# Patient Record
Sex: MALE | Race: WHITE | NOT HISPANIC OR LATINO | Employment: UNEMPLOYED | URBAN - METROPOLITAN AREA
[De-identification: names, ages, dates, MRNs, and addresses within clinical notes are randomized per-mention and may not be internally consistent; named-entity substitution may affect disease eponyms.]

---

## 2022-06-29 ENCOUNTER — TELEPHONE (OUTPATIENT)
Dept: HEMATOLOGY/ONCOLOGY | Facility: CLINIC | Age: 57
End: 2022-06-29
Payer: MEDICARE

## 2022-06-29 NOTE — TELEPHONE ENCOUNTER
Pt verbalized request for mid-morning visit due to commute from home to [facility]. Pt verbalized agreement  for BMT eval with Dr Pino on 7/14, 1030am

## 2022-06-29 NOTE — LETTER
Fax Transmission   Date:   22      To:     Danielle Gen; pathology                             From:  Eduardo Cevallos, BMT Lay Navigator   Fax:  [phone]                   Fax:      [phone]   Phone:      [phone] Phone:  [phone] (Lay Navigator)               [phone] (Pathology lab)       TIME SENSITIVE - Appointment Scheduled for _____  IF THERE ARE ANY PROBLEMS WITH THIS TRANSMISSION, PLEASE CALL IMMEDIATELY. THANK YOU.    Patient:  ___De Lakhani__     __[birth year]___Diagnosis: _MM_    Specimen ID: ___[id number]____  Date Collected: _22_  Type of Specimen: _________________        Please send the following:   Entire case, including  o All glass slides   o 10 unstained slides of tumor   Pathology reports (to include results of cytogenetic, molecular studies, next gen sequencing, flow cytometry, FISH and other tests, if any)    PLEASE FAX PATH REPORT -[phone] AT THE TIME OF SHIPMENT AS WELL AS SENDING COPY OF REPORT WITH SPECIMEN.    See attached FedEx label to use for shipping, tracking number ___________    Shipping to:  [facility]  [facility] 4th floor  [address] [place] [address]    CONFIDENTIALITY NOTICE: The accompanying facsimile is intended solely for the use of the recipient designated above. Document(s) transmitted herewith may contain information that is confidential and privileged. Delivery, distribution or dissemination of this communication other than to the intended recipient is strictly prohibited. If you have received this facsimile in error, please notify [facility]'s Corporate Integrity Department immediately by telephone at [phone].

## 2022-06-29 NOTE — TELEPHONE ENCOUNTER
----- Message from Deepak Townsend sent at 6/28/2022 11:44 AM CDT -----  Regarding: RE: SCT Benefits check  Good afternoon Malgorzata,    I have verified medical and transplant benefits for patient at [facility].    Thank you  Deepak    ----- Message -----  From: Malgorzata Schwartz RN  Sent: 6/28/2022  10:54 AM CDT  To: Armaan Lares, Deepak Townsend  Subject: SCT Benefits check                               Hello,    Please complete benefit check for patient. Medical benefit check and transplant benefit check for [facility] only.    Auto    Thank you,  Malgorzata

## 2022-06-30 DIAGNOSIS — C90.00 MULTIPLE MYELOMA, REMISSION STATUS UNSPECIFIED: Primary | ICD-10-CM

## 2022-07-06 ENCOUNTER — LAB VISIT (OUTPATIENT)
Dept: LAB | Facility: HOSPITAL | Age: 57
End: 2022-07-06
Payer: MEDICARE

## 2022-07-06 DIAGNOSIS — C90.00 MULTIPLE MYELOMA, REMISSION STATUS UNSPECIFIED: ICD-10-CM

## 2022-07-06 LAB
COMMENT: NORMAL
FINAL PATHOLOGIC DIAGNOSIS: NORMAL
GROSS: NORMAL
Lab: NORMAL
MICROSCOPIC EXAM: NORMAL

## 2022-07-06 PROCEDURE — 88321 CONSLTJ&REPRT SLD PREP ELSWR: CPT | Performed by: PATHOLOGY

## 2022-07-06 PROCEDURE — 88325 PR  COMPREHENSIVE REVIEW OF DATA: ICD-10-PCS | Mod: ,,, | Performed by: PATHOLOGY

## 2022-07-06 PROCEDURE — 88325 CONSLTJ COMPRE RVW REC REPRT: CPT | Performed by: PATHOLOGY

## 2022-07-06 PROCEDURE — 88325 CONSLTJ COMPRE RVW REC REPRT: CPT | Mod: ,,, | Performed by: PATHOLOGY

## 2022-07-14 ENCOUNTER — TELEPHONE (OUTPATIENT)
Dept: HEMATOLOGY/ONCOLOGY | Facility: CLINIC | Age: 57
End: 2022-07-14
Payer: MEDICARE

## 2022-07-14 NOTE — TELEPHONE ENCOUNTER
Spoke with pt regarding his appointments scheduled for 7/14/22. He stated that he admitted at [facility] since 7/7/22 due to hisk idneys. He states that he spoke with April Schwartz regarding his appointments. I sent a secure message to April Schwartz and Carol Pino MD regarding this conversation.

## 2022-07-26 NOTE — PROGRESS NOTES
HEMATOLOGY ONCOLOGY FELLOW CLINIC    HISTORY OF PRESENT ILLNESS:      Mr. De Lakhani is a 57 year old male with hypertension, history of prosthetic joint infection who was referred by Dr. Jonh Bunch for transplant evaluation for high-risk kappa light chain multiple myeloma with 1q gain.     Mr. Lakhani was diagnosed with multiple myeloma in April 2022 after presenting with ASHLEE. 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 He had bone marrow biopsy which showed 100% involvement by plasma cells. He was started on CyBorD on 5/18 and received two cycles of treatment. His bone marrow biopsy after the first cycle showed a decrease in his plasma cell involvement to 80-90%. Dr. Bunch then started DVRd on 7/5/22. However, he was admitted to the hospital on 7/7/22 with septic shock requiring ICU care. He improved with antibiotics but had an ASHLEE thought to be due to hypotension which resulted in renal failure. He is now dialysis dependent. He saw Dr. Bunch after he was discharged from the hospital and started back on DVRd with dose reduced lenalidomide on 7/25/22.     Oncology history:  4/20/22: renal biopsy: light chain cast nephropathy, kappa light chain  4/26/22: right subclavian vein thrombus   4/27/22: M spike 0.2 with free monoclonal kappa band. B2mg 19.57, IgG 496, IgA 10, IgM <5. Kappa 40628, Lambda 7.9, ratio >1000  5/12/22: BMBx: plasma cell myeloma, marrow cellularity 100%, plasma cell percentage 100%. Plasma cell phenotype +, kappa +, CD20- -, CD30-, EMA-, SADAF-, Congo red negative. Peripheral blood with pancytopenia and no circulating blasts. Decreased storage iron. FISH canceled due to quantity not sufficient  5/18/22: CyBorD C1D1  5/23/22: rasburicase  5/24/22: Xgeva  6/6/22: Kappa 62495, lambda 4.0, ratio >1000, M spike 0.1 with free monoclonal kappa band present  6/6/22: CyBorD C2D1  6/9/22: BMBx Plasma cell neoplasm compatible with plasma cell myeloma. Extent of involvement  80-90% of bone marrow elements. Cytology: Plasmablastic. FISH cytogenetics positive for 1q21/CKS1B gain. Karyotype failed. Myelofibrosis diffuse (MF-3)  6/20/22: CyBorD C2D8 delayed due to covid  6/23/22: CyBOrD C2D11  7/5/22: C1D1 DVRd  7/7/22 - 7/18/22: hospital admission for septic shock resulting in renal failure  7/25/22: started again on DVRd    Patient had a left knee prosthetic joint infection in the summer of 2021. In March, still had bacteria in joint space, now on prophylactic doxycycline.   He had a colonoscopy 8 years ago which he thinks was clear.     Past Medical History:   Diagnosis Date    Chronic infection of prosthetic knee, subsequent encounter     Encounter for blood transfusion     Essential (primary) hypertension     Multiple myeloma        History reviewed. No pertinent family history.    Social History     Socioeconomic History    Marital status:    Tobacco Use    Smoking status: Former Smoker    Smokeless tobacco: Never Used    Tobacco comment: quit smoking in the 90s         MEDICATIONS:     Current Outpatient Medications on File Prior to Visit   Medication Sig Dispense Refill    acyclovir (ZOVIRAX) 400 MG tablet 1 tablet      calcium carbonate (TUMS) 200 mg calcium (500 mg) chewable tablet Take 1,500 mg by mouth once daily.      dapsone 100 MG Tab Take 100 mg by mouth once daily.      dexAMETHasone (DECADRON) 4 MG Tab TAKE 10 TABLETS BY MOUTH ON DAYS 1, 8, AND 15 OF A 21 DAY CYCLE TAKE WITH FOOD IN THE MORNING (AFTER BREAKFAST)      doxycycline (VIBRAMYCIN) 100 MG Cap 1 capsule      famotidine (PEPCID) 20 MG tablet Take 20 mg by mouth once daily.      INV CYPROHEPTADINE HCL 1MG/2MG/PLACEBO CAPSULE Take 2 capsules by mouth 2 (two) times daily. FOR INVESTIGATIONAL USE ONLY      mv-mn/iron/folic acid/herb 190 (VITAMIN D3 COMPLETE ORAL) Take by mouth.      ondansetron (ZOFRAN) 4 MG tablet TAKE ONE TABLET BY MOUTH EVERY EIGHT HOURS IF NEEDED FOR NAUSEA.       "oxyCODONE-acetaminophen (PERCOCET) 5-325 mg per tablet TAKE ONE TABLET BY MOUTH EVERY 6 HOURS IF NEEDED FOR SEVERE PAIN      promethazine (PHENERGAN) 12.5 MG Tab TAKE ONE TABLET BY MOUTH EVERY 6 HOURS IF NEEDED FOR NAUSEA 2ND CHOICE      REVLIMID 10 mg Cap       rosuvastatin (CRESTOR) 20 MG tablet Take 20 mg by mouth once daily.      scopolamine (TRANSDERM-SCOP) 1.3-1.5 mg (1 mg over 3 days) APPLY ONE PATCH TO SKIN AS DIRECTED AND REPLACE EVERY 3 DAYS.      sertraline (ZOLOFT) 50 MG tablet Take 50 mg by mouth once daily.      traMADoL (ULTRAM) 50 mg tablet TAKE ONE TABLET BY MOUTH EVERY 6 HOURS IF NEEDED FOR MODERATE PAIN       No current facility-administered medications on file prior to visit.       ALLERGIES: Review of patient's allergies indicates:  No Known Allergies     ROS:       Review of Systems   Constitutional: Negative for chills and fever.   HENT:   Negative for nosebleeds and sore throat.    Respiratory: Negative for cough and shortness of breath.    Cardiovascular: Negative for chest pain and palpitations.   Gastrointestinal: Negative for abdominal pain, diarrhea, nausea and vomiting.   Genitourinary: Negative for dysuria and hematuria.    Musculoskeletal: Negative for back pain and neck pain.   Skin: Negative for rash and wound.   Neurological: Negative for headaches and light-headedness.   Hematological: Negative for adenopathy. Does not bruise/bleed easily.   Psychiatric/Behavioral: Negative for depression. The patient is not nervous/anxious.        PHYSICAL EXAM:  Vitals:    07/28/22 0912   BP: (!) 148/75   Pulse: 70   Resp: 16   SpO2: 100%   Weight: 105.6 kg (232 lb 12.9 oz)   Height: 5' 11" (1.803 m)   PainSc:   8   PainLoc: Knee       Physical Exam  Constitutional:       General: He is not in acute distress.     Appearance: He is well-developed. He is not diaphoretic.   HENT:      Head: Normocephalic and atraumatic.      Mouth/Throat:      Comments: No macroglossia. No dental caries " seen  Eyes:      General: No scleral icterus.     Conjunctiva/sclera: Conjunctivae normal.   Cardiovascular:      Rate and Rhythm: Normal rate and regular rhythm.      Heart sounds: Normal heart sounds. No murmur heard.    No friction rub. No gallop.   Pulmonary:      Effort: Pulmonary effort is normal. No respiratory distress.      Breath sounds: Normal breath sounds. No wheezing or rales.   Abdominal:      General: Bowel sounds are normal. There is no distension.      Palpations: Abdomen is soft.      Tenderness: There is no abdominal tenderness. There is no guarding.   Musculoskeletal:         General: Normal range of motion.      Cervical back: Normal range of motion and neck supple.      Comments: Right chest wall with tunneled dialysis catheter. Dressing clean and intact   Lymphadenopathy:      Cervical: No cervical adenopathy.   Skin:     General: Skin is warm and dry.      Findings: No rash.   Neurological:      Mental Status: He is alert and oriented to person, place, and time.   Psychiatric:         Behavior: Behavior normal.         LAB:   Results for orders placed or performed in visit on 07/06/22   Specimen to Pathology Other   Result Value Ref Range    Final Pathologic Diagnosis       [id number] (collected on 05/12/2022 close  BONE MARROW ASPIRATE, BONE MARROW CLOT, AND BONE MARROW CORE BIOPSY WITH:  CELLULARITY= 100%.  CONSISTENT WITH PLASMA CELL NEOPLASM(>90%).  SEE COMMENT.  CONGO RED NEGATIVE.  STORAGE IRON NOT IDENTIFIED.      Gross       Received 27 outside slides and 2 paraffin blocks labeled as [id number].    Microscopic Exam       CBC DATA  05/12/2022:  RBC:  2.68 M/UL,  H/H :  8.1/24,  MCV :  89.9 FL, WBC:   2.6 K/UL, Platelet:  82 K/UL.  Peripheral blood smear shows pancytopenia.  BONE MARROW ASPIRATE:  No bone marrow spicules are seen.  Scattered plasma cells are noted.  BONE MARROW CLOT:  Inadequate.  No bone marrow spicules are seen.  Stainable iron is not  identified.  BONE MARROW CORE  BIOPSY:  Cellularity is 100 % with mostly plasma cells.  Stainable iron is not  identified.  Occasional megakaryocytes are seen.  Special stain for Congo red  is negative.      Comment       A comprehensive review of records that included laboratory results was  performed to assist in the diagnostic assessment of the case.  -Flow cytometry analysis of bone marrow was canceled due to low cell  viability.  -FISH analysis for plasma cell myeloma panel was canceled due to quantity not  sufficient.  -Immunohistochemical studies were performed by referring institution and  reviewed by [facility] with adequate positive and negative  controls.  More than 90% of plasma cells ( positive, CD3 negative, CD20  negative, PAX5 negative, CD30 negative, CD34 negative,  negative, EMA  negative).  In Situ hybridization and immunohistochemical studies for kappa  and lambda shows kappa light chain restricted plasma cell population.  In  Situ hybridization for EBV is negative.  Findings are consistent with plasma cell neoplasm.  Correlate clinically.      Disclaimer       Unless the case is a 'gross only' or additional testing only, the final  diagnosis for each specimen is based on a microscopic examination of  appropriate tissue sections.         PROBLEMS ASSESSED THIS VISIT:    1. Multiple myeloma not having achieved remission    2. Chronic infection of knee joint prosthesis, subsequent encounter        ASSESSMENT AND PLAN    Multiple myeloma, high-risk, kappa light chain  High risk due to 1q gain  R2-ISS Stage III    - will check CBC, CMP, SPEP, GRACE, FLC, beta 2 microglobulin, LDH, quantitative immunoglobulins, HIV, hepatitis studies, MPN panel, and bcr-abl today  - patient met with transplant coordinator today  - would proceed with DVRd in the meantime. Recommend weekly bortezomib rather than twice weekly bortezomib  - will follow up with patient virtually in 6 weeks with repeat myeloma labs drawn  locally    Discussed with Dr. Christine Pino, DO  PGY-IV  Hematology/Oncology Fellow    Route Chart for Scheduling    BMT Chart Routing      Follow up with physician . F/u with me in 6 weeks virtually   Follow up with QIAN    Infusion scheduling note    Injection scheduling note    Labs CBC, CMP, immunofixation, immunoglobulins and free light chains   Lab interval:  Labs today: CBC, CMP, SPEP, GRACE, FLC, quant immunoglobulins, beta 2 microglobulin, LDH, MPN with reflex, BCR-abl, hepatitis studies, HIV.   Labs in 6 weeks, at least 3 days prior to follow up visit: CBC, CMP, SPEP, GRACE, FLC   Imaging    Pharmacy appointment    Other referrals

## 2022-07-28 ENCOUNTER — LAB VISIT (OUTPATIENT)
Dept: LAB | Facility: HOSPITAL | Age: 57
End: 2022-07-28
Payer: MEDICARE

## 2022-07-28 ENCOUNTER — OFFICE VISIT (OUTPATIENT)
Dept: HEMATOLOGY/ONCOLOGY | Facility: CLINIC | Age: 57
End: 2022-07-28
Payer: MEDICARE

## 2022-07-28 VITALS
DIASTOLIC BLOOD PRESSURE: 75 MMHG | RESPIRATION RATE: 16 BRPM | BODY MASS INDEX: 32.59 KG/M2 | WEIGHT: 232.81 LBS | HEIGHT: 71 IN | SYSTOLIC BLOOD PRESSURE: 148 MMHG | OXYGEN SATURATION: 100 % | HEART RATE: 70 BPM

## 2022-07-28 DIAGNOSIS — C90.00 MULTIPLE MYELOMA NOT HAVING ACHIEVED REMISSION: Primary | ICD-10-CM

## 2022-07-28 DIAGNOSIS — C90.00 MULTIPLE MYELOMA, REMISSION STATUS UNSPECIFIED: ICD-10-CM

## 2022-07-28 DIAGNOSIS — Z12.5 SCREENING FOR PROSTATE CANCER: ICD-10-CM

## 2022-07-28 DIAGNOSIS — Z76.82 STEM CELL TRANSPLANT CANDIDATE: Primary | ICD-10-CM

## 2022-07-28 DIAGNOSIS — Z96.659 CHRONIC INFECTION OF KNEE JOINT PROSTHESIS, SUBSEQUENT ENCOUNTER: ICD-10-CM

## 2022-07-28 DIAGNOSIS — T84.59XD CHRONIC INFECTION OF KNEE JOINT PROSTHESIS, SUBSEQUENT ENCOUNTER: ICD-10-CM

## 2022-07-28 DIAGNOSIS — Z76.82 STEM CELL TRANSPLANT CANDIDATE: ICD-10-CM

## 2022-07-28 PROBLEM — T84.59XA CHRONIC INFECTION OF PROSTHETIC KNEE: Status: ACTIVE | Noted: 2022-07-28

## 2022-07-28 LAB
ALBUMIN SERPL BCP-MCNC: 3.8 G/DL (ref 3.5–5.2)
ALP SERPL-CCNC: 105 U/L (ref 55–135)
ALT SERPL W/O P-5'-P-CCNC: 18 U/L (ref 10–44)
ANION GAP SERPL CALC-SCNC: 11 MMOL/L (ref 8–16)
AST SERPL-CCNC: 15 U/L (ref 10–40)
B2 MICROGLOB SERPL-MCNC: 17.5 UG/ML (ref 0–2.5)
BASOPHILS # BLD AUTO: 0.02 K/UL (ref 0–0.2)
BASOPHILS NFR BLD: 0.8 % (ref 0–1.9)
BILIRUB SERPL-MCNC: 0.9 MG/DL (ref 0.1–1)
BUN SERPL-MCNC: 21 MG/DL (ref 6–20)
CALCIUM SERPL-MCNC: 7.8 MG/DL (ref 8.7–10.5)
CHLORIDE SERPL-SCNC: 106 MMOL/L (ref 95–110)
CO2 SERPL-SCNC: 23 MMOL/L (ref 23–29)
COMPLEXED PSA SERPL-MCNC: 0.35 NG/ML (ref 0–4)
CREAT SERPL-MCNC: 4.6 MG/DL (ref 0.5–1.4)
DIFFERENTIAL METHOD: ABNORMAL
EOSINOPHIL # BLD AUTO: 0 K/UL (ref 0–0.5)
EOSINOPHIL NFR BLD: 0.4 % (ref 0–8)
ERYTHROCYTE [DISTWIDTH] IN BLOOD BY AUTOMATED COUNT: 18.7 % (ref 11.5–14.5)
EST. GFR  (AFRICAN AMERICAN): 15.1 ML/MIN/1.73 M^2
EST. GFR  (NON AFRICAN AMERICAN): 13.1 ML/MIN/1.73 M^2
GLUCOSE SERPL-MCNC: 87 MG/DL (ref 70–110)
HBV CORE AB SERPL QL IA: NEGATIVE
HBV SURFACE AB SER-ACNC: POSITIVE M[IU]/ML
HBV SURFACE AG SERPL QL IA: NEGATIVE
HCT VFR BLD AUTO: 24 % (ref 40–54)
HCV AB SERPL QL IA: NEGATIVE
HGB BLD-MCNC: 7.6 G/DL (ref 14–18)
HIV 1+2 AB+HIV1 P24 AG SERPL QL IA: NEGATIVE
IGA SERPL-MCNC: 10 MG/DL (ref 40–350)
IGG SERPL-MCNC: 381 MG/DL (ref 650–1600)
IGM SERPL-MCNC: 9 MG/DL (ref 50–300)
IMM GRANULOCYTES # BLD AUTO: 0.01 K/UL (ref 0–0.04)
IMM GRANULOCYTES NFR BLD AUTO: 0.4 % (ref 0–0.5)
LYMPHOCYTES # BLD AUTO: 0.1 K/UL (ref 1–4.8)
LYMPHOCYTES NFR BLD: 3.6 % (ref 18–48)
MCH RBC QN AUTO: 30.4 PG (ref 27–31)
MCHC RBC AUTO-ENTMCNC: 31.7 G/DL (ref 32–36)
MCV RBC AUTO: 96 FL (ref 82–98)
MONOCYTES # BLD AUTO: 0.3 K/UL (ref 0.3–1)
MONOCYTES NFR BLD: 11.1 % (ref 4–15)
NEUTROPHILS # BLD AUTO: 2.1 K/UL (ref 1.8–7.7)
NEUTROPHILS NFR BLD: 83.7 % (ref 38–73)
NRBC BLD-RTO: 0 /100 WBC
PLATELET # BLD AUTO: 102 K/UL (ref 150–450)
PMV BLD AUTO: 11.7 FL (ref 9.2–12.9)
POTASSIUM SERPL-SCNC: 3.6 MMOL/L (ref 3.5–5.1)
PROT SERPL-MCNC: 6 G/DL (ref 6–8.4)
RBC # BLD AUTO: 2.5 M/UL (ref 4.6–6.2)
SODIUM SERPL-SCNC: 140 MMOL/L (ref 136–145)
WBC # BLD AUTO: 2.53 K/UL (ref 3.9–12.7)

## 2022-07-28 PROCEDURE — 99999 PR PBB SHADOW E&M-EST. PATIENT-LVL IV: CPT | Mod: PBBFAC,GC,, | Performed by: STUDENT IN AN ORGANIZED HEALTH CARE EDUCATION/TRAINING PROGRAM

## 2022-07-28 PROCEDURE — 99999 PR PBB SHADOW E&M-EST. PATIENT-LVL IV: ICD-10-PCS | Mod: PBBFAC,GC,, | Performed by: STUDENT IN AN ORGANIZED HEALTH CARE EDUCATION/TRAINING PROGRAM

## 2022-07-28 PROCEDURE — 82784 ASSAY IGA/IGD/IGG/IGM EACH: CPT | Performed by: STUDENT IN AN ORGANIZED HEALTH CARE EDUCATION/TRAINING PROGRAM

## 2022-07-28 PROCEDURE — 3008F PR BODY MASS INDEX (BMI) DOCUMENTED: ICD-10-PCS | Mod: CPTII,GC,S$GLB, | Performed by: STUDENT IN AN ORGANIZED HEALTH CARE EDUCATION/TRAINING PROGRAM

## 2022-07-28 PROCEDURE — 80053 COMPREHEN METABOLIC PANEL: CPT | Performed by: STUDENT IN AN ORGANIZED HEALTH CARE EDUCATION/TRAINING PROGRAM

## 2022-07-28 PROCEDURE — 3077F SYST BP >= 140 MM HG: CPT | Mod: CPTII,GC,S$GLB, | Performed by: STUDENT IN AN ORGANIZED HEALTH CARE EDUCATION/TRAINING PROGRAM

## 2022-07-28 PROCEDURE — 99205 OFFICE O/P NEW HI 60 MIN: CPT | Mod: GC,S$GLB,, | Performed by: STUDENT IN AN ORGANIZED HEALTH CARE EDUCATION/TRAINING PROGRAM

## 2022-07-28 PROCEDURE — 81207 BCR/ABL1 GENE MINOR BP: CPT | Performed by: STUDENT IN AN ORGANIZED HEALTH CARE EDUCATION/TRAINING PROGRAM

## 2022-07-28 PROCEDURE — 86704 HEP B CORE ANTIBODY TOTAL: CPT | Performed by: STUDENT IN AN ORGANIZED HEALTH CARE EDUCATION/TRAINING PROGRAM

## 2022-07-28 PROCEDURE — 86334 PATHOLOGIST INTERPRETATION IFE: ICD-10-PCS | Mod: 26,,, | Performed by: PATHOLOGY

## 2022-07-28 PROCEDURE — 86334 IMMUNOFIX E-PHORESIS SERUM: CPT | Performed by: STUDENT IN AN ORGANIZED HEALTH CARE EDUCATION/TRAINING PROGRAM

## 2022-07-28 PROCEDURE — 83520 IMMUNOASSAY QUANT NOS NONAB: CPT | Mod: 59 | Performed by: STUDENT IN AN ORGANIZED HEALTH CARE EDUCATION/TRAINING PROGRAM

## 2022-07-28 PROCEDURE — 1160F RVW MEDS BY RX/DR IN RCRD: CPT | Mod: CPTII,GC,S$GLB, | Performed by: STUDENT IN AN ORGANIZED HEALTH CARE EDUCATION/TRAINING PROGRAM

## 2022-07-28 PROCEDURE — 87340 HEPATITIS B SURFACE AG IA: CPT | Performed by: STUDENT IN AN ORGANIZED HEALTH CARE EDUCATION/TRAINING PROGRAM

## 2022-07-28 PROCEDURE — 81339 MPL GENE SEQ ALYS EXON 10: CPT | Performed by: STUDENT IN AN ORGANIZED HEALTH CARE EDUCATION/TRAINING PROGRAM

## 2022-07-28 PROCEDURE — 85025 COMPLETE CBC W/AUTO DIFF WBC: CPT | Performed by: STUDENT IN AN ORGANIZED HEALTH CARE EDUCATION/TRAINING PROGRAM

## 2022-07-28 PROCEDURE — 1159F MED LIST DOCD IN RCRD: CPT | Mod: CPTII,GC,S$GLB, | Performed by: STUDENT IN AN ORGANIZED HEALTH CARE EDUCATION/TRAINING PROGRAM

## 2022-07-28 PROCEDURE — 84153 ASSAY OF PSA TOTAL: CPT | Performed by: STUDENT IN AN ORGANIZED HEALTH CARE EDUCATION/TRAINING PROGRAM

## 2022-07-28 PROCEDURE — 81206 BCR/ABL1 GENE MAJOR BP: CPT | Performed by: STUDENT IN AN ORGANIZED HEALTH CARE EDUCATION/TRAINING PROGRAM

## 2022-07-28 PROCEDURE — 3077F PR MOST RECENT SYSTOLIC BLOOD PRESSURE >= 140 MM HG: ICD-10-PCS | Mod: CPTII,GC,S$GLB, | Performed by: STUDENT IN AN ORGANIZED HEALTH CARE EDUCATION/TRAINING PROGRAM

## 2022-07-28 PROCEDURE — 84165 PATHOLOGIST INTERPRETATION SPE: ICD-10-PCS | Mod: 26,,, | Performed by: PATHOLOGY

## 2022-07-28 PROCEDURE — 86803 HEPATITIS C AB TEST: CPT | Performed by: STUDENT IN AN ORGANIZED HEALTH CARE EDUCATION/TRAINING PROGRAM

## 2022-07-28 PROCEDURE — 87389 HIV-1 AG W/HIV-1&-2 AB AG IA: CPT | Performed by: STUDENT IN AN ORGANIZED HEALTH CARE EDUCATION/TRAINING PROGRAM

## 2022-07-28 PROCEDURE — 84165 PROTEIN E-PHORESIS SERUM: CPT | Mod: 26,,, | Performed by: PATHOLOGY

## 2022-07-28 PROCEDURE — 3008F BODY MASS INDEX DOCD: CPT | Mod: CPTII,GC,S$GLB, | Performed by: STUDENT IN AN ORGANIZED HEALTH CARE EDUCATION/TRAINING PROGRAM

## 2022-07-28 PROCEDURE — 81270 JAK2 GENE: CPT | Performed by: STUDENT IN AN ORGANIZED HEALTH CARE EDUCATION/TRAINING PROGRAM

## 2022-07-28 PROCEDURE — 82232 ASSAY OF BETA-2 PROTEIN: CPT | Performed by: STUDENT IN AN ORGANIZED HEALTH CARE EDUCATION/TRAINING PROGRAM

## 2022-07-28 PROCEDURE — 36415 COLL VENOUS BLD VENIPUNCTURE: CPT | Performed by: STUDENT IN AN ORGANIZED HEALTH CARE EDUCATION/TRAINING PROGRAM

## 2022-07-28 PROCEDURE — 4010F ACE/ARB THERAPY RXD/TAKEN: CPT | Mod: CPTII,GC,S$GLB, | Performed by: STUDENT IN AN ORGANIZED HEALTH CARE EDUCATION/TRAINING PROGRAM

## 2022-07-28 PROCEDURE — 81219 CALR GENE COM VARIANTS: CPT | Performed by: STUDENT IN AN ORGANIZED HEALTH CARE EDUCATION/TRAINING PROGRAM

## 2022-07-28 PROCEDURE — 1160F PR REVIEW ALL MEDS BY PRESCRIBER/CLIN PHARMACIST DOCUMENTED: ICD-10-PCS | Mod: CPTII,GC,S$GLB, | Performed by: STUDENT IN AN ORGANIZED HEALTH CARE EDUCATION/TRAINING PROGRAM

## 2022-07-28 PROCEDURE — 86706 HEP B SURFACE ANTIBODY: CPT | Performed by: STUDENT IN AN ORGANIZED HEALTH CARE EDUCATION/TRAINING PROGRAM

## 2022-07-28 PROCEDURE — 3078F PR MOST RECENT DIASTOLIC BLOOD PRESSURE < 80 MM HG: ICD-10-PCS | Mod: CPTII,GC,S$GLB, | Performed by: STUDENT IN AN ORGANIZED HEALTH CARE EDUCATION/TRAINING PROGRAM

## 2022-07-28 PROCEDURE — 1159F PR MEDICATION LIST DOCUMENTED IN MEDICAL RECORD: ICD-10-PCS | Mod: CPTII,GC,S$GLB, | Performed by: STUDENT IN AN ORGANIZED HEALTH CARE EDUCATION/TRAINING PROGRAM

## 2022-07-28 PROCEDURE — 3078F DIAST BP <80 MM HG: CPT | Mod: CPTII,GC,S$GLB, | Performed by: STUDENT IN AN ORGANIZED HEALTH CARE EDUCATION/TRAINING PROGRAM

## 2022-07-28 PROCEDURE — 4010F PR ACE/ARB THEARPY RXD/TAKEN: ICD-10-PCS | Mod: CPTII,GC,S$GLB, | Performed by: STUDENT IN AN ORGANIZED HEALTH CARE EDUCATION/TRAINING PROGRAM

## 2022-07-28 PROCEDURE — 86334 IMMUNOFIX E-PHORESIS SERUM: CPT | Mod: 26,,, | Performed by: PATHOLOGY

## 2022-07-28 PROCEDURE — 84165 PROTEIN E-PHORESIS SERUM: CPT | Performed by: STUDENT IN AN ORGANIZED HEALTH CARE EDUCATION/TRAINING PROGRAM

## 2022-07-28 PROCEDURE — 99205 PR OFFICE/OUTPT VISIT, NEW, LEVL V, 60-74 MIN: ICD-10-PCS | Mod: GC,S$GLB,, | Performed by: STUDENT IN AN ORGANIZED HEALTH CARE EDUCATION/TRAINING PROGRAM

## 2022-07-28 RX ORDER — LENALIDOMIDE 10 MG/1
CAPSULE ORAL
COMMUNITY
Start: 2022-06-15 | End: 2022-11-14

## 2022-07-28 RX ORDER — FAMOTIDINE 20 MG/1
20 TABLET, FILM COATED ORAL DAILY
COMMUNITY
Start: 2022-05-18

## 2022-07-28 RX ORDER — DAPSONE 100 MG/1
100 TABLET ORAL DAILY
Status: ON HOLD | COMMUNITY
Start: 2022-05-18 | End: 2023-01-13 | Stop reason: HOSPADM

## 2022-07-28 RX ORDER — TRAMADOL HYDROCHLORIDE 50 MG/1
TABLET ORAL
COMMUNITY
Start: 2022-03-11 | End: 2022-12-12

## 2022-07-28 RX ORDER — ACYCLOVIR 400 MG/1
400 TABLET ORAL 2 TIMES DAILY
Status: ON HOLD | COMMUNITY
End: 2023-01-13 | Stop reason: HOSPADM

## 2022-07-28 RX ORDER — PROMETHAZINE HYDROCHLORIDE 12.5 MG/1
TABLET ORAL
Status: ON HOLD | COMMUNITY
Start: 2022-05-18 | End: 2023-01-13 | Stop reason: HOSPADM

## 2022-07-28 RX ORDER — SERTRALINE HYDROCHLORIDE 50 MG/1
50 TABLET, FILM COATED ORAL DAILY
COMMUNITY
Start: 2022-06-20

## 2022-07-28 RX ORDER — CALCIUM CARBONATE 200(500)MG
1000 TABLET,CHEWABLE ORAL DAILY
Status: ON HOLD | COMMUNITY
End: 2023-01-13 | Stop reason: HOSPADM

## 2022-07-28 RX ORDER — DEXAMETHASONE 4 MG/1
TABLET ORAL
COMMUNITY
Start: 2022-06-30 | End: 2022-12-28 | Stop reason: ALTCHOICE

## 2022-07-28 RX ORDER — SCOLOPAMINE TRANSDERMAL SYSTEM 1 MG/1
1 PATCH, EXTENDED RELEASE TRANSDERMAL
COMMUNITY
Start: 2022-06-23

## 2022-07-28 RX ORDER — ROSUVASTATIN CALCIUM 20 MG/1
20 TABLET, COATED ORAL DAILY
COMMUNITY
Start: 2022-06-22

## 2022-07-28 RX ORDER — ONDANSETRON 4 MG/1
TABLET, FILM COATED ORAL
COMMUNITY
Start: 2022-04-30

## 2022-07-28 RX ORDER — DOXYCYCLINE 100 MG/1
100 CAPSULE ORAL EVERY 12 HOURS
COMMUNITY

## 2022-07-28 RX ORDER — OXYCODONE AND ACETAMINOPHEN 5; 325 MG/1; MG/1
TABLET ORAL
Status: ON HOLD | COMMUNITY
Start: 2022-03-11 | End: 2022-12-28

## 2022-07-28 NOTE — Clinical Note
Dr. Bunch,   I saw this patient of yours with Dr. King today in clinic. One of our transplant coordinators met with him today and we will follow up with Mr. Lakhani in about 6 weeks. We recommend continuing with DVRd for now with weekly bortezomib.  Thank you!  Carol Pino,  PGY-V Hematology/Oncology Fellow

## 2022-07-29 LAB
ALBUMIN SERPL ELPH-MCNC: 3.92 G/DL (ref 3.35–5.55)
ALPHA1 GLOB SERPL ELPH-MCNC: 0.35 G/DL (ref 0.17–0.41)
ALPHA2 GLOB SERPL ELPH-MCNC: 0.71 G/DL (ref 0.43–0.99)
B-GLOBULIN SERPL ELPH-MCNC: 0.47 G/DL (ref 0.5–1.1)
GAMMA GLOB SERPL ELPH-MCNC: 0.35 G/DL (ref 0.67–1.58)
INTERPRETATION SERPL IFE-IMP: NORMAL
KAPPA LC SER QL IA: 214.88 MG/DL (ref 0.33–1.94)
KAPPA LC/LAMBDA SER IA: 298.4 (ref 0.26–1.65)
LAMBDA LC SER QL IA: 0.72 MG/DL (ref 0.57–2.63)
PROT SERPL-MCNC: 5.8 G/DL (ref 6–8.4)

## 2022-08-01 LAB
PATHOLOGIST INTERPRETATION IFE: NORMAL
PATHOLOGIST INTERPRETATION SPE: NORMAL

## 2022-08-02 LAB
DIAGNOSTIC BCR/ABL 1 RESULT: NORMAL
NARRATIVE DIAGNOSTIC REPORT-IMP: NORMAL
SPECIMEN TYPE, BCR/ABL: NORMAL

## 2022-08-03 DIAGNOSIS — C90.00 MULTIPLE MYELOMA, REMISSION STATUS UNSPECIFIED: Primary | ICD-10-CM

## 2022-08-04 LAB
MPNR  FINAL DIAGNOSIS: NORMAL
MPNR  SPECIMEN TYPE: NORMAL
MPNR RESULT: NORMAL

## 2022-08-23 ENCOUNTER — TELEPHONE (OUTPATIENT)
Dept: HEMATOLOGY/ONCOLOGY | Facility: CLINIC | Age: 57
End: 2022-08-23
Payer: MEDICARE

## 2022-08-23 NOTE — TELEPHONE ENCOUNTER
Spoke with patient wife and confirmed that patient is having bmbx on Thursday morning. She stated that Dr. Bunch advised her to let us know, so we are in the loop. Notified that Dr. Pino is out of the clinic and that I will update the team.

## 2022-08-23 NOTE — TELEPHONE ENCOUNTER
----- Message from Yesenia Rea RN sent at 8/23/2022  8:52 AM CDT -----  Contact: Patient wife    ----- Message -----  From: Elizabeth Hernandez  Sent: 8/23/2022   8:45 AM CDT  To: , #    ATTN:Dr.Chung Medina    Patient wife call in to inform office of      Patient wife stated patient having another bone marrow biopsy Thursday morning by      Please assist    Patient wife can be reach at [phone]

## 2022-08-25 ENCOUNTER — TELEPHONE (OUTPATIENT)
Dept: HEMATOLOGY/ONCOLOGY | Facility: CLINIC | Age: 57
End: 2022-08-25
Payer: MEDICARE

## 2022-08-25 NOTE — TELEPHONE ENCOUNTER
"----- Message from Suresh Eden sent at 8/25/2022  1:22 PM CDT -----  Consult/Advisory:          Name Of Caller: JAZMYNE JUAN (Spouse)       Contact Preference?:  [phone] (Mobile)      Provider Name: Pino      Does patient feel the need to be seen today? No      What is the nature of the call?: Calling to speak w/ nurse about pt candidacy          Additional Notes:  "Thank you for all that you do for our patients"      "

## 2022-08-25 NOTE — TELEPHONE ENCOUNTER
Wife wanted to discuss whether it would be better to switch to peritoneal dialysis vs remaining on hemodialysis. Discussed that it would be best to make this decision with their local nephrologist however if they did pursue peritoneal it may offer another potential site of infection during the transplant process which would not be ideal. Also wanted to discuss how HD would happen during the chandrakant-transplant time period. I was unable to answer but assured her that we would be better able to answer once the time comes closer.

## 2022-09-15 ENCOUNTER — OFFICE VISIT (OUTPATIENT)
Dept: HEMATOLOGY/ONCOLOGY | Facility: CLINIC | Age: 57
End: 2022-09-15
Payer: MEDICARE

## 2022-09-15 DIAGNOSIS — C90.00 MULTIPLE MYELOMA NOT HAVING ACHIEVED REMISSION: Primary | ICD-10-CM

## 2022-09-15 PROCEDURE — 1160F PR REVIEW ALL MEDS BY PRESCRIBER/CLIN PHARMACIST DOCUMENTED: ICD-10-PCS | Mod: CPTII,95,GC, | Performed by: STUDENT IN AN ORGANIZED HEALTH CARE EDUCATION/TRAINING PROGRAM

## 2022-09-15 PROCEDURE — 1160F RVW MEDS BY RX/DR IN RCRD: CPT | Mod: CPTII,95,GC, | Performed by: STUDENT IN AN ORGANIZED HEALTH CARE EDUCATION/TRAINING PROGRAM

## 2022-09-15 PROCEDURE — 4010F ACE/ARB THERAPY RXD/TAKEN: CPT | Mod: CPTII,95,GC, | Performed by: STUDENT IN AN ORGANIZED HEALTH CARE EDUCATION/TRAINING PROGRAM

## 2022-09-15 PROCEDURE — 1159F MED LIST DOCD IN RCRD: CPT | Mod: CPTII,95,GC, | Performed by: STUDENT IN AN ORGANIZED HEALTH CARE EDUCATION/TRAINING PROGRAM

## 2022-09-15 PROCEDURE — 4010F PR ACE/ARB THEARPY RXD/TAKEN: ICD-10-PCS | Mod: CPTII,95,GC, | Performed by: STUDENT IN AN ORGANIZED HEALTH CARE EDUCATION/TRAINING PROGRAM

## 2022-09-15 PROCEDURE — 1159F PR MEDICATION LIST DOCUMENTED IN MEDICAL RECORD: ICD-10-PCS | Mod: CPTII,95,GC, | Performed by: STUDENT IN AN ORGANIZED HEALTH CARE EDUCATION/TRAINING PROGRAM

## 2022-09-15 PROCEDURE — 99214 OFFICE O/P EST MOD 30 MIN: CPT | Mod: 95,GC,, | Performed by: STUDENT IN AN ORGANIZED HEALTH CARE EDUCATION/TRAINING PROGRAM

## 2022-09-15 PROCEDURE — 99214 PR OFFICE/OUTPT VISIT, EST, LEVL IV, 30-39 MIN: ICD-10-PCS | Mod: 95,GC,, | Performed by: STUDENT IN AN ORGANIZED HEALTH CARE EDUCATION/TRAINING PROGRAM

## 2022-09-15 NOTE — PROGRESS NOTES
The patient location is: home  The chief complaint leading to consultation is: multiple myeloma    Visit type: audiovisual    Face to Face time with patient: 20 min  30 minutes of total time spent on the encounter, which includes face to face time and non-face to face time preparing to see the patient (eg, review of tests), Obtaining and/or reviewing separately obtained history, Documenting clinical information in the electronic or other health record, Independently interpreting results (not separately reported) and communicating results to the patient/family/caregiver, or Care coordination (not separately reported).         Each patient to whom he or she provides medical services by telemedicine is:  (1) informed of the relationship between the physician and patient and the respective role of any other health care provider with respect to management of the patient; and (2) notified that he or she may decline to receive medical services by telemedicine and may withdraw from such care at any time.    Notes:      [facility]    Hematology History  Initial consult  Mr. De Lakhani is a 57 year old male with hypertension, history of prosthetic joint infection who was referred by Dr. Jonh Bunch for transplant evaluation for high-risk kappa light chain multiple myeloma with 1q gain.     Mr. Lakhani was diagnosed with multiple myeloma in April 2022 after presenting with ASHLEE. 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 He had bone marrow biopsy which showed 100% involvement by plasma cells. He was started on CyBorD on 5/18 and received two cycles of treatment. His bone marrow biopsy after the first cycle showed a decrease in his plasma cell involvement to 80-90%. Dr. Bunch then started DVRd on 7/5/22. However, he was admitted to the hospital on 7/7/22 with septic shock requiring ICU care. He improved with antibiotics but had an ASHLEE thought to be due to hypotension which resulted in  renal failure. He is now dialysis dependent. He saw Dr. Bunch after he was discharged from the hospital and started back on DVRd with dose reduced lenalidomide on 7/25/22.     Oncology history:  4/20/22: renal biopsy: light chain cast nephropathy, kappa light chain  4/26/22: right subclavian vein thrombus   4/27/22: M spike 0.2 with free monoclonal kappa band. B2mg 19.57, IgG 496, IgA 10, IgM <5. Kappa 42328, Lambda 7.9, ratio >1000  5/12/22: BMBx: plasma cell myeloma, marrow cellularity 100%, plasma cell percentage 100%. Plasma cell phenotype +, kappa +, CD20- -, CD30-, EMA-, SADAF-, Congo red negative. Peripheral blood with pancytopenia and no circulating blasts. Decreased storage iron. FISH canceled due to quantity not sufficient  5/18/22: CyBorD C1D1  5/23/22: rasburicase  5/24/22: Xgeva  6/6/22: Kappa 57981, lambda 4.0, ratio >1000, M spike 0.1 with free monoclonal kappa band present  6/6/22: CyBorD C2D1  6/9/22: BMBx Plasma cell neoplasm compatible with plasma cell myeloma. Extent of involvement 80-90% of bone marrow elements. Cytology: Plasmablastic. FISH cytogenetics positive for 1q21/CKS1B gain. Karyotype failed. Myelofibrosis diffuse (MF-3)  6/20/22: CyBorD C2D8 delayed due to covid  6/23/22: CyBOrD C2D11  7/5/22: C1D1 DVRd  7/7/22 - 7/18/22: hospital admission for septic shock resulting in renal failure  7/25/22: started again on DVRd    Patient had a left knee prosthetic joint infection in the summer of 2021. In March, still had bacteria in joint space, now on prophylactic doxycycline.   He had a colonoscopy 8 years ago which he thinks was clear.     Interval History:  Mr. Lakhani presents today for follow up of multiple myeloma. Since last visit, he has been on DVRd and Cycle 4 will start next Monday. He is doing well with no significant side effects from treatment. No neuropathy or diarrhea. He is following with orthopedic surgery for history of left knee prosthetic joint infection. They are  planning to aspirate joint in October for monitoring. He is also following with nephrology for renal failure.     Past Medical History:   Diagnosis Date    Chronic infection of prosthetic knee, subsequent encounter     Encounter for blood transfusion     Essential (primary) hypertension     Multiple myeloma        History reviewed. No pertinent family history.    Social History     Socioeconomic History    Marital status:    Tobacco Use    Smoking status: Former    Smokeless tobacco: Never    Tobacco comments:     quit smoking in the 90s         MEDICATIONS:     Current Outpatient Medications on File Prior to Visit   Medication Sig Dispense Refill    acyclovir (ZOVIRAX) 400 MG tablet 1 tablet      calcium carbonate (TUMS) 200 mg calcium (500 mg) chewable tablet Take 1,500 mg by mouth once daily.      dapsone 100 MG Tab Take 100 mg by mouth once daily.      dexAMETHasone (DECADRON) 4 MG Tab TAKE 10 TABLETS BY MOUTH ON DAYS 1, 8, AND 15 OF A 21 DAY CYCLE TAKE WITH FOOD IN THE MORNING (AFTER BREAKFAST)      doxycycline (VIBRAMYCIN) 100 MG Cap 1 capsule      famotidine (PEPCID) 20 MG tablet Take 20 mg by mouth once daily.      INV CYPROHEPTADINE HCL 1MG/2MG/PLACEBO CAPSULE Take 2 capsules by mouth 2 (two) times daily. FOR INVESTIGATIONAL USE ONLY      mv-mn/iron/folic acid/herb 190 (VITAMIN D3 COMPLETE ORAL) Take by mouth.      ondansetron (ZOFRAN) 4 MG tablet TAKE ONE TABLET BY MOUTH EVERY EIGHT HOURS IF NEEDED FOR NAUSEA.      oxyCODONE-acetaminophen (PERCOCET) 5-325 mg per tablet TAKE ONE TABLET BY MOUTH EVERY 6 HOURS IF NEEDED FOR SEVERE PAIN      promethazine (PHENERGAN) 12.5 MG Tab TAKE ONE TABLET BY MOUTH EVERY 6 HOURS IF NEEDED FOR NAUSEA 2ND CHOICE      REVLIMID 10 mg Cap       rosuvastatin (CRESTOR) 20 MG tablet Take 20 mg by mouth once daily.      scopolamine (TRANSDERM-SCOP) 1.3-1.5 mg (1 mg over 3 days) APPLY ONE PATCH TO SKIN AS DIRECTED AND REPLACE EVERY 3 DAYS.      sertraline (ZOLOFT) 50 MG  tablet Take 50 mg by mouth once daily.      traMADoL (ULTRAM) 50 mg tablet TAKE ONE TABLET BY MOUTH EVERY 6 HOURS IF NEEDED FOR MODERATE PAIN       No current facility-administered medications on file prior to visit.       ALLERGIES: Review of patient's allergies indicates:  No Known Allergies     ROS:       Review of Systems   Constitutional:  Negative for chills and fever.   HENT:   Negative for nosebleeds and sore throat.    Respiratory:  Negative for cough and shortness of breath.    Cardiovascular:  Negative for chest pain and palpitations.   Gastrointestinal:  Negative for abdominal pain, diarrhea, nausea and vomiting.   Genitourinary:  Negative for dysuria and hematuria.    Musculoskeletal:  Negative for back pain and neck pain.   Skin:  Negative for rash and wound.   Neurological:  Negative for headaches and light-headedness.   Hematological:  Negative for adenopathy. Does not bruise/bleed easily.   Psychiatric/Behavioral:  Negative for depression. The patient is not nervous/anxious.      PHYSICAL EXAM:  Virtual visit    LAB:   Results for orders placed or performed in visit on 07/28/22   Comprehensive Metabolic Panel   Result Value Ref Range    Sodium 140 136 - 145 mmol/L    Potassium 3.6 3.5 - 5.1 mmol/L    Chloride 106 95 - 110 mmol/L    CO2 23 23 - 29 mmol/L    Glucose 87 70 - 110 mg/dL    BUN 21 (H) 6 - 20 mg/dL    Creatinine 4.6 (H) 0.5 - 1.4 mg/dL    Calcium 7.8 (L) 8.7 - 10.5 mg/dL    Total Protein 6.0 6.0 - 8.4 g/dL    Albumin 3.8 3.5 - 5.2 g/dL    Total Bilirubin 0.9 0.1 - 1.0 mg/dL    Alkaline Phosphatase 105 55 - 135 U/L    AST 15 10 - 40 U/L    ALT 18 10 - 44 U/L    Anion Gap 11 8 - 16 mmol/L    eGFR if African American 15.1 (A) >60 mL/min/1.73 m^2    eGFR if non  13.1 (A) >60 mL/min/1.73 m^2   CBC Auto Differential   Result Value Ref Range    WBC 2.53 (L) 3.90 - 12.70 K/uL    RBC 2.50 (L) 4.60 - 6.20 M/uL    Hemoglobin 7.6 (L) 14.0 - 18.0 g/dL    Hematocrit 24.0 (L) 40.0 - 54.0  %    MCV 96 82 - 98 fL    MCH 30.4 27.0 - 31.0 pg    MCHC 31.7 (L) 32.0 - 36.0 g/dL    RDW 18.7 (H) 11.5 - 14.5 %    Platelets 102 (L) 150 - 450 K/uL    MPV 11.7 9.2 - 12.9 fL    Immature Granulocytes 0.4 0.0 - 0.5 %    Gran # (ANC) 2.1 1.8 - 7.7 K/uL    Immature Grans (Abs) 0.01 0.00 - 0.04 K/uL    Lymph # 0.1 (L) 1.0 - 4.8 K/uL    Mono # 0.3 0.3 - 1.0 K/uL    Eos # 0.0 0.0 - 0.5 K/uL    Baso # 0.02 0.00 - 0.20 K/uL    nRBC 0 0 /100 WBC    Gran % 83.7 (H) 38.0 - 73.0 %    Lymph % 3.6 (L) 18.0 - 48.0 %    Mono % 11.1 4.0 - 15.0 %    Eosinophil % 0.4 0.0 - 8.0 %    Basophil % 0.8 0.0 - 1.9 %    Differential Method Automated    HIV 1/2 Ag/Ab (4th Gen)   Result Value Ref Range    HIV 1/2 Ag/Ab Negative Negative   Hepatitis B Surface Ab, Qualitative   Result Value Ref Range    Hep B S Ab Positive    Hepatitis B Surface Antigen   Result Value Ref Range    Hepatitis B Surface Ag Negative Negative   Hepatitis B Core Antibody, Total   Result Value Ref Range    Hep B Core Total Ab Negative    Hepatitis C Antibody   Result Value Ref Range    Hepatitis C Ab Negative Negative   Immunofixation Electrophoresis   Result Value Ref Range    Immunofix Interp. SEE COMMENT    Immunoglobulin Free LT Chains Blood   Result Value Ref Range    Kappa Free Light Chains 214.88 (H) 0.33 - 1.94 mg/dL    Lambda Free Light Chains 0.72 0.57 - 2.63 mg/dL    Kappa/Lambda FLC Ratio 298.40 (H) 0.26 - 1.65   Protein Electrophoresis, Serum   Result Value Ref Range    Protein, Serum 5.8 (L) 6.0 - 8.4 g/dL    Albumin 3.92 3.35 - 5.55 g/dL    Alpha-1 0.35 0.17 - 0.41 g/dL    Alpha-2 0.71 0.43 - 0.99 g/dL    Beta 0.47 (L) 0.50 - 1.10 g/dL    Gamma 0.35 (L) 0.67 - 1.58 g/dL   Beta 2 Microglobulin, Serum   Result Value Ref Range    Beta-2 Microglobulin 17.5 (H) 0.0 - 2.5 ug/mL   MPN (JAK2 [id number])WITH REFLEX TO CALR,MPL   Result Value Ref Range    MPNR  Specimen type blood     MPNR Result see interpretation     MPNR  Final Diagnosis: SEE BELOW    BCR/ABL,  RNA-QUAL, DIAGNOSTIC w/REFLEX, BLOOD   Result Value Ref Range    Specimen Type, BCR/ABL Blood     Diagnostic BCR/ABL 1 Result see interpretation     Final Diagnosis, BCR/ABL SEE BELOW    IMMUNOGLOBULINS (IGG, IGA, IGM) QUANTITATIVE   Result Value Ref Range    IgG 381 (L) 650 - 1600 mg/dL    IgA 10 (L) 40 - 350 mg/dL    IgM 9 (L) 50 - 300 mg/dL   PSA, SCREENING   Result Value Ref Range    PSA, Screen 0.35 0.00 - 4.00 ng/mL   Pathologist Interpretation GRACE   Result Value Ref Range    Pathologist Interpretation GRACE REVIEWED    Pathologist Interpretation SPE   Result Value Ref Range    Pathologist Interpretation SPE REVIEWED        PROBLEMS ASSESSED THIS VISIT:    1. Multiple myeloma not having achieved remission          ASSESSMENT AND PLAN    Multiple myeloma, high-risk, kappa light chain  High risk due to 1q gain  R2-ISS Stage III    He has completed 3 cycles of DVRd and appears to be responding well. His plasma cell percentage on bone marrow has decreased from 80-90% to 5-10% on recent bone marrow biopsy done locally on 8/25/22. Myeloma labs were done but results have not yet been faxed over.     Will recommend that he continue with treatment with DVRd while planning for transplant. Will plan to re-stage in November.     Discussed with transplant coordinator Neeraj and he will see me on 11/14 or 11/15 (a spot will be opened on my schedule). Will arrange for myeloma labs to be faxed here along with colonoscopy and bone marrow biopsy results.     Referred to transplant ID to review history of prosthetic knee infection to plan for transplant.     Discussed with Dr. Christine Pino, DO  PGY-IV  Hematology/Oncology Fellow

## 2022-09-16 ENCOUNTER — TELEPHONE (OUTPATIENT)
Dept: INFECTIOUS DISEASES | Facility: CLINIC | Age: 57
End: 2022-09-16
Payer: MEDICARE

## 2022-09-16 DIAGNOSIS — R79.1 COAGULATION PROFILE, ABNORMAL: ICD-10-CM

## 2022-09-16 DIAGNOSIS — C90.00 MULTIPLE MYELOMA, REMISSION STATUS UNSPECIFIED: ICD-10-CM

## 2022-09-16 DIAGNOSIS — R06.09 DYSPNEA ON EXERTION: ICD-10-CM

## 2022-09-16 DIAGNOSIS — Z76.82 STEM CELL TRANSPLANT CANDIDATE: Primary | ICD-10-CM

## 2022-09-16 NOTE — TELEPHONE ENCOUNTER
----- Message from Nasreen Melissa MA sent at 9/16/2022  1:30 PM CDT -----  Regarding: FW: Appt  Contact: [phone]    ----- Message -----  From: Farideh Avery  Sent: 9/16/2022  12:08 PM CDT  To: , #  Subject: Appt                                             Patient wife is calling to get information and schedule appt. Please contact pt

## 2022-11-14 ENCOUNTER — OFFICE VISIT (OUTPATIENT)
Dept: HEMATOLOGY/ONCOLOGY | Facility: CLINIC | Age: 57
End: 2022-11-14
Payer: MEDICARE

## 2022-11-14 ENCOUNTER — HOSPITAL ENCOUNTER (OUTPATIENT)
Dept: RADIOLOGY | Facility: HOSPITAL | Age: 57
Discharge: HOME | End: 2022-11-14
Attending: INTERNAL MEDICINE
Payer: MEDICARE

## 2022-11-14 ENCOUNTER — PROCEDURE VISIT (OUTPATIENT)
Dept: HEMATOLOGY/ONCOLOGY | Facility: CLINIC | Age: 57
End: 2022-11-14
Payer: MEDICARE

## 2022-11-14 ENCOUNTER — OFFICE VISIT (OUTPATIENT)
Dept: INFECTIOUS DISEASES | Facility: CLINIC | Age: 57
End: 2022-11-14
Payer: MEDICARE

## 2022-11-14 VITALS
HEART RATE: 71 BPM | WEIGHT: 240.94 LBS | DIASTOLIC BLOOD PRESSURE: 76 MMHG | BODY MASS INDEX: 33.61 KG/M2 | SYSTOLIC BLOOD PRESSURE: 141 MMHG | TEMPERATURE: 99 F

## 2022-11-14 VITALS
BODY MASS INDEX: 33.6 KG/M2 | OXYGEN SATURATION: 100 % | HEIGHT: 71 IN | HEART RATE: 70 BPM | TEMPERATURE: 99 F | DIASTOLIC BLOOD PRESSURE: 77 MMHG | WEIGHT: 240 LBS | SYSTOLIC BLOOD PRESSURE: 153 MMHG | RESPIRATION RATE: 17 BRPM

## 2022-11-14 VITALS — SYSTOLIC BLOOD PRESSURE: 129 MMHG | HEART RATE: 67 BPM | OXYGEN SATURATION: 99 % | DIASTOLIC BLOOD PRESSURE: 76 MMHG

## 2022-11-14 DIAGNOSIS — Z99.2 DEPENDENCE ON HEMODIALYSIS: ICD-10-CM

## 2022-11-14 DIAGNOSIS — Z76.82 STEM CELL TRANSPLANT CANDIDATE: ICD-10-CM

## 2022-11-14 DIAGNOSIS — Z76.82 STEM CELL TRANSPLANT CANDIDATE: Primary | ICD-10-CM

## 2022-11-14 DIAGNOSIS — Z13.79 ENCOUNTER FOR OTHER SCREENING FOR GENETIC AND CHROMOSOMAL ANOMALIES: ICD-10-CM

## 2022-11-14 DIAGNOSIS — T84.59XD CHRONIC INFECTION OF KNEE JOINT PROSTHESIS, SUBSEQUENT ENCOUNTER: ICD-10-CM

## 2022-11-14 DIAGNOSIS — Z96.659 CHRONIC INFECTION OF KNEE JOINT PROSTHESIS, SUBSEQUENT ENCOUNTER: ICD-10-CM

## 2022-11-14 DIAGNOSIS — C90.00 MULTIPLE MYELOMA NOT HAVING ACHIEVED REMISSION: ICD-10-CM

## 2022-11-14 DIAGNOSIS — C90.00 MULTIPLE MYELOMA NOT HAVING ACHIEVED REMISSION: Primary | ICD-10-CM

## 2022-11-14 DIAGNOSIS — D69.6 THROMBOCYTOPENIA, UNSPECIFIED: ICD-10-CM

## 2022-11-14 DIAGNOSIS — C90.00 MULTIPLE MYELOMA, REMISSION STATUS UNSPECIFIED: ICD-10-CM

## 2022-11-14 LAB — POCT GLUCOSE: 89 MG/DL (ref 70–110)

## 2022-11-14 PROCEDURE — 88237 TISSUE CULTURE BONE MARROW: CPT | Performed by: NURSE PRACTITIONER

## 2022-11-14 PROCEDURE — 85097 PR  BONE MARROW,SMEAR INTERPRETATION: ICD-10-PCS | Mod: ,,, | Performed by: PATHOLOGY

## 2022-11-14 PROCEDURE — 88185 FLOWCYTOMETRY/TC ADD-ON: CPT | Mod: 91 | Performed by: NURSE PRACTITIONER

## 2022-11-14 PROCEDURE — 78816 PET IMAGE W/CT FULL BODY: CPT | Mod: 26,PI,, | Performed by: RADIOLOGY

## 2022-11-14 PROCEDURE — 3078F DIAST BP <80 MM HG: CPT | Mod: CPTII,GC,S$GLB, | Performed by: STUDENT IN AN ORGANIZED HEALTH CARE EDUCATION/TRAINING PROGRAM

## 2022-11-14 PROCEDURE — 88305 TISSUE EXAM BY PATHOLOGIST: CPT | Mod: 59 | Performed by: PATHOLOGY

## 2022-11-14 PROCEDURE — 3008F PR BODY MASS INDEX (BMI) DOCUMENTED: ICD-10-PCS | Mod: CPTII,S$GLB,, | Performed by: INTERNAL MEDICINE

## 2022-11-14 PROCEDURE — 99999 PR PBB SHADOW E&M-EST. PATIENT-LVL III: CPT | Mod: PBBFAC,,, | Performed by: INTERNAL MEDICINE

## 2022-11-14 PROCEDURE — 88305 TISSUE EXAM BY PATHOLOGIST: CPT | Mod: 26,,, | Performed by: PATHOLOGY

## 2022-11-14 PROCEDURE — 25500020 PHARM REV CODE 255: Performed by: INTERNAL MEDICINE

## 2022-11-14 PROCEDURE — 4010F PR ACE/ARB THEARPY RXD/TAKEN: ICD-10-PCS | Mod: CPTII,GC,S$GLB, | Performed by: STUDENT IN AN ORGANIZED HEALTH CARE EDUCATION/TRAINING PROGRAM

## 2022-11-14 PROCEDURE — A9698 NON-RAD CONTRAST MATERIALNOC: HCPCS | Performed by: INTERNAL MEDICINE

## 2022-11-14 PROCEDURE — 88313 PR  SPECIAL STAINS,GROUP II: ICD-10-PCS | Mod: 26,,, | Performed by: PATHOLOGY

## 2022-11-14 PROCEDURE — 88365 PR  TISSUE HYBRIDIZATION: ICD-10-PCS | Mod: 26,,, | Performed by: PATHOLOGY

## 2022-11-14 PROCEDURE — 88365 INSITU HYBRIDIZATION (FISH): CPT | Performed by: PATHOLOGY

## 2022-11-14 PROCEDURE — 38222 DX BONE MARROW BX & ASPIR: CPT | Mod: LT,S$GLB,, | Performed by: NURSE PRACTITIONER

## 2022-11-14 PROCEDURE — 88271 CYTOGENETICS DNA PROBE: CPT | Mod: 59 | Performed by: NURSE PRACTITIONER

## 2022-11-14 PROCEDURE — 3077F PR MOST RECENT SYSTOLIC BLOOD PRESSURE >= 140 MM HG: ICD-10-PCS | Mod: CPTII,GC,S$GLB, | Performed by: STUDENT IN AN ORGANIZED HEALTH CARE EDUCATION/TRAINING PROGRAM

## 2022-11-14 PROCEDURE — 88185 FLOWCYTOMETRY/TC ADD-ON: CPT | Mod: 59 | Performed by: PATHOLOGY

## 2022-11-14 PROCEDURE — 38222 BONE MARROW: ICD-10-PCS | Mod: LT,S$GLB,, | Performed by: NURSE PRACTITIONER

## 2022-11-14 PROCEDURE — 78816 PET IMAGE W/CT FULL BODY: CPT | Mod: TC,PI

## 2022-11-14 PROCEDURE — 3078F DIAST BP <80 MM HG: CPT | Mod: CPTII,S$GLB,, | Performed by: INTERNAL MEDICINE

## 2022-11-14 PROCEDURE — 3077F SYST BP >= 140 MM HG: CPT | Mod: CPTII,S$GLB,, | Performed by: INTERNAL MEDICINE

## 2022-11-14 PROCEDURE — 99205 PR OFFICE/OUTPT VISIT, NEW, LEVL V, 60-74 MIN: ICD-10-PCS | Mod: S$GLB,,, | Performed by: INTERNAL MEDICINE

## 2022-11-14 PROCEDURE — 88311 PR  DECALCIFY TISSUE: ICD-10-PCS | Mod: 26,,, | Performed by: PATHOLOGY

## 2022-11-14 PROCEDURE — 99205 OFFICE O/P NEW HI 60 MIN: CPT | Mod: S$GLB,,, | Performed by: INTERNAL MEDICINE

## 2022-11-14 PROCEDURE — 88184 FLOWCYTOMETRY/ TC 1 MARKER: CPT | Performed by: PATHOLOGY

## 2022-11-14 PROCEDURE — 88311 DECALCIFY TISSUE: CPT | Mod: 26,,, | Performed by: PATHOLOGY

## 2022-11-14 PROCEDURE — 88364 INSITU HYBRIDIZATION (FISH): CPT | Mod: 26,,, | Performed by: PATHOLOGY

## 2022-11-14 PROCEDURE — 88313 SPECIAL STAINS GROUP 2: CPT | Mod: 26,,, | Performed by: PATHOLOGY

## 2022-11-14 PROCEDURE — 78816 NM PET CT WHOLE BODY: ICD-10-PCS | Mod: 26,PI,, | Performed by: RADIOLOGY

## 2022-11-14 PROCEDURE — 3008F BODY MASS INDEX DOCD: CPT | Mod: CPTII,GC,S$GLB, | Performed by: STUDENT IN AN ORGANIZED HEALTH CARE EDUCATION/TRAINING PROGRAM

## 2022-11-14 PROCEDURE — 3078F PR MOST RECENT DIASTOLIC BLOOD PRESSURE < 80 MM HG: ICD-10-PCS | Mod: CPTII,GC,S$GLB, | Performed by: STUDENT IN AN ORGANIZED HEALTH CARE EDUCATION/TRAINING PROGRAM

## 2022-11-14 PROCEDURE — 85097 BONE MARROW INTERPRETATION: CPT | Mod: ,,, | Performed by: PATHOLOGY

## 2022-11-14 PROCEDURE — 4010F ACE/ARB THERAPY RXD/TAKEN: CPT | Mod: CPTII,GC,S$GLB, | Performed by: STUDENT IN AN ORGANIZED HEALTH CARE EDUCATION/TRAINING PROGRAM

## 2022-11-14 PROCEDURE — 3078F PR MOST RECENT DIASTOLIC BLOOD PRESSURE < 80 MM HG: ICD-10-PCS | Mod: CPTII,S$GLB,, | Performed by: INTERNAL MEDICINE

## 2022-11-14 PROCEDURE — 4010F ACE/ARB THERAPY RXD/TAKEN: CPT | Mod: CPTII,S$GLB,, | Performed by: INTERNAL MEDICINE

## 2022-11-14 PROCEDURE — 88364 CHG INSITU HYBRIDIZATION (FISH: ICD-10-PCS | Mod: 26,,, | Performed by: PATHOLOGY

## 2022-11-14 PROCEDURE — 3077F SYST BP >= 140 MM HG: CPT | Mod: CPTII,GC,S$GLB, | Performed by: STUDENT IN AN ORGANIZED HEALTH CARE EDUCATION/TRAINING PROGRAM

## 2022-11-14 PROCEDURE — 99999 PR PBB SHADOW E&M-EST. PATIENT-LVL III: ICD-10-PCS | Mod: PBBFAC,GC,, | Performed by: STUDENT IN AN ORGANIZED HEALTH CARE EDUCATION/TRAINING PROGRAM

## 2022-11-14 PROCEDURE — 88305 TISSUE EXAM BY PATHOLOGIST: ICD-10-PCS | Mod: 26,,, | Performed by: PATHOLOGY

## 2022-11-14 PROCEDURE — 3008F BODY MASS INDEX DOCD: CPT | Mod: CPTII,S$GLB,, | Performed by: INTERNAL MEDICINE

## 2022-11-14 PROCEDURE — 88342 CHG IMMUNOCYTOCHEMISTRY: ICD-10-PCS | Mod: 26,59,, | Performed by: PATHOLOGY

## 2022-11-14 PROCEDURE — 88364 INSITU HYBRIDIZATION (FISH): CPT | Performed by: PATHOLOGY

## 2022-11-14 PROCEDURE — 88365 INSITU HYBRIDIZATION (FISH): CPT | Mod: 26,,, | Performed by: PATHOLOGY

## 2022-11-14 PROCEDURE — 88342 IMHCHEM/IMCYTCHM 1ST ANTB: CPT | Performed by: PATHOLOGY

## 2022-11-14 PROCEDURE — 88189 FLOWCYTOMETRY/READ 16 & >: CPT | Mod: ,,, | Performed by: PATHOLOGY

## 2022-11-14 PROCEDURE — 99999 PR PBB SHADOW E&M-EST. PATIENT-LVL III: ICD-10-PCS | Mod: PBBFAC,,, | Performed by: INTERNAL MEDICINE

## 2022-11-14 PROCEDURE — 88189 PR  FLOWCYTOMETRY/READ, 16 & > MARKERS: ICD-10-PCS | Mod: ,,, | Performed by: PATHOLOGY

## 2022-11-14 PROCEDURE — 99999 PR PBB SHADOW E&M-EST. PATIENT-LVL III: CPT | Mod: PBBFAC,GC,, | Performed by: STUDENT IN AN ORGANIZED HEALTH CARE EDUCATION/TRAINING PROGRAM

## 2022-11-14 PROCEDURE — 88274 CYTOGENETICS 25-99: CPT | Mod: 59 | Performed by: NURSE PRACTITIONER

## 2022-11-14 PROCEDURE — 3008F PR BODY MASS INDEX (BMI) DOCUMENTED: ICD-10-PCS | Mod: CPTII,GC,S$GLB, | Performed by: STUDENT IN AN ORGANIZED HEALTH CARE EDUCATION/TRAINING PROGRAM

## 2022-11-14 PROCEDURE — 88342 IMHCHEM/IMCYTCHM 1ST ANTB: CPT | Mod: 26,59,, | Performed by: PATHOLOGY

## 2022-11-14 PROCEDURE — 99215 PR OFFICE/OUTPT VISIT, EST, LEVL V, 40-54 MIN: ICD-10-PCS | Mod: 95,GC,S$GLB, | Performed by: STUDENT IN AN ORGANIZED HEALTH CARE EDUCATION/TRAINING PROGRAM

## 2022-11-14 PROCEDURE — 88311 DECALCIFY TISSUE: CPT | Performed by: PATHOLOGY

## 2022-11-14 PROCEDURE — 99215 OFFICE O/P EST HI 40 MIN: CPT | Mod: 95,GC,S$GLB, | Performed by: STUDENT IN AN ORGANIZED HEALTH CARE EDUCATION/TRAINING PROGRAM

## 2022-11-14 PROCEDURE — 3077F PR MOST RECENT SYSTOLIC BLOOD PRESSURE >= 140 MM HG: ICD-10-PCS | Mod: CPTII,S$GLB,, | Performed by: INTERNAL MEDICINE

## 2022-11-14 PROCEDURE — 4010F PR ACE/ARB THEARPY RXD/TAKEN: ICD-10-PCS | Mod: CPTII,S$GLB,, | Performed by: INTERNAL MEDICINE

## 2022-11-14 PROCEDURE — 88313 SPECIAL STAINS GROUP 2: CPT | Performed by: PATHOLOGY

## 2022-11-14 RX ORDER — LIDOCAINE HYDROCHLORIDE 20 MG/ML
10 INJECTION, SOLUTION INFILTRATION; PERINEURAL
Status: COMPLETED | OUTPATIENT
Start: 2022-11-14 | End: 2022-11-14

## 2022-11-14 RX ORDER — LENALIDOMIDE 10 MG/1
10 CAPSULE ORAL
COMMUNITY
End: 2022-11-22

## 2022-11-14 RX ADMIN — BARIUM SULFATE 450 ML: 20 SUSPENSION ORAL at 08:11

## 2022-11-14 RX ADMIN — LIDOCAINE HYDROCHLORIDE 10 ML: 20 INJECTION, SOLUTION INFILTRATION; PERINEURAL at 03:11

## 2022-11-14 NOTE — PROGRESS NOTES
[facility]    Hematology History  Initial consult  Mr. De Lakhani is a 57 year old male with hypertension, history of prosthetic joint infection who was referred by Dr. Jonh Bunch for transplant evaluation for high-risk kappa light chain multiple myeloma with 1q gain.     Mr. Lakhani was diagnosed with multiple myeloma in April 2022 after presenting with ASHLEE. 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 He had bone marrow biopsy which showed 100% involvement by plasma cells. He was started on CyBorD on 5/18 and received two cycles of treatment. His bone marrow biopsy after the first cycle showed a decrease in his plasma cell involvement to 80-90%. Dr. Bunch then started DVRd on 7/5/22. However, he was admitted to the hospital on 7/7/22 with septic shock requiring ICU care. He improved with antibiotics but had an ASHLEE thought to be due to hypotension which resulted in renal failure. He is now dialysis dependent. He saw Dr. Bunch after he was discharged from the hospital and started back on DVRd with dose reduced lenalidomide on 7/25/22.     Oncology history:  4/20/22: renal biopsy: light chain cast nephropathy, kappa light chain  4/26/22: right subclavian vein thrombus   4/27/22: M spike 0.2 with free monoclonal kappa band. B2mg 19.57, IgG 496, IgA 10, IgM <5. Kappa 53133, Lambda 7.9, ratio >1000  5/12/22: BMBx: plasma cell myeloma, marrow cellularity 100%, plasma cell percentage 100%. Plasma cell phenotype +, kappa +, CD20- -, CD30-, EMA-, SADAF-, Congo red negative. Peripheral blood with pancytopenia and no circulating blasts. Decreased storage iron. FISH canceled due to quantity not sufficient  5/18/22: CyBorD C1D1  5/23/22: rasburicase  5/24/22: Xgeva  6/6/22: Kappa 76285, lambda 4.0, ratio >1000, M spike 0.1 with free monoclonal kappa band present  6/6/22: CyBorD C2D1  6/9/22: BMBx Plasma cell neoplasm compatible with plasma cell myeloma. Extent of  involvement 80-90% of bone marrow elements. Cytology: Plasmablastic. FISH cytogenetics positive for 1q21/CKS1B gain. Karyotype failed. Myelofibrosis diffuse (MF-3)  6/20/22: CyBorD C2D8 delayed due to covid  6/23/22: CyBOrD C2D11  7/5/22: C1D1 DVRd  7/7/22 - 7/18/22: hospital admission for septic shock resulting in renal failure  7/25/22: started again on DVRd  8/8/2022: kappa 1402.8, lambda 7.5, ratio 167.04. M spike 0.1, two faint restricted bands in gamma globulin regions.   10/10/22: C5D1 DVRd. Revlimid on hold for upcoming stem cell collection  10/31/22: C6D1 DVRd. Revlimid on hold    Patient had a left knee prosthetic joint infection in the summer of 2021. In March, still had bacteria in joint space, now on prophylactic doxycycline.   He had a colonoscopy 8 years ago which he thinks was clear.     Interval History:  Mr. Lakhani presents today for follow up of multiple myeloma. Since last visit, started C6 of DVRd on 10/31/22. Revlimid has been on hold since 10/10/22 for upcoming stem cell collection. He met with infectious disease and had PET scan this morning, will have restaging bone marrow biopsy this afternoon. He continues on MWF dialysis but produces urine. He will get colonoscopy soon, has not been scheduled yet.     Past Medical History:   Diagnosis Date    Chronic infection of prosthetic knee, subsequent encounter     Encounter for blood transfusion     Essential (primary) hypertension     Multiple myeloma        No family history on file.    Social History     Socioeconomic History    Marital status:    Tobacco Use    Smoking status: Former    Smokeless tobacco: Never    Tobacco comments:     quit smoking in the 90s         MEDICATIONS:     Current Outpatient Medications on File Prior to Visit   Medication Sig Dispense Refill    acyclovir (ZOVIRAX) 400 MG tablet 1 tablet      calcium carbonate (TUMS) 200 mg calcium (500 mg) chewable tablet Take 1,500 mg by mouth once daily.      dapsone 100 MG  Tab Take 100 mg by mouth once daily.      dexAMETHasone (DECADRON) 4 MG Tab TAKE 10 TABLETS BY MOUTH ON DAYS 1, 8, AND 15 OF A 21 DAY CYCLE TAKE WITH FOOD IN THE MORNING (AFTER BREAKFAST)      doxycycline (VIBRAMYCIN) 100 MG Cap 1 capsule      famotidine (PEPCID) 20 MG tablet Take 20 mg by mouth once daily.      lenalidomide 10 mg Cap Take 10 mg by mouth.      mv-mn/iron/folic acid/herb 190 (VITAMIN D3 COMPLETE ORAL) Take by mouth.      ondansetron (ZOFRAN) 4 MG tablet TAKE ONE TABLET BY MOUTH EVERY EIGHT HOURS IF NEEDED FOR NAUSEA.      oxyCODONE-acetaminophen (PERCOCET) 5-325 mg per tablet TAKE ONE TABLET BY MOUTH EVERY 6 HOURS IF NEEDED FOR SEVERE PAIN      promethazine (PHENERGAN) 12.5 MG Tab TAKE ONE TABLET BY MOUTH EVERY 6 HOURS IF NEEDED FOR NAUSEA 2ND CHOICE      rosuvastatin (CRESTOR) 20 MG tablet Take 20 mg by mouth once daily.      scopolamine (TRANSDERM-SCOP) 1.3-1.5 mg (1 mg over 3 days) APPLY ONE PATCH TO SKIN AS DIRECTED AND REPLACE EVERY 3 DAYS.      sertraline (ZOLOFT) 50 MG tablet Take 50 mg by mouth once daily.      traMADoL (ULTRAM) 50 mg tablet TAKE ONE TABLET BY MOUTH EVERY 6 HOURS IF NEEDED FOR MODERATE PAIN      [DISCONTINUED] INV CYPROHEPTADINE HCL 1MG/2MG/PLACEBO CAPSULE Take 2 capsules by mouth 2 (two) times daily. FOR INVESTIGATIONAL USE ONLY      [DISCONTINUED] REVLIMID 10 mg Cap        Current Facility-Administered Medications on File Prior to Visit   Medication Dose Route Frequency Provider Last Rate Last Admin    [COMPLETED] barium sulfate (READI-CAT) suspension 450 mL  450 mL Oral Once Peter King MD   450 mL at 11/14/22 0830       ALLERGIES: Review of patient's allergies indicates:  No Known Allergies     ROS:       Review of Systems   Constitutional:  Negative for chills and fever.   HENT:   Negative for nosebleeds and sore throat.    Respiratory:  Negative for cough and shortness of breath.    Cardiovascular:  Negative for chest pain and palpitations.   Gastrointestinal:   Negative for abdominal pain, diarrhea, nausea and vomiting.   Genitourinary:  Negative for dysuria and hematuria.    Musculoskeletal:  Negative for back pain and neck pain.   Skin:  Negative for rash and wound.   Neurological:  Negative for headaches and light-headedness.   Hematological:  Negative for adenopathy. Does not bruise/bleed easily.   Psychiatric/Behavioral:  Negative for depression. The patient is not nervous/anxious.      PHYSICAL EXAM:  Vitals:    11/14/22 1059   BP: (!) 153/77   Pulse: 70   Resp: 17   Temp: 98.5 °F (36.9 °C)     Physical Exam  Constitutional:       General: He is not in acute distress.     Appearance: Normal appearance. He is not ill-appearing.   HENT:      Head: Normocephalic and atraumatic.      Nose: Nose normal.      Mouth/Throat:      Mouth: Mucous membranes are moist.      Pharynx: Oropharynx is clear. No oropharyngeal exudate.   Eyes:      General: No scleral icterus.     Conjunctiva/sclera: Conjunctivae normal.   Cardiovascular:      Rate and Rhythm: Normal rate and regular rhythm.      Heart sounds: No murmur heard.  Pulmonary:      Effort: Pulmonary effort is normal. No respiratory distress.      Breath sounds: Normal breath sounds.   Abdominal:      General: Abdomen is flat. There is no distension.      Palpations: Abdomen is soft.      Tenderness: There is no abdominal tenderness.   Musculoskeletal:         General: No swelling or tenderness. Normal range of motion.      Cervical back: Normal range of motion and neck supple.   Lymphadenopathy:      Cervical: No cervical adenopathy.   Skin:     General: Skin is warm and dry.      Findings: No rash.   Neurological:      General: No focal deficit present.      Mental Status: He is alert and oriented to person, place, and time.   Psychiatric:         Mood and Affect: Mood normal.         Behavior: Behavior normal.         LAB:   Results for orders placed or performed during the hospital encounter of 11/14/22   POCT glucose    Result Value Ref Range    POCT Glucose 89 70 - 110 mg/dL       PROBLEMS ASSESSED THIS VISIT:    1. Multiple myeloma not having achieved remission    2. Thrombocytopenia, unspecified    3. Dependence on hemodialysis    4. Chronic infection of knee joint prosthesis, subsequent encounter            ASSESSMENT AND PLAN    Multiple myeloma, high-risk, kappa light chain  High risk due to 1q gain  R2-ISS Stage III    He is now on cycle 6 of DVRd (revlimid on hold since 10/10/22 for upcoming stem cell collection). His plasma cell percentage on bone marrow has decreased from 80-90% to 5-10% on bone marrow biopsy done locally on 8/25/22. Unable to see recent SPEP, GRACE, or FLC but these were drawn today and are pending.     - he will meet with transplant coordinator and have bone marrow biopsy this afternoon  - will see Dr. Bunch tomorrow for treatment  - working on scheduling colonoscopy   - Saw dentist in October, cleared for transplant  - Tentative transplant admit day 12/28    Will recommend that he continue with treatment with DVRd (revlimid on hold) until 3 weeks prior to transplant  Will call Mr. Lakhani with results of labs, PET, and bone marrow when these results    Discussed with Dr. Christine Pino DO  PGY-V  Hematology/Oncology Fellow

## 2022-11-14 NOTE — PROCEDURES
Bone marrow    Date/Time: 11/14/2022 2:30 PM  Performed by: Lisa Kaplan NP  Authorized by: Peter King MD     Consent Done?: Yes (Written)      Position: prone  Aspiration?: Yes   Biopsy?: Yes      PROCEDURE NOTE:  Date of Procedure: 11/14/2022  Bone Marrow Biopsy and Aspiration  Indication: MM, pre auto SCT  Consent: Informed consent was obtained from patient.  Timeout: Done and documented.  Position: prone  Site: Left posterior illiac crest.  Prep: Betadine.  Needle used: 11 gauge Jamshidi needle.  Anesthetic: 2% lidocaine 8 cc.  Biopsy: The biopsy needle was introduced into the marrow cavity and an aspirate was obtained without complications and sent for flow cytometry, PCPD fish, and cytogenetics. Core biopsy obtained without difficulty and sent for routine histologic examination.  Complications: None.  Disposition: The patient was placed supine for 15min following procedure. MA to assess bandaid for bleeding prior to discharge home.  Blood loss: Minimal.     Lisa Kaplan NP  Hematology/Oncology/BMT

## 2022-11-14 NOTE — PROGRESS NOTES
Subjective:     Patient ID: De Lakhani is a 57 y.o. male    Chief Complaint: L knee PJI     HPI: 57M with MM and recent L knee PJI who follows w/ ortho and ID in [facility], currently on suppressive doxycycline, presents to ID clinic today for pre-SCT evaluation. Per wife, patient has had multiple surgeries on L knee. He developed pain and swelling in the spring of 2021, and underwent hardware removal with antibiotic spacer placement. On second stage of revision, operative cultures were obtained, and were positive (organism unknown at this time). She states patient was treated with several weeks of IV abx for this, and then transitioned to PO doxy. During this time, he was noted to have progressive worsening renal failure, and was discovered to have MM. He is currently on HD MWF. He takes doxy 100mg BID without issue. No recent increase in knee pain, swelling, denies fevers.     There is no immunization history on file for this patient.     Review of Systems   Constitutional: Negative for chills, decreased appetite, fever, malaise/fatigue and night sweats.   HENT:  Negative for congestion and sore throat.    Eyes:  Negative for blurred vision, double vision, vision loss in left eye, vision loss in right eye and visual disturbance.   Cardiovascular:  Negative for chest pain, dyspnea on exertion, irregular heartbeat and leg swelling.   Respiratory:  Negative for cough, hemoptysis, shortness of breath and sputum production.    Hematologic/Lymphatic: Negative for adenopathy. Does not bruise/bleed easily.   Skin:  Negative for rash and suspicious lesions.   Musculoskeletal:  Negative for arthritis, joint pain, muscle weakness and myalgias.   Gastrointestinal:  Negative for abdominal pain, constipation, diarrhea, heartburn, nausea and vomiting.   Genitourinary:  Negative for dysuria, flank pain, frequency and genital sores.   Neurological:  Negative for dizziness, focal weakness, numbness, paresthesias, sensory  change, tremors and weakness.   Psychiatric/Behavioral:  Negative for altered mental status and depression. The patient is not nervous/anxious.    Allergic/Immunologic: Negative for environmental allergies and persistent infections.      Past Medical History:   Diagnosis Date    Chronic infection of prosthetic knee, subsequent encounter     Encounter for blood transfusion     Essential (primary) hypertension     Multiple myeloma      Past Surgical History:   Procedure Laterality Date    KNEE SURGERY      neck infusion       No family history on file.  Social History     Tobacco Use    Smoking status: Former    Smokeless tobacco: Never    Tobacco comments:     quit smoking in the 90s       Objective:     Physical Exam  Constitutional:       General: He is not in acute distress.     Appearance: Normal appearance. He is well-developed. He is not ill-appearing or diaphoretic.   HENT:      Head: Normocephalic and atraumatic.      Right Ear: External ear normal.      Left Ear: External ear normal.      Nose: Nose normal.   Eyes:      General: No scleral icterus.        Right eye: No discharge.         Left eye: No discharge.      Extraocular Movements: Extraocular movements intact.      Conjunctiva/sclera: Conjunctivae normal.   Pulmonary:      Effort: Pulmonary effort is normal. No respiratory distress.      Breath sounds: No stridor.   Skin:     General: Skin is dry.      Coloration: Skin is not jaundiced or pale.      Findings: No erythema.   Neurological:      General: No focal deficit present.      Mental Status: He is alert and oriented to person, place, and time. Mental status is at baseline.   Psychiatric:         Mood and Affect: Mood normal.         Behavior: Behavior normal.         Thought Content: Thought content normal.         Judgment: Judgment normal.       Data:    All data, including recent labs, radiology, and pathology, has been independently reviewed.    Labs:    Recent Labs   Lab Result Units  11/14/22  0727   WBC K/uL 4.77   Hemoglobin g/dL 7.3*   Hematocrit % 22.1*   Sodium mmol/L 140   Potassium mmol/L 4.2   Chloride mmol/L 106   BUN mg/dL 64*   Creatinine mg/dL 6.7*   AST U/L 12   ALT U/L 15   Alkaline Phosphatase U/L 67   Total Bilirubin mg/dL 0.9        Radiology:    No results found in the last 24 hours.     Assessment:    1. Multiple myeloma not having achieved remission  OK to proceed w/ planned stem cell transplant in December  Recommend continuing doxycycline throughout transplant       2. Chronic infection of knee joint prosthesis, subsequent encounter  Will request records from ortho and ID to confirm history and review cultures  Plan to continue doxycycline indefinitely           Follow up as needed    The total time for evaluation and management services performed on 11/14/22 was greater than 60 minutes.     Vee Chatterjee DO  Transplant Infectious Disease

## 2022-11-14 NOTE — PROGRESS NOTES
Mr. Lakhani is a 57 y.o. male, patient of Dr. Pino/Dr. King, with MM who presents today for his pre-auto SCT bone marrow biopsy. Procedure explained, consent obtained. See procedure note    Lisa Kaplan NP  Hematology/Oncology/BMT

## 2022-11-15 LAB
CHROM BANDING METHOD: NORMAL
CHROMOSOME ANALYSIS BM ADDITIONAL INFORMATION: NORMAL
CHROMOSOME ANALYSIS BM RELEASED BY: NORMAL
CHROMOSOME ANALYSIS BM RESULT SUMMARY: NORMAL
CLINICAL CYTOGENETICIST REVIEW: NORMAL
KARYOTYP MAR: NORMAL
REASON FOR REFERRAL (NARRATIVE): NORMAL
REF LAB TEST METHOD: NORMAL
SPECIMEN SOURCE: NORMAL
SPECIMEN: NORMAL

## 2022-11-21 LAB
BODY SITE - BONE MARROW: NORMAL
CLINICAL DIAGNOSIS - BONE MARROW: NORMAL
FLOW CYTOMETRY ANTIBODIES ANALYZED - BONE MARROW: NORMAL
FLOW CYTOMETRY COMMENT - BONE MARROW: NORMAL
FLOW CYTOMETRY INTERPRETATION - BONE MARROW: NORMAL

## 2022-11-21 NOTE — PROGRESS NOTES
"Oncology Nutrition Assessment for Medical Nutrition Therapy  Initial Visit    De Lakhani   1965    Referring Provider: Peter King MD      Reason for Visit: nutrition counseling and education    PMHx:   Past Medical History:   Diagnosis Date    Chronic infection of prosthetic knee, subsequent encounter     Encounter for blood transfusion     Essential (primary) hypertension     Multiple myeloma        Nutrition Assessment    This is a 57 y.o.male with a medical diagnosis of multiple myeloma, plan for auto SCT. He reports that his appetite is fair. He does pretty well with protein, hamburgers, fish, some chicken, and also likes peanut butter crackers. His wife reports he had weight loss surgery several years ago and since then he tolerates some foods better than others. Recently he only tolerates hamburgers from [employer], but not any other kind. He drinks mostly water, Dr. Pepper, Coke, and milk.   Noted he has ESRD and is on HD MWF. He reports he isn't on any renal diet restrictions but was told recently to cut back a little on the amount of fluid he is drinking.     Weight: 108.8 kg (239 lb 15.5 oz)  Height: 5' 11" (1.803 m)  BMI: 33.47    Usual BW: 235-240lb  Weight Change: stable    Allergies: Patient has no known allergies.    Current Medications:  Current Outpatient Medications:     acyclovir (ZOVIRAX) 400 MG tablet, 1 tablet, Disp: , Rfl:     calcium carbonate (TUMS) 200 mg calcium (500 mg) chewable tablet, Take 1,500 mg by mouth once daily., Disp: , Rfl:     dapsone 100 MG Tab, Take 100 mg by mouth once daily., Disp: , Rfl:     dexAMETHasone (DECADRON) 4 MG Tab, TAKE 10 TABLETS BY MOUTH ON DAYS 1, 8, AND 15 OF A 21 DAY CYCLE TAKE WITH FOOD IN THE MORNING (AFTER BREAKFAST), Disp: , Rfl:     doxycycline (VIBRAMYCIN) 100 MG Cap, 1 capsule, Disp: , Rfl:     famotidine (PEPCID) 20 MG tablet, Take 20 mg by mouth once daily., Disp: , Rfl:     lenalidomide 10 mg Cap, Take 10 mg by mouth., Disp: , " Rfl:     mv-mn/iron/folic acid/herb 190 (VITAMIN D3 COMPLETE ORAL), Take by mouth., Disp: , Rfl:     ondansetron (ZOFRAN) 4 MG tablet, TAKE ONE TABLET BY MOUTH EVERY EIGHT HOURS IF NEEDED FOR NAUSEA., Disp: , Rfl:     oxyCODONE-acetaminophen (PERCOCET) 5-325 mg per tablet, TAKE ONE TABLET BY MOUTH EVERY 6 HOURS IF NEEDED FOR SEVERE PAIN, Disp: , Rfl:     promethazine (PHENERGAN) 12.5 MG Tab, TAKE ONE TABLET BY MOUTH EVERY 6 HOURS IF NEEDED FOR NAUSEA 2ND CHOICE, Disp: , Rfl:     rosuvastatin (CRESTOR) 20 MG tablet, Take 20 mg by mouth once daily., Disp: , Rfl:     scopolamine (TRANSDERM-SCOP) 1.3-1.5 mg (1 mg over 3 days), APPLY ONE PATCH TO SKIN AS DIRECTED AND REPLACE EVERY 3 DAYS., Disp: , Rfl:     sertraline (ZOLOFT) 50 MG tablet, Take 50 mg by mouth once daily., Disp: , Rfl:     traMADoL (ULTRAM) 50 mg tablet, TAKE ONE TABLET BY MOUTH EVERY 6 HOURS IF NEEDED FOR MODERATE PAIN, Disp: , Rfl:     Food/medication interactions noted: avoid grapefruit    Vitamins/Supplements: vitamin D    Labs: Reviewed    Nutrition Diagnosis    Problem: nutrition-related knowledge deficit  Etiology (related to): lack of prior need for nutrition education  Signs/Symptoms (as evidenced by): referral for SCT evaluation    Nutrition Intervention    Nutrition Prescription   2176 kcals (20kcal/kg)  87g protein (0.8g/kg)   2176mL fluid (20mL/kg)    Recommendations:  Include protein at all meals/snacks - examples discussed  FR per nephrology  Educated patient on food safety and healthy diet pre- and post-transplant. Answered all nutrition related questions.    Materials Provided/Reviewed: Nutrition Therapy for Bone Marrow Transplant Patients booklet    Nutrition Monitoring and Evaluation    Monitor: diet education needs, energy intake, and weight status    Goals: weight maintenance    Follow up: Patient provided with dietitian contact information and advised to call/message with questions or to make future appointment if further  intervention is needed.    Communication to referring provider/care team: note available in chart    Counseling time: 15 minutes    Della Amezcua MS, RD, LDN  [phone]

## 2022-11-22 ENCOUNTER — HOSPITAL ENCOUNTER (OUTPATIENT)
Dept: RADIOLOGY | Facility: HOSPITAL | Age: 57
Discharge: HOME | End: 2022-11-22
Attending: INTERNAL MEDICINE
Payer: MEDICARE

## 2022-11-22 ENCOUNTER — OFFICE VISIT (OUTPATIENT)
Dept: PSYCHIATRY | Facility: CLINIC | Age: 57
End: 2022-11-22
Payer: MEDICARE

## 2022-11-22 ENCOUNTER — CLINICAL SUPPORT (OUTPATIENT)
Dept: HEMATOLOGY/ONCOLOGY | Facility: CLINIC | Age: 57
End: 2022-11-22
Payer: MEDICARE

## 2022-11-22 ENCOUNTER — HOSPITAL ENCOUNTER (OUTPATIENT)
Dept: CARDIOLOGY | Facility: CLINIC | Age: 57
Discharge: HOME | End: 2022-11-22
Payer: MEDICARE

## 2022-11-22 ENCOUNTER — HOSPITAL ENCOUNTER (OUTPATIENT)
Dept: PULMONOLOGY | Facility: CLINIC | Age: 57
Discharge: HOME | End: 2022-11-22
Payer: MEDICARE

## 2022-11-22 ENCOUNTER — HOSPITAL ENCOUNTER (OUTPATIENT)
Dept: CARDIOLOGY | Facility: HOSPITAL | Age: 57
Discharge: HOME | End: 2022-11-22
Attending: INTERNAL MEDICINE
Payer: MEDICARE

## 2022-11-22 VITALS
SYSTOLIC BLOOD PRESSURE: 129 MMHG | DIASTOLIC BLOOD PRESSURE: 76 MMHG | BODY MASS INDEX: 33.46 KG/M2 | HEIGHT: 71 IN | HEART RATE: 63 BPM | WEIGHT: 239 LBS

## 2022-11-22 DIAGNOSIS — C90.00 MULTIPLE MYELOMA, REMISSION STATUS UNSPECIFIED: ICD-10-CM

## 2022-11-22 DIAGNOSIS — Z01.818 PRE-TRANSPLANT EVALUATION FOR STEM CELL TRANSPLANT: ICD-10-CM

## 2022-11-22 DIAGNOSIS — Z76.82 STEM CELL TRANSPLANT CANDIDATE: ICD-10-CM

## 2022-11-22 DIAGNOSIS — C90.00 MULTIPLE MYELOMA, REMISSION STATUS UNSPECIFIED: Primary | ICD-10-CM

## 2022-11-22 DIAGNOSIS — C90.00 MULTIPLE MYELOMA NOT HAVING ACHIEVED REMISSION: Primary | ICD-10-CM

## 2022-11-22 DIAGNOSIS — Z71.3 NUTRITIONAL COUNSELING: ICD-10-CM

## 2022-11-22 DIAGNOSIS — R06.09 DYSPNEA ON EXERTION: ICD-10-CM

## 2022-11-22 DIAGNOSIS — Z01.818 PRE-TRANSPLANT EVALUATION FOR STEM CELL TRANSPLANT: Primary | ICD-10-CM

## 2022-11-22 DIAGNOSIS — C90.00 MULTIPLE MYELOMA NOT HAVING ACHIEVED REMISSION: ICD-10-CM

## 2022-11-22 DIAGNOSIS — Z76.82 STEM CELL TRANSPLANT CANDIDATE: Primary | ICD-10-CM

## 2022-11-22 PROBLEM — N18.6 END STAGE RENAL DISEASE: Status: ACTIVE | Noted: 2022-08-26

## 2022-11-22 LAB
AMPHET+METHAMPHET UR QL: NEGATIVE
ASCENDING AORTA: 3.65 CM
AV INDEX (PROSTH): 0.77
AV MEAN GRADIENT: 4 MMHG
AV PEAK GRADIENT: 7 MMHG
AV VALVE AREA: 3.58 CM2
AV VELOCITY RATIO: 0.84
BACTERIA #/AREA URNS AUTO: NORMAL /HPF
BARBITURATES UR QL SCN>200 NG/ML: NEGATIVE
BENZODIAZ UR QL SCN>200 NG/ML: NEGATIVE
BILIRUB UR QL STRIP: NEGATIVE
BSA FOR ECHO PROCEDURE: 2.33 M2
BZE UR QL SCN: NEGATIVE
CANNABINOIDS UR QL SCN: NEGATIVE
CLARITY UR REFRACT.AUTO: CLEAR
COLOR UR AUTO: YELLOW
CREAT CL/1.73 SQ M 12H UR+SERPL-ARVRAT: 6 ML/MIN (ref 70–110)
CREAT SERPL-MCNC: 4.5 MG/DL (ref 0.5–1.4)
CREAT UR-MCNC: 71 MG/DL (ref 23–375)
CREAT UR-MCNC: 90 MG/DL (ref 23–375)
CREATININE, URINE (MG/SPEC): 382.5 MG/SPEC
CV ECHO LV RWT: 0.29 CM
DLCO ADJ PRE: 19.18 ML/(MIN*MMHG) (ref 22.62–36.48)
DLCO SINGLE BREATH LLN: 22.62
DLCO SINGLE BREATH PRE REF: 48.1 %
DLCO SINGLE BREATH REF: 29.55
DLCOC SBVA LLN: 2.96
DLCOC SBVA PRE REF: 58.7 %
DLCOC SBVA REF: 4.15
DLCOC SINGLE BREATH LLN: 22.62
DLCOC SINGLE BREATH PRE REF: 64.9 %
DLCOC SINGLE BREATH REF: 29.55
DLCOCSBVAULN: 5.33
DLCOCSINGLEBREATHULN: 36.48
DLCOSINGLEBREATHULN: 36.48
DLCOVA LLN: 2.96
DLCOVA PRE REF: 43.5 %
DLCOVA PRE: 1.8 ML/(MIN*MMHG*L) (ref 2.96–5.33)
DLCOVA REF: 4.15
DLCOVAULN: 5.33
DLVAADJ PRE: 2.43 ML/(MIN*MMHG*L) (ref 2.96–5.33)
DOP CALC AO PEAK VEL: 1.35 M/S
DOP CALC AO VTI: 27.24 CM
DOP CALC LVOT AREA: 4.7 CM2
DOP CALC LVOT DIAMETER: 2.44 CM
DOP CALC LVOT PEAK VEL: 1.14 M/S
DOP CALC LVOT STROKE VOLUME: 97.44 CM3
DOP CALCLVOT PEAK VEL VTI: 20.85 CM
E WAVE DECELERATION TIME: 244.5 MSEC
E/A RATIO: 0.78
E/E' RATIO: 6.8 M/S
ECHO LV POSTERIOR WALL: 0.89 CM (ref 0.6–1.1)
EJECTION FRACTION: 50 %
ETHANOL UR-MCNC: <10 MG/DL
FEF 25 75 LLN: 1.53
FEF 25 75 PRE REF: 125.7 %
FEF 25 75 REF: 3.06
FEV05 LLN: 1.71
FEV05 REF: 2.85
FEV1 FVC LLN: 66
FEV1 FVC PRE REF: 100.6 %
FEV1 FVC REF: 78
FEV1 LLN: 2.75
FEV1 PRE REF: 123.2 %
FEV1 REF: 3.63
FRACTIONAL SHORTENING: 25 % (ref 28–44)
FVC LLN: 3.59
FVC PRE REF: 122.2 %
FVC REF: 4.69
GENETICIST REVIEW: NORMAL
GLUCOSE UR QL STRIP: NEGATIVE
HGB UR QL STRIP: NEGATIVE
HYALINE CASTS UR QL AUTO: 0 /LPF
INTERVENTRICULAR SEPTUM: 0.91 CM (ref 0.6–1.1)
IVC PRE: 5.49 L (ref 3.59–5.8)
IVC SINGLE BREATH LLN: 3.59
IVC SINGLE BREATH PRE REF: 117.1 %
IVC SINGLE BREATH REF: 4.69
IVCSINGLEBREATHULN: 5.8
IVRT: 88.49 MSEC
KETONES UR QL STRIP: NEGATIVE
LA MAJOR: 5.16 CM
LA MINOR: 5.17 CM
LA WIDTH: 5.54 CM
LEFT ATRIUM SIZE: 4.1 CM
LEFT ATRIUM VOLUME INDEX MOD: 39.3 ML/M2
LEFT ATRIUM VOLUME INDEX: 43.9 ML/M2
LEFT ATRIUM VOLUME MOD: 89.15 CM3
LEFT ATRIUM VOLUME: 99.72 CM3
LEFT INTERNAL DIMENSION IN SYSTOLE: 4.7 CM (ref 2.1–4)
LEFT VENTRICLE DIASTOLIC VOLUME INDEX: 86.35 ML/M2
LEFT VENTRICLE DIASTOLIC VOLUME: 196.01 ML
LEFT VENTRICLE MASS INDEX: 101 G/M2
LEFT VENTRICLE SYSTOLIC VOLUME INDEX: 45 ML/M2
LEFT VENTRICLE SYSTOLIC VOLUME: 102.18 ML
LEFT VENTRICULAR INTERNAL DIMENSION IN DIASTOLE: 6.23 CM (ref 3.5–6)
LEFT VENTRICULAR MASS: 230.21 G
LEUKOCYTE ESTERASE UR QL STRIP: NEGATIVE
LV LATERAL E/E' RATIO: 7.29 M/S
LV SEPTAL E/E' RATIO: 6.38 M/S
METHADONE UR QL SCN>300 NG/ML: NEGATIVE
MICROSCOPIC COMMENT: NORMAL
MV A" WAVE DURATION": 9.71 MSEC
MV PEAK A VEL: 0.65 M/S
MV PEAK E VEL: 0.51 M/S
MV STENOSIS PRESSURE HALF TIME: 70.9 MS
MV VALVE AREA P 1/2 METHOD: 3.1 CM2
NITRITE UR QL STRIP: NEGATIVE
OPIATES UR QL SCN: NEGATIVE
PCP UR QL SCN>25 NG/ML: NEGATIVE
PEF LLN: 7.05
PEF PRE REF: 93.5 %
PEF REF: 9.36
PH UR STRIP: 6 [PH] (ref 5–8)
PHYSICIAN COMMENT: ABNORMAL
PISA MRMAX VEL: 0.05 M/S
PISA TR MAX VEL: 2.37 M/S
PLASMA CELL PROLIF RELEASED BY: NORMAL
PLASMA CELL PROLIF RESULT SUMMARY: NORMAL
PLASMA CELL PROLIF RESULT TABLE: NORMAL
PRE DLCO: 14.21 ML/(MIN*MMHG) (ref 22.62–36.48)
PRE FEF 25 75: 3.85 L/S (ref 1.53–5.13)
PRE FET 100: 6.37 SEC
PRE FEV05 REF: 111.1 %
PRE FEV1 FVC: 78.14 % (ref 65.84–87.96)
PRE FEV1: 4.48 L (ref 2.75–4.47)
PRE FEV5: 3.16 L (ref 1.71–3.98)
PRE FVC: 5.73 L (ref 3.59–5.8)
PRE PEF: 8.75 L/S (ref 7.05–11.68)
PROT 24H UR-MRATE: 370 MG/SPEC (ref 0–100)
PROT UR QL STRIP: ABNORMAL
PROT UR-MCNC: 87 MG/DL (ref 0–15)
PULM VEIN S/D RATIO: 1
PV PEAK D VEL: 0.35 M/S
PV PEAK S VEL: 0.35 M/S
QEF: 49 %
RA MAJOR: 5.42 CM
RA PRESSURE: 3 MMHG
RA WIDTH: 3.41 CM
RBC #/AREA URNS AUTO: 0 /HPF (ref 0–4)
REASON FOR REFERRAL, PLASMA CELL PROLIF (PCPD), FISH: NORMAL
REF LAB TEST METHOD: NORMAL
RESULTS, PLASMA CELL PROLIF (PCPD), FISH: NORMAL
RIGHT VENTRICULAR END-DIASTOLIC DIMENSION: 4.67 CM
RV TISSUE DOPPLER FREE WALL SYSTOLIC VELOCITY 1 (APICAL 4 CHAMBER VIEW): 13.1 CM/S
SERVICE CMNT-IMP: NORMAL
SERVICE CMNT-IMP: NORMAL
SINUS: 3.59 CM
SP GR UR STRIP: 1.01 (ref 1–1.03)
SPECIMEN SOURCE: NORMAL
SPECIMEN, PLASMA CELL PROLIF (PCPD), FISH: NORMAL
STJ: 3.25 CM
TDI LATERAL: 0.07 M/S
TDI SEPTAL: 0.08 M/S
TDI: 0.08 M/S
TOXICOLOGY INFORMATION: NORMAL
TR MAX PG: 22 MMHG
TRICUSPID ANNULAR PLANE SYSTOLIC EXCURSION: 2.64 CM
TV REST PULMONARY ARTERY PRESSURE: 25 MMHG
URINE COLLECTION DURATION: 24 HR
URINE COLLECTION DURATION: 24 HR
URINE VOLUME: 425 ML
URINE VOLUME: 425 ML
URN SPEC COLLECT METH UR: ABNORMAL
VA PRE: 7.88 L (ref 6.98–6.98)
VA SINGLE BREATH LLN: 6.98
VA SINGLE BREATH PRE REF: 113 %
VA SINGLE BREATH REF: 6.98
VASINGLEBREATHULN: 6.98
WBC #/AREA URNS AUTO: 0 /HPF (ref 0–5)

## 2022-11-22 PROCEDURE — 86335 IMMUNFIX E-PHORSIS/URINE/CSF: CPT | Performed by: INTERNAL MEDICINE

## 2022-11-22 PROCEDURE — 90791 PSYCH DIAGNOSTIC EVALUATION: CPT | Mod: S$GLB,,, | Performed by: PSYCHOLOGIST

## 2022-11-22 PROCEDURE — 94729 DIFFUSING CAPACITY: CPT | Mod: S$GLB,,, | Performed by: INTERNAL MEDICINE

## 2022-11-22 PROCEDURE — 4010F ACE/ARB THERAPY RXD/TAKEN: CPT | Mod: CPTII,S$GLB,, | Performed by: PSYCHOLOGIST

## 2022-11-22 PROCEDURE — 93356 ECHO (CUPID ONLY): ICD-10-PCS | Mod: ,,, | Performed by: INTERNAL MEDICINE

## 2022-11-22 PROCEDURE — 96137 PSYCL/NRPSYC TST PHY/QHP EA: CPT | Mod: S$GLB,,, | Performed by: PSYCHOLOGIST

## 2022-11-22 PROCEDURE — 97802 PR MED NUTR THER, 1ST, INDIV, EA 15 MIN: ICD-10-PCS | Mod: S$GLB,,, | Performed by: DIETITIAN, REGISTERED

## 2022-11-22 PROCEDURE — 93005 EKG 12-LEAD: ICD-10-PCS | Mod: S$GLB,,, | Performed by: INTERNAL MEDICINE

## 2022-11-22 PROCEDURE — 36415 COLL VENOUS BLD VENIPUNCTURE: CPT | Performed by: NURSE PRACTITIONER

## 2022-11-22 PROCEDURE — 93010 ELECTROCARDIOGRAM REPORT: CPT | Mod: S$GLB,,, | Performed by: INTERNAL MEDICINE

## 2022-11-22 PROCEDURE — 99999 PR PBB SHADOW E&M-EST. PATIENT-LVL I: ICD-10-PCS | Mod: PBBFAC,,,

## 2022-11-22 PROCEDURE — 4010F PR ACE/ARB THEARPY RXD/TAKEN: ICD-10-PCS | Mod: CPTII,S$GLB,, | Performed by: PSYCHOLOGIST

## 2022-11-22 PROCEDURE — 96146 PSYCL/NRPSYC TST AUTO RESULT: CPT | Mod: S$GLB,,, | Performed by: PSYCHOLOGIST

## 2022-11-22 PROCEDURE — 99999 PR PBB SHADOW E&M-EST. PATIENT-LVL II: ICD-10-PCS | Mod: PBBFAC,,, | Performed by: PSYCHOLOGIST

## 2022-11-22 PROCEDURE — 71046 XR CHEST PA AND LATERAL: ICD-10-PCS | Mod: 26,,, | Performed by: RADIOLOGY

## 2022-11-22 PROCEDURE — 84166 PROTEIN E-PHORESIS/URINE/CSF: CPT | Mod: 26,,, | Performed by: PATHOLOGY

## 2022-11-22 PROCEDURE — 93356 MYOCRD STRAIN IMG SPCKL TRCK: CPT | Mod: ,,, | Performed by: INTERNAL MEDICINE

## 2022-11-22 PROCEDURE — 93010 EKG 12-LEAD: ICD-10-PCS | Mod: S$GLB,,, | Performed by: INTERNAL MEDICINE

## 2022-11-22 PROCEDURE — 86335 PATHOLOGIST INTERPRETATION UIFE: ICD-10-PCS | Mod: 26,,, | Performed by: PATHOLOGY

## 2022-11-22 PROCEDURE — 94010 BREATHING CAPACITY TEST: CPT | Mod: S$GLB,,, | Performed by: INTERNAL MEDICINE

## 2022-11-22 PROCEDURE — 99999 PR PBB SHADOW E&M-EST. PATIENT-LVL I: ICD-10-PCS | Mod: PBBFAC,,, | Performed by: DIETITIAN, REGISTERED

## 2022-11-22 PROCEDURE — 99999 PR PBB SHADOW E&M-EST. PATIENT-LVL I: CPT | Mod: PBBFAC,,,

## 2022-11-22 PROCEDURE — 90791 PR PSYCHIATRIC DIAGNOSTIC EVALUATION: ICD-10-PCS | Mod: S$GLB,,, | Performed by: PSYCHOLOGIST

## 2022-11-22 PROCEDURE — 80307 DRUG TEST PRSMV CHEM ANLYZR: CPT | Performed by: INTERNAL MEDICINE

## 2022-11-22 PROCEDURE — 93306 TTE W/DOPPLER COMPLETE: CPT | Mod: 26,,, | Performed by: INTERNAL MEDICINE

## 2022-11-22 PROCEDURE — 93356 MYOCRD STRAIN IMG SPCKL TRCK: CPT

## 2022-11-22 PROCEDURE — 94010 BREATHING CAPACITY TEST: ICD-10-PCS | Mod: S$GLB,,, | Performed by: INTERNAL MEDICINE

## 2022-11-22 PROCEDURE — 96136 PR PSYCH/NEUROPSYCH TEST ADMIN/SCORING, 2+ TESTS, 1ST 30 MIN: ICD-10-PCS | Mod: S$GLB,,, | Performed by: PSYCHOLOGIST

## 2022-11-22 PROCEDURE — 99999 PR PBB SHADOW E&M-EST. PATIENT-LVL II: CPT | Mod: PBBFAC,,, | Performed by: PSYCHOLOGIST

## 2022-11-22 PROCEDURE — 84166 PATHOLOGIST INTERPRETATION UPE: ICD-10-PCS | Mod: 26,,, | Performed by: PATHOLOGY

## 2022-11-22 PROCEDURE — 84156 ASSAY OF PROTEIN URINE: CPT | Performed by: INTERNAL MEDICINE

## 2022-11-22 PROCEDURE — 1159F MED LIST DOCD IN RCRD: CPT | Mod: CPTII,S$GLB,, | Performed by: PSYCHOLOGIST

## 2022-11-22 PROCEDURE — 96146 PR PSYCH/NEUROPSYCH TEST ADMIN, ELEC PLATFORM, AUTO RESULT ONLY: ICD-10-PCS | Mod: S$GLB,,, | Performed by: PSYCHOLOGIST

## 2022-11-22 PROCEDURE — 94729 PR C02/MEMBANE DIFFUSE CAPACITY: ICD-10-PCS | Mod: S$GLB,,, | Performed by: INTERNAL MEDICINE

## 2022-11-22 PROCEDURE — 71046 X-RAY EXAM CHEST 2 VIEWS: CPT | Mod: TC,FY

## 2022-11-22 PROCEDURE — 96136 PSYCL/NRPSYC TST PHY/QHP 1ST: CPT | Mod: S$GLB,,, | Performed by: PSYCHOLOGIST

## 2022-11-22 PROCEDURE — 1159F PR MEDICATION LIST DOCUMENTED IN MEDICAL RECORD: ICD-10-PCS | Mod: CPTII,S$GLB,, | Performed by: PSYCHOLOGIST

## 2022-11-22 PROCEDURE — 96130 PSYCL TST EVAL PHYS/QHP 1ST: CPT | Mod: S$GLB,,, | Performed by: PSYCHOLOGIST

## 2022-11-22 PROCEDURE — 93306 ECHO (CUPID ONLY): ICD-10-PCS | Mod: 26,,, | Performed by: INTERNAL MEDICINE

## 2022-11-22 PROCEDURE — 97802 MEDICAL NUTRITION INDIV IN: CPT | Mod: S$GLB,,, | Performed by: DIETITIAN, REGISTERED

## 2022-11-22 PROCEDURE — 93005 ELECTROCARDIOGRAM TRACING: CPT | Mod: S$GLB,,, | Performed by: INTERNAL MEDICINE

## 2022-11-22 PROCEDURE — 84166 PROTEIN E-PHORESIS/URINE/CSF: CPT | Performed by: INTERNAL MEDICINE

## 2022-11-22 PROCEDURE — 81001 URINALYSIS AUTO W/SCOPE: CPT | Performed by: INTERNAL MEDICINE

## 2022-11-22 PROCEDURE — 99999 PR PBB SHADOW E&M-EST. PATIENT-LVL I: CPT | Mod: PBBFAC,,, | Performed by: DIETITIAN, REGISTERED

## 2022-11-22 PROCEDURE — 96130 PR PSYCHOLOGIC TEST EVAL SVCS, 1ST HR: ICD-10-PCS | Mod: S$GLB,,, | Performed by: PSYCHOLOGIST

## 2022-11-22 PROCEDURE — 96137 PR PSYCH/NEUROPSYCH TEST ADMIN/SCORING, 2+ TESTS, EA ADDTL 30 MIN: ICD-10-PCS | Mod: S$GLB,,, | Performed by: PSYCHOLOGIST

## 2022-11-22 PROCEDURE — 86335 IMMUNFIX E-PHORSIS/URINE/CSF: CPT | Mod: 26,,, | Performed by: PATHOLOGY

## 2022-11-22 PROCEDURE — 82575 CREATININE CLEARANCE TEST: CPT | Performed by: INTERNAL MEDICINE

## 2022-11-22 PROCEDURE — 71046 X-RAY EXAM CHEST 2 VIEWS: CPT | Mod: 26,,, | Performed by: RADIOLOGY

## 2022-11-22 RX ORDER — CALCITRIOL 0.5 UG/1
0.5 CAPSULE ORAL
Status: ON HOLD | COMMUNITY
End: 2023-01-13 | Stop reason: HOSPADM

## 2022-11-22 RX ORDER — ERGOCALCIFEROL 1.25 MG/1
50000 CAPSULE ORAL
COMMUNITY

## 2022-11-22 RX ORDER — GLUCOSAMINE HCL 500 MG
1 TABLET ORAL DAILY
COMMUNITY

## 2022-11-22 NOTE — LETTER
December 2, 2022        Peter King MD  [address] [facility] [address]             [facility] - Psychiatry  [address] [facility] [address]  Phone: [phone]  Fax: [phone]   Patient: De Lakhani   MR Number: [id number]   YOB: 1965   Date of Visit: 11/22/2022       Dear Dr. King:    Thank you for referring De Lakhani to me for evaluation. Below are the relevant portions of my assessment and plan of care.       MPRESSIONS AND RECOMMENDATIONS  De Lakhani is an  acceptable HSCT candidate from a psychological perspective, with low risk.   There are no overt psychological contraindications for proceeding with the procedure.He eports no current psychiatric problems or major adjustment issues.  The patient has reasonable expectations for the procedure, good social support, and has already begun making appropriate life plans in anticipation of the procedure. The patient has verbalized appropriate awareness and commitment to the necessary behavioral changes associated with HSCTand appears willing to adjust to long-term lifestyle challenges and medical follow-up. Given literacy limitations, this patient should be provided all vital information for consent, decision-making, and treatment both verbally and in writing.     If you have questions, please do not hesitate to call me. I look forward to following De along with you.    Sincerely,      Jazmyn Nieves, PhD           CC  Neeraj Patel RN

## 2022-11-22 NOTE — PROGRESS NOTES
BMT Pharmacist Evaluation      Current Outpatient Medications:     acyclovir (ZOVIRAX) 400 MG tablet, Take 400 mg by mouth 2 (two) times a day., Disp: , Rfl:     apixaban (ELIQUIS) 5 mg Tab, Take 5 mg by mouth 2 (two) times daily., Disp: , Rfl:     calcitRIOL (ROCALTROL) 0.5 MCG Cap, Take 0.5 mcg by mouth. Given w/ dialysis, Disp: , Rfl:     calcium carbonate (TUMS) 200 mg calcium (500 mg) chewable tablet, Take 1,000 mg by mouth once daily., Disp: , Rfl:     cholecalciferol, vitamin D3, 75 mcg (3,000 unit) Tab, Take 3,000 Units by mouth once daily., Disp: , Rfl:     dapsone 100 MG Tab, Take 100 mg by mouth once daily., Disp: , Rfl:     dexAMETHasone (DECADRON) 4 MG Tab, Takes 5 tablets on Tuesdays and 5 tablets on Wednesdays., Disp: , Rfl:     doxycycline (VIBRAMYCIN) 100 MG Cap, Take 100 mg by mouth every 12 (twelve) hours. Preventative for past knee infection, Disp: , Rfl:     ergocalciferol (ERGOCALCIFEROL) 50,000 unit Cap, Take 50,000 Units by mouth every 14 (fourteen) days. Given at dialysis center, Disp: , Rfl:     famotidine (PEPCID) 20 MG tablet, Take 20 mg by mouth once daily., Disp: , Rfl:     rosuvastatin (CRESTOR) 20 MG tablet, Take 20 mg by mouth once daily., Disp: , Rfl:     sertraline (ZOLOFT) 50 MG tablet, Take 50 mg by mouth once daily., Disp: , Rfl:     traMADoL (ULTRAM) 50 mg tablet, TAKE ONE TABLET BY MOUTH EVERY 6 HOURS IF NEEDED FOR MODERATE PAIN, Disp: , Rfl:     ondansetron (ZOFRAN) 4 MG tablet, TAKE ONE TABLET BY MOUTH EVERY EIGHT HOURS IF NEEDED FOR NAUSEA., Disp: , Rfl:     oxyCODONE-acetaminophen (PERCOCET) 5-325 mg per tablet, TAKE ONE TABLET BY MOUTH EVERY 6 HOURS IF NEEDED FOR SEVERE PAIN, Disp: , Rfl:     promethazine (PHENERGAN) 12.5 MG Tab, TAKE ONE TABLET BY MOUTH EVERY 6 HOURS IF NEEDED FOR NAUSEA 2ND CHOICE, Disp: , Rfl:     scopolamine (TRANSDERM-SCOP) 1.3-1.5 mg (1 mg over 3 days), APPLY ONE PATCH TO SKIN AS DIRECTED AND REPLACE EVERY 3 DAYS., Disp: , Rfl:       Review of  patient's allergies indicates:  No Known Allergies      Estimated Creatinine Clearance: 22.4 mL/min (A) (based on SCr of 4.5 mg/dL (H)).       Medication adherence: Pill box - has difficulties with adhering to twice daily dosing.   Medication-related problems: None admitted     Planned conditioning regimen:  Melphalan on Day -1    Antimicrobial Prophylaxis:  Acyclovir starting on Day -1  Levofloxacin starting on Day -1  Fluconazole starting on Day -1    Growth Factor Support:  Neupogen starting on Day +7      Caregiver: Wife  Post-transplant discharge plans: [facility]     Notes:  Reviewed and reconciled the medication list with the patient and wife, Darlyn. Patient was able to confirm all medications he currently takes including schedule and indication. Patient demonstrates fair medication adherence and understands the importance of this through the transplant process.     Reviewed the planned high-dose chemotherapy regimen, including schedule and possible side effects. Provided the patient with drug information handouts for chemotherapy. Reviewed possible side effects of transplant including: neutropenia, thrombocytopenia, anemia, infection, infusion reactions, nausea/vomiting, mucositis, loss of appetite, taste changes, diarrhea,hair loss, liver and/or renal dysfunction. Also discussed the rare side effect of neurotoxicity. Reviewed prophylactic antimicrobials, as well as prophylactic and as needed antiemetics. Encouraged the patient to report all possible side effects/new symptoms and to ask for supportive care medications if needed. Patient verbalized understanding and all questions were answered.    Pharmacy Notes:   - Patient denies history of c.diff, fungal infections, or shingles.    - Patient denies significant need for pain controlling medications. Endorses adequate control with occasional tramadol (2 per day) and alternating oxycodone for severe pain only.    - Unclear reasoning behind ergocalciferol and  cholecalciferol combination or ergocalciferol frequency.    - Apixaban was continued after an upper extremity DVT related to PICC line placement due to risks associated with disease and lenalidomide therapy.     Drug Interactions:   Additive Qtc risk: Sertraline, ondansetron, levofloxacin, fluconazole. (EKG 11/22: 462)   Decreased efficacy: Please separate administration of doxycycline from polyvalent cations such as calcium or magnesium containing supplements.      Proposed recommendations:   - Note that current acyclovir dosing is appropriate for transplant given hemodialysis.    - Would consider dose adjustment of rosuvastatin to 10 mg given hemodialysis.    - Would recommend holding apixaban when platelets drop below threshold.    - Would consider consolidating future medications to avoid twice daily dosing when possible. Additional counseling on adherence may be required.    The patient demonstrates fair medication adherence and understanding of the chemotherapy and transplant plan. BMT/Hematology Oncology PharmD will continue to follow the patient while admitted to the inpatient unit.     Prisca Perez, PharmD

## 2022-11-22 NOTE — PROGRESS NOTES
INFORMED CONSENT/ LIMITS of CONFIDENTIALITY: Prior to beginning the interview, the patient's identification was confirmed via name and date of birth. De Lakhani  was informed of the possible risks and benefits of psychological interventions (e.g., counseling, psychotherapy, testing) and provided information regarding the handling of protected health records and   the limits of confidentiality, including the importance of reporting any suicidal or homicidal ideation to ensure safety of all parties. This provider explained the purpose of today's appointment and the patient was provided with time to ask questions regarding this information.  Acceptance and understanding of these conditions was expressed, and De Lakhani freely consented to this evaluation.       Psycho-Oncology Pre-Transplant Evaluation  Psychiatry Initial Visit (PhD)  Psychological Intake and Assessment    Date:  11/22/2022     CPT Code: 94144 (1hr) , 57706 (1hr), 43636 (1hr) Evaluation Length (direct face-to-face time):  1 hour   Total Time including report writing, chart review, integration of data and feedback: 3 hours       Referred by:  BMT Team/ Oncologist: DORYS King MD.     Chief complaint/reason for encounter:  Psychological Evaluation prior to stem cell transplantation    Clinical status of patient: Outpatient    De Lakhani, a 57 y.o. male, was seen for initial evaluation visit.  Met with patient and spouse Darlyn. His primary care physician is To Obtain Unable.       Psychological Intake  Medical/Surgical History:   Patient Active Problem List   Diagnosis    Multiple myeloma not having achieved remission    Chronic infection of prosthetic knee    Thrombocytopenia, unspecified    Dependence on hemodialysis    Stem cell transplant candidate        Health Behaviors:       ETOH Use: No (rare)      Tobacco Use: No   Illicit Drug Use:  No     Prescription Misuse:No   Caffeine: minimal   Exercise:The patient engages in environmental  activity only.   Firearms:  Yes, Stored Safely   Advanced directives:No     Family History:   Psychiatric illness: No     Alcohol/Drug Abuse: No     Suicide: No      Past Psychiatric History:   Inpatient treatment: No     Outpatient treatment: Yes  Marital Therapy Years Ago   Prior substance abuse treatment: No     Suicide Attempts: No      Psychotropic Medications:  Current: Zoloft       Past: none    Current medications as per below, allergies reviewed in chart.  Current Outpatient Medications   Medication    acyclovir (ZOVIRAX) 400 MG tablet    apixaban (ELIQUIS) 5 mg Tab    calcitRIOL (ROCALTROL) 0.5 MCG Cap    calcium carbonate (TUMS) 200 mg calcium (500 mg) chewable tablet    cholecalciferol, vitamin D3, 75 mcg (3,000 unit) Tab    dapsone 100 MG Tab    dexAMETHasone (DECADRON) 4 MG Tab    doxycycline (VIBRAMYCIN) 100 MG Cap    ergocalciferol (ERGOCALCIFEROL) 50,000 unit Cap    famotidine (PEPCID) 20 MG tablet    ondansetron (ZOFRAN) 4 MG tablet    oxyCODONE-acetaminophen (PERCOCET) 5-325 mg per tablet    promethazine (PHENERGAN) 12.5 MG Tab    rosuvastatin (CRESTOR) 20 MG tablet    scopolamine (TRANSDERM-SCOP) 1.3-1.5 mg (1 mg over 3 days)    sertraline (ZOLOFT) 50 MG tablet    traMADoL (ULTRAM) 50 mg tablet     No current facility-administered medications for this visit.         Social situation/Stressors:De Lakhani is an 57 y.o. male referred by DORYS King MD for pre-transplant evaluation.  De Lakhani lives with wife in [place], [place]. He has bas been disabled since 2019.  His prior work history is stable, formally on shipyard.  The patient reports that he does have adequate availability of time off for the procedure and recovery.  De Lakhani has been  37 and has one adult daughter and two grandchildren.  His spouse works at a bank and has time off work. Family is supportive.    The patient reports good social support.  Darlyn will be present and available to assist the patient  during his recovery period.   De Lakhani is an active member of the [employer]. De Lakhani's hobbies include hunting.   The patient has no  history.    Additional stressors:    None     Strengths: Able to vocalize needs, Values and traditions, Motivation, readiness for change, and Setting and pursuing goals, hopes, dreams, aspirations   Liabilities: Complicated medical illness    History of present illness:   Patient with MM and ESRD. Plans for Auto SCT.  Takes zoloft for history of depression, in remission    De Lakhani has adjusted to illness well primarily through focus on alternative activities. He has engaged in appropriate information gathering.  The patient has good family support.  His family is coping marginally with the diagnosis/treatment/prognosis. Wife was initially struggling but has improved. Daughter struggled with worries but is managing appropriately.    De Lakhani reports using prayer and time with family and friends  as his primary methods of coping with general stressors.  Illness-related psychosocial stressors include changes in ability to engage in leisure activities and absence from home. These stressors will not prevent patient from adhering to post-transplant requirements.  The patient has an good [place] [facility] [place]. The patient reports the following barriers to cancer care:financial limitations and distance from the hospital.    Stem Cell Transplantation (SCT):  De Lakhani possesses a satisfactory level of knowledge about SCT gleaned from materials provided by his clinicians and discussions with his clinical team .  De Lakhani is knowledgeable about the possible costs, risks, and complications of the procedure and the behavioral changes which will be required of him.  He has anticipated his recovery needs and has planned adequate time away from work, assistance from wife, and residence at [facility] to facilitate healing.  eD Lakhani is aware of the requirement that HSCT patients must stay within 1 hour of the hospital for their first 30 days post-transplant.  De Lakhani knows he must commit to careful monitoring of symptoms, the possibility of a complex long-term multiple drug treatment regimen, and long term follow-up visits with his oncologist (as required) following the procedure.  He is aware of the following necessary behavioral changes:changes in food selection, preparation, and storage, increased vigilance with home cleanliness , careful personal and dental hygiene , changes to modes of pet care , and rapid return to physical activity. The patient reports good compliance with medical treatment in the past, which is supported by review of his medical chart.  De Lakhani has realistic expectations of health and illness possibilities following SCT. He is aware of possible medical side effects including infection, hair loss, GI difficulties , loss of appetite, fatigue , neuropathy, and mucositis  during/following treatment. He is aware of the risk of mortality. He also anticipates social strains including decreased ability to participate in some leisure and social activities for some time and the strains of care-giving demands on friends/family.      Collateral Information:    Current Symptoms:  Mood: denied;  prior depression:several years ago ;   Lillian: Denies   Psychosis: Denies  Anxiety: denied; no prior;   Generalized anxiety: Denies       Panic Disorder: Denies  Social/specific phobia: Denies   OCD: Denies  Substance abuse: denied  Is the patient willing to submit to a random drug screen?  Yes  Cognitive functioning: denied  Health behaviors: noncontributory  Can the patient identify own medications and describe purpose and proper dosing? Yes  Does the patient appropriately manage chronic conditions which need close monitoring (such as diabetes)? Yes  Does the patient use complementary or alternative medications,  remedies, procedures, or interventions? No   Sleep: The patient reports 8 hours of uninterrupted sleep per night. no concerns,  no sleep onset difficulty  and no sleep maintenance difficulty, no EDS  and no reported apneic events, no sleep hygiene considerations  no use of OTC/melatonin/hypnotics/benzodiazepines    Pain: Mr. Lakhani reports 0 pain.   Trauma: Denies  Sexual Dysfunction:  Denies   Head Injury History: Denies  Personality Functioning: The patient does not display any personality characteristics which would be an impediment to receiving BMT.    Patient Reported Cancer Treatment Symptoms:  nausea and weight loss    Psychological Assessment/Testing:     Distress thermometer:   Distress Score    Distress Score: 0 - No Distress        Practical Problems Physical Problems                                                   Family Problems                                         Emotional Problems                                                         Spiritual/Religions Concerns     Spiritual / Restorationist Concerns: No         Other Problems              PHQ ANSWERS    Q1. Little interest or pleasure in doing things: (P) Not at all (11/21/22 0950)  Q2. Feeling down, depressed, or hopeless: (P) Not at all (11/21/22 0950)  Q3. Trouble falling or staying asleep, or sleeping too much: (P) Not at all (11/21/22 0950)  Q4. Feeling tired or having little energy: (P) Not at all (11/21/22 0950)  Q5. Poor appetite or overeating: (P) Not at all (11/21/22 0950)  Q6. Feeling bad about yourself - or that you are a failure or have let yourself or your family down: (P) Not at all (11/21/22 0950)  Q7. Trouble concentrating on things, such as reading the newspaper or watching television: (P) Not at all (11/21/22 0950)  Q8. Moving or speaking so slowly that other people could have noticed. Or the opposite - being so fidgety or restless that you have been moving around a lot more than usual: (P) Not at all (11/21/22 0950)  Q9.   0    PHQ8 Score : (P) 0 (11/21/22 0950)  PHQ-9 Total Score: (P) 0 (11/21/22 0950)         VINICIUS-7 Answers    GAD7 11/21/2022   1. Feeling nervous, anxious, or on edge? 0   2. Not being able to stop or control worrying? 0   3. Worrying too much about different things? 0   4. Trouble relaxing? 0   5. Being so restless that it is hard to sit still? 0   6. Becoming easily annoyed or irritable? 0   7. Feeling afraid as if something awful might happen? 0   VINICIUS-7 Score 0     VINICIUS-7 Score: (P) 0  Interpretation: (P) Normal       AUDIT-C  The AUDIT-C is a 3-item alcohol screen that can help identify individuals who are hazardous drinkers or who have alcohol use disorders (including alcohol abuse or dependence). Negative; Score:  0    PCS: Pain catastrophizing: There were no elevations     SLUMS  Patient highest education is 7th grade. The [employer] Mental Status Examination (UMS), a cognitive screener, was administered to assess the Patient's current mental status and cognitive capacity. Patient obtained a score of 25/30. This score does fall within normal limits as compared to those within similar age and level of education, and suggests no impairments in neurocognitive functioning.     REALM-R:   The Rapid Estimate of Adult Literacy in Medicine, Revised (REALM-R) is a brief screening instrument used to assess an adult patient's ability to read common medical words. It is designed to assist medical professionals in identifying patients at risk for poor literacy skills.  Results:  Insufficient health literacy      [facility] DIAGNOSTIC (MBMD)                                    The MBMD provides an assessment of the potential role of psychiatric factors in a patient's disease and treatment. De Lakhani produced a valid MBMD profile. Validity indices suggested some concerns in the areas of desirability  though not enough to invalidate results. The elevation of the desirability scale suggests that  De Lakhani may be reporting to ensure that the patient is not presented in a negative light. Elevation in the area of disclosure may indicate that the patient is hesitant to share some information.         The patient's profile suggests the presence of no depression,  no anxiety, mild cognitive issues, no difficulty with moodiness or mood swings, and mild apprehension to being open with others about these issues.      In regard to COPING STYLES, several key scales show significant elevations that would be expected to POSITIVELY influence chandrakant-procedural course (Sociable, Confident, and Respectful). De Lakhani responses indicate that  he is outgoing and cooperative following treatment, which suggests that  He will be compliant with recommendations by providers. Additionally, the profile suggests that the patient is self-assured but may need to be informed in more detailed of medical suggestions by providers.      De Lakhani obtained mild elevations on the STRESS MODERATORS  scales (Illness Apprehension, Functional DeficitsFuture Pessimism, Spiritual Absence). The Stress Moderators scales assess factors which have the potential to influence patient responses to treatment. Profiles similar to De Lakhani indicate the patient may have high awareness of changes in physical functioning, which may be beneficial to with regard to being proactive in seeking medical treatment, but may cause some worry about physical health.         The patient's responses indicate that he may sometimes find it difficult to carry out activities, roles, and responsibilities of daily life. It should be noted that, given De Lakhani multiple co- morbid medical problems, these elevations are fairly reasonable and do NOT reflect absolute contraindications.   Mental Status Exam:    General appearance:  appears stated age, neatly dressed, well groomed  Level of cooperation:  cooperative  Thought processes:  logical,  goal-directed   Speech: normal in rate, quiet   Mood: euthymic  Affect: mood congruent  Thought content:  no illusions, no visual hallucinations, no auditory hallucinations, no delusions, no active or passive homicidal thoughts, no active or passive suicidal ideation, no obsessions, no compulsions, no violence  Orientation:  oriented to person, place, and time  Memory:  Recent memory:   of 5 objects after brief delay.    Remote memory - intact  Attention span and concentration:  spelled WORLD forwards and backwards; SAVEAHAART without difficulty  Abstract reasoning:    Similarities: abstract.    Proverbs: abstract.  Judgment and insight: good   Language:  Intact      SUMMARY:  De Lakhani is a  57 y.o. male referred by Dr. DORYS King MD. for psychological evaluation prior to stem cell transplantation.  The patient appears absent of disabling psychopathology or disabilities which would prevent understanding and compliance with medical treatment.  There is no evidence of suicidality.   The patient has good knowledge about HSCT, appropriate expectations for health and illness following transplantation, adequate  understanding of the possible risks and complications of this treatment option, and a high willingness to sustain effort for lifestyle changes and health adaptations which will be required of him. He is aware of the 30 day 1 hour residence requirement.  He reports adequate compliance with previous medical treatment.  De Lakhani has good social support from family. Caregivers are engaged and aware of post-HSCT demands.  The patient exhibits a high degree of social stability.  The patient has experience using coping mechanisms to deal with stress. and The patient acknowledges no stressors expected to limit his ability to cope with the demands of HSCT and recovery.  The patient reports no tobacco use, no alcohol use, no illicit drug use, and no caffeine consumption  He demonstrates adequate health  literacy.     He does demonstrate some impairment in cognitive functioning, but retains the ability to complete all ADLs and has the ability to consent to HSCT based upon knowledge   and understanding of the procedure.      IMPRESSIONS AND RECOMMENDATIONS  De Lakhani is an  acceptable HSCT candidate from a psychological perspective, with low risk.   There are no overt psychological contraindications for proceeding with the procedure.He eports no current psychiatric problems or major adjustment issues.  The patient has reasonable expectations for the procedure, good social support, and has already begun making appropriate life plans in anticipation of the procedure. The patient has verbalized appropriate awareness and commitment to the necessary behavioral changes associated with HSCTand appears willing to adjust to long-term lifestyle challenges and medical follow-up. Given literacy limitations, this patient should be provided all vital information for consent, decision-making, and treatment both verbally and in writing.    1. Multiple myeloma not having achieved remission    2. Pre-transplant evaluation for stem cell transplant    3. Stem cell transplant candidate    4. Multiple myeloma, remission status unspecified         Jazmyn Nieves, PhD  Clinical Psychologist  [place] License #[id number]  [place] License #[id number]

## 2022-11-22 NOTE — PROGRESS NOTES
INFORMED CONSENT/ LIMITS of CONFIDENTIALITY: Prior to beginning the interview, the patient's identification was confirmed via name and date of birth. De Lakhani  was informed of the possible risks and benefits of psychological interventions (e.g., counseling, psychotherapy, testing) and provided information regarding the handling of protected health records and   the limits of confidentiality, including the importance of reporting any suicidal or homicidal ideation to ensure safety of all parties. This provider explained the purpose of today's appointment and the patient was provided with time to ask questions regarding this information.  Acceptance and understanding of these conditions was expressed, and De Lakhani freely consented to this evaluation.     Computer Administered Psychological Testing (47037)  Date:  11/22/2022     CPT Code: 13497 Evaluation Length:  1 hour      Referred by:  BMT Team/ Oncologist: DORYS King MD.     Chief complaint/reason for encounter:  Psychological Evaluation prior to stem cell transplantation      De Lakhani is an 57 y.o. male referred by DORYS King MD for pre-transplant evaluation.     De Lakhani arrived promptly for the scheduled appointment during which computer-administered psychological testing of the [facility] Behavioral Medicine Diagnostic (MBMD) was conducted. Patient informed of the risks and benefits of testing, was instructed how to navigate the computer program, and was informed of the importance of accurate responding.  Patient expressed understanding and willingly engaged in the assessment.    Risk Questions on the MBMD (Q49, Q86) were negative indicating the patient denied thoughts of suicide and denied that he cannot find things in life that were interesting/pleasurable.    De Lakhani 's complete assessment report will be included in the medical record with any additional testing instruments  compiled with appropriate   interview data, summary,  and recommendations.  Patient will be provided feedback on the assessment results at BMT Evaluation visit with the psychologist.       ICD-10-CM ICD-9-CM   1. Pre-transplant evaluation for stem cell transplant  Z01.818 V72.83   2. Multiple myeloma not having achieved remission  C90.00 203.00        Jazmyn Nieves, PhD  Clinical Psychologist  [place] License #[id number]  [place] License #[id number]

## 2022-11-23 ENCOUNTER — SOCIAL WORK (OUTPATIENT)
Dept: HEMATOLOGY/ONCOLOGY | Facility: CLINIC | Age: 57
End: 2022-11-23
Payer: MEDICARE

## 2022-11-23 LAB
COMMENT: NORMAL
FINAL PATHOLOGIC DIAGNOSIS: NORMAL
GROSS: NORMAL
INTERPRETATION UR IFE-IMP: NORMAL
Lab: NORMAL
MICROSCOPIC EXAM: NORMAL
PROT PATTERN UR ELPH-IMP: NORMAL
SUPPLEMENTAL DIAGNOSIS: NORMAL

## 2022-11-23 NOTE — PROGRESS NOTES
[facility]   Bone Marrow Transplant Psychosocial Assessment   Date: 2022       Demographic Information     Name: De Lakhani    : 1965    Age: 57 y.o.    Sex: male    Race: White    Marital Status:     #:     Phone Number(s): [phone] (home) [phone] (work)    Home Address: [address] [place] [id number]    Mailing Address: [address] [place] [address]    Are you a [place] Citizen? Yes           Contact Information     Next of Kin: Jose Lakhani   Relationship: daughter   Phone Number(s): [phone]   Emergency Contact: Extended Emergency Contact Information  Primary Emergency Contact: JAZMYNE LAKHANI   [place]  Mobile Phone: [phone]  Relation: Spouse        Living Arrangements   Household Composition:  Patient currently resides with: Spouse   If patient resides with spouse, please explain the marital relationship: Randolph, supportive   Does the patient currently own his/her own home? Yes   Does the patient currently rent home/apartment: No   Are current living arrangements permanent? Yes     Children's Names     Name Sex Age   1.  Jose female 31                         Who will be the primary caregiver for the children when patient is admitted to the hospital? N/A               Support System   Primary Caregiver:     Name: Jazmyne   Relationship: Spouse   Cell #: [phone]   Address: [address] [place] [address] #: N/A   City: [place]   Street: [place]   ZIP: [id number]       Secondary Caregiver:     Name: Jose   Relationship: daughter   Cell #: [phone]                         Will patient's caregiver be available full-time? Yes   What is the patient's Cheondoism? Uatsdin   Is patient currently practicing or non-practicing? No      Does patient have any other sources for support? No              Post BMT Plans      Does patient have full understanding of recovery from BMT? Yes   Does patient understand risk associated with BMT? Yes  "  What are patient's housing plans post BMT? [facility]   Does patient have a Living Will? No   Does patient have a Power of ?  No               Employment Information     Is patient currently employed? No   Employer: [employer]    Phone #: Data Unavailable   Position: Disabled since 2019             Significant Other Employment Information     Employer: [employer]       Position:    Full Time/Part Time? full time   YRS: 3         Financial Information     Monthly Income: $3,917.11 (his $1917.11 disability plus her $2,000 salary)   Yearly Income: $47,000   Source of Income:  social security + slary   Do you have any financial concerns? No   If yes, please explain:  100% coverage with daily co-pay and a lodging reimbursement benefit       Insurance Information      Do you have health insurance?  Yes   Insurance [employer]   Policy #: [id number]   Group #: [id number]   Policy Reich: self   Medicare: Yes   Medicare Part D: No   Medicaid: No   Do you have Disability Insurance? No      Do you have a Cancer Policy? Yes (already filed)   Do you have medication/prescription coverage? Yes   Are you a ? No       Medical Information     Diagnosis: No diagnosis found. "MM"   Date of Diagnosis: 05/2022   Is this a new diagnosis? Yes             Past Medical History: Past Medical History:   Diagnosis Date    Chronic infection of prosthetic knee, subsequent encounter     Encounter for blood transfusion     Essential (primary) hypertension     Multiple myeloma       Infusion Services: [facility] in the past   Home Health: [facility] after knee replacements    Durable Medical Equipment: Has RW and cane, not in use   Activities of Daily Living: independent   Patient's Family Cancer History: Cancer-related family history is not on file. M with current [facility], in treatment.       Cognitive Functioning     Cognitive State: alert   Does patient have any concerns " "that may affect medical follow up and full understanding of treatment? No   Does patient have any concerns that may impact medication compliance? No, but sometimes forgets evening meds   Education Level: 7th grade   Does the patient have any learning disabilities? No   If yes, please explain:    Can the patient read English? Yes   Can the patient write in English? Yes      Is the patient Literate? Yes   What is the patient's primary language? English   Does the patient need interpretation services? No        Psychosocial History     Does patient have any emotional issues? No       Does patient have a psychiatric history? No       Is patient currently taking any psychiatric medication? No       Is patient currently in therapy or attending support groups? No     Alcohol/Drug Use/Abuse History     Alcohol Use: Social History     Substance and Sexual Activity   Alcohol Use None      Tobacco Use: Social History     Tobacco Use   Smoking Status Former   Smokeless Tobacco Never   Tobacco Comments    quit smoking in the 90s      Drug Use: Social History     Substance and Sexual Activity   Drug Use Not on file          Coping Skills     How is the patient currently coping with their diagnosis? Hunt/fish/camp.word search   Is the patient open/receptive to psychosocial intervention? Yes   Has the patient experienced any significant losses in his/her life? No          What are the patient's identified needs? lodging   Goals: Find the "green light on the other side"   Interview Behavior: Open, cooperative   Suitability for Transplant: Suitable   Additional Comments:  And Mrs. Lakhani were open and cooperative throughout assessment for auto SCT on 11/22.  She will be his primary caregiver at the [facility], with daughter Jose available to assist if needed. They both had a good understanding of risks and benefits of transplant and the recovery process.  Both signed the caregiver contract without reservation.  Pharmacy support " may be helpful given the patient's report of forgetting his night medications at times and history of limited schooling.  They were provided contact information for the [facility] (as they will be reimbursed by insurance) and the financial counselor (for itemized bills for their cancer policy).

## 2022-11-24 LAB
PATHOLOGIST INTERPRETATION UIFE: NORMAL
PATHOLOGIST INTERPRETATION UPE: NORMAL

## 2022-11-25 DIAGNOSIS — C90.00 MULTIPLE MYELOMA, REMISSION STATUS UNSPECIFIED: ICD-10-CM

## 2022-11-25 DIAGNOSIS — Z76.82 STEM CELL TRANSPLANT CANDIDATE: Primary | ICD-10-CM

## 2022-11-28 LAB
PCPDS FINAL DIAGNOSIS: NORMAL
PCPDS PRE-ANALYSIS PRE-SORT: NORMAL

## 2022-12-01 ENCOUNTER — TELEPHONE (OUTPATIENT)
Dept: HEMATOLOGY/ONCOLOGY | Facility: CLINIC | Age: 57
End: 2022-12-01
Payer: MEDICARE

## 2022-12-01 RX ORDER — ACETAMINOPHEN 325 MG/1
650 TABLET ORAL ONCE AS NEEDED
Status: CANCELLED | OUTPATIENT
Start: 2022-12-21 | Stop reason: HOSPADM

## 2022-12-01 RX ORDER — DIPHENHYDRAMINE HCL 25 MG
25 CAPSULE ORAL ONCE AS NEEDED
Status: CANCELLED | OUTPATIENT
Start: 2022-12-21 | Stop reason: HOSPADM

## 2022-12-01 RX ORDER — POTASSIUM CHLORIDE 20 MEQ/1
40 TABLET, EXTENDED RELEASE ORAL ONCE AS NEEDED
Status: CANCELLED | OUTPATIENT
Start: 2022-12-22 | Stop reason: HOSPADM

## 2022-12-01 RX ORDER — SODIUM,POTASSIUM PHOSPHATES 280-250MG
2 POWDER IN PACKET (EA) ORAL ONCE AS NEEDED
Status: CANCELLED | OUTPATIENT
Start: 2022-12-21 | Stop reason: HOSPADM

## 2022-12-01 RX ORDER — SODIUM,POTASSIUM PHOSPHATES 280-250MG
2 POWDER IN PACKET (EA) ORAL ONCE AS NEEDED
Status: CANCELLED | OUTPATIENT
Start: 2022-12-18

## 2022-12-01 RX ORDER — LANOLIN ALCOHOL/MO/W.PET/CERES
800 CREAM (GRAM) TOPICAL ONCE AS NEEDED
Status: CANCELLED | OUTPATIENT
Start: 2022-12-19

## 2022-12-01 RX ORDER — ACETAMINOPHEN 325 MG/1
650 TABLET ORAL ONCE AS NEEDED
Status: CANCELLED | OUTPATIENT
Start: 2022-12-16

## 2022-12-01 RX ORDER — SODIUM,POTASSIUM PHOSPHATES 280-250MG
2 POWDER IN PACKET (EA) ORAL ONCE AS NEEDED
Status: CANCELLED | OUTPATIENT
Start: 2022-12-22 | Stop reason: HOSPADM

## 2022-12-01 RX ORDER — ACETAMINOPHEN 325 MG/1
650 TABLET ORAL ONCE AS NEEDED
Status: CANCELLED | OUTPATIENT
Start: 2022-12-20

## 2022-12-01 RX ORDER — POTASSIUM CHLORIDE 20 MEQ/1
40 TABLET, EXTENDED RELEASE ORAL ONCE AS NEEDED
Status: CANCELLED | OUTPATIENT
Start: 2022-12-18

## 2022-12-01 RX ORDER — ACETAMINOPHEN 325 MG/1
650 TABLET ORAL ONCE AS NEEDED
Status: CANCELLED | OUTPATIENT
Start: 2022-12-19

## 2022-12-01 RX ORDER — DIPHENHYDRAMINE HCL 25 MG
25 CAPSULE ORAL ONCE AS NEEDED
Status: CANCELLED | OUTPATIENT
Start: 2022-12-17

## 2022-12-01 RX ORDER — POTASSIUM CHLORIDE 20 MEQ/1
40 TABLET, EXTENDED RELEASE ORAL ONCE AS NEEDED
Status: CANCELLED | OUTPATIENT
Start: 2022-12-16

## 2022-12-01 RX ORDER — LANOLIN ALCOHOL/MO/W.PET/CERES
800 CREAM (GRAM) TOPICAL ONCE AS NEEDED
Status: CANCELLED | OUTPATIENT
Start: 2022-12-18

## 2022-12-01 RX ORDER — SODIUM,POTASSIUM PHOSPHATES 280-250MG
2 POWDER IN PACKET (EA) ORAL ONCE AS NEEDED
Status: CANCELLED | OUTPATIENT
Start: 2022-12-16

## 2022-12-01 RX ORDER — SODIUM,POTASSIUM PHOSPHATES 280-250MG
2 POWDER IN PACKET (EA) ORAL ONCE AS NEEDED
Status: CANCELLED | OUTPATIENT
Start: 2022-12-19

## 2022-12-01 RX ORDER — ACETAMINOPHEN 325 MG/1
650 TABLET ORAL ONCE AS NEEDED
Status: CANCELLED | OUTPATIENT
Start: 2022-12-17

## 2022-12-01 RX ORDER — ACETAMINOPHEN 325 MG/1
650 TABLET ORAL ONCE AS NEEDED
Status: CANCELLED | OUTPATIENT
Start: 2022-12-22 | Stop reason: HOSPADM

## 2022-12-01 RX ORDER — ACETAMINOPHEN 325 MG/1
650 TABLET ORAL ONCE AS NEEDED
Status: CANCELLED | OUTPATIENT
Start: 2022-12-18

## 2022-12-01 RX ORDER — DIPHENHYDRAMINE HCL 25 MG
25 CAPSULE ORAL ONCE AS NEEDED
Status: CANCELLED | OUTPATIENT
Start: 2022-12-22 | Stop reason: HOSPADM

## 2022-12-01 RX ORDER — POTASSIUM CHLORIDE 20 MEQ/1
40 TABLET, EXTENDED RELEASE ORAL ONCE AS NEEDED
Status: CANCELLED | OUTPATIENT
Start: 2022-12-20

## 2022-12-01 RX ORDER — LANOLIN ALCOHOL/MO/W.PET/CERES
800 CREAM (GRAM) TOPICAL ONCE AS NEEDED
Status: CANCELLED | OUTPATIENT
Start: 2022-12-21 | Stop reason: HOSPADM

## 2022-12-01 RX ORDER — PLERIXAFOR 24 MG/1.2ML
0.24 SOLUTION SUBCUTANEOUS ONCE AS NEEDED
Status: CANCELLED | OUTPATIENT
Start: 2022-12-22 | End: 2034-05-20

## 2022-12-01 RX ORDER — POTASSIUM CHLORIDE 20 MEQ/1
40 TABLET, EXTENDED RELEASE ORAL ONCE AS NEEDED
Status: CANCELLED | OUTPATIENT
Start: 2022-12-17

## 2022-12-01 RX ORDER — LANOLIN ALCOHOL/MO/W.PET/CERES
800 CREAM (GRAM) TOPICAL ONCE AS NEEDED
Status: CANCELLED | OUTPATIENT
Start: 2022-12-20

## 2022-12-01 RX ORDER — PLERIXAFOR 24 MG/1.2ML
0.24 SOLUTION SUBCUTANEOUS ONCE AS NEEDED
Status: CANCELLED | OUTPATIENT
Start: 2022-12-21 | End: 2034-05-19

## 2022-12-01 RX ORDER — DIPHENHYDRAMINE HCL 25 MG
25 CAPSULE ORAL ONCE AS NEEDED
Status: CANCELLED | OUTPATIENT
Start: 2022-12-18

## 2022-12-01 RX ORDER — PLERIXAFOR 24 MG/1.2ML
0.24 SOLUTION SUBCUTANEOUS ONCE AS NEEDED
Status: CANCELLED | OUTPATIENT
Start: 2022-12-19 | End: 2034-05-17

## 2022-12-01 RX ORDER — DIPHENHYDRAMINE HCL 25 MG
25 CAPSULE ORAL ONCE AS NEEDED
Status: CANCELLED | OUTPATIENT
Start: 2022-12-20

## 2022-12-01 RX ORDER — SODIUM,POTASSIUM PHOSPHATES 280-250MG
2 POWDER IN PACKET (EA) ORAL ONCE AS NEEDED
Status: CANCELLED | OUTPATIENT
Start: 2022-12-20

## 2022-12-01 RX ORDER — DIPHENHYDRAMINE HCL 25 MG
25 CAPSULE ORAL ONCE AS NEEDED
Status: CANCELLED | OUTPATIENT
Start: 2022-12-19

## 2022-12-01 RX ORDER — LANOLIN ALCOHOL/MO/W.PET/CERES
800 CREAM (GRAM) TOPICAL ONCE AS NEEDED
Status: CANCELLED | OUTPATIENT
Start: 2022-12-17

## 2022-12-01 RX ORDER — DIPHENHYDRAMINE HCL 25 MG
25 CAPSULE ORAL ONCE AS NEEDED
Status: CANCELLED | OUTPATIENT
Start: 2022-12-16

## 2022-12-01 RX ORDER — LANOLIN ALCOHOL/MO/W.PET/CERES
800 CREAM (GRAM) TOPICAL ONCE AS NEEDED
Status: CANCELLED | OUTPATIENT
Start: 2022-12-22 | Stop reason: HOSPADM

## 2022-12-01 RX ORDER — LANOLIN ALCOHOL/MO/W.PET/CERES
800 CREAM (GRAM) TOPICAL ONCE AS NEEDED
Status: CANCELLED | OUTPATIENT
Start: 2022-12-16

## 2022-12-01 RX ORDER — PLERIXAFOR 24 MG/1.2ML
0.24 SOLUTION SUBCUTANEOUS ONCE AS NEEDED
Status: CANCELLED | OUTPATIENT
Start: 2022-12-20 | End: 2034-05-18

## 2022-12-01 RX ORDER — POTASSIUM CHLORIDE 20 MEQ/1
40 TABLET, EXTENDED RELEASE ORAL ONCE AS NEEDED
Status: CANCELLED | OUTPATIENT
Start: 2022-12-19

## 2022-12-01 RX ORDER — SODIUM,POTASSIUM PHOSPHATES 280-250MG
2 POWDER IN PACKET (EA) ORAL ONCE AS NEEDED
Status: CANCELLED | OUTPATIENT
Start: 2022-12-17

## 2022-12-01 RX ORDER — POTASSIUM CHLORIDE 20 MEQ/1
40 TABLET, EXTENDED RELEASE ORAL ONCE AS NEEDED
Status: CANCELLED | OUTPATIENT
Start: 2022-12-21 | Stop reason: HOSPADM

## 2022-12-01 NOTE — TELEPHONE ENCOUNTER
[facility] in [place] coordinating for patient to receive dialysis at [facility] on Friday 12/16 during stem cell collection. Awaiting call back regarding coordination. Pt typically is dialyzed MWF 6 AM-10 AM.

## 2022-12-02 ENCOUNTER — PATIENT MESSAGE (OUTPATIENT)
Dept: CARDIOLOGY | Facility: CLINIC | Age: 57
End: 2022-12-02
Payer: MEDICARE

## 2022-12-02 ENCOUNTER — OFFICE VISIT (OUTPATIENT)
Dept: HEMATOLOGY/ONCOLOGY | Facility: CLINIC | Age: 57
End: 2022-12-02
Payer: MEDICARE

## 2022-12-02 ENCOUNTER — LAB VISIT (OUTPATIENT)
Dept: LAB | Facility: HOSPITAL | Age: 57
End: 2022-12-02
Payer: MEDICARE

## 2022-12-02 VITALS
SYSTOLIC BLOOD PRESSURE: 145 MMHG | RESPIRATION RATE: 16 BRPM | WEIGHT: 235.44 LBS | OXYGEN SATURATION: 98 % | BODY MASS INDEX: 32.96 KG/M2 | HEIGHT: 71 IN | DIASTOLIC BLOOD PRESSURE: 79 MMHG | HEART RATE: 80 BPM

## 2022-12-02 DIAGNOSIS — N18.6 ESRD ON HEMODIALYSIS: ICD-10-CM

## 2022-12-02 DIAGNOSIS — C90.00 MULTIPLE MYELOMA, REMISSION STATUS UNSPECIFIED: ICD-10-CM

## 2022-12-02 DIAGNOSIS — Z01.810 PREOP CARDIOVASCULAR EXAM: Primary | ICD-10-CM

## 2022-12-02 DIAGNOSIS — R93.1 ABNORMAL ECHOCARDIOGRAM: ICD-10-CM

## 2022-12-02 DIAGNOSIS — Z76.82 STEM CELL TRANSPLANT CANDIDATE: ICD-10-CM

## 2022-12-02 DIAGNOSIS — Z76.82 STEM CELL TRANSPLANT CANDIDATE: Primary | ICD-10-CM

## 2022-12-02 DIAGNOSIS — R93.1 DECREASED CARDIAC EJECTION FRACTION: Primary | ICD-10-CM

## 2022-12-02 DIAGNOSIS — Z99.2 ESRD ON HEMODIALYSIS: ICD-10-CM

## 2022-12-02 DIAGNOSIS — T84.50XS INFECTION OR INFLAMMATORY REACTION DUE TO INTERNAL JOINT PROSTHESIS, SEQUELA: ICD-10-CM

## 2022-12-02 PROCEDURE — 99417 PROLNG OP E/M EACH 15 MIN: CPT | Mod: S$GLB,,, | Performed by: INTERNAL MEDICINE

## 2022-12-02 PROCEDURE — 36415 COLL VENOUS BLD VENIPUNCTURE: CPT | Performed by: INTERNAL MEDICINE

## 2022-12-02 PROCEDURE — 3008F BODY MASS INDEX DOCD: CPT | Mod: CPTII,S$GLB,, | Performed by: INTERNAL MEDICINE

## 2022-12-02 PROCEDURE — 3078F DIAST BP <80 MM HG: CPT | Mod: CPTII,S$GLB,, | Performed by: INTERNAL MEDICINE

## 2022-12-02 PROCEDURE — 3077F PR MOST RECENT SYSTOLIC BLOOD PRESSURE >= 140 MM HG: ICD-10-PCS | Mod: CPTII,S$GLB,, | Performed by: INTERNAL MEDICINE

## 2022-12-02 PROCEDURE — 1159F PR MEDICATION LIST DOCUMENTED IN MEDICAL RECORD: ICD-10-PCS | Mod: CPTII,S$GLB,, | Performed by: INTERNAL MEDICINE

## 2022-12-02 PROCEDURE — 3008F PR BODY MASS INDEX (BMI) DOCUMENTED: ICD-10-PCS | Mod: CPTII,S$GLB,, | Performed by: INTERNAL MEDICINE

## 2022-12-02 PROCEDURE — 99215 OFFICE O/P EST HI 40 MIN: CPT | Mod: S$GLB,,, | Performed by: INTERNAL MEDICINE

## 2022-12-02 PROCEDURE — 99417 PR PROLONGED SVC, OUTPT, W/WO DIRECT PT CONTACT,  EA ADDTL 15 MIN: ICD-10-PCS | Mod: S$GLB,,, | Performed by: INTERNAL MEDICINE

## 2022-12-02 PROCEDURE — 3078F PR MOST RECENT DIASTOLIC BLOOD PRESSURE < 80 MM HG: ICD-10-PCS | Mod: CPTII,S$GLB,, | Performed by: INTERNAL MEDICINE

## 2022-12-02 PROCEDURE — 99215 PR OFFICE/OUTPT VISIT, EST, LEVL V, 40-54 MIN: ICD-10-PCS | Mod: S$GLB,,, | Performed by: INTERNAL MEDICINE

## 2022-12-02 PROCEDURE — 4010F ACE/ARB THERAPY RXD/TAKEN: CPT | Mod: CPTII,S$GLB,, | Performed by: INTERNAL MEDICINE

## 2022-12-02 PROCEDURE — 99999 PR PBB SHADOW E&M-EST. PATIENT-LVL III: CPT | Mod: PBBFAC,,, | Performed by: INTERNAL MEDICINE

## 2022-12-02 PROCEDURE — 82955 ASSAY OF G6PD ENZYME: CPT | Performed by: INTERNAL MEDICINE

## 2022-12-02 PROCEDURE — 4010F PR ACE/ARB THEARPY RXD/TAKEN: ICD-10-PCS | Mod: CPTII,S$GLB,, | Performed by: INTERNAL MEDICINE

## 2022-12-02 PROCEDURE — 99999 PR PBB SHADOW E&M-EST. PATIENT-LVL III: ICD-10-PCS | Mod: PBBFAC,,, | Performed by: INTERNAL MEDICINE

## 2022-12-02 PROCEDURE — 3077F SYST BP >= 140 MM HG: CPT | Mod: CPTII,S$GLB,, | Performed by: INTERNAL MEDICINE

## 2022-12-02 PROCEDURE — 1159F MED LIST DOCD IN RCRD: CPT | Mod: CPTII,S$GLB,, | Performed by: INTERNAL MEDICINE

## 2022-12-02 NOTE — PROGRESS NOTES
HEMATOLOGY ONCOLOGY FELLOW CLINIC    Hematology History  Initial consult  Mr. De Lakhani is a 57 year old male with hypertension, history of prosthetic joint infection who was referred by Dr. Jonh Bunch for transplant evaluation for high-risk kappa light chain multiple myeloma with 1q gain.     Mr. Lakhani was diagnosed with multiple myeloma in April 2022 after presenting with ASHLEE. 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 He had bone marrow biopsy which showed 100% involvement by plasma cells. He was started on CyBorD on 5/18 and received two cycles of treatment. His bone marrow biopsy after the first cycle showed a decrease in his plasma cell involvement to 80-90%. Dr. Bunch then started DVRd on 7/5/22. However, he was admitted to the hospital on 7/7/22 with septic shock requiring ICU care. He improved with antibiotics but had an ASHLEE thought to be due to hypotension which resulted in renal failure. He is now dialysis dependent. He saw Dr. Bunch after he was discharged from the hospital and started back on DVRd with dose reduced lenalidomide on 7/25/22.     Oncology history:  4/20/22: renal biopsy: light chain cast nephropathy, kappa light chain  4/26/22: right subclavian vein thrombus   4/27/22: M spike 0.2 with free monoclonal kappa band. B2mg 19.57, IgG 496, IgA 10, IgM <5. Kappa 00025, Lambda 7.9, ratio >1000  5/12/22: BMBx: plasma cell myeloma, marrow cellularity 100%, plasma cell percentage 100%. Plasma cell phenotype +, kappa +, CD20- -, CD30-, EMA-, SADAF-, Congo red negative. Peripheral blood with pancytopenia and no circulating blasts. Decreased storage iron. FISH canceled due to quantity not sufficient  5/18/22: CyBorD C1D1  5/23/22: rasburicase  5/24/22: Xgeva  6/6/22: Kappa 62660, lambda 4.0, ratio >1000, M spike 0.1 with free monoclonal kappa band present  6/6/22: CyBorD C2D1  6/9/22: BMBx Plasma cell neoplasm compatible with plasma cell myeloma. Extent of  involvement 80-90% of bone marrow elements. Cytology: Plasmablastic. FISH cytogenetics positive for 1q21/CKS1B gain. Karyotype failed. Myelofibrosis diffuse (MF-3)  6/20/22: CyBorD C2D8 delayed due to covid  6/23/22: CyBOrD C2D11  7/5/22: C1D1 DVRd  7/7/22 - 7/18/22: hospital admission for septic shock resulting in renal failure  7/25/22: started again on DVRd  8/8/2022: kappa 1402.8, lambda 7.5, ratio 167.04. M spike 0.1, two faint restricted bands in gamma globulin regions.   10/10/22: C5D1 DVRd. Revlimid on hold for upcoming stem cell collection  10/31/22: C6D1 DVRd. Revlimid on hold    Patient had a left knee prosthetic joint infection in the summer of 2021. In March, still had bacteria in joint space, now on prophylactic doxycycline.   He had a colonoscopy 8 years ago which he thinks was clear.     Interval History:  Mr. Lakhani presents today with his wife to review pre transplant restaging results and pre transplant evaluation and consent signing.  Completed 6.5 cycles of Jeanne-Vrd mid nov 2022 with revlimid on hold since 10/10/22.    Feels well today .    Past Medical History:   Diagnosis Date    Chronic infection of prosthetic knee, subsequent encounter     Encounter for blood transfusion     Essential (primary) hypertension     Multiple myeloma        No family history on file.    Social History     Socioeconomic History    Marital status:      Spouse name: Darlyn    Number of children: 1   Tobacco Use    Smoking status: Former    Smokeless tobacco: Never    Tobacco comments:     quit smoking in the 90s         MEDICATIONS:     Current Outpatient Medications on File Prior to Visit   Medication Sig Dispense Refill    acyclovir (ZOVIRAX) 400 MG tablet Take 400 mg by mouth 2 (two) times a day.      apixaban (ELIQUIS) 5 mg Tab Take 5 mg by mouth 2 (two) times daily.      calcitRIOL (ROCALTROL) 0.5 MCG Cap Take 0.5 mcg by mouth. Given w/ dialysis      calcium carbonate (TUMS) 200 mg calcium (500 mg)  chewable tablet Take 1,000 mg by mouth once daily.      cholecalciferol, vitamin D3, 75 mcg (3,000 unit) Tab Take 3,000 Units by mouth once daily.      dapsone 100 MG Tab Take 100 mg by mouth once daily.      dexAMETHasone (DECADRON) 4 MG Tab Takes 5 tablets on Tuesdays and 5 tablets on Wednesdays.      doxycycline (VIBRAMYCIN) 100 MG Cap Take 100 mg by mouth every 12 (twelve) hours. Preventative for past knee infection      ergocalciferol (ERGOCALCIFEROL) 50,000 unit Cap Take 50,000 Units by mouth every 14 (fourteen) days. Given at dialysis center      famotidine (PEPCID) 20 MG tablet Take 20 mg by mouth once daily.      ondansetron (ZOFRAN) 4 MG tablet TAKE ONE TABLET BY MOUTH EVERY EIGHT HOURS IF NEEDED FOR NAUSEA.      oxyCODONE-acetaminophen (PERCOCET) 5-325 mg per tablet TAKE ONE TABLET BY MOUTH EVERY 6 HOURS IF NEEDED FOR SEVERE PAIN      promethazine (PHENERGAN) 12.5 MG Tab TAKE ONE TABLET BY MOUTH EVERY 6 HOURS IF NEEDED FOR NAUSEA 2ND CHOICE      rosuvastatin (CRESTOR) 20 MG tablet Take 20 mg by mouth once daily.      scopolamine (TRANSDERM-SCOP) 1.3-1.5 mg (1 mg over 3 days) APPLY ONE PATCH TO SKIN AS DIRECTED AND REPLACE EVERY 3 DAYS.      sertraline (ZOLOFT) 50 MG tablet Take 50 mg by mouth once daily.      traMADoL (ULTRAM) 50 mg tablet TAKE ONE TABLET BY MOUTH EVERY 6 HOURS IF NEEDED FOR MODERATE PAIN       No current facility-administered medications on file prior to visit.       ALLERGIES: Review of patient's allergies indicates:  No Known Allergies     ROS:       Review of Systems   Constitutional:  Negative for chills and fever.   HENT:   Negative for nosebleeds and sore throat.    Respiratory:  Negative for cough and shortness of breath.    Cardiovascular:  Negative for chest pain and palpitations.   Gastrointestinal:  Negative for abdominal pain, diarrhea, nausea and vomiting.   Genitourinary:  Negative for dysuria and hematuria.    Musculoskeletal:  Negative for back pain and neck pain.   Skin:   Negative for rash and wound.   Neurological:  Negative for headaches and light-headedness.   Hematological:  Negative for adenopathy. Does not bruise/bleed easily.   Psychiatric/Behavioral:  Negative for depression. The patient is not nervous/anxious.      PHYSICAL EXAM:  Vitals:    12/02/22 1347   BP: (!) 145/79   Pulse: 80   Resp: 16     General - well developed, well nourished, no apparent distress  HEENT - oropharynx clear  Chest and Lung - clear to auscultation bilaterally ; chest wall HD catheter in place c/d/i  Cardiovascular - RRR with no MGR, normal S1 and S2  Abdomen-  soft, nontender, no palpable hepatomegaly or splenomegaly  Lymph - no palpable lymphadenopathy  Heme - no bruising, petechiae, pallor  Skin - no rashes or lesions  Psych - appropriate mood and affect        PROBLEMS ASSESSED THIS VISIT:    1. Decreased cardiac ejection fraction    2. ESRD on hemodialysis    3. Multiple myeloma, remission status unspecified    4. Stem cell transplant candidate    5. Infection or inflammatory reaction due to internal joint prosthesis, sequela              ASSESSMENT AND PLAN    1)Multiple myeloma, KLC; disease complicated light chain cast nephropathy for which patient remains HD dependent  -High risk due to TP53 deletion and a 1q duplication  -R2-ISS Stage III  -please see HPI for oncologic history to date  -completed 6.5 cycles of Jeanne-Vrd mid nov 2022 with revlimid on hold since 10/10/22  -his pre transplant restaging mid November 2022 reveals IMWG robust WV vs VGPR (unable  to interpret SFLC in context of HD); however  marrow plasma cell burden has gone from >90% to <5% and pet with no avid lesions; adequate response to proceed to transplant  -his pre transplant evaluation did reveal decreased EF to 50% so he will be seeing cardiology for clearance next week; he is asymptomatic  -he has a history of 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; none active; he has been cleared by ID; will remain on  chronic doxycyline per ID recs in addition to standard prophylaxis  -he is ESRD on HD and will receive his HD locally during mobilization/collection/post SCT and inpt; renal to be consulted upon admission  -we have been cleared by renal to use  his HD catheter for transplant needs   -pt has good understanding of procedure and good social support   --spent 75 minutes on this case, half of which was spent face to face with patient  And wife  discussing rationale, alternatives, risks of central line placement,  Mobilization/colleection, melphalan autoSCT; discussed approximate  1% risk of transplant related mortality  -discussed average 5-6 yr PFS following SCT but that more durable remissions were possible and emerging concept of functional cure;  discussed that majority of patients do eventually relapse; conversely discussed risks of early relapse post autoSCT particularly in his case in setting of his high risk CG/FISH; discussed emerging  relevance of sustained MRD negativity which will be measured periodically post transplant   -discussed approximately 2 week average hospitalization   -discussed need for appt and medication compliance following transplant  -discussed need for full time caretaker through day +30  -discussed need to stay within 60 miles of transplant center through day +30; they will be staying at [facility] post dc  -discussed approximate day  restaging and likely recommendation for velcade/rev maintenance given high risk disease; duration of maintenance likely life long  -discussed revaccinations starting at day +100 (covid, influenza) and child kang immunizations (day +180)  -patient and caretaker expressed understanding; all written consents obtained     -continue all other medications for chronic conditions      FU: mobilization -12/16/22  Collection -12/20/22  Admit  -12/28/22

## 2022-12-05 ENCOUNTER — PATIENT MESSAGE (OUTPATIENT)
Dept: LAB | Facility: HOSPITAL | Age: 57
End: 2022-12-05
Payer: MEDICARE

## 2022-12-05 DIAGNOSIS — Z76.82 STEM CELL TRANSPLANT CANDIDATE: Primary | ICD-10-CM

## 2022-12-05 DIAGNOSIS — R79.1 COAGULATION PROFILE, ABNORMAL: ICD-10-CM

## 2022-12-05 DIAGNOSIS — C90.00 MULTIPLE MYELOMA, REMISSION STATUS UNSPECIFIED: ICD-10-CM

## 2022-12-05 LAB — G6PD RBC-CCNT: 10.5 U/G HB (ref 8–11.9)

## 2022-12-06 RX ORDER — PLERIXAFOR 24 MG/1.2ML
0.24 SOLUTION SUBCUTANEOUS ONCE AS NEEDED
Status: CANCELLED | OUTPATIENT
Start: 2022-12-22 | End: 2034-05-20

## 2022-12-06 RX ORDER — PLERIXAFOR 24 MG/1.2ML
0.24 SOLUTION SUBCUTANEOUS ONCE AS NEEDED
Status: CANCELLED | OUTPATIENT
Start: 2022-12-21 | End: 2034-05-19

## 2022-12-06 RX ORDER — PLERIXAFOR 24 MG/1.2ML
0.24 SOLUTION SUBCUTANEOUS ONCE AS NEEDED
Status: CANCELLED | OUTPATIENT
Start: 2022-12-20 | End: 2034-05-18

## 2022-12-06 NOTE — PROGRESS NOTES
Subjective:           Chief Complaint: No chief complaint on file.      HPI    57 year old male with multiple myeloma, prior right subclavian vein thrombus, ESRD on HD vis port here for cardio-oncology evaluation. He has received chemotherapy with CyBorD, denisse-DVRd, revlimid with plans for stem cell transplantation and possible lifelong velcade/revlimid maintenance.    He feels fatigued which he attributes to his anemia. He denies chest pain/pressure/tightness/discomfort, orthopnea, PND, peripheral edema, palpitations, syncope or claudication. He has dyspnea with mild exertion e.g. walking from the bedroom to kitchen (NYHA III).    He quit smoking in . Brother had PCI in his 60's, father  of MI at 55, his first MI was at age 44. He is not regularly exercising (he had 5 knee replacements). He was deer hunting this weekend and climbed into a 10 foot deer stand this weekend without symptoms. He carried 6 bags of corn (50 lbs each) about 30 feet with shortness of breath, taking breaks (10 minutes) in between trips.    EKG today NSR, within normal limits    TTE 22 with low-normal LVEF and reduced strain (-14%)    Review of Systems   Constitutional:  Positive for fever.   HENT:  Negative for nosebleeds.    Eyes:  Negative for visual disturbance.   Respiratory:  Negative for shortness of breath.    Cardiovascular:  Negative for leg swelling.   Gastrointestinal:  Negative for blood in stool.   Genitourinary:  Negative for hematuria.   Musculoskeletal:  Negative for gait problem.   Integumentary:  Negative for rash.   Neurological:  Negative for syncope.       Objective:      Vitals:    22 1453   BP: (!) 147/85   Pulse: 73       Physical Exam  Constitutional:       Appearance: He is well-developed. He is not diaphoretic.   HENT:      Head: Normocephalic and atraumatic.   Eyes:      Pupils: Pupils are equal, round, and reactive to light.   Neck:      Vascular: No carotid bruit or JVD.   Cardiovascular:       Rate and Rhythm: Normal rate and regular rhythm.      Heart sounds: Normal heart sounds. Heart sounds not distant. No murmur heard.    No friction rub. No gallop. No S3 or S4 sounds.   Pulmonary:      Effort: Pulmonary effort is normal. No respiratory distress.      Breath sounds: Normal breath sounds. No wheezing.   Abdominal:      General: Bowel sounds are normal. There is no distension.      Palpations: Abdomen is soft.      Tenderness: There is no abdominal tenderness.   Musculoskeletal:         General: No swelling.      Cervical back: Normal range of motion.   Skin:     General: Skin is warm.      Findings: No erythema.   Neurological:      Mental Status: He is alert and oriented to person, place, and time.   Psychiatric:         Behavior: Behavior normal.       Assessment & Plan     Pre-op evaluation  RCRI 1 = class 2 risk = 6% risk of death, MI or cardiac arrest at 30 days  No further cardiovascular testing is recommended prior to stem cell transplantation    Abnormal echocardiogram  Low-normal LVEF and reduced GLS  Start toprol 25 mg daily with plans to up-titrate  He will ask his nephrologist if he can take lisinopril  No contraindication to velcade/revlimid maintenance therapy    Stem cell transplant candidate  Multiple myeloma, remission status unspecified  Managed by Heme-Onc    End stage renal disease  Dependence on hemodialysis  Managed by nephrology

## 2022-12-12 ENCOUNTER — OFFICE VISIT (OUTPATIENT)
Dept: CARDIOLOGY | Facility: CLINIC | Age: 57
End: 2022-12-12
Payer: MEDICARE

## 2022-12-12 ENCOUNTER — HOSPITAL ENCOUNTER (OUTPATIENT)
Dept: CARDIOLOGY | Facility: CLINIC | Age: 57
Discharge: HOME | End: 2022-12-12
Payer: MEDICARE

## 2022-12-12 VITALS
WEIGHT: 240.5 LBS | HEART RATE: 73 BPM | SYSTOLIC BLOOD PRESSURE: 147 MMHG | BODY MASS INDEX: 33.67 KG/M2 | DIASTOLIC BLOOD PRESSURE: 85 MMHG | HEIGHT: 71 IN

## 2022-12-12 DIAGNOSIS — Z01.818 PRE-OP EVALUATION: Primary | ICD-10-CM

## 2022-12-12 DIAGNOSIS — Z99.2 DEPENDENCE ON HEMODIALYSIS: ICD-10-CM

## 2022-12-12 DIAGNOSIS — Z01.810 PREOP CARDIOVASCULAR EXAM: ICD-10-CM

## 2022-12-12 DIAGNOSIS — C90.00 MULTIPLE MYELOMA, REMISSION STATUS UNSPECIFIED: ICD-10-CM

## 2022-12-12 DIAGNOSIS — R93.1 ABNORMAL ECHOCARDIOGRAM: ICD-10-CM

## 2022-12-12 DIAGNOSIS — N18.6 END STAGE RENAL DISEASE: ICD-10-CM

## 2022-12-12 DIAGNOSIS — Z76.82 STEM CELL TRANSPLANT CANDIDATE: ICD-10-CM

## 2022-12-12 PROCEDURE — 3077F SYST BP >= 140 MM HG: CPT | Mod: CPTII,S$GLB,, | Performed by: INTERNAL MEDICINE

## 2022-12-12 PROCEDURE — 4010F ACE/ARB THERAPY RXD/TAKEN: CPT | Mod: CPTII,S$GLB,, | Performed by: INTERNAL MEDICINE

## 2022-12-12 PROCEDURE — 3077F PR MOST RECENT SYSTOLIC BLOOD PRESSURE >= 140 MM HG: ICD-10-PCS | Mod: CPTII,S$GLB,, | Performed by: INTERNAL MEDICINE

## 2022-12-12 PROCEDURE — 93005 ELECTROCARDIOGRAM TRACING: CPT | Mod: S$GLB,,, | Performed by: INTERNAL MEDICINE

## 2022-12-12 PROCEDURE — 93010 EKG 12-LEAD: ICD-10-PCS | Mod: S$GLB,,, | Performed by: INTERNAL MEDICINE

## 2022-12-12 PROCEDURE — 1159F MED LIST DOCD IN RCRD: CPT | Mod: CPTII,S$GLB,, | Performed by: INTERNAL MEDICINE

## 2022-12-12 PROCEDURE — 1159F PR MEDICATION LIST DOCUMENTED IN MEDICAL RECORD: ICD-10-PCS | Mod: CPTII,S$GLB,, | Performed by: INTERNAL MEDICINE

## 2022-12-12 PROCEDURE — 1160F RVW MEDS BY RX/DR IN RCRD: CPT | Mod: CPTII,S$GLB,, | Performed by: INTERNAL MEDICINE

## 2022-12-12 PROCEDURE — 93010 ELECTROCARDIOGRAM REPORT: CPT | Mod: S$GLB,,, | Performed by: INTERNAL MEDICINE

## 2022-12-12 PROCEDURE — 4010F PR ACE/ARB THEARPY RXD/TAKEN: ICD-10-PCS | Mod: CPTII,S$GLB,, | Performed by: INTERNAL MEDICINE

## 2022-12-12 PROCEDURE — 93005 EKG 12-LEAD: ICD-10-PCS | Mod: S$GLB,,, | Performed by: INTERNAL MEDICINE

## 2022-12-12 PROCEDURE — 99204 PR OFFICE/OUTPT VISIT, NEW, LEVL IV, 45-59 MIN: ICD-10-PCS | Mod: S$GLB,,, | Performed by: INTERNAL MEDICINE

## 2022-12-12 PROCEDURE — 99999 PR PBB SHADOW E&M-EST. PATIENT-LVL III: ICD-10-PCS | Mod: PBBFAC,,, | Performed by: INTERNAL MEDICINE

## 2022-12-12 PROCEDURE — 3079F DIAST BP 80-89 MM HG: CPT | Mod: CPTII,S$GLB,, | Performed by: INTERNAL MEDICINE

## 2022-12-12 PROCEDURE — 3008F PR BODY MASS INDEX (BMI) DOCUMENTED: ICD-10-PCS | Mod: CPTII,S$GLB,, | Performed by: INTERNAL MEDICINE

## 2022-12-12 PROCEDURE — 3079F PR MOST RECENT DIASTOLIC BLOOD PRESSURE 80-89 MM HG: ICD-10-PCS | Mod: CPTII,S$GLB,, | Performed by: INTERNAL MEDICINE

## 2022-12-12 PROCEDURE — 1160F PR REVIEW ALL MEDS BY PRESCRIBER/CLIN PHARMACIST DOCUMENTED: ICD-10-PCS | Mod: CPTII,S$GLB,, | Performed by: INTERNAL MEDICINE

## 2022-12-12 PROCEDURE — 99204 OFFICE O/P NEW MOD 45 MIN: CPT | Mod: S$GLB,,, | Performed by: INTERNAL MEDICINE

## 2022-12-12 PROCEDURE — 3008F BODY MASS INDEX DOCD: CPT | Mod: CPTII,S$GLB,, | Performed by: INTERNAL MEDICINE

## 2022-12-12 PROCEDURE — 99999 PR PBB SHADOW E&M-EST. PATIENT-LVL III: CPT | Mod: PBBFAC,,, | Performed by: INTERNAL MEDICINE

## 2022-12-12 RX ORDER — METOPROLOL SUCCINATE 25 MG/1
25 TABLET, EXTENDED RELEASE ORAL DAILY
Qty: 30 TABLET | Refills: 11 | Status: ON HOLD | OUTPATIENT
Start: 2022-12-12 | End: 2023-01-13 | Stop reason: HOSPADM

## 2022-12-16 ENCOUNTER — INFUSION (OUTPATIENT)
Dept: INFUSION THERAPY | Facility: HOSPITAL | Age: 57
End: 2022-12-16
Payer: MEDICARE

## 2022-12-16 DIAGNOSIS — Z76.82 STEM CELL TRANSPLANT CANDIDATE: Primary | ICD-10-CM

## 2022-12-16 PROCEDURE — 96372 THER/PROPH/DIAG INJ SC/IM: CPT

## 2022-12-16 PROCEDURE — 63600175 PHARM REV CODE 636 W HCPCS: Mod: JG | Performed by: NURSE PRACTITIONER

## 2022-12-16 RX ORDER — PLERIXAFOR 24 MG/1.2ML
0.24 SOLUTION SUBCUTANEOUS ONCE AS NEEDED
Status: CANCELLED | OUTPATIENT
Start: 2022-12-22 | End: 2034-05-20

## 2022-12-16 RX ORDER — PLERIXAFOR 24 MG/1.2ML
0.24 SOLUTION SUBCUTANEOUS ONCE AS NEEDED
Status: CANCELLED | OUTPATIENT
Start: 2022-12-20 | End: 2034-05-18

## 2022-12-16 RX ORDER — PLERIXAFOR 24 MG/1.2ML
0.24 SOLUTION SUBCUTANEOUS ONCE AS NEEDED
Status: CANCELLED | OUTPATIENT
Start: 2022-12-21 | End: 2034-05-19

## 2022-12-16 RX ADMIN — FILGRASTIM-SNDZ 600 MCG: 300 INJECTION, SOLUTION INTRAVENOUS; SUBCUTANEOUS at 08:12

## 2022-12-16 NOTE — NURSING
Patient tolerated Zarxio with no complications. VSS. Pt instructed to call MD with any problems. Pt discharged home independently.

## 2022-12-17 ENCOUNTER — INFUSION (OUTPATIENT)
Dept: INFUSION THERAPY | Facility: HOSPITAL | Age: 57
End: 2022-12-17
Payer: MEDICARE

## 2022-12-17 VITALS
TEMPERATURE: 98 F | RESPIRATION RATE: 18 BRPM | SYSTOLIC BLOOD PRESSURE: 135 MMHG | DIASTOLIC BLOOD PRESSURE: 71 MMHG | HEART RATE: 76 BPM

## 2022-12-17 DIAGNOSIS — Z76.82 STEM CELL TRANSPLANT CANDIDATE: Primary | ICD-10-CM

## 2022-12-17 PROCEDURE — 63600175 PHARM REV CODE 636 W HCPCS: Mod: JG | Performed by: NURSE PRACTITIONER

## 2022-12-17 PROCEDURE — 96372 THER/PROPH/DIAG INJ SC/IM: CPT

## 2022-12-17 RX ADMIN — FILGRASTIM-SNDZ 600 MCG: 300 INJECTION, SOLUTION INTRAVENOUS; SUBCUTANEOUS at 08:12

## 2022-12-17 RX ADMIN — FILGRASTIM-SNDZ 480 MCG: 480 INJECTION, SOLUTION INTRAVENOUS; SUBCUTANEOUS at 08:12

## 2022-12-17 NOTE — NURSING
0864  Pt here for Zarxio injections, accompanied by spouse, no new complaints or concerns, reports tolerating prior injections w/out issued; injections administered, tolerated well; discussed entry to building for Sunday a.m.; pt instructed to contact MD for any needs or concerns, pt and spouse verbalized understanding of all discussed and when to report next

## 2022-12-18 ENCOUNTER — INFUSION (OUTPATIENT)
Dept: INFUSION THERAPY | Facility: HOSPITAL | Age: 57
End: 2022-12-18
Attending: INTERNAL MEDICINE
Payer: MEDICARE

## 2022-12-18 VITALS — WEIGHT: 235.44 LBS | HEIGHT: 71 IN | BODY MASS INDEX: 32.96 KG/M2

## 2022-12-18 DIAGNOSIS — Z76.82 STEM CELL TRANSPLANT CANDIDATE: Primary | ICD-10-CM

## 2022-12-18 PROCEDURE — 63600175 PHARM REV CODE 636 W HCPCS: Mod: JG | Performed by: NURSE PRACTITIONER

## 2022-12-18 PROCEDURE — 96372 THER/PROPH/DIAG INJ SC/IM: CPT

## 2022-12-18 RX ADMIN — FILGRASTIM-SNDZ 1080 MCG: 480 INJECTION, SOLUTION INTRAVENOUS; SUBCUTANEOUS at 07:12

## 2022-12-19 ENCOUNTER — INFUSION (OUTPATIENT)
Dept: INFUSION THERAPY | Facility: HOSPITAL | Age: 57
End: 2022-12-19
Payer: MEDICARE

## 2022-12-19 VITALS
BODY MASS INDEX: 32.84 KG/M2 | HEART RATE: 67 BPM | DIASTOLIC BLOOD PRESSURE: 62 MMHG | TEMPERATURE: 98 F | WEIGHT: 235.44 LBS | RESPIRATION RATE: 18 BRPM | SYSTOLIC BLOOD PRESSURE: 123 MMHG

## 2022-12-19 VITALS
HEART RATE: 65 BPM | DIASTOLIC BLOOD PRESSURE: 65 MMHG | OXYGEN SATURATION: 94 % | TEMPERATURE: 98 F | SYSTOLIC BLOOD PRESSURE: 128 MMHG | RESPIRATION RATE: 17 BRPM

## 2022-12-19 DIAGNOSIS — Z76.82 STEM CELL TRANSPLANT CANDIDATE: ICD-10-CM

## 2022-12-19 DIAGNOSIS — Z76.82 STEM CELL TRANSPLANT CANDIDATE: Primary | ICD-10-CM

## 2022-12-19 DIAGNOSIS — C90.00 MULTIPLE MYELOMA, REMISSION STATUS UNSPECIFIED: ICD-10-CM

## 2022-12-19 DIAGNOSIS — D64.9 ANEMIA REQUIRING TRANSFUSIONS: Primary | ICD-10-CM

## 2022-12-19 DIAGNOSIS — R79.1 COAGULATION PROFILE, ABNORMAL: ICD-10-CM

## 2022-12-19 LAB
ABO + RH BLD: ABNORMAL
ALBUMIN SERPL BCP-MCNC: 3.8 G/DL (ref 3.5–5.2)
ALP SERPL-CCNC: 145 U/L (ref 55–135)
ALT SERPL W/O P-5'-P-CCNC: 10 U/L (ref 10–44)
ANION GAP SERPL CALC-SCNC: 10 MMOL/L (ref 8–16)
ANISOCYTOSIS BLD QL SMEAR: SLIGHT
APTT BLDCRRT: 45.3 SEC (ref 21–32)
AST SERPL-CCNC: 12 U/L (ref 10–40)
BASOPHILS NFR BLD: 0 % (ref 0–1.9)
BILIRUB SERPL-MCNC: 1.2 MG/DL (ref 0.1–1)
BLD GP AB SCN CELLS X3 SERPL QL: ABNORMAL
BLD GP AB SCN CELLS X3 SERPL QL: ABNORMAL
BLD PROD TYP BPU: NORMAL
BLOOD GROUP ANTIBODIES SERPL: NORMAL
BLOOD UNIT EXPIRATION DATE: NORMAL
BLOOD UNIT TYPE CODE: 5100
BLOOD UNIT TYPE: NORMAL
BUN SERPL-MCNC: 34 MG/DL (ref 6–20)
CA-I BLDV-SCNC: 1.15 MMOL/L (ref 1.06–1.42)
CALCIUM SERPL-MCNC: 8.2 MG/DL (ref 8.7–10.5)
CD34 %: 0.01 %
CD34 ABSOLUTE: 3.74 CELLS/UL
CD34 VIABILITY: 100 %
CHLORIDE SERPL-SCNC: 109 MMOL/L (ref 95–110)
CO2 SERPL-SCNC: 20 MMOL/L (ref 23–29)
CODING SYSTEM: NORMAL
CREAT SERPL-MCNC: 7 MG/DL (ref 0.5–1.4)
DAT IGG-SP REAG RBC-IMP: NORMAL
DIFFERENTIAL METHOD: ABNORMAL
DISPENSE STATUS: NORMAL
EOSINOPHIL NFR BLD: 1 % (ref 0–8)
ERYTHROCYTE [DISTWIDTH] IN BLOOD BY AUTOMATED COUNT: 19.7 % (ref 11.5–14.5)
EST. GFR  (NO RACE VARIABLE): 8.4 ML/MIN/1.73 M^2
GLUCOSE SERPL-MCNC: 94 MG/DL (ref 70–110)
HCT VFR BLD AUTO: 20.4 % (ref 40–54)
HGB BLD-MCNC: 6.7 G/DL (ref 14–18)
HYPOCHROMIA BLD QL SMEAR: ABNORMAL
IMM GRANULOCYTES # BLD AUTO: ABNORMAL K/UL (ref 0–0.04)
IMM GRANULOCYTES NFR BLD AUTO: ABNORMAL % (ref 0–0.5)
INR PPP: 1.3 (ref 0.8–1.2)
LYMPHOCYTES NFR BLD: 2 % (ref 18–48)
MAGNESIUM SERPL-MCNC: 1.5 MG/DL (ref 1.6–2.6)
MCH RBC QN AUTO: 34.2 PG (ref 27–31)
MCHC RBC AUTO-ENTMCNC: 32.8 G/DL (ref 32–36)
MCV RBC AUTO: 104 FL (ref 82–98)
MONOCYTES NFR BLD: 8 % (ref 4–15)
NEUTROPHILS NFR BLD: 89 % (ref 38–73)
NRBC BLD-RTO: 0 /100 WBC
NUM UNITS TRANS PACKED RBC: NORMAL
PHOSPHATE SERPL-MCNC: 2.4 MG/DL (ref 2.7–4.5)
PLATELET # BLD AUTO: 99 K/UL (ref 150–450)
PLATELET BLD QL SMEAR: ABNORMAL
PMV BLD AUTO: 10.5 FL (ref 9.2–12.9)
POIKILOCYTOSIS BLD QL SMEAR: SLIGHT
POTASSIUM SERPL-SCNC: 3.6 MMOL/L (ref 3.5–5.1)
PROT SERPL-MCNC: 6 G/DL (ref 6–8.4)
PROTHROMBIN TIME: 12.8 SEC (ref 9–12.5)
RBC # BLD AUTO: 1.96 M/UL (ref 4.6–6.2)
SODIUM SERPL-SCNC: 139 MMOL/L (ref 136–145)
WBC # BLD AUTO: 27.47 K/UL (ref 3.9–12.7)

## 2022-12-19 PROCEDURE — 85027 COMPLETE CBC AUTOMATED: CPT | Performed by: INTERNAL MEDICINE

## 2022-12-19 PROCEDURE — 96372 THER/PROPH/DIAG INJ SC/IM: CPT

## 2022-12-19 PROCEDURE — 25000003 PHARM REV CODE 250: Performed by: NURSE PRACTITIONER

## 2022-12-19 PROCEDURE — 86850 RBC ANTIBODY SCREEN: CPT | Performed by: INTERNAL MEDICINE

## 2022-12-19 PROCEDURE — 83735 ASSAY OF MAGNESIUM: CPT | Performed by: INTERNAL MEDICINE

## 2022-12-19 PROCEDURE — 63600175 PHARM REV CODE 636 W HCPCS: Mod: JG | Performed by: NURSE PRACTITIONER

## 2022-12-19 PROCEDURE — 80053 COMPREHEN METABOLIC PANEL: CPT | Performed by: INTERNAL MEDICINE

## 2022-12-19 PROCEDURE — 86850 RBC ANTIBODY SCREEN: CPT | Mod: 91 | Performed by: INTERNAL MEDICINE

## 2022-12-19 PROCEDURE — 36592 COLLECT BLOOD FROM PICC: CPT

## 2022-12-19 PROCEDURE — 85730 THROMBOPLASTIN TIME PARTIAL: CPT | Performed by: INTERNAL MEDICINE

## 2022-12-19 PROCEDURE — 86922 COMPATIBILITY TEST ANTIGLOB: CPT | Performed by: INTERNAL MEDICINE

## 2022-12-19 PROCEDURE — 36430 TRANSFUSION BLD/BLD COMPNT: CPT

## 2022-12-19 PROCEDURE — 86870 RBC ANTIBODY IDENTIFICATION: CPT | Performed by: INTERNAL MEDICINE

## 2022-12-19 PROCEDURE — 86367 STEM CELLS TOTAL COUNT: CPT | Performed by: INTERNAL MEDICINE

## 2022-12-19 PROCEDURE — 86880 COOMBS TEST DIRECT: CPT | Performed by: INTERNAL MEDICINE

## 2022-12-19 PROCEDURE — 85610 PROTHROMBIN TIME: CPT | Performed by: INTERNAL MEDICINE

## 2022-12-19 PROCEDURE — 84100 ASSAY OF PHOSPHORUS: CPT | Performed by: INTERNAL MEDICINE

## 2022-12-19 PROCEDURE — 86902 BLOOD TYPE ANTIGEN DONOR EA: CPT | Performed by: NURSE PRACTITIONER

## 2022-12-19 PROCEDURE — 36415 COLL VENOUS BLD VENIPUNCTURE: CPT | Performed by: INTERNAL MEDICINE

## 2022-12-19 PROCEDURE — 86922 COMPATIBILITY TEST ANTIGLOB: CPT | Performed by: NURSE PRACTITIONER

## 2022-12-19 PROCEDURE — P9040 RBC LEUKOREDUCED IRRADIATED: HCPCS | Performed by: NURSE PRACTITIONER

## 2022-12-19 PROCEDURE — 63600175 PHARM REV CODE 636 W HCPCS: Mod: JW,JG | Performed by: NURSE PRACTITIONER

## 2022-12-19 PROCEDURE — 85007 BL SMEAR W/DIFF WBC COUNT: CPT | Performed by: INTERNAL MEDICINE

## 2022-12-19 PROCEDURE — 86905 BLOOD TYPING RBC ANTIGENS: CPT | Mod: 91 | Performed by: INTERNAL MEDICINE

## 2022-12-19 PROCEDURE — 82330 ASSAY OF CALCIUM: CPT | Performed by: INTERNAL MEDICINE

## 2022-12-19 RX ORDER — PLERIXAFOR 24 MG/1.2ML
0.24 SOLUTION SUBCUTANEOUS ONCE AS NEEDED
Status: COMPLETED | OUTPATIENT
Start: 2022-12-19 | End: 2022-12-19

## 2022-12-19 RX ORDER — HYDROCODONE BITARTRATE AND ACETAMINOPHEN 500; 5 MG/1; MG/1
TABLET ORAL ONCE
Status: COMPLETED | OUTPATIENT
Start: 2022-12-19 | End: 2022-12-19

## 2022-12-19 RX ORDER — ACETAMINOPHEN 325 MG/1
650 TABLET ORAL
Status: CANCELLED | OUTPATIENT
Start: 2022-12-19

## 2022-12-19 RX ORDER — DIPHENHYDRAMINE HCL 25 MG
25 CAPSULE ORAL
Status: DISCONTINUED | OUTPATIENT
Start: 2022-12-19 | End: 2022-12-19 | Stop reason: HOSPADM

## 2022-12-19 RX ORDER — HEPARIN 100 UNIT/ML
500 SYRINGE INTRAVENOUS
Status: DISCONTINUED | OUTPATIENT
Start: 2022-12-19 | End: 2022-12-19 | Stop reason: HOSPADM

## 2022-12-19 RX ORDER — ACETAMINOPHEN 325 MG/1
650 TABLET ORAL
Status: DISCONTINUED | OUTPATIENT
Start: 2022-12-19 | End: 2022-12-19 | Stop reason: HOSPADM

## 2022-12-19 RX ORDER — HEPARIN 100 UNIT/ML
500 SYRINGE INTRAVENOUS
Status: CANCELLED | OUTPATIENT
Start: 2022-12-19

## 2022-12-19 RX ORDER — SODIUM CHLORIDE 0.9 % (FLUSH) 0.9 %
10 SYRINGE (ML) INJECTION
Status: DISCONTINUED | OUTPATIENT
Start: 2022-12-19 | End: 2022-12-19 | Stop reason: HOSPADM

## 2022-12-19 RX ORDER — SODIUM CHLORIDE 0.9 % (FLUSH) 0.9 %
10 SYRINGE (ML) INJECTION
Status: CANCELLED | OUTPATIENT
Start: 2022-12-19

## 2022-12-19 RX ORDER — HYDROCODONE BITARTRATE AND ACETAMINOPHEN 500; 5 MG/1; MG/1
TABLET ORAL ONCE
Status: CANCELLED | OUTPATIENT
Start: 2022-12-19 | End: 2022-12-19

## 2022-12-19 RX ORDER — DIPHENHYDRAMINE HCL 25 MG
25 CAPSULE ORAL
Status: CANCELLED | OUTPATIENT
Start: 2022-12-19

## 2022-12-19 RX ADMIN — FILGRASTIM-SNDZ 480 MCG: 480 INJECTION, SOLUTION INTRAVENOUS; SUBCUTANEOUS at 08:12

## 2022-12-19 RX ADMIN — FILGRASTIM-SNDZ 600 MCG: 300 INJECTION, SOLUTION INTRAVENOUS; SUBCUTANEOUS at 08:12

## 2022-12-19 RX ADMIN — PLERIXAFOR 26 MG: 24 SOLUTION SUBCUTANEOUS at 05:12

## 2022-12-19 RX ADMIN — SODIUM CHLORIDE: 0.9 INJECTION, SOLUTION INTRAVENOUS at 03:12

## 2022-12-19 RX ADMIN — HEPARIN 500 UNITS: 100 SYRINGE at 08:12

## 2022-12-19 NOTE — NURSING
Pt arrives ambulatory to clinic for zarxio inj and labs. CVC line flushed, dressing clean dry and intact. Zarxio inj administered sq to abd. Pt tolerated well. D/c with spouse to MD appointment.

## 2022-12-20 ENCOUNTER — INFUSION (OUTPATIENT)
Dept: INFUSION THERAPY | Facility: HOSPITAL | Age: 57
End: 2022-12-20
Payer: MEDICARE

## 2022-12-20 ENCOUNTER — TELEPHONE (OUTPATIENT)
Dept: HEMATOLOGY/ONCOLOGY | Facility: CLINIC | Age: 57
End: 2022-12-20
Payer: MEDICARE

## 2022-12-20 ENCOUNTER — HOSPITAL ENCOUNTER (OUTPATIENT)
Dept: TRANSFUSION MEDICINE | Facility: HOSPITAL | Age: 57
Discharge: HOME | End: 2022-12-20
Attending: INTERNAL MEDICINE
Payer: MEDICARE

## 2022-12-20 VITALS
DIASTOLIC BLOOD PRESSURE: 62 MMHG | WEIGHT: 235 LBS | HEART RATE: 71 BPM | BODY MASS INDEX: 32.9 KG/M2 | TEMPERATURE: 99 F | SYSTOLIC BLOOD PRESSURE: 121 MMHG | HEIGHT: 71 IN

## 2022-12-20 VITALS
RESPIRATION RATE: 18 BRPM | HEART RATE: 95 BPM | TEMPERATURE: 99 F | SYSTOLIC BLOOD PRESSURE: 133 MMHG | DIASTOLIC BLOOD PRESSURE: 61 MMHG

## 2022-12-20 DIAGNOSIS — N18.9 ANEMIA ASSOCIATED WITH CHRONIC RENAL FAILURE: Primary | ICD-10-CM

## 2022-12-20 DIAGNOSIS — D63.1 ANEMIA ASSOCIATED WITH CHRONIC RENAL FAILURE: Primary | ICD-10-CM

## 2022-12-20 DIAGNOSIS — C90.00 MULTIPLE MYELOMA NOT HAVING ACHIEVED REMISSION: Primary | ICD-10-CM

## 2022-12-20 DIAGNOSIS — D63.1 ANEMIA ASSOCIATED WITH CHRONIC RENAL FAILURE: ICD-10-CM

## 2022-12-20 DIAGNOSIS — Z76.82 STEM CELL TRANSPLANT CANDIDATE: ICD-10-CM

## 2022-12-20 DIAGNOSIS — N18.9 ANEMIA ASSOCIATED WITH CHRONIC RENAL FAILURE: ICD-10-CM

## 2022-12-20 DIAGNOSIS — Z76.82 STEM CELL TRANSPLANT CANDIDATE: Primary | ICD-10-CM

## 2022-12-20 LAB
ALBUMIN SERPL BCP-MCNC: 3.1 G/DL (ref 3.5–5.2)
ALBUMIN SERPL BCP-MCNC: 3.2 G/DL (ref 3.5–5.2)
ALBUMIN SERPL BCP-MCNC: 3.8 G/DL (ref 3.5–5.2)
ALP SERPL-CCNC: 191 U/L (ref 55–135)
ALP SERPL-CCNC: 193 U/L (ref 55–135)
ALP SERPL-CCNC: 242 U/L (ref 55–135)
ALT SERPL W/O P-5'-P-CCNC: 6 U/L (ref 10–44)
ALT SERPL W/O P-5'-P-CCNC: 8 U/L (ref 10–44)
ALT SERPL W/O P-5'-P-CCNC: 9 U/L (ref 10–44)
ANION GAP SERPL CALC-SCNC: 12 MMOL/L (ref 8–16)
ANION GAP SERPL CALC-SCNC: 12 MMOL/L (ref 8–16)
ANION GAP SERPL CALC-SCNC: 14 MMOL/L (ref 8–16)
ANISOCYTOSIS BLD QL SMEAR: SLIGHT
ANISOCYTOSIS BLD QL SMEAR: SLIGHT
AST SERPL-CCNC: 13 U/L (ref 10–40)
AST SERPL-CCNC: 14 U/L (ref 10–40)
AST SERPL-CCNC: 18 U/L (ref 10–40)
BASO STIPL BLD QL SMEAR: ABNORMAL
BASOPHILS # BLD AUTO: ABNORMAL K/UL (ref 0–0.2)
BASOPHILS NFR BLD: 0 % (ref 0–1.9)
BILIRUB SERPL-MCNC: 0.9 MG/DL (ref 0.1–1)
BILIRUB SERPL-MCNC: 1 MG/DL (ref 0.1–1)
BILIRUB SERPL-MCNC: 1.2 MG/DL (ref 0.1–1)
BLD PROD TYP BPU: NORMAL
BLOOD UNIT EXPIRATION DATE: NORMAL
BLOOD UNIT TYPE CODE: 5100
BLOOD UNIT TYPE: NORMAL
BUN SERPL-MCNC: 43 MG/DL (ref 6–20)
BUN SERPL-MCNC: 43 MG/DL (ref 6–20)
BUN SERPL-MCNC: 45 MG/DL (ref 6–20)
CA-I BLDV-SCNC: 1 MMOL/L (ref 1.06–1.42)
CA-I BLDV-SCNC: 1.07 MMOL/L (ref 1.06–1.42)
CA-I BLDV-SCNC: 1.15 MMOL/L (ref 1.06–1.42)
CALCIUM SERPL-MCNC: 8.2 MG/DL (ref 8.7–10.5)
CALCIUM SERPL-MCNC: 8.3 MG/DL (ref 8.7–10.5)
CALCIUM SERPL-MCNC: 9 MG/DL (ref 8.7–10.5)
CD34 %: 0.08 %
CD34 ABSOLUTE: 46.17 CELLS/UL
CD34 VIABILITY: 99.1 %
CHLORIDE SERPL-SCNC: 105 MMOL/L (ref 95–110)
CHLORIDE SERPL-SCNC: 105 MMOL/L (ref 95–110)
CHLORIDE SERPL-SCNC: 107 MMOL/L (ref 95–110)
CO2 SERPL-SCNC: 21 MMOL/L (ref 23–29)
CO2 SERPL-SCNC: 24 MMOL/L (ref 23–29)
CO2 SERPL-SCNC: 24 MMOL/L (ref 23–29)
CODING SYSTEM: NORMAL
CREAT SERPL-MCNC: 7.5 MG/DL (ref 0.5–1.4)
CREAT SERPL-MCNC: 7.6 MG/DL (ref 0.5–1.4)
CREAT SERPL-MCNC: 7.7 MG/DL (ref 0.5–1.4)
DIFFERENTIAL METHOD: ABNORMAL
DISPENSE STATUS: NORMAL
DOHLE BOD BLD QL SMEAR: PRESENT
EOSINOPHIL # BLD AUTO: ABNORMAL K/UL (ref 0–0.5)
EOSINOPHIL NFR BLD: 0 % (ref 0–8)
EOSINOPHIL NFR BLD: 2 % (ref 0–8)
EOSINOPHIL NFR BLD: 3 % (ref 0–8)
ERYTHROCYTE [DISTWIDTH] IN BLOOD BY AUTOMATED COUNT: 20.9 % (ref 11.5–14.5)
ERYTHROCYTE [DISTWIDTH] IN BLOOD BY AUTOMATED COUNT: 21.1 % (ref 11.5–14.5)
ERYTHROCYTE [DISTWIDTH] IN BLOOD BY AUTOMATED COUNT: 21.1 % (ref 11.5–14.5)
EST. GFR  (NO RACE VARIABLE): 7.5 ML/MIN/1.73 M^2
EST. GFR  (NO RACE VARIABLE): 7.7 ML/MIN/1.73 M^2
EST. GFR  (NO RACE VARIABLE): 7.8 ML/MIN/1.73 M^2
GLUCOSE SERPL-MCNC: 104 MG/DL (ref 70–110)
GLUCOSE SERPL-MCNC: 60 MG/DL (ref 70–110)
GLUCOSE SERPL-MCNC: 84 MG/DL (ref 70–110)
HCT VFR BLD AUTO: 17.6 % (ref 40–54)
HCT VFR BLD AUTO: 18.7 % (ref 40–54)
HCT VFR BLD AUTO: 21.6 % (ref 40–54)
HGB BLD-MCNC: 5.7 G/DL (ref 14–18)
HGB BLD-MCNC: 6.1 G/DL (ref 14–18)
HGB BLD-MCNC: 7 G/DL (ref 14–18)
IMM GRANULOCYTES # BLD AUTO: ABNORMAL K/UL (ref 0–0.04)
IMM GRANULOCYTES NFR BLD AUTO: ABNORMAL % (ref 0–0.5)
LYMPHOCYTES # BLD AUTO: ABNORMAL K/UL (ref 1–4.8)
LYMPHOCYTES NFR BLD: 1 % (ref 18–48)
LYMPHOCYTES NFR BLD: 5 % (ref 18–48)
LYMPHOCYTES NFR BLD: 6 % (ref 18–48)
MAGNESIUM SERPL-MCNC: 1.6 MG/DL (ref 1.6–2.6)
MCH RBC QN AUTO: 33.2 PG (ref 27–31)
MCH RBC QN AUTO: 33.3 PG (ref 27–31)
MCH RBC QN AUTO: 33.5 PG (ref 27–31)
MCHC RBC AUTO-ENTMCNC: 32.4 G/DL (ref 32–36)
MCHC RBC AUTO-ENTMCNC: 32.4 G/DL (ref 32–36)
MCHC RBC AUTO-ENTMCNC: 32.6 G/DL (ref 32–36)
MCV RBC AUTO: 102 FL (ref 82–98)
MCV RBC AUTO: 102 FL (ref 82–98)
MCV RBC AUTO: 104 FL (ref 82–98)
METAMYELOCYTES NFR BLD MANUAL: 2 %
METAMYELOCYTES NFR BLD MANUAL: 3 %
METAMYELOCYTES NFR BLD MANUAL: 4 %
MONOCYTES # BLD AUTO: ABNORMAL K/UL (ref 0.3–1)
MONOCYTES NFR BLD: 4 % (ref 4–15)
MONOCYTES NFR BLD: 6 % (ref 4–15)
MONOCYTES NFR BLD: 6 % (ref 4–15)
MYELOCYTES NFR BLD MANUAL: 2 %
MYELOCYTES NFR BLD MANUAL: 3 %
MYELOCYTES NFR BLD MANUAL: 4 %
NEUTROPHILS NFR BLD: 73 % (ref 38–73)
NEUTROPHILS NFR BLD: 74 % (ref 38–73)
NEUTROPHILS NFR BLD: 77 % (ref 38–73)
NEUTS BAND NFR BLD MANUAL: 4 %
NEUTS BAND NFR BLD MANUAL: 5 %
NEUTS BAND NFR BLD MANUAL: 6 %
NRBC BLD-RTO: 0 /100 WBC
NUM UNITS TRANS PACKED RBC: NORMAL
PHOSPHATE SERPL-MCNC: 2.5 MG/DL (ref 2.7–4.5)
PLATELET # BLD AUTO: 114 K/UL (ref 150–450)
PLATELET # BLD AUTO: 76 K/UL (ref 150–450)
PLATELET # BLD AUTO: 82 K/UL (ref 150–450)
PLATELET BLD QL SMEAR: ABNORMAL
PMV BLD AUTO: 10.1 FL (ref 9.2–12.9)
PMV BLD AUTO: 9.6 FL (ref 9.2–12.9)
PMV BLD AUTO: 9.9 FL (ref 9.2–12.9)
POLYCHROMASIA BLD QL SMEAR: ABNORMAL
POLYCHROMASIA BLD QL SMEAR: ABNORMAL
POTASSIUM SERPL-SCNC: 3.3 MMOL/L (ref 3.5–5.1)
POTASSIUM SERPL-SCNC: 3.7 MMOL/L (ref 3.5–5.1)
POTASSIUM SERPL-SCNC: 3.7 MMOL/L (ref 3.5–5.1)
PROMYELOCYTES NFR BLD MANUAL: 1 %
PROMYELOCYTES NFR BLD MANUAL: 2 %
PROMYELOCYTES NFR BLD MANUAL: 4 %
PROT SERPL-MCNC: 4.8 G/DL (ref 6–8.4)
PROT SERPL-MCNC: 5 G/DL (ref 6–8.4)
PROT SERPL-MCNC: 6 G/DL (ref 6–8.4)
RBC # BLD AUTO: 1.7 M/UL (ref 4.6–6.2)
RBC # BLD AUTO: 1.83 M/UL (ref 4.6–6.2)
RBC # BLD AUTO: 2.11 M/UL (ref 4.6–6.2)
SCHISTOCYTES BLD QL SMEAR: ABNORMAL
SODIUM SERPL-SCNC: 140 MMOL/L (ref 136–145)
SODIUM SERPL-SCNC: 141 MMOL/L (ref 136–145)
SODIUM SERPL-SCNC: 143 MMOL/L (ref 136–145)
SPHEROCYTES BLD QL SMEAR: ABNORMAL
TOXIC GRANULES BLD QL SMEAR: PRESENT
WBC # BLD AUTO: 37.08 K/UL (ref 3.9–12.7)
WBC # BLD AUTO: 40.1 K/UL (ref 3.9–12.7)
WBC # BLD AUTO: 49.76 K/UL (ref 3.9–12.7)
WBC OTHER NFR BLD MANUAL: 3 %

## 2022-12-20 PROCEDURE — P9040 RBC LEUKOREDUCED IRRADIATED: HCPCS | Performed by: INTERNAL MEDICINE

## 2022-12-20 PROCEDURE — 25000003 PHARM REV CODE 250: Performed by: INTERNAL MEDICINE

## 2022-12-20 PROCEDURE — 96372 THER/PROPH/DIAG INJ SC/IM: CPT | Mod: 59

## 2022-12-20 PROCEDURE — 38206 HARVEST AUTO STEM CELLS: CPT | Mod: ,,, | Performed by: PATHOLOGY

## 2022-12-20 PROCEDURE — 38207 CRYOPRESERVE STEM CELLS: CPT

## 2022-12-20 PROCEDURE — 38206 HARVEST AUTO STEM CELLS: CPT

## 2022-12-20 PROCEDURE — 38206 PR PROG CELL HARVEST,TRANSPLANT,AUTOLOGOUS: ICD-10-PCS | Mod: ,,, | Performed by: PATHOLOGY

## 2022-12-20 PROCEDURE — 82330 ASSAY OF CALCIUM: CPT | Mod: 91 | Performed by: INTERNAL MEDICINE

## 2022-12-20 PROCEDURE — 83735 ASSAY OF MAGNESIUM: CPT | Mod: 91 | Performed by: INTERNAL MEDICINE

## 2022-12-20 PROCEDURE — 85007 BL SMEAR W/DIFF WBC COUNT: CPT | Mod: 91 | Performed by: INTERNAL MEDICINE

## 2022-12-20 PROCEDURE — 63600175 PHARM REV CODE 636 W HCPCS: Performed by: PATHOLOGY

## 2022-12-20 PROCEDURE — 85027 COMPLETE CBC AUTOMATED: CPT | Mod: 91 | Performed by: INTERNAL MEDICINE

## 2022-12-20 PROCEDURE — 38214 VOLUME DEPLETE OF HARVEST: CPT

## 2022-12-20 PROCEDURE — 25000003 PHARM REV CODE 250: Performed by: NURSE PRACTITIONER

## 2022-12-20 PROCEDURE — 36430 TRANSFUSION BLD/BLD COMPNT: CPT | Mod: 59

## 2022-12-20 PROCEDURE — 25000003 PHARM REV CODE 250: Performed by: PATHOLOGY

## 2022-12-20 PROCEDURE — 80053 COMPREHEN METABOLIC PANEL: CPT | Mod: 91 | Performed by: INTERNAL MEDICINE

## 2022-12-20 PROCEDURE — 86367 STEM CELLS TOTAL COUNT: CPT | Performed by: PATHOLOGY

## 2022-12-20 PROCEDURE — 84100 ASSAY OF PHOSPHORUS: CPT | Performed by: INTERNAL MEDICINE

## 2022-12-20 PROCEDURE — 63600175 PHARM REV CODE 636 W HCPCS: Mod: JG | Performed by: NURSE PRACTITIONER

## 2022-12-20 RX ORDER — LANOLIN ALCOHOL/MO/W.PET/CERES
800 CREAM (GRAM) TOPICAL ONCE AS NEEDED
Status: DISCONTINUED | OUTPATIENT
Start: 2022-12-20 | End: 2022-12-20 | Stop reason: HOSPADM

## 2022-12-20 RX ORDER — HYDROCODONE BITARTRATE AND ACETAMINOPHEN 500; 5 MG/1; MG/1
TABLET ORAL ONCE
Status: COMPLETED | OUTPATIENT
Start: 2022-12-20 | End: 2022-12-20

## 2022-12-20 RX ORDER — DIPHENHYDRAMINE HYDROCHLORIDE 50 MG/ML
25 INJECTION INTRAMUSCULAR; INTRAVENOUS
Status: CANCELLED | OUTPATIENT
Start: 2022-12-20

## 2022-12-20 RX ORDER — HEPARIN SODIUM 1000 [USP'U]/ML
3600 INJECTION, SOLUTION INTRAVENOUS; SUBCUTANEOUS ONCE
Status: COMPLETED | OUTPATIENT
Start: 2022-12-20 | End: 2022-12-20

## 2022-12-20 RX ORDER — SODIUM,POTASSIUM PHOSPHATES 280-250MG
2 POWDER IN PACKET (EA) ORAL DAILY PRN
Status: DISCONTINUED | OUTPATIENT
Start: 2022-12-20 | End: 2022-12-20 | Stop reason: HOSPADM

## 2022-12-20 RX ORDER — HYDROCODONE BITARTRATE AND ACETAMINOPHEN 500; 5 MG/1; MG/1
TABLET ORAL ONCE
Status: CANCELLED | OUTPATIENT
Start: 2022-12-20 | End: 2022-12-20

## 2022-12-20 RX ORDER — DIPHENHYDRAMINE HYDROCHLORIDE 50 MG/ML
25 INJECTION INTRAMUSCULAR; INTRAVENOUS
Status: DISCONTINUED | OUTPATIENT
Start: 2022-12-20 | End: 2022-12-20 | Stop reason: HOSPADM

## 2022-12-20 RX ORDER — ACETAMINOPHEN 325 MG/1
650 TABLET ORAL
Status: CANCELLED | OUTPATIENT
Start: 2022-12-20

## 2022-12-20 RX ORDER — ACETAMINOPHEN 325 MG/1
650 TABLET ORAL
Status: DISCONTINUED | OUTPATIENT
Start: 2022-12-20 | End: 2022-12-20 | Stop reason: HOSPADM

## 2022-12-20 RX ORDER — POTASSIUM CHLORIDE 20 MEQ/1
40 TABLET, EXTENDED RELEASE ORAL ONCE AS NEEDED
Status: COMPLETED | OUTPATIENT
Start: 2022-12-20 | End: 2022-12-20

## 2022-12-20 RX ORDER — ACETAMINOPHEN 325 MG/1
650 TABLET ORAL ONCE AS NEEDED
Status: DISCONTINUED | OUTPATIENT
Start: 2022-12-20 | End: 2022-12-20 | Stop reason: HOSPADM

## 2022-12-20 RX ORDER — DIPHENHYDRAMINE HCL 25 MG
25 CAPSULE ORAL ONCE AS NEEDED
Status: DISCONTINUED | OUTPATIENT
Start: 2022-12-20 | End: 2022-12-20 | Stop reason: HOSPADM

## 2022-12-20 RX ORDER — SODIUM,POTASSIUM PHOSPHATES 280-250MG
2 POWDER IN PACKET (EA) ORAL ONCE AS NEEDED
Status: COMPLETED | OUTPATIENT
Start: 2022-12-20 | End: 2022-12-20

## 2022-12-20 RX ADMIN — FILGRASTIM-SNDZ 1080 MCG: 300 INJECTION, SOLUTION INTRAVENOUS; SUBCUTANEOUS at 08:12

## 2022-12-20 RX ADMIN — POTASSIUM & SODIUM PHOSPHATES POWDER PACK 280-160-250 MG 2 PACKET: 280-160-250 PACK at 04:12

## 2022-12-20 RX ADMIN — CALCIUM GLUCONATE: 98 INJECTION, SOLUTION INTRAVENOUS at 10:12

## 2022-12-20 RX ADMIN — POTASSIUM & SODIUM PHOSPHATES POWDER PACK 280-160-250 MG 2 PACKET: 280-160-250 PACK at 11:12

## 2022-12-20 RX ADMIN — POTASSIUM CHLORIDE 40 MEQ: 1500 TABLET, EXTENDED RELEASE ORAL at 04:12

## 2022-12-20 RX ADMIN — HEPARIN SODIUM 3600 UNITS: 1000 INJECTION, SOLUTION INTRAVENOUS; SUBCUTANEOUS at 04:12

## 2022-12-20 RX ADMIN — SODIUM CHLORIDE: 0.9 INJECTION, SOLUTION INTRAVENOUS at 04:12

## 2022-12-20 NOTE — PROCEDURES
[facility] - Apheresis  Transfusion Medicine  Procedure Note    SUMMARY   Stem Cell Collection Autologous    Date/Time: 12/20/2022 4:43 PM  Performed by: Aldo Hamlin MD  Authorized by: Aldo Hamlin MD       Date of Procedure: 12/20/2022     Procedure: Hematopoietic Progenitor Cell Collection    Provider: Ragini Hamlin MD     Assisting Provider: None    Pre-Procedure Diagnosis: Stem cell transplant candidate    Post-Procedure Diagnosis: Stem cell transplant candidate    Follow-up Assessment: Mr. Lakhani is collecting stem cells in preparation for an autologous transplant. He has multiple myeloma and ESRD.     A mid run count at 4 hours showed that we had obtained 2.77 million CD34 cells/kg. We ceased collection at that point so he could receive a unit of RBCs. He is scheduled for dialysis tomorrow AM. A decision was made to forgo a second day of collection.    FINAL CD34 collection: 3.12 x 10^6/kg.    Pertinent Laboratory Data: Complete Blood Count:   Lab Results   Component Value Date    HGB 5.7 (LL) 12/20/2022    HCT 17.6 (LL) 12/20/2022    PLT 82 (L) 12/20/2022    WBC 37.08 (H) 12/20/2022     Basic Metabolic Panel:   Lab Results   Component Value Date     12/20/2022    K 3.3 (L) 12/20/2022     12/20/2022    CO2 24 12/20/2022     12/20/2022    BUN 45 (H) 12/20/2022    CREATININE 7.5 (H) 12/20/2022    CALCIUM 8.3 (L) 12/20/2022    ANIONGAP 12 12/20/2022    ESTGFRAFRICA 15.1 (A) 07/28/2022    EGFRNONAA 13.1 (A) 07/28/2022     CD34 - quantitative:   Lab Results   Component Value Date    CD34 0.08 12/20/2022    MN85UTXEAQKN 46.17 12/20/2022    [id number] 99.1 12/20/2022       Pertinent Medications: None contraindicated for collection. Mobilized with GCSF and Mozobil.    Review of patient's allergies indicates:  No Known Allergies    Anesthesia: None     Technical Procedures Used: Hematopoietic Progenitor Cell collection: The patient presented to the 5th floor Chemotherapy unit, details  about the procedure reviewed. Any interim clinical changes from previous clinic visit - No. All pertinent labs reviewed. Human Progenitor (Stem) Cell Collection initiated by Apheresis Nurse. Current plan is to perform the procedure for approximately 7 hours. Initial laboratory tests collected, results to Oncology Nurse. Mid-run laboratory tests collected, results to Oncology Nurse. Included CD34 count on collection bag - results to Stem Cell Lab and BMT clinical team. Final laboratory tests collected, results to Oncology Nurse. Red blood cell or platelet transfusion - Yes -  .  Date of next procedure Complete.    Description of the Findings of the Procedure:     Please see Apheresis Nurse flowsheet for details.    The patient was evaluated and all clinical and laboratory data relevant to the treatment was reviewed, and a decision was made to proceed with the Apheresis procedure.    I was available to the clinical staff throughout the procedure.    Significant Surgical Tasks Conducted by the Assistant(s): Not applicable    Complications: None    Estimated Blood Loss (EBL): None    Implants: None     Specimens: None

## 2022-12-20 NOTE — PLAN OF CARE
Pt ambulatory to clinic with wife today for blood transfusion and Mozobil injection. Denies any sig complaints. CVC to R chest wall. Dressing dry and intact. Good blood flow. Tolerated transfusion well. CVC flushed with NS and heparin. Aware of what time to return to clinic tomorrow. Ambulatory from clinic in [facility].

## 2022-12-20 NOTE — NURSING
Patient arrived to clinic, oriented to room. Zarxio injection given x3. Tolerated well. CBC redrawn this am. Patient will need 1 unit of prbc. Per Christine and Dr. Hamlin, patient will receive blood after stem cell collection is completed.     AM labs reviewed. Replaced phosphorus per standing order. NAD noted. Patient resting comfortably in bed with wife by side.    Will continue to monitor closely.

## 2022-12-21 NOTE — PLAN OF CARE
Problem: Adult Inpatient Plan of Care  Goal: Optimal Comfort and Wellbeing  Intervention: Provide Person-Centered Care  Flowsheets (Taken 12/20/2022 1804)  Trust Relationship/Rapport:   care explained   questions answered   questions encouraged   choices provided   emotional support provided   reassurance provided   empathic listening provided   thoughts/feelings acknowledged

## 2022-12-21 NOTE — PROGRESS NOTES
Pt to Apheresis / Chemo Infusion for day 1 autologous stem cell collection with wife.  Voiced no complaints.  A&O x4.  Pt stated had no pain, 0 on pain scale.  Timeout performed prior to start.  R perm cath accessed without difficulty, CD 34 drawn and sent to lab.  OK to proceed per Dr Hamlin after CD 34 resulted.  R perm cath accessed again. R perm cath patent.  Dressing changed.  HPC started at 1007.  4 g Ca Glu IVPB during collection. Tolerated well. Tx ended at 1630 per Dr Hamlin.  Cath flushed and locked. No further collections per Dr Hamlin.. Chemo RN administered 1 unit of PRBC's post collection.  Chemo RN to discharge pt.

## 2022-12-21 NOTE — PLAN OF CARE
Patient completed Stem cell collection and received 1 unit of prbcs prior to discharge. NAD noted upon discharge. Patient is scheduled for dialysis tomorrow. Discharged home, ambulated independently with wife by side.

## 2022-12-27 NOTE — ASSESSMENT & PLAN NOTE
- Patient of Dr. King. Per most recent clinic note:   - 4/20/22: renal biopsy: light chain cast nephropathy, kappa light chain  - 4/26/22: right subclavian vein thrombus   - 4/27/22: M spike 0.2 with free monoclonal kappa band. B2mg 19.57, IgG 496, IgA 10, IgM <5. Kappa 73528, Lambda 7.9, ratio >1000  - 5/12/22: BMBx: plasma cell myeloma, marrow cellularity 100%, plasma cell percentage 100%. Plasma cell phenotype +, kappa +, CD20- -, CD30-, EMA-, SADAF-, Congo red negative. Peripheral blood with pancytopenia and no circulating blasts. Decreased storage iron. FISH canceled due to quantity not sufficient  - 5/18/22: CyBorD C1D1  - 5/23/22: rasburicase  - 5/24/22: Xgeva  - 6/6/22: Kappa 56733, lambda 4.0, ratio >1000, M spike 0.1 with free monoclonal kappa band present  - 6/6/22: CyBorD C2D1  - 6/9/22: BMBx Plasma cell neoplasm compatible with plasma cell myeloma. Extent of involvement 80-90% of bone marrow elements. Cytology: Plasmablastic. FISH cytogenetics positive for 1q21/CKS1B gain. Karyotype failed. Myelofibrosis diffuse (MF-3)  - 6/20/22: CyBorD C2D8 delayed due to covid  - 6/23/22: CyBOrD C2D11  - 7/5/22: C1D1 DVRd  - 7/7/22 - 7/18/22: hospital admission for septic shock resulting in renal failure  - 7/25/22: started again on DVRd  - 8/8/2022: kappa 1402.8, lambda 7.5, ratio 167.04. M spike 0.1, two faint restricted bands in gamma globulin regions.   - 10/10/22: C5D1 DVRd. Revlimid on hold for upcoming stem   - Admitted for Torie 140 auto SCT on 12/28/22 as above

## 2022-12-27 NOTE — ASSESSMENT & PLAN NOTE
- Patient of Dr. King  - Diagnosed with MM in April 2022. Underwent 2 cycles CyBorD then transitioned to DVRd (now s/p C6)  - Admitted 12/28/22 for Torie 140 auto SCT   - Today is Day -1  - Will have chemotherapy today with plans for SCT tomorrow      Planned conditioning regimen:  Melphalan on Day -1     Antimicrobial Prophylaxis:  Acyclovir starting on Day -1  Levofloxacin starting on Day -1  Fluconazole starting on Day -1     Growth Factor Support:  Neupogen starting on Day +7      Caregiver: Wife  Post-transplant discharge plans: [facility]

## 2022-12-28 ENCOUNTER — HOSPITAL ENCOUNTER (INPATIENT)
Facility: HOSPITAL | Age: 57
LOS: 17 days | Discharge: HOME | DRG: 016 | End: 2023-01-14
Attending: INTERNAL MEDICINE | Admitting: INTERNAL MEDICINE
Payer: MEDICARE

## 2022-12-28 ENCOUNTER — INFUSION (OUTPATIENT)
Dept: INFUSION THERAPY | Facility: HOSPITAL | Age: 57
End: 2022-12-28
Payer: MEDICARE

## 2022-12-28 ENCOUNTER — OFFICE VISIT (OUTPATIENT)
Dept: HEMATOLOGY/ONCOLOGY | Facility: CLINIC | Age: 57
End: 2022-12-28
Payer: MEDICARE

## 2022-12-28 VITALS
HEIGHT: 71 IN | OXYGEN SATURATION: 100 % | SYSTOLIC BLOOD PRESSURE: 134 MMHG | DIASTOLIC BLOOD PRESSURE: 75 MMHG | HEART RATE: 69 BPM | BODY MASS INDEX: 33.93 KG/M2 | WEIGHT: 242.38 LBS | TEMPERATURE: 99 F | RESPIRATION RATE: 17 BRPM

## 2022-12-28 DIAGNOSIS — T45.1X5A CINV (CHEMOTHERAPY-INDUCED NAUSEA AND VOMITING): ICD-10-CM

## 2022-12-28 DIAGNOSIS — R55 SYNCOPE: ICD-10-CM

## 2022-12-28 DIAGNOSIS — D61.818 PANCYTOPENIA: ICD-10-CM

## 2022-12-28 DIAGNOSIS — T50.905A DRUG-INDUCED CYTOPENIA: ICD-10-CM

## 2022-12-28 DIAGNOSIS — E87.6 HYPOKALEMIA: ICD-10-CM

## 2022-12-28 DIAGNOSIS — T84.59XD CHRONIC INFECTION OF KNEE JOINT PROSTHESIS, SUBSEQUENT ENCOUNTER: ICD-10-CM

## 2022-12-28 DIAGNOSIS — Z94.84 HISTORY OF AUTOLOGOUS STEM CELL TRANSPLANT: ICD-10-CM

## 2022-12-28 DIAGNOSIS — T45.1X5A CHEMOTHERAPY INDUCED DIARRHEA: ICD-10-CM

## 2022-12-28 DIAGNOSIS — D75.9 DRUG-INDUCED CYTOPENIA: ICD-10-CM

## 2022-12-28 DIAGNOSIS — R11.2 CINV (CHEMOTHERAPY-INDUCED NAUSEA AND VOMITING): ICD-10-CM

## 2022-12-28 DIAGNOSIS — Z96.659 CHRONIC INFECTION OF KNEE JOINT PROSTHESIS, SUBSEQUENT ENCOUNTER: ICD-10-CM

## 2022-12-28 DIAGNOSIS — K52.1 CHEMOTHERAPY INDUCED DIARRHEA: ICD-10-CM

## 2022-12-28 DIAGNOSIS — Z99.2 DEPENDENCE ON HEMODIALYSIS: ICD-10-CM

## 2022-12-28 DIAGNOSIS — C90.00 MULTIPLE MYELOMA, REMISSION STATUS UNSPECIFIED: ICD-10-CM

## 2022-12-28 DIAGNOSIS — Z86.718 HISTORY OF DVT (DEEP VEIN THROMBOSIS): ICD-10-CM

## 2022-12-28 DIAGNOSIS — R00.0 TACHYCARDIA: ICD-10-CM

## 2022-12-28 DIAGNOSIS — R93.1 DECREASED CARDIAC EJECTION FRACTION: ICD-10-CM

## 2022-12-28 DIAGNOSIS — C90.00 MULTIPLE MYELOMA, REMISSION STATUS UNSPECIFIED: Primary | ICD-10-CM

## 2022-12-28 DIAGNOSIS — Z76.82 STEM CELL TRANSPLANT CANDIDATE: ICD-10-CM

## 2022-12-28 DIAGNOSIS — Z99.2 ESRD ON HEMODIALYSIS: ICD-10-CM

## 2022-12-28 DIAGNOSIS — N18.6 ESRD ON HEMODIALYSIS: ICD-10-CM

## 2022-12-28 DIAGNOSIS — Z76.82 STEM CELL TRANSPLANT CANDIDATE: Primary | ICD-10-CM

## 2022-12-28 DIAGNOSIS — D84.9 IMMUNOSUPPRESSION: ICD-10-CM

## 2022-12-28 DIAGNOSIS — K21.9 GASTROESOPHAGEAL REFLUX DISEASE WITHOUT ESOPHAGITIS: ICD-10-CM

## 2022-12-28 DIAGNOSIS — C90.00 MULTIPLE MYELOMA NOT HAVING ACHIEVED REMISSION: Primary | ICD-10-CM

## 2022-12-28 DIAGNOSIS — E78.2 MIXED HYPERLIPIDEMIA: ICD-10-CM

## 2022-12-28 PROBLEM — D63.0 ANEMIA IN NEOPLASTIC DISEASE: Status: ACTIVE | Noted: 2022-12-28

## 2022-12-28 PROBLEM — E78.5 HYPERLIPIDEMIA: Status: ACTIVE | Noted: 2022-12-28

## 2022-12-28 PROBLEM — F32.A DEPRESSION: Status: ACTIVE | Noted: 2022-12-28

## 2022-12-28 PROBLEM — I10 HYPERTENSION: Status: ACTIVE | Noted: 2022-12-28

## 2022-12-28 PROBLEM — I82.629 DEEP VEIN THROMBOSIS (DVT) OF UPPER EXTREMITY: Status: ACTIVE | Noted: 2022-12-28

## 2022-12-28 LAB
ABO + RH BLD: NORMAL
ALBUMIN SERPL BCP-MCNC: 3.8 G/DL (ref 3.5–5.2)
ALP SERPL-CCNC: 114 U/L (ref 55–135)
ALT SERPL W/O P-5'-P-CCNC: 11 U/L (ref 10–44)
ANION GAP SERPL CALC-SCNC: 8 MMOL/L (ref 8–16)
AST SERPL-CCNC: 11 U/L (ref 10–40)
BASOPHILS # BLD AUTO: 0.02 K/UL (ref 0–0.2)
BASOPHILS NFR BLD: 0.4 % (ref 0–1.9)
BILIRUB SERPL-MCNC: 1.1 MG/DL (ref 0.1–1)
BLD GP AB SCN CELLS X3 SERPL QL: NORMAL
BUN SERPL-MCNC: 18 MG/DL (ref 6–20)
CALCIUM SERPL-MCNC: 8.7 MG/DL (ref 8.7–10.5)
CHLORIDE SERPL-SCNC: 108 MMOL/L (ref 95–110)
CO2 SERPL-SCNC: 24 MMOL/L (ref 23–29)
CREAT SERPL-MCNC: 2.9 MG/DL (ref 0.5–1.4)
DIFFERENTIAL METHOD: ABNORMAL
EOSINOPHIL # BLD AUTO: 0.1 K/UL (ref 0–0.5)
EOSINOPHIL NFR BLD: 1.3 % (ref 0–8)
ERYTHROCYTE [DISTWIDTH] IN BLOOD BY AUTOMATED COUNT: 19.4 % (ref 11.5–14.5)
EST. GFR  (NO RACE VARIABLE): 24.3 ML/MIN/1.73 M^2
GLUCOSE SERPL-MCNC: 91 MG/DL (ref 70–110)
HCT VFR BLD AUTO: 23.5 % (ref 40–54)
HGB BLD-MCNC: 7.7 G/DL (ref 14–18)
IMM GRANULOCYTES # BLD AUTO: 0.07 K/UL (ref 0–0.04)
IMM GRANULOCYTES NFR BLD AUTO: 1.3 % (ref 0–0.5)
LYMPHOCYTES # BLD AUTO: 0.2 K/UL (ref 1–4.8)
LYMPHOCYTES NFR BLD: 4 % (ref 18–48)
MCH RBC QN AUTO: 33.3 PG (ref 27–31)
MCHC RBC AUTO-ENTMCNC: 32.8 G/DL (ref 32–36)
MCV RBC AUTO: 102 FL (ref 82–98)
MONOCYTES # BLD AUTO: 0.5 K/UL (ref 0.3–1)
MONOCYTES NFR BLD: 8.2 % (ref 4–15)
NEUTROPHILS # BLD AUTO: 4.7 K/UL (ref 1.8–7.7)
NEUTROPHILS NFR BLD: 84.8 % (ref 38–73)
NRBC BLD-RTO: 0 /100 WBC
PLATELET # BLD AUTO: 125 K/UL (ref 150–450)
PMV BLD AUTO: 10.2 FL (ref 9.2–12.9)
POTASSIUM SERPL-SCNC: 3.3 MMOL/L (ref 3.5–5.1)
PROT SERPL-MCNC: 6.1 G/DL (ref 6–8.4)
RBC # BLD AUTO: 2.31 M/UL (ref 4.6–6.2)
SARS-COV-2 RDRP RESP QL NAA+PROBE: NEGATIVE
SODIUM SERPL-SCNC: 140 MMOL/L (ref 136–145)
WBC # BLD AUTO: 5.52 K/UL (ref 3.9–12.7)

## 2022-12-28 PROCEDURE — 86850 RBC ANTIBODY SCREEN: CPT | Performed by: INTERNAL MEDICINE

## 2022-12-28 PROCEDURE — 85025 COMPLETE CBC W/AUTO DIFF WBC: CPT | Performed by: STUDENT IN AN ORGANIZED HEALTH CARE EDUCATION/TRAINING PROGRAM

## 2022-12-28 PROCEDURE — 63600175 PHARM REV CODE 636 W HCPCS: Performed by: NURSE PRACTITIONER

## 2022-12-28 PROCEDURE — 36592 COLLECT BLOOD FROM PICC: CPT

## 2022-12-28 PROCEDURE — 80053 COMPREHEN METABOLIC PANEL: CPT | Performed by: STUDENT IN AN ORGANIZED HEALTH CARE EDUCATION/TRAINING PROGRAM

## 2022-12-28 PROCEDURE — 3078F DIAST BP <80 MM HG: CPT | Mod: CPTII,S$GLB,, | Performed by: NURSE PRACTITIONER

## 2022-12-28 PROCEDURE — A4216 STERILE WATER/SALINE, 10 ML: HCPCS | Performed by: NURSE PRACTITIONER

## 2022-12-28 PROCEDURE — 25000003 PHARM REV CODE 250: Performed by: INTERNAL MEDICINE

## 2022-12-28 PROCEDURE — 4010F PR ACE/ARB THEARPY RXD/TAKEN: ICD-10-PCS | Mod: CPTII,S$GLB,, | Performed by: NURSE PRACTITIONER

## 2022-12-28 PROCEDURE — 99999 PR PBB SHADOW E&M-EST. PATIENT-LVL IV: CPT | Mod: PBBFAC,,, | Performed by: NURSE PRACTITIONER

## 2022-12-28 PROCEDURE — 94761 N-INVAS EAR/PLS OXIMETRY MLT: CPT

## 2022-12-28 PROCEDURE — 1159F PR MEDICATION LIST DOCUMENTED IN MEDICAL RECORD: ICD-10-PCS | Mod: CPTII,S$GLB,, | Performed by: NURSE PRACTITIONER

## 2022-12-28 PROCEDURE — 3008F BODY MASS INDEX DOCD: CPT | Mod: CPTII,S$GLB,, | Performed by: NURSE PRACTITIONER

## 2022-12-28 PROCEDURE — 99215 PR OFFICE/OUTPT VISIT, EST, LEVL V, 40-54 MIN: ICD-10-PCS | Mod: S$GLB,,, | Performed by: NURSE PRACTITIONER

## 2022-12-28 PROCEDURE — 3075F SYST BP GE 130 - 139MM HG: CPT | Mod: CPTII,S$GLB,, | Performed by: NURSE PRACTITIONER

## 2022-12-28 PROCEDURE — 25000003 PHARM REV CODE 250: Performed by: NURSE PRACTITIONER

## 2022-12-28 PROCEDURE — 3078F PR MOST RECENT DIASTOLIC BLOOD PRESSURE < 80 MM HG: ICD-10-PCS | Mod: CPTII,S$GLB,, | Performed by: NURSE PRACTITIONER

## 2022-12-28 PROCEDURE — 99215 OFFICE O/P EST HI 40 MIN: CPT | Mod: S$GLB,,, | Performed by: NURSE PRACTITIONER

## 2022-12-28 PROCEDURE — 63600175 PHARM REV CODE 636 W HCPCS: Performed by: INTERNAL MEDICINE

## 2022-12-28 PROCEDURE — 20600001 HC STEP DOWN PRIVATE ROOM

## 2022-12-28 PROCEDURE — 3075F PR MOST RECENT SYSTOLIC BLOOD PRESS GE 130-139MM HG: ICD-10-PCS | Mod: CPTII,S$GLB,, | Performed by: NURSE PRACTITIONER

## 2022-12-28 PROCEDURE — 99999 PR PBB SHADOW E&M-EST. PATIENT-LVL IV: ICD-10-PCS | Mod: PBBFAC,,, | Performed by: NURSE PRACTITIONER

## 2022-12-28 PROCEDURE — 4010F ACE/ARB THERAPY RXD/TAKEN: CPT | Mod: CPTII,S$GLB,, | Performed by: NURSE PRACTITIONER

## 2022-12-28 PROCEDURE — 1160F PR REVIEW ALL MEDS BY PRESCRIBER/CLIN PHARMACIST DOCUMENTED: ICD-10-PCS | Mod: CPTII,S$GLB,, | Performed by: NURSE PRACTITIONER

## 2022-12-28 PROCEDURE — 3008F PR BODY MASS INDEX (BMI) DOCUMENTED: ICD-10-PCS | Mod: CPTII,S$GLB,, | Performed by: NURSE PRACTITIONER

## 2022-12-28 PROCEDURE — U0002 COVID-19 LAB TEST NON-CDC: HCPCS | Performed by: NURSE PRACTITIONER

## 2022-12-28 PROCEDURE — 1159F MED LIST DOCD IN RCRD: CPT | Mod: CPTII,S$GLB,, | Performed by: NURSE PRACTITIONER

## 2022-12-28 PROCEDURE — 1160F RVW MEDS BY RX/DR IN RCRD: CPT | Mod: CPTII,S$GLB,, | Performed by: NURSE PRACTITIONER

## 2022-12-28 RX ORDER — LANOLIN ALCOHOL/MO/W.PET/CERES
400 CREAM (GRAM) TOPICAL EVERY 4 HOURS PRN
Status: DISCONTINUED | OUTPATIENT
Start: 2022-12-28 | End: 2022-12-30

## 2022-12-28 RX ORDER — MAG HYDROX/ALUMINUM HYD/SIMETH 200-200-20
30 SUSPENSION, ORAL (FINAL DOSE FORM) ORAL EVERY 6 HOURS PRN
Status: DISCONTINUED | OUTPATIENT
Start: 2022-12-28 | End: 2023-01-14 | Stop reason: HOSPADM

## 2022-12-28 RX ORDER — DEXAMETHASONE 4 MG/1
12 TABLET ORAL
Status: COMPLETED | OUTPATIENT
Start: 2022-12-29 | End: 2022-12-29

## 2022-12-28 RX ORDER — ACYCLOVIR 200 MG/1
400 CAPSULE ORAL 2 TIMES DAILY
Status: DISCONTINUED | OUTPATIENT
Start: 2022-12-29 | End: 2022-12-28

## 2022-12-28 RX ORDER — POTASSIUM CHLORIDE 20 MEQ/1
20 TABLET, EXTENDED RELEASE ORAL
Status: DISCONTINUED | OUTPATIENT
Start: 2022-12-28 | End: 2022-12-30

## 2022-12-28 RX ORDER — SODIUM CHLORIDE 0.9 % (FLUSH) 0.9 %
10 SYRINGE (ML) INJECTION
Status: DISCONTINUED | OUTPATIENT
Start: 2022-12-28 | End: 2023-01-14 | Stop reason: HOSPADM

## 2022-12-28 RX ORDER — LEVOFLOXACIN 250 MG/1
250 TABLET ORAL EVERY OTHER DAY
Status: DISCONTINUED | OUTPATIENT
Start: 2022-12-29 | End: 2023-01-14 | Stop reason: HOSPADM

## 2022-12-28 RX ORDER — METOPROLOL SUCCINATE 25 MG/1
25 TABLET, EXTENDED RELEASE ORAL DAILY
Status: DISCONTINUED | OUTPATIENT
Start: 2022-12-29 | End: 2023-01-03

## 2022-12-28 RX ORDER — CALCIUM CARBONATE 200(500)MG
1000 TABLET,CHEWABLE ORAL DAILY
Status: DISCONTINUED | OUTPATIENT
Start: 2022-12-29 | End: 2023-01-04

## 2022-12-28 RX ORDER — ONDANSETRON 8 MG/1
8 TABLET, ORALLY DISINTEGRATING ORAL
Status: CANCELLED | OUTPATIENT
Start: 2022-12-29

## 2022-12-28 RX ORDER — FAMOTIDINE 20 MG/1
20 TABLET, FILM COATED ORAL DAILY
Status: DISCONTINUED | OUTPATIENT
Start: 2022-12-29 | End: 2022-12-28

## 2022-12-28 RX ORDER — SODIUM CHLORIDE 0.9 % (FLUSH) 0.9 %
10 SYRINGE (ML) INJECTION
Status: DISCONTINUED | OUTPATIENT
Start: 2022-12-28 | End: 2022-12-28 | Stop reason: HOSPADM

## 2022-12-28 RX ORDER — HEPARIN 100 UNIT/ML
500 SYRINGE INTRAVENOUS
Status: DISCONTINUED | OUTPATIENT
Start: 2022-12-28 | End: 2022-12-28 | Stop reason: HOSPADM

## 2022-12-28 RX ORDER — ACYCLOVIR 200 MG/1
400 CAPSULE ORAL 2 TIMES DAILY
Status: CANCELLED | OUTPATIENT
Start: 2022-12-29

## 2022-12-28 RX ORDER — PROCHLORPERAZINE EDISYLATE 5 MG/ML
10 INJECTION INTRAMUSCULAR; INTRAVENOUS EVERY 6 HOURS PRN
Status: CANCELLED | OUTPATIENT
Start: 2022-12-28

## 2022-12-28 RX ORDER — SODIUM CHLORIDE 0.9 % (FLUSH) 0.9 %
10 SYRINGE (ML) INJECTION
Status: CANCELLED | OUTPATIENT
Start: 2022-12-28 | Stop reason: HOSPADM

## 2022-12-28 RX ORDER — OLANZAPINE 2.5 MG/1
5 TABLET ORAL 2 TIMES DAILY
Status: COMPLETED | OUTPATIENT
Start: 2022-12-28 | End: 2022-12-31

## 2022-12-28 RX ORDER — SODIUM CHLORIDE 0.9 % (FLUSH) 0.9 %
10 SYRINGE (ML) INJECTION
Status: CANCELLED | OUTPATIENT
Start: 2022-12-28

## 2022-12-28 RX ORDER — LEVOFLOXACIN 250 MG/1
250 TABLET ORAL EVERY OTHER DAY
Status: CANCELLED | OUTPATIENT
Start: 2022-12-29

## 2022-12-28 RX ORDER — SCOLOPAMINE TRANSDERMAL SYSTEM 1 MG/1
1 PATCH, EXTENDED RELEASE TRANSDERMAL
Status: DISCONTINUED | OUTPATIENT
Start: 2022-12-28 | End: 2023-01-05

## 2022-12-28 RX ORDER — HEPARIN 100 UNIT/ML
500 SYRINGE INTRAVENOUS
Status: CANCELLED | OUTPATIENT
Start: 2022-12-28 | Stop reason: HOSPADM

## 2022-12-28 RX ORDER — DAPSONE 100 MG/1
100 TABLET ORAL DAILY
Status: DISCONTINUED | OUTPATIENT
Start: 2022-12-29 | End: 2022-12-29

## 2022-12-28 RX ORDER — DOXYCYCLINE HYCLATE 100 MG
100 TABLET ORAL EVERY 12 HOURS
Status: DISCONTINUED | OUTPATIENT
Start: 2022-12-28 | End: 2023-01-14 | Stop reason: HOSPADM

## 2022-12-28 RX ORDER — OLANZAPINE 5 MG/1
5 TABLET ORAL 2 TIMES DAILY
Status: CANCELLED | OUTPATIENT
Start: 2022-12-28

## 2022-12-28 RX ORDER — SERTRALINE HYDROCHLORIDE 50 MG/1
50 TABLET, FILM COATED ORAL DAILY
Status: DISCONTINUED | OUTPATIENT
Start: 2022-12-29 | End: 2023-01-14 | Stop reason: HOSPADM

## 2022-12-28 RX ORDER — DIPHENHYDRAMINE HCL 25 MG
25 CAPSULE ORAL
Status: DISCONTINUED | OUTPATIENT
Start: 2022-12-28 | End: 2023-01-14 | Stop reason: HOSPADM

## 2022-12-28 RX ORDER — HEPARIN SODIUM 1000 [USP'U]/ML
1600 INJECTION, SOLUTION INTRAVENOUS; SUBCUTANEOUS
Status: CANCELLED | OUTPATIENT
Start: 2022-12-28

## 2022-12-28 RX ORDER — FLUCONAZOLE 200 MG/1
200 TABLET ORAL DAILY
Status: DISCONTINUED | OUTPATIENT
Start: 2022-12-29 | End: 2023-01-08

## 2022-12-28 RX ORDER — ACYCLOVIR 200 MG/1
400 CAPSULE ORAL 2 TIMES DAILY
Status: DISCONTINUED | OUTPATIENT
Start: 2022-12-28 | End: 2023-01-08

## 2022-12-28 RX ORDER — SODIUM CHLORIDE AND POTASSIUM CHLORIDE 150; 900 MG/100ML; MG/100ML
INJECTION, SOLUTION INTRAVENOUS CONTINUOUS
Status: DISCONTINUED | OUTPATIENT
Start: 2022-12-28 | End: 2022-12-29

## 2022-12-28 RX ORDER — SODIUM CHLORIDE AND POTASSIUM CHLORIDE 150; 900 MG/100ML; MG/100ML
INJECTION, SOLUTION INTRAVENOUS CONTINUOUS
Status: CANCELLED | OUTPATIENT
Start: 2022-12-28

## 2022-12-28 RX ORDER — ONDANSETRON 8 MG/1
8 TABLET, ORALLY DISINTEGRATING ORAL
Status: COMPLETED | OUTPATIENT
Start: 2022-12-29 | End: 2022-12-31

## 2022-12-28 RX ORDER — POTASSIUM CHLORIDE 20 MEQ/1
40 TABLET, EXTENDED RELEASE ORAL ONCE
Status: COMPLETED | OUTPATIENT
Start: 2022-12-28 | End: 2022-12-28

## 2022-12-28 RX ORDER — CHOLECALCIFEROL (VITAMIN D3) 25 MCG
3000 TABLET ORAL DAILY
Status: DISCONTINUED | OUTPATIENT
Start: 2022-12-29 | End: 2023-01-14 | Stop reason: HOSPADM

## 2022-12-28 RX ORDER — ERGOCALCIFEROL 1.25 MG/1
50000 CAPSULE ORAL
Status: DISCONTINUED | OUTPATIENT
Start: 2022-12-28 | End: 2022-12-28

## 2022-12-28 RX ORDER — MUPIROCIN 20 MG/G
OINTMENT TOPICAL 2 TIMES DAILY
Status: COMPLETED | OUTPATIENT
Start: 2022-12-28 | End: 2023-01-02

## 2022-12-28 RX ORDER — FLUCONAZOLE 200 MG/1
200 TABLET ORAL DAILY
Status: CANCELLED | OUTPATIENT
Start: 2022-12-29

## 2022-12-28 RX ORDER — HEPARIN SODIUM 1000 [USP'U]/ML
1600 INJECTION, SOLUTION INTRAVENOUS; SUBCUTANEOUS
Status: DISCONTINUED | OUTPATIENT
Start: 2022-12-28 | End: 2023-01-14 | Stop reason: HOSPADM

## 2022-12-28 RX ORDER — PANTOPRAZOLE SODIUM 40 MG/1
40 TABLET, DELAYED RELEASE ORAL DAILY
Status: DISCONTINUED | OUTPATIENT
Start: 2022-12-29 | End: 2023-01-08

## 2022-12-28 RX ORDER — ERGOCALCIFEROL 1.25 MG/1
50000 CAPSULE ORAL
Status: DISCONTINUED | OUTPATIENT
Start: 2023-01-04 | End: 2023-01-14 | Stop reason: HOSPADM

## 2022-12-28 RX ORDER — LORAZEPAM 2 MG/ML
1 INJECTION INTRAMUSCULAR EVERY 6 HOURS PRN
Status: DISCONTINUED | OUTPATIENT
Start: 2022-12-28 | End: 2023-01-14 | Stop reason: HOSPADM

## 2022-12-28 RX ORDER — LANOLIN ALCOHOL/MO/W.PET/CERES
800 CREAM (GRAM) TOPICAL EVERY 4 HOURS PRN
Status: DISCONTINUED | OUTPATIENT
Start: 2022-12-28 | End: 2022-12-30

## 2022-12-28 RX ORDER — DEXAMETHASONE 4 MG/1
12 TABLET ORAL
Status: CANCELLED | OUTPATIENT
Start: 2022-12-29

## 2022-12-28 RX ORDER — PROCHLORPERAZINE EDISYLATE 5 MG/ML
10 INJECTION INTRAMUSCULAR; INTRAVENOUS EVERY 6 HOURS PRN
Status: DISCONTINUED | OUTPATIENT
Start: 2022-12-28 | End: 2023-01-07

## 2022-12-28 RX ADMIN — HEPARIN 500 UNITS: 100 SYRINGE at 01:12

## 2022-12-28 RX ADMIN — ACYCLOVIR 400 MG: 200 CAPSULE ORAL at 08:12

## 2022-12-28 RX ADMIN — POTASSIUM CHLORIDE 40 MEQ: 1500 TABLET, EXTENDED RELEASE ORAL at 06:12

## 2022-12-28 RX ADMIN — SODIUM CHLORIDE AND POTASSIUM CHLORIDE: .9; .15 SOLUTION INTRAVENOUS at 09:12

## 2022-12-28 RX ADMIN — DOXYCYCLINE HYCLATE 100 MG: 100 TABLET, COATED ORAL at 08:12

## 2022-12-28 RX ADMIN — SCOPALAMINE 1 PATCH: 1 PATCH, EXTENDED RELEASE TRANSDERMAL at 05:12

## 2022-12-28 RX ADMIN — Medication 10 ML: at 01:12

## 2022-12-28 RX ADMIN — Medication 1 DOSE: at 08:12

## 2022-12-28 RX ADMIN — MUPIROCIN: 20 OINTMENT TOPICAL at 08:12

## 2022-12-28 RX ADMIN — OLANZAPINE 5 MG: 2.5 TABLET, FILM COATED ORAL at 08:12

## 2022-12-28 RX ADMIN — APIXABAN 5 MG: 5 TABLET, FILM COATED ORAL at 08:12

## 2022-12-28 NOTE — SUBJECTIVE & OBJECTIVE
Patient information was obtained from patient and past medical records.     Oncology History:   From Dr. King's most recent clinic note:  Mr. De Lakhani is a 57 year old male with hypertension, history of prosthetic joint infection who was referred by Dr. Jonh Bunch for transplant evaluation for high-risk kappa light chain multiple myeloma with 1q gain.     Mr. Lakhani was diagnosed with multiple myeloma in April 2022 after presenting with ASHLEE. 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 He had bone marrow biopsy which showed 100% involvement by plasma cells. He was started on CyBorD on 5/18 and received two cycles of treatment. His bone marrow biopsy after the first cycle showed a decrease in his plasma cell involvement to 80-90%. Dr. Bunch then started DVRd on 7/5/22. However, he was admitted to the hospital on 7/7/22 with septic shock requiring ICU care. He improved with antibiotics but had an ASHLEE thought to be due to hypotension which resulted in renal failure. He is now dialysis dependent. He saw Dr. Bunch after he was discharged from the hospital and started back on DVRd with dose reduced lenalidomide on 7/25/22.      Oncology history:  4/20/22: renal biopsy: light chain cast nephropathy, kappa light chain  4/26/22: right subclavian vein thrombus   4/27/22: M spike 0.2 with free monoclonal kappa band. B2mg 19.57, IgG 496, IgA 10, IgM <5. Kappa 76092, Lambda 7.9, ratio >1000  5/12/22: BMBx: plasma cell myeloma, marrow cellularity 100%, plasma cell percentage 100%. Plasma cell phenotype +, kappa +, CD20- -, CD30-, EMA-, SADAF-, Congo red negative. Peripheral blood with pancytopenia and no circulating blasts. Decreased storage iron. FISH canceled due to quantity not sufficient  5/18/22: CyBorD C1D1  5/23/22: rasburicase  5/24/22: Xgeva  6/6/22: Kappa 44587, lambda 4.0, ratio >1000, M spike 0.1 with free monoclonal kappa band present  6/6/22: CyBorD C2D1  6/9/22: BMBx Plasma  cell neoplasm compatible with plasma cell myeloma. Extent of involvement 80-90% of bone marrow elements. Cytology: Plasmablastic. FISH cytogenetics positive for 1q21/CKS1B gain. Karyotype failed. Myelofibrosis diffuse (MF-3)  6/20/22: CyBorD C2D8 delayed due to covid  6/23/22: CyBOrD C2D11  7/5/22: C1D1 DVRd  7/7/22 - 7/18/22: hospital admission for septic shock resulting in renal failure  7/25/22: started again on DVRd  8/8/2022: kappa 1402.8, lambda 7.5, ratio 167.04. M spike 0.1, two faint restricted bands in gamma globulin regions.   10/10/22: C5D1 DVRd. Revlimid on hold for upcoming stem cell collection  10/31/22: C6D1 DVRd. Revlimid on hold    Medications Prior to Admission   Medication Sig Dispense Refill Last Dose    acyclovir (ZOVIRAX) 400 MG tablet Take 400 mg by mouth 2 (two) times a day.   12/27/2022    apixaban (ELIQUIS) 5 mg Tab Take 5 mg by mouth 2 (two) times daily.   12/27/2022    calcitRIOL (ROCALTROL) 0.5 MCG Cap Take 0.5 mcg by mouth. Given w/ dialysis   Past Week    calcium carbonate (TUMS) 200 mg calcium (500 mg) chewable tablet Take 1,000 mg by mouth once daily.   12/27/2022    cholecalciferol, vitamin D3, 75 mcg (3,000 unit) Tab Take 3,000 Units by mouth once daily.   12/27/2022    dapsone 100 MG Tab Take 100 mg by mouth once daily.   12/27/2022    doxycycline (VIBRAMYCIN) 100 MG Cap Take 100 mg by mouth every 12 (twelve) hours. Preventative for past knee infection   12/27/2022    ergocalciferol (ERGOCALCIFEROL) 50,000 unit Cap Take 50,000 Units by mouth every 14 (fourteen) days. Given at dialysis center   Past Week    famotidine (PEPCID) 20 MG tablet Take 20 mg by mouth once daily.   12/27/2022    metoprolol succinate (TOPROL-XL) 25 MG 24 hr tablet Take 1 tablet (25 mg total) by mouth once daily. 30 tablet 11 12/27/2022    promethazine (PHENERGAN) 12.5 MG Tab TAKE ONE TABLET BY MOUTH EVERY 6 HOURS IF NEEDED FOR NAUSEA 2ND CHOICE   Past Month    rosuvastatin (CRESTOR) 20 MG tablet Take 20 mg  by mouth once daily.   12/27/2022    scopolamine (TRANSDERM-SCOP) 1.3-1.5 mg (1 mg over 3 days) APPLY ONE PATCH TO SKIN AS DIRECTED AND REPLACE EVERY 3 DAYS.   Past Week    sertraline (ZOLOFT) 50 MG tablet Take 50 mg by mouth once daily.   12/27/2022    ondansetron (ZOFRAN) 4 MG tablet TAKE ONE TABLET BY MOUTH EVERY EIGHT HOURS IF NEEDED FOR NAUSEA.   More than a month       Patient has no known allergies.     Past Medical History:   Diagnosis Date    Chronic infection of prosthetic knee, subsequent encounter     Encounter for blood transfusion     Essential (primary) hypertension     Multiple myeloma      Past Surgical History:   Procedure Laterality Date    KNEE SURGERY      neck infusion       Family History       Problem Relation (Age of Onset)    Heart attack Father          Tobacco Use    Smoking status: Former    Smokeless tobacco: Never    Tobacco comments:     quit smoking in the 90s   Substance and Sexual Activity    Alcohol use: Not Currently    Drug use: Not on file    Sexual activity: Not on file       Review of Systems   Constitutional:  Negative for activity change, appetite change, chills, fatigue and fever.   HENT:  Negative for congestion, mouth sores, nosebleeds, rhinorrhea, sinus pressure, sinus pain, sneezing and sore throat.    Eyes:  Negative for photophobia, pain, discharge, redness, itching and visual disturbance.   Respiratory:  Negative for cough, chest tightness, shortness of breath and wheezing.    Cardiovascular:  Negative for chest pain, palpitations and leg swelling.   Gastrointestinal:  Negative for abdominal distention, abdominal pain, blood in stool, constipation, diarrhea, nausea and vomiting.   Endocrine: Negative for cold intolerance, heat intolerance, polydipsia, polyphagia and polyuria.   Genitourinary:  Negative for difficulty urinating, frequency, hematuria and urgency.   Musculoskeletal:  Negative for arthralgias, back pain, myalgias, neck pain and neck stiffness.   Skin:   Negative for pallor, rash and wound.   Allergic/Immunologic: Negative for environmental allergies, food allergies and immunocompromised state.   Neurological:  Negative for dizziness, tremors, seizures, syncope, speech difficulty, weakness, light-headedness, numbness and headaches.   Hematological:  Negative for adenopathy. Does not bruise/bleed easily.   Psychiatric/Behavioral:  Negative for agitation, confusion, hallucinations, sleep disturbance and suicidal ideas. The patient is not nervous/anxious.    Objective:     Vital Signs (Most Recent):  Temp: 97.7 °F (36.5 °C) (12/29/22 0438)  Pulse: 62 (12/29/22 0438)  Resp: 18 (12/29/22 0438)  BP: 112/68 (12/29/22 0438)  SpO2: 97 % (12/29/22 0438) Vital Signs (24h Range):  Temp:  [97.7 °F (36.5 °C)-98.6 °F (37 °C)] 97.7 °F (36.5 °C)  Pulse:  [62-84] 62  Resp:  [17-18] 18  SpO2:  [95 %-100 %] 97 %  BP: (111-134)/(59-78) 112/68     Weight: 101.5 kg (223 lb 10.5 oz)  Body mass index is 31.19 kg/m².  Body surface area is 2.25 meters squared.    KPS: 80    Lines/Drains/Airways       Central Venous Catheter Line  Duration             Percutaneous Central Line Insertion/Assessment - Double Lumen  -- days                    Physical Exam  Constitutional:       Appearance: He is well-developed.   HENT:      Head: Normocephalic and atraumatic.      Mouth/Throat:      Pharynx: No oropharyngeal exudate.   Eyes:      General:         Right eye: No discharge.         Left eye: No discharge.      Conjunctiva/sclera: Conjunctivae normal.      Pupils: Pupils are equal, round, and reactive to light.   Cardiovascular:      Rate and Rhythm: Normal rate and regular rhythm.      Heart sounds: Normal heart sounds. No murmur heard.  Pulmonary:      Effort: Pulmonary effort is normal. No respiratory distress.      Breath sounds: Normal breath sounds. No wheezing or rales.   Abdominal:      General: Bowel sounds are normal. There is no distension.      Palpations: Abdomen is soft.       Tenderness: There is no abdominal tenderness.   Musculoskeletal:         General: No deformity. Normal range of motion.      Cervical back: Normal range of motion and neck supple.   Skin:     General: Skin is warm and dry.      Findings: No erythema or rash.      Comments: Right chest wall dialysis catheter. Dressing c/d/i. No sign of infection to site.   Neurological:      Mental Status: He is alert and oriented to person, place, and time.   Psychiatric:         Behavior: Behavior normal.         Thought Content: Thought content normal.         Judgment: Judgment normal.       Significant Labs:   CBC:   Recent Labs   Lab 12/28/22  1259 12/29/22  0440   WBC 5.52 3.34*   HGB 7.7* 6.5*   HCT 23.5* 20.8*   * 97*    and CMP:   Recent Labs   Lab 12/28/22  1259 12/29/22  0440    140   K 3.3* 4.3    110   CO2 24 19*   GLU 91 85   BUN 18 27*   CREATININE 2.9* 4.3*   CALCIUM 8.7 8.2*   PROT 6.1 5.1*   ALBUMIN 3.8 3.3*   BILITOT 1.1* 1.2*   ALKPHOS 114 83   AST 11 8*   ALT 11 8*   ANIONGAP 8 11       Diagnostic Results:  I have reviewed all pertinent imaging results/findings within the past 24 hours.

## 2022-12-28 NOTE — HPI
Mr. Lakhani is a 57-y-o patient of Dr. King with high-risk kappa light chain multiple myeloma with q1 gain. Other medical history includes hypertension, chronic infection of prosthetic knee (on ppx doxycycline and dapsone), ESRD (on hemodialysis), upper extremity DVT, HLD, and depression. He is admitting today for a Torie 140 auto SCT. He is feeling well today. Denies fevers, chills, cough, SOB, chest pain, bleeding and bruising, and n/v/d/c. He is COVID neg.

## 2022-12-28 NOTE — ASSESSMENT & PLAN NOTE
- transfuse for hgb <7, Plt <10K or bleeding  - daily CBC while inpatient  - expect counts to drop further following chemo and SCT

## 2022-12-28 NOTE — PROGRESS NOTES
Pt admitted to room 843 for a Torie Auto SCT. Pt ambulated independently to the floor accompanied by spouse. MD notified of patient arrival.    Vitals and assessments as charted/ R Vascath in place with date on dressing 12/28/22 and dressing remaining dry, clean, and intact. All other skin dry, clean, and intact.    Upon arrival to room, pt and family oriented to room, floor, and call light. Bed locked and in lowest position. Call light within reach. Instructed to call for assistance.

## 2022-12-28 NOTE — ASSESSMENT & PLAN NOTE
- developed during ICU stay. Believed to be 2/2 hypoprofusion due to hypotension  - nephrology consulted on admission  - on dialysis MWF at home (will need dialysis prior to SCT tomorrow)  - right chest wall dialysis catheter in place

## 2022-12-28 NOTE — PROGRESS NOTES
HEMATOLOGY ONCOLOGY FELLOW CLINIC    Hematology History  Initial consult  Mr. De Lakhani is a 57 year old male with hypertension, history of prosthetic joint infection who was referred by Dr. Jonh Bunch for transplant evaluation for high-risk kappa light chain multiple myeloma with 1q gain.     Mr. Lakhani was diagnosed with multiple myeloma in April 2022 after presenting with ASHLEE. 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 He had bone marrow biopsy which showed 100% involvement by plasma cells. He was started on CyBorD on 5/18 and received two cycles of treatment. His bone marrow biopsy after the first cycle showed a decrease in his plasma cell involvement to 80-90%. Dr. Bunch then started DVRd on 7/5/22. However, he was admitted to the hospital on 7/7/22 with septic shock requiring ICU care. He improved with antibiotics but had an ASHLEE thought to be due to hypotension which resulted in renal failure. He is now dialysis dependent. He saw Dr. Bunch after he was discharged from the hospital and started back on DVRd with dose reduced lenalidomide on 7/25/22.     Oncology history:  4/20/22: renal biopsy: light chain cast nephropathy, kappa light chain  4/26/22: right subclavian vein thrombus   4/27/22: M spike 0.2 with free monoclonal kappa band. B2mg 19.57, IgG 496, IgA 10, IgM <5. Kappa 19076, Lambda 7.9, ratio >1000  5/12/22: BMBx: plasma cell myeloma, marrow cellularity 100%, plasma cell percentage 100%. Plasma cell phenotype +, kappa +, CD20- -, CD30-, EMA-, SADAF-, [place] red negative. Peripheral blood with pancytopenia and no circulating blasts. Decreased storage iron. FISH canceled due to quantity not sufficient  5/18/22: CyBorD C1D1  5/23/22: rasburicase  5/24/22: Xgeva  6/6/22: Kappa 70400, lambda 4.0, ratio >1000, M spike 0.1 with free monoclonal kappa band present  6/6/22: CyBorD C2D1  6/9/22: BMBx Plasma cell neoplasm compatible with plasma cell myeloma. Extent of  involvement 80-90% of bone marrow elements. Cytology: Plasmablastic. FISH cytogenetics positive for 1q21/CKS1B gain. Karyotype failed. Myelofibrosis diffuse (MF-3)  6/20/22: CyBorD C2D8 delayed due to covid  6/23/22: CyBOrD C2D11  7/5/22: C1D1 DVRd  7/7/22 - 7/18/22: hospital admission for septic shock resulting in renal failure  7/25/22: started again on DVRd  8/8/2022: kappa 1402.8, lambda 7.5, ratio 167.04. M spike 0.1, two faint restricted bands in gamma globulin regions.   10/10/22: C5D1 DVRd. Revlimid on hold for upcoming stem cell collection  10/31/22: C6D1 DVRd. Revlimid on hold    Patient had a left knee prosthetic joint infection in the summer of 2021. In March, still had bacteria in joint space, now on prophylactic doxycycline.   He had a colonoscopy 8 years ago which he thinks was clear.     Interval History:  Mr. Lakhani presents today with his wife for his transplant admission visit. He is admitting for planned Torie asct for his MM. Transplant evaluations and collections completed successfully. Using HD catheter for transplant. No issues with HD catheter. He feels fine today. HD patient, dialyzed today morning. His covid test pending at this time.     Past Medical History:   Diagnosis Date    Chronic infection of prosthetic knee, subsequent encounter     Encounter for blood transfusion     Essential (primary) hypertension     Multiple myeloma        Family History   Problem Relation Age of Onset    Heart attack Father        Social History     Socioeconomic History    Marital status:      Spouse name: Darlyn    Number of children: 1   Tobacco Use    Smoking status: Former    Smokeless tobacco: Never    Tobacco comments:     quit smoking in the 90s   Substance and Sexual Activity    Alcohol use: Not Currently         MEDICATIONS:     Current Outpatient Medications on File Prior to Visit   Medication Sig Dispense Refill    acyclovir (ZOVIRAX) 400 MG tablet Take 400 mg by mouth 2 (two) times a day.       apixaban (ELIQUIS) 5 mg Tab Take 5 mg by mouth 2 (two) times daily.      calcitRIOL (ROCALTROL) 0.5 MCG Cap Take 0.5 mcg by mouth. Given w/ dialysis      calcium carbonate (TUMS) 200 mg calcium (500 mg) chewable tablet Take 1,000 mg by mouth once daily.      cholecalciferol, vitamin D3, 75 mcg (3,000 unit) Tab Take 3,000 Units by mouth once daily.      dapsone 100 MG Tab Take 100 mg by mouth once daily.      doxycycline (VIBRAMYCIN) 100 MG Cap Take 100 mg by mouth every 12 (twelve) hours. Preventative for past knee infection      ergocalciferol (ERGOCALCIFEROL) 50,000 unit Cap Take 50,000 Units by mouth every 14 (fourteen) days. Given at dialysis center      famotidine (PEPCID) 20 MG tablet Take 20 mg by mouth once daily.      metoprolol succinate (TOPROL-XL) 25 MG 24 hr tablet Take 1 tablet (25 mg total) by mouth once daily. 30 tablet 11    ondansetron (ZOFRAN) 4 MG tablet TAKE ONE TABLET BY MOUTH EVERY EIGHT HOURS IF NEEDED FOR NAUSEA.      oxyCODONE-acetaminophen (PERCOCET) 5-325 mg per tablet TAKE ONE TABLET BY MOUTH EVERY 6 HOURS IF NEEDED FOR SEVERE PAIN      promethazine (PHENERGAN) 12.5 MG Tab TAKE ONE TABLET BY MOUTH EVERY 6 HOURS IF NEEDED FOR NAUSEA 2ND CHOICE      rosuvastatin (CRESTOR) 20 MG tablet Take 20 mg by mouth once daily.      scopolamine (TRANSDERM-SCOP) 1.3-1.5 mg (1 mg over 3 days) APPLY ONE PATCH TO SKIN AS DIRECTED AND REPLACE EVERY 3 DAYS.      sertraline (ZOLOFT) 50 MG tablet Take 50 mg by mouth once daily.      [DISCONTINUED] dexAMETHasone (DECADRON) 4 MG Tab Takes 5 tablets on Tuesdays and 5 tablets on Wednesdays.       No current facility-administered medications on file prior to visit.       ALLERGIES: Review of patient's allergies indicates:  No Known Allergies     ROS:       Review of Systems   Constitutional:  Negative for chills and fever.   HENT:   Negative for nosebleeds and sore throat.    Respiratory:  Negative for cough and shortness of breath.    Cardiovascular:  Negative  for chest pain and palpitations.   Gastrointestinal:  Negative for abdominal pain, diarrhea, nausea and vomiting.   Genitourinary:  Negative for dysuria and hematuria.    Musculoskeletal:  Negative for back pain and neck pain.   Skin:  Negative for rash and wound.   Neurological:  Negative for headaches and light-headedness.   Hematological:  Negative for adenopathy. Does not bruise/bleed easily.   Psychiatric/Behavioral:  Negative for depression. The patient is not nervous/anxious.      PHYSICAL EXAM:  Vitals:    12/28/22 1331   BP: 134/75   Pulse: 69   Resp: 17   Temp: 98.5 °F (36.9 °C)     Physical Exam  Constitutional:       Appearance: He is well-developed.   HENT:      Head: Normocephalic and atraumatic.      Mouth/Throat:      Mouth: Mucous membranes are moist. No oral lesions.      Pharynx: Oropharynx is clear.   Eyes:      Conjunctiva/sclera: Conjunctivae normal.   Cardiovascular:      Rate and Rhythm: Normal rate and regular rhythm.      Heart sounds: Normal heart sounds.   Pulmonary:      Effort: No respiratory distress.      Breath sounds: Normal breath sounds.   Abdominal:      General: Bowel sounds are normal.      Palpations: Abdomen is soft.   Musculoskeletal:         General: Normal range of motion.      Cervical back: Normal range of motion.   Skin:     General: Skin is warm and dry.      Comments: Right HD catheter intact   Neurological:      Mental Status: He is alert and oriented to person, place, and time.   Psychiatric:         Behavior: Behavior normal.         Thought Content: Thought content normal.         Judgment: Judgment normal.         PROBLEMS ASSESSED THIS VISIT:    1. Stem cell transplant candidate    2. Drug-induced cytopenia    3. Multiple myeloma, remission status unspecified    4. ESRD on hemodialysis    5. History of DVT (deep vein thrombosis)    6. Chronic infection of knee joint prosthesis, subsequent encounter    7. Hypokalemia    8. Gastroesophageal reflux disease without  esophagitis    9. Mixed hyperlipidemia    10. Dependence on hemodialysis    11. Decreased cardiac ejection fraction        ASSESSMENT AND PLAN   Stem Cell Transplant Candidate  Disease status: OK vs VGPR  Type of transplant: Auto  Conditioning regimen: Melphalan 140  Karnofsky score: 90%  Post transplant maintenance plans: Velcade/Rev    Planned conditioning regimen:  Melphalan on Day -1     Antimicrobial Prophylaxis:  Acyclovir starting on Day -1  Levofloxacin starting on Day -1  Fluconazole starting on Day -1     Growth Factor Support:  Neupogen starting on Day +7      Caregiver: Wife  Post-transplant discharge plans: Javed Garcia    1)Multiple myeloma, KLC; disease complicated light chain cast nephropathy for which patient remains HD dependent  -High risk due to TP53 deletion and a 1q duplication  -R2-ISS Stage III  -please see HPI for oncologic history to date  -completed 6.5 cycles of Jeanne-Vrd mid nov 2022 with revlimid on hold since 10/10/22  -his pre transplant restaging mid November 2022 reveals IMWG robust OK vs VGPR (unable  to interpret SFLC in context of HD); however  marrow plasma cell burden has gone from >90% to <5% and pet with no avid lesions; adequate response to proceed to transplant  -his pre transplant evaluation did reveal decreased EF to 50% , cardiology cleared for transplant; he is asymptomatic  -he has a history of 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; none active; he has been cleared by ID; will remain on chronic doxycyline per ID recs in addition to standard prophylaxis  -he is ESRD on HD and will receive his HD locally during mobilization/collection/post SCT and inpt; renal to be consulted upon admission  -we have been cleared by renal to use  his HD catheter for transplant needs   -pt has good understanding of procedure and good social support    Cytopenia due to neoplastic treatment   - Daily cbc at inpatient  - Consider to transfuse if hgb,7, PLT<10  Electrolyte  Abnormality  - K+3.3 - replace at inpatient  - Continue to monitor daily cmp at inpatient     Upper extremity DVT  - Apixaban was continued after an upper extremity DVT related to PICC line placement due to risks associated with disease and lenalidomide therapy.    - Would recommend holding apixaban when platelets drop below threshold.   Chronic Joint infection  -he has a history of 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; none active; he has been cleared by ID; will remain on chronic doxycyline per ID recs in addition to standard prophylaxis  - Please separate administration of doxycycline from calcium or magnesium containing supplements.   Ckd  - on Hemodialysis MWF regimen  - Dialyzed this morning  - Renal consult at inpatient  GERD  - Continue PPI  Hyperlipidemia  - Hold statin during inpatient stay  Abnormal echocardiogram  Low-normal LVEF and reduced GLS  On toprol 25 mg daily  Cardiology cleared for transplant         BMT Chart Routing      Follow up with physician . Admit to inpatient for Stem Cell transplant   Follow up with [facility]    Provider visit type    Infusion scheduling note    Injection scheduling note    Labs    Imaging    Pharmacy appointment    Other referrals        Anna Tovar NP  Hematology/Oncology/BMT

## 2022-12-29 PROBLEM — D61.818 OTHER PANCYTOPENIA: Status: ACTIVE | Noted: 2022-12-28

## 2022-12-29 LAB
ABO + RH BLD: ABNORMAL
ALBUMIN SERPL BCP-MCNC: 3.3 G/DL (ref 3.5–5.2)
ALP SERPL-CCNC: 83 U/L (ref 55–135)
ALT SERPL W/O P-5'-P-CCNC: 8 U/L (ref 10–44)
ANION GAP SERPL CALC-SCNC: 11 MMOL/L (ref 8–16)
AST SERPL-CCNC: 8 U/L (ref 10–40)
BASOPHILS # BLD AUTO: 0.02 K/UL (ref 0–0.2)
BASOPHILS NFR BLD: 0.6 % (ref 0–1.9)
BILIRUB SERPL-MCNC: 1.2 MG/DL (ref 0.1–1)
BLD GP AB SCN CELLS X3 SERPL QL: ABNORMAL
BLD PROD TYP BPU: NORMAL
BLOOD GROUP ANTIBODIES SERPL: NORMAL
BLOOD UNIT EXPIRATION DATE: NORMAL
BLOOD UNIT TYPE CODE: 5100
BLOOD UNIT TYPE: NORMAL
BUN SERPL-MCNC: 27 MG/DL (ref 6–20)
CALCIUM SERPL-MCNC: 8.2 MG/DL (ref 8.7–10.5)
CHLORIDE SERPL-SCNC: 110 MMOL/L (ref 95–110)
CO2 SERPL-SCNC: 19 MMOL/L (ref 23–29)
CODING SYSTEM: NORMAL
CREAT SERPL-MCNC: 4.3 MG/DL (ref 0.5–1.4)
DAT IGG-SP REAG RBC-IMP: NORMAL
DIFFERENTIAL METHOD: ABNORMAL
DISPENSE STATUS: NORMAL
EOSINOPHIL # BLD AUTO: 0.1 K/UL (ref 0–0.5)
EOSINOPHIL NFR BLD: 1.8 % (ref 0–8)
ERYTHROCYTE [DISTWIDTH] IN BLOOD BY AUTOMATED COUNT: 19.1 % (ref 11.5–14.5)
EST. GFR  (NO RACE VARIABLE): 15.2 ML/MIN/1.73 M^2
GLUCOSE SERPL-MCNC: 85 MG/DL (ref 70–110)
HCT VFR BLD AUTO: 20.8 % (ref 40–54)
HGB BLD-MCNC: 6.5 G/DL (ref 14–18)
IMM GRANULOCYTES # BLD AUTO: 0.04 K/UL (ref 0–0.04)
IMM GRANULOCYTES NFR BLD AUTO: 1.2 % (ref 0–0.5)
LYMPHOCYTES # BLD AUTO: 0.2 K/UL (ref 1–4.8)
LYMPHOCYTES NFR BLD: 4.8 % (ref 18–48)
MAGNESIUM SERPL-MCNC: 1.9 MG/DL (ref 1.6–2.6)
MCH RBC QN AUTO: 32.7 PG (ref 27–31)
MCHC RBC AUTO-ENTMCNC: 31.3 G/DL (ref 32–36)
MCV RBC AUTO: 105 FL (ref 82–98)
MONOCYTES # BLD AUTO: 0.3 K/UL (ref 0.3–1)
MONOCYTES NFR BLD: 9 % (ref 4–15)
NEUTROPHILS # BLD AUTO: 2.8 K/UL (ref 1.8–7.7)
NEUTROPHILS NFR BLD: 82.6 % (ref 38–73)
NRBC BLD-RTO: 0 /100 WBC
NUM UNITS TRANS PACKED RBC: NORMAL
PHOSPHATE SERPL-MCNC: 4.8 MG/DL (ref 2.7–4.5)
PLATELET # BLD AUTO: 97 K/UL (ref 150–450)
PMV BLD AUTO: 10.9 FL (ref 9.2–12.9)
POTASSIUM SERPL-SCNC: 4.3 MMOL/L (ref 3.5–5.1)
PROT SERPL-MCNC: 5.1 G/DL (ref 6–8.4)
RBC # BLD AUTO: 1.99 M/UL (ref 4.6–6.2)
SODIUM SERPL-SCNC: 140 MMOL/L (ref 136–145)
WBC # BLD AUTO: 3.34 K/UL (ref 3.9–12.7)

## 2022-12-29 PROCEDURE — 80053 COMPREHEN METABOLIC PANEL: CPT | Performed by: NURSE PRACTITIONER

## 2022-12-29 PROCEDURE — 36430 TRANSFUSION BLD/BLD COMPNT: CPT

## 2022-12-29 PROCEDURE — 94761 N-INVAS EAR/PLS OXIMETRY MLT: CPT

## 2022-12-29 PROCEDURE — 86870 RBC ANTIBODY IDENTIFICATION: CPT | Performed by: INTERNAL MEDICINE

## 2022-12-29 PROCEDURE — 86880 COOMBS TEST DIRECT: CPT | Performed by: INTERNAL MEDICINE

## 2022-12-29 PROCEDURE — 86902 BLOOD TYPE ANTIGEN DONOR EA: CPT | Performed by: INTERNAL MEDICINE

## 2022-12-29 PROCEDURE — 25000003 PHARM REV CODE 250: Performed by: NURSE PRACTITIONER

## 2022-12-29 PROCEDURE — 86922 COMPATIBILITY TEST ANTIGLOB: CPT | Performed by: STUDENT IN AN ORGANIZED HEALTH CARE EDUCATION/TRAINING PROGRAM

## 2022-12-29 PROCEDURE — 97161 PT EVAL LOW COMPLEX 20 MIN: CPT

## 2022-12-29 PROCEDURE — 99223 1ST HOSP IP/OBS HIGH 75: CPT | Mod: ,,, | Performed by: INTERNAL MEDICINE

## 2022-12-29 PROCEDURE — 84100 ASSAY OF PHOSPHORUS: CPT | Performed by: NURSE PRACTITIONER

## 2022-12-29 PROCEDURE — 25000003 PHARM REV CODE 250: Performed by: INTERNAL MEDICINE

## 2022-12-29 PROCEDURE — 97165 OT EVAL LOW COMPLEX 30 MIN: CPT

## 2022-12-29 PROCEDURE — 83735 ASSAY OF MAGNESIUM: CPT | Performed by: NURSE PRACTITIONER

## 2022-12-29 PROCEDURE — 20600001 HC STEP DOWN PRIVATE ROOM

## 2022-12-29 PROCEDURE — 85025 COMPLETE CBC W/AUTO DIFF WBC: CPT | Performed by: NURSE PRACTITIONER

## 2022-12-29 PROCEDURE — P9040 RBC LEUKOREDUCED IRRADIATED: HCPCS | Performed by: STUDENT IN AN ORGANIZED HEALTH CARE EDUCATION/TRAINING PROGRAM

## 2022-12-29 PROCEDURE — 99223 PR INITIAL HOSPITAL CARE,LEVL III: ICD-10-PCS | Mod: ,,, | Performed by: INTERNAL MEDICINE

## 2022-12-29 PROCEDURE — 99223 1ST HOSP IP/OBS HIGH 75: CPT | Mod: ,,, | Performed by: NURSE PRACTITIONER

## 2022-12-29 PROCEDURE — 63600175 PHARM REV CODE 636 W HCPCS: Mod: JG | Performed by: INTERNAL MEDICINE

## 2022-12-29 PROCEDURE — 99223 PR INITIAL HOSPITAL CARE,LEVL III: ICD-10-PCS | Mod: ,,, | Performed by: NURSE PRACTITIONER

## 2022-12-29 PROCEDURE — 86900 BLOOD TYPING SEROLOGIC ABO: CPT | Performed by: INTERNAL MEDICINE

## 2022-12-29 RX ORDER — CLONIDINE HYDROCHLORIDE 0.1 MG/1
0.1 TABLET ORAL ONCE AS NEEDED
Status: COMPLETED | OUTPATIENT
Start: 2022-12-30 | End: 2022-12-30

## 2022-12-29 RX ORDER — LORAZEPAM 1 MG/1
1 TABLET ORAL ONCE
Status: COMPLETED | OUTPATIENT
Start: 2022-12-30 | End: 2022-12-30

## 2022-12-29 RX ORDER — NITROGLYCERIN 0.4 MG/1
0.4 TABLET SUBLINGUAL ONCE AS NEEDED
Status: COMPLETED | OUTPATIENT
Start: 2022-12-30 | End: 2022-12-30

## 2022-12-29 RX ORDER — SODIUM CHLORIDE 9 MG/ML
INJECTION, SOLUTION INTRAVENOUS CONTINUOUS
Status: DISCONTINUED | OUTPATIENT
Start: 2022-12-29 | End: 2022-12-30

## 2022-12-29 RX ORDER — EPINEPHRINE 1 MG/ML
0.5 INJECTION, SOLUTION, CONCENTRATE INTRAVENOUS ONCE AS NEEDED
Status: COMPLETED | OUTPATIENT
Start: 2022-12-30 | End: 2022-12-30

## 2022-12-29 RX ORDER — FUROSEMIDE 10 MG/ML
100 INJECTION INTRAMUSCULAR; INTRAVENOUS ONCE AS NEEDED
Status: COMPLETED | OUTPATIENT
Start: 2022-12-30 | End: 2022-12-30

## 2022-12-29 RX ORDER — DIPHENHYDRAMINE HYDROCHLORIDE 50 MG/ML
50 INJECTION INTRAMUSCULAR; INTRAVENOUS ONCE
Status: COMPLETED | OUTPATIENT
Start: 2022-12-30 | End: 2022-12-30

## 2022-12-29 RX ORDER — MEPERIDINE HYDROCHLORIDE 50 MG/ML
50 INJECTION INTRAMUSCULAR; INTRAVENOUS; SUBCUTANEOUS ONCE AS NEEDED
Status: COMPLETED | OUTPATIENT
Start: 2022-12-30 | End: 2022-12-30

## 2022-12-29 RX ORDER — SODIUM CHLORIDE AND POTASSIUM CHLORIDE 150; 900 MG/100ML; MG/100ML
INJECTION, SOLUTION INTRAVENOUS CONTINUOUS
Status: DISCONTINUED | OUTPATIENT
Start: 2022-12-30 | End: 2022-12-30

## 2022-12-29 RX ORDER — DIPHENHYDRAMINE HYDROCHLORIDE 50 MG/ML
50 INJECTION INTRAMUSCULAR; INTRAVENOUS ONCE AS NEEDED
Status: COMPLETED | OUTPATIENT
Start: 2022-12-30 | End: 2022-12-30

## 2022-12-29 RX ORDER — HYDROCODONE BITARTRATE AND ACETAMINOPHEN 500; 5 MG/1; MG/1
TABLET ORAL
Status: DISCONTINUED | OUTPATIENT
Start: 2022-12-29 | End: 2023-01-03

## 2022-12-29 RX ADMIN — DEXAMETHASONE 12 MG: 4 TABLET ORAL at 08:12

## 2022-12-29 RX ADMIN — SODIUM CHLORIDE: 9 INJECTION, SOLUTION INTRAVENOUS at 10:12

## 2022-12-29 RX ADMIN — Medication 1 DOSE: at 01:12

## 2022-12-29 RX ADMIN — MUPIROCIN: 20 OINTMENT TOPICAL at 08:12

## 2022-12-29 RX ADMIN — DOXYCYCLINE HYCLATE 100 MG: 100 TABLET, COATED ORAL at 08:12

## 2022-12-29 RX ADMIN — MELPHALAN 330 MG: 50 INJECTION, POWDER, LYOPHILIZED, FOR SOLUTION INTRAVENOUS at 10:12

## 2022-12-29 RX ADMIN — Medication 1 DOSE: at 08:12

## 2022-12-29 RX ADMIN — OLANZAPINE 5 MG: 2.5 TABLET, FILM COATED ORAL at 09:12

## 2022-12-29 RX ADMIN — CALCIUM CARBONATE (ANTACID) CHEW TAB 500 MG 1000 MG: 500 CHEW TAB at 08:12

## 2022-12-29 RX ADMIN — SERTRALINE HYDROCHLORIDE 50 MG: 50 TABLET ORAL at 08:12

## 2022-12-29 RX ADMIN — APIXABAN 5 MG: 5 TABLET, FILM COATED ORAL at 08:12

## 2022-12-29 RX ADMIN — OLANZAPINE 5 MG: 2.5 TABLET, FILM COATED ORAL at 08:12

## 2022-12-29 RX ADMIN — ONDANSETRON 8 MG: 8 TABLET, ORALLY DISINTEGRATING ORAL at 08:12

## 2022-12-29 RX ADMIN — ONDANSETRON 8 MG: 8 TABLET, ORALLY DISINTEGRATING ORAL at 01:12

## 2022-12-29 RX ADMIN — ACYCLOVIR 400 MG: 200 CAPSULE ORAL at 08:12

## 2022-12-29 RX ADMIN — Medication 400 MG: at 01:12

## 2022-12-29 RX ADMIN — Medication 1 DOSE: at 04:12

## 2022-12-29 RX ADMIN — Medication 1 DOSE: at 09:12

## 2022-12-29 RX ADMIN — SODIUM CHLORIDE AND POTASSIUM CHLORIDE: .9; .15 SOLUTION INTRAVENOUS at 04:12

## 2022-12-29 RX ADMIN — FLUCONAZOLE 200 MG: 200 TABLET ORAL at 09:12

## 2022-12-29 RX ADMIN — HEPARIN SODIUM 1600 UNITS: 1000 INJECTION, SOLUTION INTRAVENOUS; SUBCUTANEOUS at 04:12

## 2022-12-29 RX ADMIN — PANTOPRAZOLE SODIUM 40 MG: 40 TABLET, DELAYED RELEASE ORAL at 09:12

## 2022-12-29 RX ADMIN — Medication 400 MG: at 09:12

## 2022-12-29 RX ADMIN — CHOLECALCIFEROL TAB 25 MCG (1000 UNIT) 3000 UNITS: 25 TAB at 08:12

## 2022-12-29 RX ADMIN — LEVOFLOXACIN 250 MG: 250 TABLET, FILM COATED ORAL at 09:12

## 2022-12-29 RX ADMIN — ONDANSETRON 8 MG: 8 TABLET, ORALLY DISINTEGRATING ORAL at 04:12

## 2022-12-29 RX ADMIN — METOPROLOL SUCCINATE 25 MG: 25 TABLET, EXTENDED RELEASE ORAL at 08:12

## 2022-12-29 NOTE — HPI
Mr. Lakhani was diagnosed with multiple myeloma in April 2022 after presenting with ASHLEE. 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 He had bone marrow biopsy which showed 100% involvement by plasma cells. He was started on CyBorD on 5/18 and received two cycles of treatment. His bone marrow biopsy after the first cycle showed a decrease in his plasma cell involvement to 80-90%. Dr. Bunch then started DVRd on 7/5/22. However, he was admitted to the hospital on 7/7/22 with septic shock requiring ICU care. He improved with antibiotics but had an ASHLEE thought to be due to hypotension which resulted in renal failure. He is now dialysis dependent since 7/22. Last HD 12/28. Reports good UOP. Nephrology consulted for ESRD on HD.

## 2022-12-29 NOTE — PLAN OF CARE
Day - 1 kortney auto SCT. Plan of care discussed at start of shift. Free from falls and injuries. Resting quietly with eyes closed. Respirations even, unlabored. Skin warm and dry. Denies pain. Denies nausea. NS with 20 KCL started at 150. Wife remains at bedside. Frequent checks for pain and safety maintained. Bed in lowest position, wheels locked, side rails up x's 2, call light in reach. Instructed to call for assistance as needed, verbalizes understanding. Will continue to monitor.

## 2022-12-29 NOTE — CONSULTS
[facility] - Oncology ([facility])  Nephrology  Progress Note    Patient Name: De Lakhani  MRN: [id number]  Admission Date: 12/28/2022  Hospital Length of Stay: 1 days  Attending Provider: Ignacio Lr MD   Primary Care Physician: To Obtain Unable  Principal Problem:Stem cell transplant candidate    Subjective:     HPI: Mr. Lakhani was diagnosed with multiple myeloma in April 2022 after presenting with ASHLEE. 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 He had bone marrow biopsy which showed 100% involvement by plasma cells. He was started on CyBorD on 5/18 and received two cycles of treatment. His bone marrow biopsy after the first cycle showed a decrease in his plasma cell involvement to 80-90%. Dr. Bunch then started DVRd on 7/5/22. However, he was admitted to the hospital on 7/7/22 with septic shock requiring ICU care. He improved with antibiotics but had an ASHLEE thought to be due to hypotension which resulted in renal failure. He is now dialysis dependent since 7/22. Last HD 12/28. Reports good UOP. Nephrology consulted for ESRD on HD.       Past Medical History:   Diagnosis Date    Chronic infection of prosthetic knee, subsequent encounter     Encounter for blood transfusion     Essential (primary) hypertension     Multiple myeloma        Past Surgical History:   Procedure Laterality Date    KNEE SURGERY      neck infusion         Review of patient's allergies indicates:  No Known Allergies  Current Facility-Administered Medications   Medication Frequency    0.9%  NaCl infusion (for blood administration) Q24H PRN    0.9%  NaCl infusion Continuous    acyclovir capsule 400 mg BID    alteplase injection 2 mg PRN    aluminum-magnesium hydroxide-simethicone 200-200-20 mg/5 mL suspension 30 mL Q6H PRN    apixaban tablet 5 mg BID    calcium carbonate 200 mg calcium (500 mg) chewable tablet 1,000 mg Daily    diphenhydrAMINE capsule 25 mg PRN    doxycycline tablet 100 mg Q12H    [START ON  1/4/2023] ergocalciferol capsule 50,000 Units Q14 Days    [START ON 1/6/2023] filgrastim (NEUPOGEN) injection 480 mcg/1.6 ml Daily    fluconazole tablet 200 mg Daily    heparin (porcine) injection 1,600 Units PRN    k phos di & mono-sod phos mono 250 mg tablet 1 tablet Q4H PRN    k phos di & mono-sod phos mono 250 mg tablet 2 tablet Q4H PRN    levoFLOXacin tablet 250 mg Every other day    LORazepam injection 1 mg Q6H PRN    magnesium oxide tablet 400 mg Q4H PRN    magnesium oxide tablet 400 mg Q4H PRN    magnesium oxide tablet 800 mg Q4H PRN    metoprolol succinate (TOPROL-XL) 24 hr tablet 25 mg Daily    mupirocin 2 % ointment BID    OLANZapine tablet 5 mg BID    ondansetron disintegrating tablet 8 mg Q8H    pantoprazole EC tablet 40 mg Daily    potassium chloride SA CR tablet 20 mEq PRN    potassium chloride SA CR tablet 20 mEq Q2H PRN    potassium chloride SA CR tablet 20 mEq Q2H PRN    prochlorperazine injection Soln 10 mg Q6H PRN    scopolamine 1.3-1.5 mg (1 mg over 3 days) 1 patch Q3 Days    sertraline tablet 50 mg Daily    sodium bicarb-sodium chloride powder 1 Dose QID    sodium chloride 0.9% flush 10 mL PRN    vitamin D 1000 units tablet 3,000 Units Daily     Family History       Problem Relation (Age of Onset)    Heart attack Father          Tobacco Use    Smoking status: Former    Smokeless tobacco: Never    Tobacco comments:     quit smoking in the 90s   Substance and Sexual Activity    Alcohol use: Not Currently    Drug use: Not on file    Sexual activity: Not on file     Review of Systems   Constitutional:  Negative for activity change, appetite change, chills, fatigue and fever.   HENT: Negative.     Eyes:  Negative for photophobia, pain, discharge, redness, itching and visual disturbance.   Respiratory:  Negative for cough, chest tightness, shortness of breath and wheezing.    Cardiovascular:  Negative for chest pain, palpitations and leg swelling.   Gastrointestinal:   Negative for abdominal distention, abdominal pain, blood in stool, constipation, diarrhea, nausea and vomiting.   Endocrine: Negative for cold intolerance, heat intolerance, polydipsia, polyphagia and polyuria.   Genitourinary:  Positive for decreased urine volume.   Musculoskeletal:  Negative for arthralgias, back pain, myalgias, neck pain and neck stiffness.   Skin: Negative.    Allergic/Immunologic: Negative for environmental allergies, food allergies and immunocompromised state.   Neurological: Negative.    Psychiatric/Behavioral:  Negative for agitation, confusion, hallucinations, sleep disturbance and suicidal ideas. The patient is not nervous/anxious.    Objective:     Vital Signs (Most Recent):  Temp: 98.1 °F (36.7 °C) (12/29/22 1153)  Pulse: 76 (12/29/22 1153)  Resp: 16 (12/29/22 1153)  BP: 114/66 (12/29/22 1153)  SpO2: (!) 93 % (12/29/22 1153)   Vital Signs (24h Range):  Temp:  [97.7 °F (36.5 °C)-98.6 °F (37 °C)] 98.1 °F (36.7 °C)  Pulse:  [61-84] 76  Resp:  [16-18] 16  SpO2:  [93 %-100 %] 93 %  BP: (111-147)/(59-79) 114/66     Weight: 101.5 kg (223 lb 10.5 oz) (12/29/22 0600)  Body mass index is 31.19 kg/m².  Body surface area is 2.25 meters squared.    I/O last 3 completed shifts:  In: 1060.6 [P.O.:240; I.V.:820.6]  Out: 550 [Urine:550]    Physical Exam  Vitals and nursing note reviewed.   HENT:      Head: Normocephalic.      Mouth/Throat:      Pharynx: Oropharynx is clear.   Eyes:      Conjunctiva/sclera: Conjunctivae normal.   Cardiovascular:      Rate and Rhythm: Normal rate and regular rhythm.      Pulses: Normal pulses.   Pulmonary:      Effort: Pulmonary effort is normal.      Breath sounds: Normal breath sounds.   Abdominal:      Palpations: Abdomen is soft.   Musculoskeletal:      Cervical back: Neck supple.      Right lower leg: No edema.      Left lower leg: No edema.   Skin:     Comments: EDA Boogie    Neurological:      Mental Status: He is alert and oriented to person, place, and time.    Psychiatric:         Mood and Affect: Mood normal.     Significant Labs:  CBC:   Recent Labs   Lab 12/29/22  0440   WBC 3.34*   RBC 1.99*   HGB 6.5*   HCT 20.8*   PLT 97*   *   MCH 32.7*   MCHC 31.3*     CMP:   Recent Labs   Lab 12/29/22  0440   GLU 85   CALCIUM 8.2*   ALBUMIN 3.3*   PROT 5.1*      K 4.3   CO2 19*      BUN 27*   CREATININE 4.3*   ALKPHOS 83   ALT 8*   AST 8*   BILITOT 1.2*     All labs within the past 24 hours have been reviewed.        Assessment/Plan:     * Stem cell transplant candidate  -management per primary team     End stage renal disease  Mr. Lakhani was diagnosed with multiple myeloma in April 2022 after presenting with ASHLEE. 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 He had bone marrow biopsy which showed 100% involvement by plasma cells. He was started on CyBorD on 5/18 and received two cycles of treatment. His bone marrow biopsy after the first cycle showed a decrease in his plasma cell involvement to 80-90%. Dr. Bunch then started DVRd on 7/5/22. However, he was admitted to the hospital on 7/7/22 with septic shock requiring ICU care. He improved with antibiotics but had an ASHLEE thought to be due to hypotension which resulted in renal failure. Dialysis dependent since 07/22.     Last HD prior to presentation 12/28. Plan for chemo today 12/29 and Stem cell transplant on 12/30    Nephrology History  iHD Schedule: [facility]   Unit/MD: [facility]   Duration: 4 hours   UF: ?  EDW: 107kg   Access: R permcath   Residual Renal Function: moderate     Plan/Reccomendations     Plan for HD tomorrow 24 hours after chemotherapy - pt received chemo on 12/29 10 am   Will plan for HD tomorrow prior to SCT   Caution in administering IVF in ESRD patient   Strict I&Os   Pre and post HD weight   Renal diet, if not NPO    Hgb 6.5 - plan for blood transfusion   Hold for phos binders for now     Multiple myeloma not having achieved remission  -management per primary          Thank you for your consult. I will follow-up with patient. Please contact us if you have any additional questions.    Nichelle Araujo DNP  Nephrology  [facility] - Oncology ([facility])

## 2022-12-29 NOTE — ASSESSMENT & PLAN NOTE
Mr. Lakhani was diagnosed with multiple myeloma in April 2022 after presenting with ASHLEE. 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 He had bone marrow biopsy which showed 100% involvement by plasma cells. He was started on CyBorD on 5/18 and received two cycles of treatment. His bone marrow biopsy after the first cycle showed a decrease in his plasma cell involvement to 80-90%. Dr. Bunch then started DVRd on 7/5/22. However, he was admitted to the hospital on 7/7/22 with septic shock requiring ICU care. He improved with antibiotics but had an ASHLEE thought to be due to hypotension which resulted in renal failure. Dialysis dependent since 07/22.     Last HD prior to presentation 12/28. Plan for chemo today 12/29 and Stem cell transplant on 12/30    Nephrology History  iHD Schedule: MWF   Unit/MD: [facility]   Duration: 4 hours   UF: ?  EDW: 107kg   Access: R permcath   Residual Renal Function: moderate     Plan/Reccomendations     Plan for HD tomorrow 24 hours after chemotherapy - pt received chemo on 12/29 10 am   Will plan for HD tomorrow prior to SCT   Caution in administering IVF in ESRD patient   Strict I&Os   Pre and post HD weight   Renal diet, if not NPO    Hgb 6.5 - plan for blood transfusion   Hold for phos binders for now

## 2022-12-29 NOTE — H&P
Braden Cramer - Oncology ([facility])  Hematology  Bone Marrow Transplant  H&P    Subjective:     Principal Problem: Stem cell transplant candidate    HPI: Mr. Lakhani is a 57-y-o patient of Dr. King with high-risk kappa light chain multiple myeloma with q1 gain. Other medical history includes hypertension, chronic infection of prosthetic knee (on ppx doxycycline and dapsone), ESRD (on hemodialysis), upper extremity DVT, HLD, and depression. He is a planned admission for a Torie 140 autologous SCT. He is feeling well today. Denies fevers, chills, cough, SOB, chest pain, bleeding and bruising, and n/v/d/c. He was COVID negative prior to admission.      Patient information was obtained from patient and past medical records.     Oncology History:   From Dr. King's most recent clinic note:  Mr. De Lakhani is a 57 year old male with hypertension, history of prosthetic joint infection who was referred by Dr. Jonh Bunch for transplant evaluation for high-risk kappa light chain multiple myeloma with 1q gain.     Mr. Lakhani was diagnosed with multiple myeloma in April 2022 after presenting with ASHLEE. 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 He had bone marrow biopsy which showed 100% involvement by plasma cells. He was started on CyBorD on 5/18 and received two cycles of treatment. His bone marrow biopsy after the first cycle showed a decrease in his plasma cell involvement to 80-90%. Dr. Bunch then started DVRd on 7/5/22. However, he was admitted to the hospital on 7/7/22 with septic shock requiring ICU care. He improved with antibiotics but had an ASHLEE thought to be due to hypotension which resulted in renal failure. He is now dialysis dependent. He saw Dr. Bunch after he was discharged from the hospital and started back on DVRd with dose reduced lenalidomide on 7/25/22.      Oncology history:  4/20/22: renal biopsy: light chain cast nephropathy, kappa light chain  4/26/22: right subclavian vein  thrombus   4/27/22: M spike 0.2 with free monoclonal kappa band. B2mg 19.57, IgG 496, IgA 10, IgM <5. Kappa 66267, Lambda 7.9, ratio >1000  5/12/22: BMBx: plasma cell myeloma, marrow cellularity 100%, plasma cell percentage 100%. Plasma cell phenotype +, kappa +, CD20- -, CD30-, EMA-, SADAF-, Congo red negative. Peripheral blood with pancytopenia and no circulating blasts. Decreased storage iron. FISH canceled due to quantity not sufficient  5/18/22: CyBorD C1D1  5/23/22: rasburicase  5/24/22: Xgeva  6/6/22: Kappa 63210, lambda 4.0, ratio >1000, M spike 0.1 with free monoclonal kappa band present  6/6/22: CyBorD C2D1  6/9/22: BMBx Plasma cell neoplasm compatible with plasma cell myeloma. Extent of involvement 80-90% of bone marrow elements. Cytology: Plasmablastic. FISH cytogenetics positive for 1q21/CKS1B gain. Karyotype failed. Myelofibrosis diffuse (MF-3)  6/20/22: CyBorD C2D8 delayed due to covid  6/23/22: CyBOrD C2D11  7/5/22: C1D1 DVRd  7/7/22 - 7/18/22: hospital admission for septic shock resulting in renal failure  7/25/22: started again on DVRd  8/8/2022: kappa 1402.8, lambda 7.5, ratio 167.04. M spike 0.1, two faint restricted bands in gamma globulin regions.   10/10/22: C5D1 DVRd. Revlimid on hold for upcoming stem cell collection  10/31/22: C6D1 DVRd. Revlimid on hold    Medications Prior to Admission   Medication Sig Dispense Refill Last Dose    acyclovir (ZOVIRAX) 400 MG tablet Take 400 mg by mouth 2 (two) times a day.   12/27/2022    apixaban (ELIQUIS) 5 mg Tab Take 5 mg by mouth 2 (two) times daily.   12/27/2022    calcitRIOL (ROCALTROL) 0.5 MCG Cap Take 0.5 mcg by mouth. Given w/ dialysis   Past Week    calcium carbonate (TUMS) 200 mg calcium (500 mg) chewable tablet Take 1,000 mg by mouth once daily.   12/27/2022    cholecalciferol, vitamin D3, 75 mcg (3,000 unit) Tab Take 3,000 Units by mouth once daily.   12/27/2022    dapsone 100 MG Tab Take 100 mg by mouth once daily.   12/27/2022     doxycycline (VIBRAMYCIN) 100 MG Cap Take 100 mg by mouth every 12 (twelve) hours. Preventative for past knee infection   12/27/2022    ergocalciferol (ERGOCALCIFEROL) 50,000 unit Cap Take 50,000 Units by mouth every 14 (fourteen) days. Given at dialysis center   Past Week    famotidine (PEPCID) 20 MG tablet Take 20 mg by mouth once daily.   12/27/2022    metoprolol succinate (TOPROL-XL) 25 MG 24 hr tablet Take 1 tablet (25 mg total) by mouth once daily. 30 tablet 11 12/27/2022    promethazine (PHENERGAN) 12.5 MG Tab TAKE ONE TABLET BY MOUTH EVERY 6 HOURS IF NEEDED FOR NAUSEA 2ND CHOICE   Past Month    rosuvastatin (CRESTOR) 20 MG tablet Take 20 mg by mouth once daily.   12/27/2022    scopolamine (TRANSDERM-SCOP) 1.3-1.5 mg (1 mg over 3 days) APPLY ONE PATCH TO SKIN AS DIRECTED AND REPLACE EVERY 3 DAYS.   Past Week    sertraline (ZOLOFT) 50 MG tablet Take 50 mg by mouth once daily.   12/27/2022    ondansetron (ZOFRAN) 4 MG tablet TAKE ONE TABLET BY MOUTH EVERY EIGHT HOURS IF NEEDED FOR NAUSEA.   More than a month       Patient has no known allergies.     Past Medical History:   Diagnosis Date    Chronic infection of prosthetic knee, subsequent encounter     Encounter for blood transfusion     Essential (primary) hypertension     Multiple myeloma      Past Surgical History:   Procedure Laterality Date    KNEE SURGERY      neck infusion       Family History       Problem Relation (Age of Onset)    Heart attack Father          Tobacco Use    Smoking status: Former    Smokeless tobacco: Never    Tobacco comments:     quit smoking in the 90s   Substance and Sexual Activity    Alcohol use: Not Currently    Drug use: Not on file    Sexual activity: Not on file       Review of Systems   Constitutional:  Negative for activity change, appetite change, chills, fatigue and fever.   HENT:  Negative for congestion, mouth sores, nosebleeds, rhinorrhea, sinus pressure, sinus pain, sneezing and sore throat.    Eyes:  Negative for  photophobia, pain, discharge, redness, itching and visual disturbance.   Respiratory:  Negative for cough, chest tightness, shortness of breath and wheezing.    Cardiovascular:  Negative for chest pain, palpitations and leg swelling.   Gastrointestinal:  Negative for abdominal distention, abdominal pain, blood in stool, constipation, diarrhea, nausea and vomiting.   Endocrine: Negative for cold intolerance, heat intolerance, polydipsia, polyphagia and polyuria.   Genitourinary:  Negative for difficulty urinating, frequency, hematuria and urgency.   Musculoskeletal:  Negative for arthralgias, back pain, myalgias, neck pain and neck stiffness.   Skin:  Negative for pallor, rash and wound.   Allergic/Immunologic: Negative for environmental allergies, food allergies and immunocompromised state.   Neurological:  Negative for dizziness, tremors, seizures, syncope, speech difficulty, weakness, light-headedness, numbness and headaches.   Hematological:  Negative for adenopathy. Does not bruise/bleed easily.   Psychiatric/Behavioral:  Negative for agitation, confusion, hallucinations, sleep disturbance and suicidal ideas. The patient is not nervous/anxious.    Objective:     Vital Signs (Most Recent):  Temp: 97.7 °F (36.5 °C) (12/29/22 0438)  Pulse: 62 (12/29/22 0438)  Resp: 18 (12/29/22 0438)  BP: 112/68 (12/29/22 0438)  SpO2: 97 % (12/29/22 0438) Vital Signs (24h Range):  Temp:  [97.7 °F (36.5 °C)-98.6 °F (37 °C)] 97.7 °F (36.5 °C)  Pulse:  [62-84] 62  Resp:  [17-18] 18  SpO2:  [95 %-100 %] 97 %  BP: (111-134)/(59-78) 112/68     Weight: 101.5 kg (223 lb 10.5 oz)  Body mass index is 31.19 kg/m².  Body surface area is 2.25 meters squared.    KPS: 80    Lines/Drains/Airways       Central Venous Catheter Line  Duration             Percutaneous Central Line Insertion/Assessment - Double Lumen  -- days                    Physical Exam  Constitutional:       Appearance: He is well-developed.   HENT:      Head: Normocephalic and  atraumatic.      Mouth/Throat:      Pharynx: No oropharyngeal exudate.   Eyes:      General:         Right eye: No discharge.         Left eye: No discharge.      Conjunctiva/sclera: Conjunctivae normal.      Pupils: Pupils are equal, round, and reactive to light.   Cardiovascular:      Rate and Rhythm: Normal rate and regular rhythm.      Heart sounds: Normal heart sounds. No murmur heard.  Pulmonary:      Effort: Pulmonary effort is normal. No respiratory distress.      Breath sounds: Normal breath sounds. No wheezing or rales.   Abdominal:      General: Bowel sounds are normal. There is no distension.      Palpations: Abdomen is soft.      Tenderness: There is no abdominal tenderness.   Musculoskeletal:         General: No deformity. Normal range of motion.      Cervical back: Normal range of motion and neck supple.   Skin:     General: Skin is warm and dry.      Findings: No erythema or rash.      Comments: Right chest wall dialysis catheter. Dressing c/d/i. No sign of infection to site.   Neurological:      Mental Status: He is alert and oriented to person, place, and time.   Psychiatric:         Behavior: Behavior normal.         Thought Content: Thought content normal.         Judgment: Judgment normal.       Significant Labs:   CBC:   Recent Labs   Lab 12/28/22  1259 12/29/22  0440   WBC 5.52 3.34*   HGB 7.7* 6.5*   HCT 23.5* 20.8*   * 97*    and CMP:   Recent Labs   Lab 12/28/22  1259 12/29/22  0440    140   K 3.3* 4.3    110   CO2 24 19*   GLU 91 85   BUN 18 27*   CREATININE 2.9* 4.3*   CALCIUM 8.7 8.2*   PROT 6.1 5.1*   ALBUMIN 3.8 3.3*   BILITOT 1.1* 1.2*   ALKPHOS 114 83   AST 11 8*   ALT 11 8*   ANIONGAP 8 11       Diagnostic Results:  I have reviewed all pertinent imaging results/findings within the past 24 hours.    Assessment/Plan:     * Stem cell transplant candidate  - Patient of Dr. King  - Diagnosed with MM in April 2022. Underwent 2 cycles CyBorD then transitioned to  DVRd (now s/p C6)  - Admitted 12/28/22 for Torie 140 auto SCT   - Today is Day -1  - Will have chemotherapy today with plans for SCT tomorrow      Planned conditioning regimen:  Melphalan on Day -1     Antimicrobial Prophylaxis:  Acyclovir starting on Day -1  Levofloxacin starting on Day -1  Fluconazole starting on Day -1     Growth Factor Support:  Neupogen starting on Day +7      Caregiver: Wife  Post-transplant discharge plans: Javed Garcia    Multiple myeloma not having achieved remission  - Patient of Dr. King. Per most recent clinic note:   - High risk due to TP53 deletion and a 1q duplication  - R2-ISS Stage III  - 4/20/22: renal biopsy: light chain cast nephropathy, kappa light chain  - 4/26/22: right subclavian vein thrombus   - 4/27/22: M spike 0.2 with free monoclonal kappa band. B2mg 19.57, IgG 496, IgA 10, IgM <5. Kappa 36002, Lambda 7.9, ratio >1000  - 5/12/22: BMBx: plasma cell myeloma, marrow cellularity 100%, plasma cell percentage 100%. Plasma cell phenotype +, kappa +, CD20- -, CD30-, EMA-, SADAF-, [place] red negative. Peripheral blood with pancytopenia and no circulating blasts. Decreased storage iron. FISH canceled due to quantity not sufficient  - 5/18/22: CyBorD C1D1  - 5/23/22: rasburicase  - 5/24/22: Xgeva  - 6/6/22: Kappa 81706, lambda 4.0, ratio >1000, M spike 0.1 with free monoclonal kappa band present  - 6/6/22: CyBorD C2D1  - 6/9/22: BMBx Plasma cell neoplasm compatible with plasma cell myeloma. Extent of involvement 80-90% of bone marrow elements. Cytology: Plasmablastic. FISH cytogenetics positive for 1q21/CKS1B gain. Karyotype failed. Myelofibrosis diffuse (MF-3)  - 6/20/22: CyBorD C2D8 delayed due to covid  - 6/23/22: CyBOrD C2D11  - 7/5/22: C1D1 DVRd  - 7/7/22 - 7/18/22: hospital admission for septic shock resulting in renal failure  - 7/25/22: started again on DVRd  - 8/8/2022: kappa 1402.8, lambda 7.5, ratio 167.04. M spike 0.1, two faint restricted bands in gamma  globulin regions.   - 10/10/22: C5D1 DVRd. Revlimid on hold for upcoming stem   - Admitted for Torie 140 auto SCT on 12/28/22 as above    Other pancytopenia  - transfuse for hgb <7, Plt <10K or bleeding  - daily CBC while inpatient  - expect counts to drop further following chemo and SCT    Hyperlipidemia  - Will hold home statin while inpatient    Depression  - Continue home Zoloft    Deep vein thrombosis (DVT) of upper extremity  - Continue home apixiban. Will hold with plts < 50K.    Hypertension  - Continue home metoprolol    End stage renal disease  - developed during ICU stay. Believed to be 2/2 hypoprofusion due to hypotension  - nephrology consulted on admission  - on dialysis MWF at home (will need dialysis prior to SCT tomorrow)  - right chest wall dialysis catheter in place    Dependence on hemodialysis  - see ESRD    Chronic infection of prosthetic knee  - Seen in ID clinic with Dr. Chatterjee prior to transplant   - Rec continuing doxycycline throughout transplant         VTE Risk Mitigation (From admission, onward)           Ordered     apixaban tablet 5 mg  2 times daily         12/28/22 1659     heparin (porcine) injection 1,600 Units  As needed (PRN)         12/28/22 1825     IP VTE HIGH RISK PATIENT  Once         12/28/22 1659     Place sequential compression device  Until discontinued         12/28/22 1659                    Disposition: Admitted evening of 12/28/22 for chemotherapy and Autologous SCT to BMT team    Lisa Kaplan NP  Bone Marrow Transplant  Hematology  [facility] - Oncology ([facility])

## 2022-12-29 NOTE — PLAN OF CARE
Recommendations    1) Continue Regular Diet   2) Boost Plus BID between meals for increased intake -RD added  3) RD following    Goals: Meet % een/epn by next rd f./u  Nutrition Goal Status: new  Communication of RD Recs: other (comment) (POC)

## 2022-12-29 NOTE — ASSESSMENT & PLAN NOTE
- Patient of Dr. King  - Diagnosed with MM in April 2022. Underwent 2 cycles CyBorD then transitioned to DVRd (now s/p C6)  - Admitted 12/28/22 for Torie 140 auto SCT   - Today is Day 0  - Completed [id number]/29 without issue  - Transplant is scheduled for today 12/30/2022, at 3p.m. He will be receiving 6 bags with a total CD34 dose of 3.13 x10^6/kg.    Planned conditioning regimen:  Melphalan on Day -1     Antimicrobial Prophylaxis:  Acyclovir starting on Day -1  Levofloxacin starting on Day -1  Fluconazole starting on Day -1     Growth Factor Support:  Neupogen starting on Day +7      Caregiver: Wife  Post-transplant discharge plans: Javed House

## 2022-12-29 NOTE — PLAN OF CARE
Problem: Physical Therapy  Goal: Physical Therapy Goal  Description: Goals to be met by: 1/15     Patient will increase functional independence with mobility by performin. Gait  x 500 feet with Modified [place] using No Assistive Device.   2. Lower and upper extremity exercise program x20 reps per handout, with independence  3. Ascend/descend 3 steps with L HR with modified independence and no AD.     Outcome: Ongoing, Progressing   Evaluation completed, initiated plan of care.   Mikaela Bennett, PT  2022

## 2022-12-29 NOTE — SUBJECTIVE & OBJECTIVE
Past Medical History:   Diagnosis Date    Chronic infection of prosthetic knee, subsequent encounter     Encounter for blood transfusion     Essential (primary) hypertension     Multiple myeloma        Past Surgical History:   Procedure Laterality Date    KNEE SURGERY      neck infusion         Review of patient's allergies indicates:  No Known Allergies  Current Facility-Administered Medications   Medication Frequency    0.9%  NaCl infusion (for blood administration) Q24H PRN    0.9%  NaCl infusion Continuous    acyclovir capsule 400 mg BID    alteplase injection 2 mg PRN    aluminum-magnesium hydroxide-simethicone 200-200-20 mg/5 mL suspension 30 mL Q6H PRN    apixaban tablet 5 mg BID    calcium carbonate 200 mg calcium (500 mg) chewable tablet 1,000 mg Daily    diphenhydrAMINE capsule 25 mg PRN    doxycycline tablet 100 mg Q12H    [START ON 1/4/2023] ergocalciferol capsule 50,000 Units Q14 Days    [START ON 1/6/2023] filgrastim (NEUPOGEN) injection 480 mcg/1.6 ml Daily    fluconazole tablet 200 mg Daily    heparin (porcine) injection 1,600 Units PRN    k phos di & mono-sod phos mono 250 mg tablet 1 tablet Q4H PRN    k phos di & mono-sod phos mono 250 mg tablet 2 tablet Q4H PRN    levoFLOXacin tablet 250 mg Every other day    LORazepam injection 1 mg Q6H PRN    magnesium oxide tablet 400 mg Q4H PRN    magnesium oxide tablet 400 mg Q4H PRN    magnesium oxide tablet 800 mg Q4H PRN    metoprolol succinate (TOPROL-XL) 24 hr tablet 25 mg Daily    mupirocin 2 % ointment BID    OLANZapine tablet 5 mg BID    ondansetron disintegrating tablet 8 mg Q8H    pantoprazole EC tablet 40 mg Daily    potassium chloride SA CR tablet 20 mEq PRN    potassium chloride SA CR tablet 20 mEq Q2H PRN    potassium chloride SA CR tablet 20 mEq Q2H PRN    prochlorperazine injection Soln 10 mg Q6H PRN    scopolamine 1.3-1.5 mg (1 mg over 3 days) 1 patch Q3 Days    sertraline tablet 50 mg Daily    sodium bicarb-sodium chloride powder 1 Dose QID     sodium chloride 0.9% flush 10 mL PRN    vitamin D 1000 units tablet 3,000 Units Daily     Family History       Problem Relation (Age of Onset)    Heart attack Father          Tobacco Use    Smoking status: Former    Smokeless tobacco: Never    Tobacco comments:     quit smoking in the 90s   Substance and Sexual Activity    Alcohol use: Not Currently    Drug use: Not on file    Sexual activity: Not on file     Review of Systems   Constitutional:  Negative for activity change, appetite change, chills, fatigue and fever.   HENT: Negative.     Eyes:  Negative for photophobia, pain, discharge, redness, itching and visual disturbance.   Respiratory:  Negative for cough, chest tightness, shortness of breath and wheezing.    Cardiovascular:  Negative for chest pain, palpitations and leg swelling.   Gastrointestinal:  Negative for abdominal distention, abdominal pain, blood in stool, constipation, diarrhea, nausea and vomiting.   Endocrine: Negative for cold intolerance, heat intolerance, polydipsia, polyphagia and polyuria.   Genitourinary:  Positive for decreased urine volume.   Musculoskeletal:  Negative for arthralgias, back pain, myalgias, neck pain and neck stiffness.   Skin: Negative.    Allergic/Immunologic: Negative for environmental allergies, food allergies and immunocompromised state.   Neurological: Negative.    Psychiatric/Behavioral:  Negative for agitation, confusion, hallucinations, sleep disturbance and suicidal ideas. The patient is not nervous/anxious.    Objective:     Vital Signs (Most Recent):  Temp: 98.1 °F (36.7 °C) (12/29/22 1153)  Pulse: 76 (12/29/22 1153)  Resp: 16 (12/29/22 1153)  BP: 114/66 (12/29/22 1153)  SpO2: (!) 93 % (12/29/22 1153)   Vital Signs (24h Range):  Temp:  [97.7 °F (36.5 °C)-98.6 °F (37 °C)] 98.1 °F (36.7 °C)  Pulse:  [61-84] 76  Resp:  [16-18] 16  SpO2:  [93 %-100 %] 93 %  BP: (111-147)/(59-79) 114/66     Weight: 101.5 kg (223 lb 10.5 oz) (12/29/22 0600)  Body mass index is  31.19 kg/m².  Body surface area is 2.25 meters squared.    I/O last 3 completed shifts:  In: 1060.6 [P.O.:240; I.V.:820.6]  Out: 550 [Urine:550]    Physical Exam  Vitals and nursing note reviewed.   HENT:      Head: Normocephalic.      Mouth/Throat:      Pharynx: Oropharynx is clear.   Eyes:      Conjunctiva/sclera: Conjunctivae normal.   Cardiovascular:      Rate and Rhythm: Normal rate and regular rhythm.      Pulses: Normal pulses.   Pulmonary:      Effort: Pulmonary effort is normal.      Breath sounds: Normal breath sounds.   Abdominal:      Palpations: Abdomen is soft.   Musculoskeletal:      Cervical back: Neck supple.      Right lower leg: No edema.      Left lower leg: No edema.   Skin:     Comments: EDA Boogie    Neurological:      Mental Status: He is alert and oriented to person, place, and time.   Psychiatric:         Mood and Affect: Mood normal.     Significant Labs:  CBC:   Recent Labs   Lab 12/29/22  0440   WBC 3.34*   RBC 1.99*   HGB 6.5*   HCT 20.8*   PLT 97*   *   MCH 32.7*   MCHC 31.3*     CMP:   Recent Labs   Lab 12/29/22  0440   GLU 85   CALCIUM 8.2*   ALBUMIN 3.3*   PROT 5.1*      K 4.3   CO2 19*      BUN 27*   CREATININE 4.3*   ALKPHOS 83   ALT 8*   AST 8*   BILITOT 1.2*     All labs within the past 24 hours have been reviewed.

## 2022-12-29 NOTE — PT/OT/SLP EVAL
Occupational Therapy  Co- Evaluation/d/c    Name: De Lakhani  MRN: [id number]  Admitting Diagnosis: Stem cell transplant candidate  Recent Surgery: * No surgery found *    Co-eval  for pt. Safety and to account for pt. Activity tolerance. Pt. With chemo earlier on this date and receiving blood  Recommendations:     Discharge Recommendations: home  Discharge Equipment Recommendations:  none  Barriers to discharge:  None    Assessment:     De Lakhani is a 57 y.o. male with a medical diagnosis of Stem cell transplant candidate.  He presents with high level of functioning with ADL tasks performance at this time as well as strength and mobility in BUE. Pt. Does not require continued OT services at this time. Please re-consult if patients needs change. .   Rehab Prognosis: ;Good: pt. Does not require continued OT services at this time.   Plan:     Patient to be d/c'd from acute OT services   Plan of Care Expires: 01/28/23  Plan of Care Reviewed with: patient, spouse    Subjective     Chief Complaint: no specific complaints  Patient/Family Comments/goals: to keep doing well     Occupational Profile:  Living Environment: pt. Resides with spouse in  house with 3 steps to enter and LHR.  Pt. Has a tub shower with a seat.  Previous level of function: Pt. Reports ambulating independently and dressing self.    Roles and Routines: spouse, caretaker of self, disabled, likes to hunt. + driving short distances.   Equipment Used at Home: shower chair (has a SPC and RW from previous knee sx but not using it.)  Assistance upon Discharge: spouse but she does work    Pain/Comfort:  Pain Rating 1: 0/10    Patients cultural, spiritual, Moravian conflicts given the current situation: no    Objective:     Communicated with: nurse prior to session.  Patient found  standing coming out of bathroom pushing IV pole  with peripheral IV upon OT entry to room. Spouse present    General Precautions: Standard, fall  Orthopedic Precautions:  N/A  Braces: N/A  Respiratory Status: Room air    Occupational Performance:    Bed Mobility:    Patient completed Sit to Supine with independence    Functional Mobility/Transfers:  Patient completed Sit <> Stand Transfer with supervision  with  no assistive device   Functional Mobility: pt. Ambulated in room from bathroom to bed pushing IV pole with S    Activities of Daily Living:  Toileting: supervision on this date    Cognitive/Visual Perceptual:  Cognitive/Psychosocial Skills:     -       Oriented to: Person, Place, Time, and Situation   -       Follows Commands/attention:Follows multistep  commands  -       Communication: clear/fluent  -       Memory: No Deficits noted  -       Safety awareness/insight to disability: intact   -       Mood/Affect/Coping skills/emotional control: Appropriate to situation  Visual/Perceptual:      -wears glasses    Physical Exam:  Balance: -       sit and stand : good  Postural examination/scapula alignment:    -       Rounded shoulders  Upper Extremity Range of Motion:     -       Right Upper Extremity: WFL  -       Left Upper Extremity: WFL  Upper Extremity Strength:    -       Right Upper Extremity: WFL  -       Left Upper Extremity: WFL    AMPAC 6 Click ADL:  AMPAC Total Score: 22    Treatment & Education:  Pt. Educated on role of OT and pOC      Patient left supine with all lines intact, call button in reach, and spouse present    GOALS:   Multidisciplinary Problems       Occupational Therapy Goals          Problem: Occupational Therapy    Goal Priority Disciplines Outcome Interventions   Occupational Therapy Goal     OT, PT/OT     Description: Goals to be met by:No goals set                         History:     Past Medical History:   Diagnosis Date    Chronic infection of prosthetic knee, subsequent encounter     Encounter for blood transfusion     Essential (primary) hypertension     Multiple myeloma          Past Surgical History:   Procedure Laterality Date    KNEE  SURGERY      neck infusion         Time Tracking:     OT Date of Treatment: 12/29/22  OT Start Time: 1327  OT Stop Time: 1335  OT Total Time (min): 8 min    Billable Minutes:Evaluation 8    12/29/2022

## 2022-12-29 NOTE — NURSING
melphalan Hcl-betadex sbes (EVOMELA) 330 mg in sodium chloride 0.9% 631 mL IVPB started through right Vascath noted for having positive blood return. Pt educated on importance of ice chip for cryotherapy before, during, and post chemo infusion. Pt verbalized understanding.  Chemotherapy dosage and BSA checked by two chemotherapy certified nurses prior to administration.  Chemotherapy education done prior to hanging of chemotherapy.  Chemotherapeutic precautions in place throughout therapy.  Will continue to monitor.

## 2022-12-29 NOTE — PT/OT/SLP EVAL
"Physical Therapy Evaluation  Co-evaluation with OT due to acuity of condition, level of skilled assist needed for assessment of safety with mobility.   Patient Name:  De Lakhani   MRN:  [id number]    Recommendations:     Discharge Recommendations: home   Discharge Equipment Recommendations: none   Barriers to discharge: None    Assessment:     De Lakhani is a 57 y.o. male admitted with a medical diagnosis of Stem cell transplant candidate.  He presents with the following impairments/functional limitations:  (at risk for deconditioning 2/2 diagnosis, treatment, prolonged hospitalization). Patient observed to be ambulating around his room with no AD and modified independence. He denies any symptoms currently. He is in agreement with therapy POC, therapy check in one day a week to ensure he is mobilizing, review LE/UE HEP.     Rehab Prognosis: Good; patient would benefit from acute skilled PT services to address these deficits and reach maximum level of function.    Recent Surgery: * No surgery found *      Plan:     During this hospitalization, patient to be seen 1 x/week to address the identified rehab impairments via gait training, therapeutic activities, therapeutic exercises and progress toward the following goals:    Plan of Care Expires:  01/28/23    Subjective     Chief Complaint: "I feel fine"  Patient/Family Comments/goals: maintain independence  Pain/Comfort:  Pain Rating 1: 0/10    Patients cultural, spiritual, Amish conflicts given the current situation: no    Living Environment:  The patient lives with his wife in a [facility] with L HR, Tub-shower. On disability, drives.   Prior to admission, patients level of function was independent with mobility.  Equipment used at home: shower chair (owns RW and SPC, does not use them).  DME owned (not currently used): none.  Upon discharge, patient will have assistance from wife.    Objective:     Communicated with RN prior to session. Patient receiving " blood transfusion, RN ok'd patient to mobilize while blood is transfusing.  Patient found  using bathroom, ambulating with modified independence pushing IV pole  with peripheral IV  upon PT entry to room.Wife at bedside.     General Precautions: Standard, fall  Orthopedic Precautions:N/A   Braces: N/A  Respiratory Status: Room air    Exams:    Cognitive Exam  Patient is A&O x4 and follows 100% of one -step commands    Fine Motor Coordination   -       WNL     Postural Exam Patient presented with the following abnormalities:    -       Rounded shoulders  -       Forward head  -       Kyphosis     Sensation    -       Light touch diminished sensation EVERETTE feet 2/2 chronic neuropathy   Skin Integrity/Edema     -       Skin integrity: visibly intact  -       Edema: NA   R LE ROM WNL   R LE Strength 5/5 hip flexion, knee ext/flex, and ankle DF/PF   L LE ROM WNL   L LE Strength  5/5 hip flexion, knee ext/flex, and ankle DF/PF         Functional Mobility:      Bed Mobility  Sit to supine: independent    Transfers Sit to Stand:  independent from EOB no AD   Gait  Gait Distance: 12 ft, pushing IV pole  Assistance Level: modified independent   Description: reciprocal steps, good speed, no gross deficits.         AM-PAC 6 CLICK MOBILITY  Total Score:24       Treatment & Education:  Patient and wife educated on role of therapy, goals of session, benefits of out of bed mobility. Patient agreeable to mobilize with therapy.  Discussed PT plan of care during hospitalization. Patient educated that they need to call for assistance to mobilize out of bed. Whiteboard updated as appropriate. Patient educated on how their diagnosis impacts their mobility within PT scope of practice.     Patient educated on PT schedule (1 day a week for check in, reviewing HEP, ensure patient is mobilizing daily and without functional decline).  Encouraged patient to ambulate, sit up in chair 3x/day to prevent deconditioning during hospitalization. Patient  verbalized understanding and agreement not to mobilize without RN assist. Patient in agreement with PT POC, Home.      Patient safe to ambulate independently.     Patient left HOB elevated with all lines intact, call button in reach, and wife present.    GOALS:   Multidisciplinary Problems       Physical Therapy Goals          Problem: Physical Therapy    Goal Priority Disciplines Outcome Goal Variances Interventions   Physical Therapy Goal     PT, PT/OT Ongoing, Progressing     Description: Goals to be met by: 1/15     Patient will increase functional independence with mobility by performin. Gait  x 500 feet with Modified [place] using No Assistive Device.   2. Lower and upper extremity exercise program x20 reps per handout, with independence  3. Ascend/descend 3 steps with L HR with modified independence and no AD.                          History:     Past Medical History:   Diagnosis Date    Chronic infection of prosthetic knee, subsequent encounter     Encounter for blood transfusion     Essential (primary) hypertension     Multiple myeloma        Past Surgical History:   Procedure Laterality Date    KNEE SURGERY      neck infusion         Time Tracking:     PT Received On: 22  PT Start Time: 1327     PT Stop Time: 1335  PT Total Time (min): 8 min     Billable Minutes: Evaluation 8      2022

## 2022-12-29 NOTE — CONSULTS
"[facility])  Adult Nutrition  Consult Note    SUMMARY     Recommendations    1) Continue Regular Diet   2) Boost Plus BID between meals for increased intake -RD added  3) RD following    Goals: Meet % een/epn by next rd f./u  Nutrition Goal Status: new  Communication of RD Recs: other (comment) (POC)    Assessment and Plan    Nutrition Problem  Increased nutrition needs     Related to (etiology):   Stem cell transplant candidate     Signs and Symptoms (as evidenced by):   50% intake of meals    Interventions/Recommendations (treatment strategy):  Collaboration with other providers  ONS    Nutrition Diagnosis Status:   New      Reason for Assessment    Reason For Assessment: consult  Diagnosis: cancer diagnosis/related complications (Stem cell transplant candidate)  Relevant Medical History: high-risk kappa light chain multiple myeloma , HTN,  chronic infection of prosthetic knee , ESRD (hemodialysis), HLD  Interdisciplinary Rounds: did not attend  General Information Comments: RD consulted for "MM with planned auto SCT". Pt endorses good appetite, 50% intake of meals. States he eats zelda meals due to gastric sleeve. Denies N/V/D/C, chewing, or swallowing issues. Good appetite PTA, eating around the same amount. -240# past charted wt seems inaccurate (17# wt loss in 1 day per chart). Pt appears nourished.     Provided pt with high calorie, high protein diet education and food safety education for bone marrow transplant. Appropriate education material with RD contact information provided. No other needs identified. RD following  Nutrition Discharge Planning: EDU: high calorie, high protein diet education and food safety education for bone marrow transplant 12/29/22    Nutrition Risk Screen    Nutrition Risk Screen: no indicators present    Nutrition/Diet History    Patient Reported Diet/Restrictions/Preferences: general  Typical Food/Fluid Intake: 3 small/medium meals/day  Food " "Preferences: cheeze its for snack  Spiritual, Cultural Beliefs, Congregational Practices, Values that Affect Care: no  Vitamin/Mineral/Herbal Supplements: vitamin D    Anthropometrics    Temp: 98.1 °F (36.7 °C)  Height Method: Stated  Height: 5' 11" (180.3 cm)  Height (inches): 71 in  Weight Method: Standard Scale  Weight: 101.2 kg (223 lb)  Weight (lb): 223 lb  Ideal Body Weight (IBW), Male: 172 lb  % Ideal Body Weight, Male (lb): 129.65 %  BMI (Calculated): 31.1  BMI Grade: 30 - 34.9- obesity - grade I  Usual Body Weight (UBW), k.81 kg  % Usual Body Weight: 94.9  % Weight Change From Usual Weight: -5.3 %       Lab/Procedures/Meds    Pertinent Labs Reviewed: reviewed  Pertinent Labs Comments: H/H: 6.5/20.8, MCH: 32.7, MCHC: 31.3, BUN: 27, creatinine: 4.3, GFR: 15.2, Wyatt: 8.2, Phos: 4.8, AST: 8, ALT: 8  Pertinent Medications Reviewed: reviewed  Pertinent Medications Comments: calcium carbonate, ergocalciferol, pantoprazole, vitamin D, NaCl      Estimated/Assessed Needs    Weight Used For Calorie Calculations: 101.2 kg (223 lb)  Energy Calorie Requirements (kcal): 2538-4827 (20-25 kcal/kg (high wyatt/ high pro stem cell transplant candidate))  Energy Need Method: Kcal/kg  Protein Requirements: 141-162 (1.4-1.6 g/kg (stem cell trans candidate))  Weight Used For Protein Calculations: 101.2 kg (223 lb)        RDA Method (mL):          Nutrition Prescription Ordered    Current Diet Order: Regular    Evaluation of Received Nutrient/Fluid Intake    I/O: +510.6  Comments: LBM   Tolerance: tolerating  % Intake of Estimated Energy Needs: 50 - 75 %  % Meal Intake: 50 - 75 %    Nutrition Risk    Level of Risk/Frequency of Follow-up: moderate       Monitor and Evaluation    Food and Nutrient Intake: energy intake, food and beverage intake  Food and Nutrient Adminstration: diet order  Knowledge/Beliefs/Attitudes: food and nutrition knowledge/skill, beliefs and attitudes  Physical Activity and Function: nutrition-related " ADLs and IADLs  Anthropometric Measurements: height/length, weight, weight change, body mass index  Biochemical Data, Medical Tests and Procedures: electrolyte and renal panel, gastrointestinal profile, glucose/endocrine profile, inflammatory profile, lipid profile  Nutrition-Focused Physical Findings: overall appearance       Nutrition Follow-Up    RD Follow-up?: Yes    Oliva Forbes, Registration Eligible, Provisional LDN

## 2022-12-30 PROBLEM — D69.6 THROMBOCYTOPENIA: Status: ACTIVE | Noted: 2022-12-30

## 2022-12-30 PROBLEM — Z94.84 HISTORY OF AUTOLOGOUS STEM CELL TRANSPLANT: Status: ACTIVE | Noted: 2022-11-22

## 2022-12-30 PROBLEM — D63.0 ANEMIA IN NEOPLASTIC DISEASE: Status: ACTIVE | Noted: 2022-12-30

## 2022-12-30 PROBLEM — D61.818 OTHER PANCYTOPENIA: Status: RESOLVED | Noted: 2022-12-28 | Resolved: 2022-12-30

## 2022-12-30 LAB
ALBUMIN SERPL BCP-MCNC: 3.6 G/DL (ref 3.5–5.2)
ALP SERPL-CCNC: 90 U/L (ref 55–135)
ALT SERPL W/O P-5'-P-CCNC: 11 U/L (ref 10–44)
ANION GAP SERPL CALC-SCNC: 8 MMOL/L (ref 8–16)
ANISOCYTOSIS BLD QL SMEAR: SLIGHT
AST SERPL-CCNC: 10 U/L (ref 10–40)
BASOPHILS # BLD AUTO: 0.02 K/UL (ref 0–0.2)
BASOPHILS NFR BLD: 0.2 % (ref 0–1.9)
BILIRUB SERPL-MCNC: 0.8 MG/DL (ref 0.1–1)
BUN SERPL-MCNC: 44 MG/DL (ref 6–20)
CALCIUM SERPL-MCNC: 8.3 MG/DL (ref 8.7–10.5)
CHLORIDE SERPL-SCNC: 113 MMOL/L (ref 95–110)
CO2 SERPL-SCNC: 19 MMOL/L (ref 23–29)
CREAT SERPL-MCNC: 5.4 MG/DL (ref 0.5–1.4)
DIFFERENTIAL METHOD: ABNORMAL
EOSINOPHIL # BLD AUTO: 0 K/UL (ref 0–0.5)
EOSINOPHIL NFR BLD: 0 % (ref 0–8)
ERYTHROCYTE [DISTWIDTH] IN BLOOD BY AUTOMATED COUNT: 20.3 % (ref 11.5–14.5)
EST. GFR  (NO RACE VARIABLE): 11.5 ML/MIN/1.73 M^2
GLUCOSE SERPL-MCNC: 102 MG/DL (ref 70–110)
HBV SURFACE AG SERPL QL IA: NORMAL
HCT VFR BLD AUTO: 22.9 % (ref 40–54)
HGB BLD-MCNC: 7.5 G/DL (ref 14–18)
IMM GRANULOCYTES # BLD AUTO: 0.04 K/UL (ref 0–0.04)
IMM GRANULOCYTES NFR BLD AUTO: 0.4 % (ref 0–0.5)
LYMPHOCYTES # BLD AUTO: 0.2 K/UL (ref 1–4.8)
LYMPHOCYTES NFR BLD: 2.2 % (ref 18–48)
MAGNESIUM SERPL-MCNC: 1.8 MG/DL (ref 1.6–2.6)
MCH RBC QN AUTO: 32.9 PG (ref 27–31)
MCHC RBC AUTO-ENTMCNC: 32.8 G/DL (ref 32–36)
MCV RBC AUTO: 100 FL (ref 82–98)
MONOCYTES # BLD AUTO: 0.5 K/UL (ref 0.3–1)
MONOCYTES NFR BLD: 5.3 % (ref 4–15)
NEUTROPHILS # BLD AUTO: 8.3 K/UL (ref 1.8–7.7)
NEUTROPHILS NFR BLD: 91.9 % (ref 38–73)
NRBC BLD-RTO: 0 /100 WBC
PHOSPHATE SERPL-MCNC: 3.9 MG/DL (ref 2.7–4.5)
PLATELET # BLD AUTO: 117 K/UL (ref 150–450)
PLATELET BLD QL SMEAR: ABNORMAL
PMV BLD AUTO: 11 FL (ref 9.2–12.9)
POTASSIUM SERPL-SCNC: 4.5 MMOL/L (ref 3.5–5.1)
PROT SERPL-MCNC: 5.5 G/DL (ref 6–8.4)
RBC # BLD AUTO: 2.28 M/UL (ref 4.6–6.2)
SODIUM SERPL-SCNC: 140 MMOL/L (ref 136–145)
TOXIC GRANULES BLD QL SMEAR: PRESENT
WBC # BLD AUTO: 9.07 K/UL (ref 3.9–12.7)

## 2022-12-30 PROCEDURE — 38208 THAW PRESERVED STEM CELLS: CPT

## 2022-12-30 PROCEDURE — 94761 N-INVAS EAR/PLS OXIMETRY MLT: CPT

## 2022-12-30 PROCEDURE — 85025 COMPLETE CBC W/AUTO DIFF WBC: CPT | Performed by: NURSE PRACTITIONER

## 2022-12-30 PROCEDURE — 84100 ASSAY OF PHOSPHORUS: CPT | Performed by: NURSE PRACTITIONER

## 2022-12-30 PROCEDURE — 99233 SBSQ HOSP IP/OBS HIGH 50: CPT | Mod: ,,, | Performed by: INTERNAL MEDICINE

## 2022-12-30 PROCEDURE — 38241 TRANSPLT AUTOL HCT/DONOR: CPT

## 2022-12-30 PROCEDURE — 99233 PR SUBSEQUENT HOSPITAL CARE,LEVL III: ICD-10-PCS | Mod: ,,, | Performed by: INTERNAL MEDICINE

## 2022-12-30 PROCEDURE — 25000003 PHARM REV CODE 250: Performed by: NURSE PRACTITIONER

## 2022-12-30 PROCEDURE — 87340 HEPATITIS B SURFACE AG IA: CPT | Performed by: NURSE PRACTITIONER

## 2022-12-30 PROCEDURE — 86706 HEP B SURFACE ANTIBODY: CPT | Performed by: NURSE PRACTITIONER

## 2022-12-30 PROCEDURE — 63600175 PHARM REV CODE 636 W HCPCS: Performed by: NURSE PRACTITIONER

## 2022-12-30 PROCEDURE — 90935 PR HEMODIALYSIS, ONE EVALUATION: ICD-10-PCS | Mod: ,,, | Performed by: NURSE PRACTITIONER

## 2022-12-30 PROCEDURE — 83735 ASSAY OF MAGNESIUM: CPT | Performed by: NURSE PRACTITIONER

## 2022-12-30 PROCEDURE — 20600001 HC STEP DOWN PRIVATE ROOM

## 2022-12-30 PROCEDURE — 25000003 PHARM REV CODE 250: Performed by: INTERNAL MEDICINE

## 2022-12-30 PROCEDURE — 80053 COMPREHEN METABOLIC PANEL: CPT | Performed by: NURSE PRACTITIONER

## 2022-12-30 PROCEDURE — 27000221 HC OXYGEN, UP TO 24 HOURS

## 2022-12-30 PROCEDURE — 25000003 PHARM REV CODE 250: Performed by: STUDENT IN AN ORGANIZED HEALTH CARE EDUCATION/TRAINING PROGRAM

## 2022-12-30 PROCEDURE — 90935 HEMODIALYSIS ONE EVALUATION: CPT | Mod: ,,, | Performed by: NURSE PRACTITIONER

## 2022-12-30 PROCEDURE — 80100016 HC MAINTENANCE HEMODIALYSIS

## 2022-12-30 RX ORDER — SODIUM CHLORIDE 9 MG/ML
INJECTION, SOLUTION INTRAVENOUS ONCE
Status: COMPLETED | OUTPATIENT
Start: 2022-12-30 | End: 2022-12-30

## 2022-12-30 RX ORDER — HEPARIN SODIUM 1000 [USP'U]/ML
1000 INJECTION, SOLUTION INTRAVENOUS; SUBCUTANEOUS
Status: DISCONTINUED | OUTPATIENT
Start: 2022-12-30 | End: 2023-01-14 | Stop reason: HOSPADM

## 2022-12-30 RX ADMIN — DIPHENHYDRAMINE HYDROCHLORIDE 50 MG: 50 INJECTION, SOLUTION INTRAMUSCULAR; INTRAVENOUS at 02:12

## 2022-12-30 RX ADMIN — APIXABAN 5 MG: 5 TABLET, FILM COATED ORAL at 08:12

## 2022-12-30 RX ADMIN — OLANZAPINE 5 MG: 2.5 TABLET, FILM COATED ORAL at 08:12

## 2022-12-30 RX ADMIN — SODIUM CHLORIDE 500 ML: 0.9 INJECTION, SOLUTION INTRAVENOUS at 11:12

## 2022-12-30 RX ADMIN — OLANZAPINE 5 MG: 2.5 TABLET, FILM COATED ORAL at 09:12

## 2022-12-30 RX ADMIN — DOXYCYCLINE HYCLATE 100 MG: 100 TABLET, COATED ORAL at 08:12

## 2022-12-30 RX ADMIN — ONDANSETRON 8 MG: 8 TABLET, ORALLY DISINTEGRATING ORAL at 04:12

## 2022-12-30 RX ADMIN — SODIUM CHLORIDE: 9 INJECTION, SOLUTION INTRAVENOUS at 10:12

## 2022-12-30 RX ADMIN — APIXABAN 5 MG: 5 TABLET, FILM COATED ORAL at 09:12

## 2022-12-30 RX ADMIN — Medication 1 DOSE: at 09:12

## 2022-12-30 RX ADMIN — MUPIROCIN: 20 OINTMENT TOPICAL at 08:12

## 2022-12-30 RX ADMIN — ONDANSETRON 8 MG: 8 TABLET, ORALLY DISINTEGRATING ORAL at 09:12

## 2022-12-30 RX ADMIN — Medication 1 DOSE: at 01:12

## 2022-12-30 RX ADMIN — LORAZEPAM 1 MG: 1 TABLET ORAL at 02:12

## 2022-12-30 RX ADMIN — ACYCLOVIR 400 MG: 200 CAPSULE ORAL at 08:12

## 2022-12-30 RX ADMIN — ONDANSETRON 8 MG: 8 TABLET, ORALLY DISINTEGRATING ORAL at 01:12

## 2022-12-30 RX ADMIN — CALCIUM CARBONATE (ANTACID) CHEW TAB 500 MG 1000 MG: 500 CHEW TAB at 08:12

## 2022-12-30 RX ADMIN — PANTOPRAZOLE SODIUM 40 MG: 40 TABLET, DELAYED RELEASE ORAL at 08:12

## 2022-12-30 RX ADMIN — ACYCLOVIR 400 MG: 200 CAPSULE ORAL at 09:12

## 2022-12-30 RX ADMIN — HEPARIN SODIUM 1000 UNITS: 1000 INJECTION, SOLUTION INTRAVENOUS; SUBCUTANEOUS at 01:12

## 2022-12-30 RX ADMIN — FLUCONAZOLE 200 MG: 200 TABLET ORAL at 08:12

## 2022-12-30 RX ADMIN — Medication 1 DOSE: at 08:12

## 2022-12-30 RX ADMIN — CHOLECALCIFEROL TAB 25 MCG (1000 UNIT) 3000 UNITS: 25 TAB at 08:12

## 2022-12-30 RX ADMIN — HYDROCORTISONE SODIUM SUCCINATE 250 MG: 250 INJECTION, POWDER, FOR SOLUTION INTRAMUSCULAR; INTRAVENOUS at 02:12

## 2022-12-30 RX ADMIN — SERTRALINE HYDROCHLORIDE 50 MG: 50 TABLET ORAL at 08:12

## 2022-12-30 RX ADMIN — DOXYCYCLINE HYCLATE 100 MG: 100 TABLET, COATED ORAL at 09:12

## 2022-12-30 RX ADMIN — MUPIROCIN: 20 OINTMENT TOPICAL at 09:12

## 2022-12-30 RX ADMIN — SODIUM CHLORIDE 500 ML: 9 INJECTION, SOLUTION INTRAVENOUS at 09:12

## 2022-12-30 RX ADMIN — Medication 1 DOSE: at 04:12

## 2022-12-30 NOTE — NURSING
Pt arrived to [facility] via wheelchair in NAD, VSS, AAOx4. Pt ambulated to standing scale and to bed. Pt presents for maintenance hemodialysis. Connected to cardiac monitor. Right chest wall CVC aspirated, flushed, and accessed using aseptic technique. Lines connected and secured- tx initiated at 1008.

## 2022-12-30 NOTE — PLAN OF CARE
Auto tx this shift. Dialysis complete with 3L removed. POC reviewed with patient; understanding verbalized. Regular diet with good appetite. Pt voids yellow urine via the urinal; no noted BM this shift. Wife to remain at the bedside. Pt. with nonskid footwear on, bed in lowest position, and locked with bed rails up x2. Pt. has call light and personal items within reach. VSS and afebrile this shift. All questions and concerns addressed at this time. Will continue to monitor.

## 2022-12-30 NOTE — ASSESSMENT & PLAN NOTE
"                                                                           Nutrition   Progress Note        Recommendations:  1. Continue Diabetic diet once medically feasible.          Reason for Evaluation: f/u     Diagnosis:    1. Neck pain    2. S/P spinal fusion    3. Chest pain          Relevant Medical History:         Past Medical History:   Diagnosis Date    Arthritis      Diabetes mellitus      Hypertension           Nutrition Diet History:     Factors affecting nutritional intake: Diabetic     Food / Episcopalian / Culture Preferences:        Nutrition Prescription Ordered:     Current Diet Order: Diabetic     Appetite:  good     PO intake: 75%        Labs / Medications / Procedures:     Nutrition Related Medications: Pantoprazole, glipizide, detemir     Nutrition Related Labs:    6/2: H/h-8.1/25.5, Bun-47.4, Crea-1.30, Gluc-143  6/9: No updated labs  6/16: CBG's 6/15-6/16: 111-284          Anthropometrics:  Height: 5' 2" (1.575 m)  Admit Weight:  Weight: 100 kg (220 lb 7.4 oz)  Latest Weight:  100 kg (220 lb 7.4 oz) - pt reports inaccurate         Wt Readings from Last 5 Encounters:   06/02/22 100 kg (220 lb 7.4 oz)   05/10/22 85.7 kg (188 lb 15 oz)      IBW: 50kg  %IBW: 200%  UBW: reports 170lb  %Weight Change: n/a  Body mass index is 40.32 kg/m².  BMI classification:  Obese Grade III (BMI >/= 40)        Nutrition Narrative:  6/2: Pt NPO for possible MRI. Recommend Diabetic diet as tolerated when medically feasible to begin oral diet.   6/9: Pt reports good intake on diabetic diet, requesting ONS. States UBS ~ 170lbs and that 220lb is not accurate.   6/16: Pt working with therapy at time of visit. Good intake noted.      Monitoring and Evaluation:     Nutrition Monitoring and Evaluation:  food and beverage intake     Nutrition Risk:  Level of Nutrition Risk:  Low  Frequency of Follow up:  Dietitian will f/up within 7 days.                         " - Continue home apixiban. Will hold with plts < 50K.

## 2022-12-30 NOTE — ASSESSMENT & PLAN NOTE
- Seen in ID clinic with Dr. Chatterjee prior to transplant   - Rec continuing doxycycline throughout transplant

## 2022-12-30 NOTE — ASSESSMENT & PLAN NOTE
- daily CBC  - transfuse for platelets <10K or bleeding  - stop apixaban when platelets <50K  - except further platelet decline following chemo and SCT

## 2022-12-30 NOTE — ASSESSMENT & PLAN NOTE
- Patient of Dr. King. Per most recent clinic note:   - 4/20/22: renal biopsy: light chain cast nephropathy, kappa light chain  - 4/26/22: right subclavian vein thrombus   - 4/27/22: M spike 0.2 with free monoclonal kappa band. B2mg 19.57, IgG 496, IgA 10, IgM <5. Kappa 36189, Lambda 7.9, ratio >1000  - 5/12/22: BMBx: plasma cell myeloma, marrow cellularity 100%, plasma cell percentage 100%. Plasma cell phenotype +, kappa +, CD20- -, CD30-, EMA-, SADAF-, [place] red negative. Peripheral blood with pancytopenia and no circulating blasts. Decreased storage iron. FISH canceled due to quantity not sufficient  - 5/18/22: CyBorD C1D1  - 5/23/22: rasburicase  - 5/24/22: Xgeva  - 6/6/22: Kappa 46793, lambda 4.0, ratio >1000, M spike 0.1 with free monoclonal kappa band present  - 6/6/22: CyBorD C2D1  - 6/9/22: BMBx Plasma cell neoplasm compatible with plasma cell myeloma. Extent of involvement 80-90% of bone marrow elements. Cytology: Plasmablastic. FISH cytogenetics positive for 1q21/CKS1B gain. Karyotype failed. Myelofibrosis diffuse (MF-3)  - 6/20/22: CyBorD C2D8 delayed due to covid  - 6/23/22: CyBOrD C2D11  - 7/5/22: C1D1 DVRd  - 7/7/22 - 7/18/22: hospital admission for septic shock resulting in renal failure  - 7/25/22: started again on DVRd  - 8/8/2022: kappa 1402.8, lambda 7.5, ratio 167.04. M spike 0.1, two faint restricted bands in gamma globulin regions.   - 10/10/22: C5D1 DVRd. Revlimid on hold for upcoming stem   - Admitted for Torie 140 auto SCT on 12/28/22 as above

## 2022-12-30 NOTE — ASSESSMENT & PLAN NOTE
- developed during ICU stay. Believed to be 2/2 hypoprofusion due to hypotension  - nephrology consulted on admission  - on dialysis MWF at home  - plan for dialysis today after 10am (must be 24hrs after chemotherapy at minimum) prior to SCT  - right chest wall dialysis catheter in place

## 2022-12-30 NOTE — PROGRESS NOTES
[facility]FF HEMODIALYSIS NOTE     Patient currently on hemodialysis for removal of uremic toxins and volume.     Patient seen and evaluated on hemodialysis, tolerating treatment, see HD flowsheet for vitals and assessments.    Labs have been reviewed and the dialysate bath has been adjusted.       Assessment/Plan:    -Patient seen on HD, tolerating treatment well, w/o complaints  -chemo last done on 12/29, HD today - 24 hours after chemo   -Plan for SCT today    -UF goal of 3L  -Renal diet, if not NPO   -Strict I/O's and daily weights  -Daily renal function panels  -Keep MAP >65 while on HD   -Hgb target 10-11, PRBC transfusion on 12/29  -Will continue to follow while inpatient     Nichelle Araujo DNP-FNP, C  Nephrology  Pager: [phone]

## 2022-12-30 NOTE — HOSPITAL COURSE
12/30/2022 Day 0 today of Torie 140 Auto SCT for MM. Tolerated melphalan yesterday without incident. Plan for dialysis today after 10am (must be 24hrs after chemotherapy at minimum). Will have SCT after completion of dialysis. Tentatively scheduled for 3pm. Will receive 6 bags of stem cells with CD34 dose of 3.13x10^6/kg. Patient denies any issues or concerns this AM. Afebrile, VSS  12/31/2022 Day 1 today of Torie 140 Auto SCT for MM. Yesterday following transplant he was ambulating around his room and felt dizzy that lead to a ground-level fall. He denies hitting his head or any other trauma. Reports that he does get dizziness after HD sessions with large volume removal such as yesterday. He was educated to notify the nurse today before getting out of bed to prevent further falls. Labs this morning show a mild leukocytosis at 15K. Patient and spouse updated regarding overall plan of care.   01/01/2023 Day 2 of Torie 140 Auto SCT for MM. No recurrent dizziness or falls over the past 24 hours. He has been vitally stable with no acute complaints. He responded well to his PRBC transfusion yesterday with a hemoglobin of 7.7 this AM. He was updated regarding the overall plan of care and was agreeable and understanding.   01/02/2023 Day +3 of Torie 140 Auto SCT for MM. No recurrent dizziness or falls over the past 24 hours. He has been vitally stable with patient reporting improvement diarrhea compared to yesterday afternoon but with continued nausea and vomiting. They report the Zofran has helped some since being scheduled Q8 but not to the point of allowing consistent oral intake. We discussed the plan for increasing to Q6 with the goal of small consistent intake throughout the day and were agreeable and understanding. Labs reviewed with stable anemia and plts at 71K. Calcium of 7.1 with plans for HD.  01/03/2023: Day +4 from a Torie 140 auto SCT for MM. Had repeat fall/syncopal episode in room yesterday. Occurred after dialysis.  900 ml fluid removed. Orthostatic BP positive. Suspect orthostasis resulted in syncope, but will get EEG out of abundance of caution. Plt 60K. Stopping Eliquis. CRISTHIAN hose ordered. Phos 5.3. Starting sevelamer. No episodes of loose stools since yesterday. Changed loperamide from scheduled to prn. Nausea better controlled with zofran ATC.  01/04/2023: Day +5 from a Torie 140 auto SCT fo MM. Remains afebrile. VSS. EEG still pending performance but suspect that syncopal episodes are due to orthostasis following dialysis. Patient reports experiencing dizziness at home following dialysis as well. Will receive dialysis today. Diarrhea improved. Nausea controlled with existing meds. Encouraged increased oral intake. Weight up 9 lbs from admission, but down the last 2 days, so will defer lasix for now--particularly as patient will be dialyzed today.  01/05/2023 Day +6 s/p Torie 140 Auto SCT for MM. Reports no syncope/dizziness this AM. Continues with nausea, controlled with scheduled zofran and prn compazine. Denies diarrhea. Still awaiting EEG but will discuss need given improved status. Encouraged increased PO intake as tolerated, especially fluids. Mild mucositis pain, using dukes. Afebrile, VSS  01/06/2023: Day +7 from a Torie 140 auto SCT for MM. ANC 0 today. Remains afebrile. VSS. Denies dizziness on exam this morning. Will maintain fall precautions given syncopal episodes/falls. Diarrhea and n/v stable. Main complaint today is esophagitis. Patient states that Duke's makes him nauseous. Changed Garvin's to PRN, and started prn oxy. Nurse will try to get chloraseptic spray from central supply.  01/07/2023 Day +8 following auto SCT conditioned with [id number] for MM. Diarrhea controlled with Lomotil. Nausea improved with the addition of Zyprexa. Continue Zofran PRN. He was more active yesterday.  01/08/2023 Day +9 following auto SCT for MM conditioned with [id number].  Diarrhea and nausea are now controlled.  He is frustrated with remote  monitoring as he would like to get up more frequently.  Will ask PT to re-evaluate tomorrow.  01/09/2023: Day +10 from a Torie 140 auto SCT for MM. Remains afebrile. VSS. Diarrhea and nausea stable. Dialysis planned for today. Will receive 1 unit prbc with dialysis for hgb of 6.9. ANC 10 today.  01/10/2023 Day +11 s/p Torie 140 Auto SCT for MM. No issues with dialysis yesterday. Patient reports feeling well overall. Appetite improving. Mildly sore throat. Denies n/v/d/c. Continue to work with PT s/p falls as patient desires to ambulate more but currently feel limited by telesitter and requirement to ask nursing staff for assistance. Replacing Mag cautiously today given ESRD for level of 1.5. Afebrile, VSS  01/11/2023: Day +12 from a Torie 140 auto SCT for MM. ANC up to 40. Will receive dialysis today. Feeling well. Afebrile. VSS. Main complaint is persistent mucositis which is making it difficult to eat. Still able to take in oral fluids. Replacing mag. Increased Ca+ frequency due to persistent hypocalcemia.  01/12/2023: Day +13 from a Torie 140 auto SCT for MM. ANC up to 350 today. Remains afebrile. VSS. Oral food intake remains poor 2/2 mucositis. Patient states that tongue ulcer is improving. He understands that oral intake is a contingency for discharge. Expect that he will engraft and be ready for discharge on Saturday. Will plan for discharge teaching this afternoon.  01/13/2023: Day +14 from a Torie 140 auto SCT for MM. Engrafted today with an ANC of 869. Remains afebrile. VSS. Tongue ulcer pain improving per patient. Will receive dialysis today. Transplant discharge teaching completed yesterday in anticipation of discharge tomorrow. Pharmacy teaching to be completed today.

## 2022-12-30 NOTE — NURSING
Pt completed HD. Tolerated well. VSS. NAD. Net 3 liters fluid removed. Blood returned. Lines disconnected, flushed with NS, heparin locked, clamped, and capped.    1328- report given to NIK Markham.    4234- pt brought back to room via wheelchair by RN.

## 2022-12-30 NOTE — PLAN OF CARE
Problem: Fluid Volume Excess (Chronic Kidney Disease)  Goal: Fluid Balance  Outcome: Ongoing, Progressing     Discussed fluid management with today's tx. Pt responded appropriately.

## 2022-12-30 NOTE — PLAN OF CARE
Plan of care reviewed with patient. Pt is day 0 of a Torie Auto. Afebrile. Free from falls or injury. No complaints of pain. NS infusing at 50. Zofran ODT given as scheduled. Dialysis today followed by the transplant. Bed locked in lowest position, non skid socks on, call light within reach. Pt instructed to call if any assistance is needed. Vitals stable. Wife at bedside. Will cont to toña pt.

## 2022-12-30 NOTE — SUBJECTIVE & OBJECTIVE
Subjective:     Interval History: Day 0 today of Torie 140 Auto SCT for MM. Tolerated melphalan yesterday without incident. Plan for dialysis today after 10am (must be 24hrs after chemotherapy at minimum). Will have SCT after completion of dialysis. Tentatively scheduled for 3pm. Will receive 6 bags of stem cells with CD34 dose of 3.13x10^6/kg. Patient denies any issues or concerns this AM. Afebrile, VSS    Objective:     Vital Signs (Most Recent):  Temp: 97.9 °F (36.6 °C) (12/30/22 0711)  Pulse: 65 (12/30/22 0711)  Resp: 16 (12/30/22 0711)  BP: 135/69 (12/30/22 0711)  SpO2: 100 % (12/30/22 0711) Vital Signs (24h Range):  Temp:  [97.4 °F (36.3 °C)-98.1 °F (36.7 °C)] 97.9 °F (36.6 °C)  Pulse:  [65-86] 65  Resp:  [16-18] 16  SpO2:  [92 %-100 %] 100 %  BP: (106-160)/(59-79) 135/69     Weight: 112 kg (246 lb 14.6 oz)  Body mass index is 34.44 kg/m².  Body surface area is 2.37 meters squared.      Intake/Output - Last 3 Shifts         12/28 0700  12/29 0659 12/29 0700  12/30 0659 12/30 0700  12/31 0659    P.O. 240 1400     I.V. (mL/kg) 820.6 (8.1) 1662 (14.8) 129.2 (1.2)    Blood  700     Total Intake(mL/kg) 1060.6 (10.5) 3762 (33.6) 129.2 (1.2)    Urine (mL/kg/hr) 550 1930 (0.7) 475 (2.1)    Stool  0     Total Output 550 1930 475    Net +510.6 +1832 -345.8           Urine Occurrence 1 x      Stool Occurrence  1 x             Physical Exam  Vitals and nursing note reviewed.   Constitutional:       Appearance: He is well-developed.   HENT:      Head: Normocephalic and atraumatic.      Mouth/Throat:      Pharynx: No oropharyngeal exudate.   Eyes:      General:         Right eye: No discharge.         Left eye: No discharge.      Conjunctiva/sclera: Conjunctivae normal.      Pupils: Pupils are equal, round, and reactive to light.   Cardiovascular:      Rate and Rhythm: Normal rate and regular rhythm.      Heart sounds: Normal heart sounds. No murmur heard.  Pulmonary:      Effort: Pulmonary effort is normal. No respiratory  distress.      Breath sounds: Normal breath sounds. No wheezing or rales.   Abdominal:      General: Bowel sounds are normal. There is no distension.      Palpations: Abdomen is soft.      Tenderness: There is no abdominal tenderness.   Musculoskeletal:         General: No deformity. Normal range of motion.      Cervical back: Normal range of motion and neck supple.   Skin:     General: Skin is warm and dry.      Findings: No erythema or rash.      Comments: Right chest wall dialysis catheter. Dressing c/d/i. No sign of infection to site.   Neurological:      Mental Status: He is alert and oriented to person, place, and time.   Psychiatric:         Behavior: Behavior normal.         Thought Content: Thought content normal.         Judgment: Judgment normal.       Significant Labs:   CBC:   Recent Labs   Lab 12/28/22  1259 12/29/22  0440 12/30/22  0412   WBC 5.52 3.34* 9.07   HGB 7.7* 6.5* 7.5*   HCT 23.5* 20.8* 22.9*   * 97* 117*    and CMP:   Recent Labs   Lab 12/28/22  1259 12/29/22  0440 12/30/22  0412    140 140   K 3.3* 4.3 4.5    110 113*   CO2 24 19* 19*   GLU 91 85 102   BUN 18 27* 44*   CREATININE 2.9* 4.3* 5.4*   CALCIUM 8.7 8.2* 8.3*   PROT 6.1 5.1* 5.5*   ALBUMIN 3.8 3.3* 3.6   BILITOT 1.1* 1.2* 0.8   ALKPHOS 114 83 90   AST 11 8* 10   ALT 11 8* 11   ANIONGAP 8 11 8       Diagnostic Results:  I have reviewed all pertinent imaging results/findings within the past 24 hours.

## 2022-12-30 NOTE — PLAN OF CARE
Pt involved in plan of care and communicating needs throughout shift. Day -1 for a Torie Auto SCT. Melphalan administered this am, pt compliant with cryotherapy before, during, and post chemo infusion. 1 u RBCs transfused, pt tolerated well. Plan for HD tomorrow am followed by SCT tomorrow afternoon. Up in room and to bathroom independently; no c/o pain. Tolerating diet, voiding without difficulty.  Electrolytes replaced PRN as per protocol. All VSS; no acute events so far this shift.  Pt remaining free from falls or injury throughout shift; bed locked and in lowest position; call light within reach.  Pt instructed to call for assistance as needed.  Q1H rounding done on pt.

## 2022-12-30 NOTE — PROGRESS NOTES
[facility] - Oncology ([facility])  Hematology  Bone Marrow Transplant  Progress Note    Patient Name: De Lakhani  Admission Date: 12/28/2022  Hospital Length of Stay: 2 days  Code Status: Full Code    Subjective:     Interval History: Day 0 today of Torie 140 Auto SCT for MM. Tolerated melphalan yesterday without incident. Plan for dialysis today after 10am (must be 24hrs after chemotherapy at minimum). Will have SCT after completion of dialysis. Tentatively scheduled for 3pm. Will receive 6 bags of stem cells with CD34 dose of 3.13x10^6/kg. Patient denies any issues or concerns this AM. Afebrile, VSS    Objective:     Vital Signs (Most Recent):  Temp: 97.9 °F (36.6 °C) (12/30/22 0711)  Pulse: 65 (12/30/22 0711)  Resp: 16 (12/30/22 0711)  BP: 135/69 (12/30/22 0711)  SpO2: 100 % (12/30/22 0711) Vital Signs (24h Range):  Temp:  [97.4 °F (36.3 °C)-98.1 °F (36.7 °C)] 97.9 °F (36.6 °C)  Pulse:  [65-86] 65  Resp:  [16-18] 16  SpO2:  [92 %-100 %] 100 %  BP: (106-160)/(59-79) 135/69     Weight: 112 kg (246 lb 14.6 oz)  Body mass index is 34.44 kg/m².  Body surface area is 2.37 meters squared.      Intake/Output - Last 3 Shifts         12/28 0700 12/29 0659 12/29 0700 12/30 0659 12/30 0700 12/31 0659    P.O. 240 1400     I.V. (mL/kg) 820.6 (8.1) 1662 (14.8) 129.2 (1.2)    Blood  700     Total Intake(mL/kg) 1060.6 (10.5) 3762 (33.6) 129.2 (1.2)    Urine (mL/kg/hr) 550 1930 (0.7) 475 (2.1)    Stool  0     Total Output 550 1930 475    Net +510.6 +1832 -345.8           Urine Occurrence 1 x      Stool Occurrence  1 x             Physical Exam  Vitals and nursing note reviewed.   Constitutional:       Appearance: He is well-developed.   HENT:      Head: Normocephalic and atraumatic.      Mouth/Throat:      Pharynx: No oropharyngeal exudate.   Eyes:      General:         Right eye: No discharge.         Left eye: No discharge.      Conjunctiva/sclera: Conjunctivae normal.      Pupils: Pupils are equal, round, and reactive to  light.   Cardiovascular:      Rate and Rhythm: Normal rate and regular rhythm.      Heart sounds: Normal heart sounds. No murmur heard.  Pulmonary:      Effort: Pulmonary effort is normal. No respiratory distress.      Breath sounds: Normal breath sounds. No wheezing or rales.   Abdominal:      General: Bowel sounds are normal. There is no distension.      Palpations: Abdomen is soft.      Tenderness: There is no abdominal tenderness.   Musculoskeletal:         General: No deformity. Normal range of motion.      Cervical back: Normal range of motion and neck supple.   Skin:     General: Skin is warm and dry.      Findings: No erythema or rash.      Comments: Right chest wall dialysis catheter. Dressing c/d/i. No sign of infection to site.   Neurological:      Mental Status: He is alert and oriented to person, place, and time.   Psychiatric:         Behavior: Behavior normal.         Thought Content: Thought content normal.         Judgment: Judgment normal.       Significant Labs:   CBC:   Recent Labs   Lab 12/28/22  1259 12/29/22  0440 12/30/22  0412   WBC 5.52 3.34* 9.07   HGB 7.7* 6.5* 7.5*   HCT 23.5* 20.8* 22.9*   * 97* 117*    and CMP:   Recent Labs   Lab 12/28/22  1259 12/29/22  0440 12/30/22  0412    140 140   K 3.3* 4.3 4.5    110 113*   CO2 24 19* 19*   GLU 91 85 102   BUN 18 27* 44*   CREATININE 2.9* 4.3* 5.4*   CALCIUM 8.7 8.2* 8.3*   PROT 6.1 5.1* 5.5*   ALBUMIN 3.8 3.3* 3.6   BILITOT 1.1* 1.2* 0.8   ALKPHOS 114 83 90   AST 11 8* 10   ALT 11 8* 11   ANIONGAP 8 11 8       Diagnostic Results:  I have reviewed all pertinent imaging results/findings within the past 24 hours.    Assessment/Plan:     * Stem cell transplant candidate  - Patient of Dr. King  - Diagnosed with MM in April 2022. Underwent 2 cycles CyBorD then transitioned to DVRd (now s/p C6)  - Admitted 12/28/22 for Torie 140 auto SCT   - Today is Day 0  - Completed [id number] without issue  - Transplant is scheduled  for today 12/30/2022, at 3p.m. He will be receiving 6 bags with a total CD34 dose of 3.13 x10^6/kg.    Planned conditioning regimen:  Melphalan on Day -1     Antimicrobial Prophylaxis:  Acyclovir starting on Day -1  Levofloxacin starting on Day -1  Fluconazole starting on Day -1     Growth Factor Support:  Neupogen starting on Day +7      Caregiver: Wife  Post-transplant discharge plans: Javed Garcia    Multiple myeloma not having achieved remission  - Patient of Dr. King. Per most recent clinic note:   - 4/20/22: renal biopsy: light chain cast nephropathy, kappa light chain  - 4/26/22: right subclavian vein thrombus   - 4/27/22: M spike 0.2 with free monoclonal kappa band. B2mg 19.57, IgG 496, IgA 10, IgM <5. Kappa 09290, Lambda 7.9, ratio >1000  - 5/12/22: BMBx: plasma cell myeloma, marrow cellularity 100%, plasma cell percentage 100%. Plasma cell phenotype +, kappa +, CD20- -, CD30-, EMA-, SADAF-, [place] red negative. Peripheral blood with pancytopenia and no circulating blasts. Decreased storage iron. FISH canceled due to quantity not sufficient  - 5/18/22: CyBorD C1D1  - 5/23/22: rasburicase  - 5/24/22: Xgeva  - 6/6/22: Kappa 53855, lambda 4.0, ratio >1000, M spike 0.1 with free monoclonal kappa band present  - 6/6/22: CyBorD C2D1  - 6/9/22: BMBx Plasma cell neoplasm compatible with plasma cell myeloma. Extent of involvement 80-90% of bone marrow elements. Cytology: Plasmablastic. FISH cytogenetics positive for 1q21/CKS1B gain. Karyotype failed. Myelofibrosis diffuse (MF-3)  - 6/20/22: CyBorD C2D8 delayed due to covid  - 6/23/22: CyBOrD C2D11  - 7/5/22: C1D1 DVRd  - 7/7/22 - 7/18/22: hospital admission for septic shock resulting in renal failure  - 7/25/22: started again on DVRd  - 8/8/2022: kappa 1402.8, lambda 7.5, ratio 167.04. M spike 0.1, two faint restricted bands in gamma globulin regions.   - 10/10/22: C5D1 DVRd. Revlimid on hold for upcoming stem   - Admitted for Torie 140 auto SCT on  12/28/22 as above    Thrombocytopenia  - daily CBC  - transfuse for platelets <10K or bleeding  - stop apixaban when platelets <50K  - except further platelet decline following chemo and SCT    Anemia in neoplastic disease  - transfuse for Hgb <7  - daily CBC  - expect pancytopenia following chemo and SCT    Hyperlipidemia  - Will hold home statin while inpatient    Depression  - Continue home Zoloft    Deep vein thrombosis (DVT) of upper extremity  - Continue home apixiban. Will hold with plts < 50K.    Hypertension  - Continue home metoprolol    End stage renal disease  - developed during ICU stay. Believed to be 2/2 hypoprofusion due to hypotension  - nephrology consulted on admission  - on dialysis MWF at home  - plan for dialysis today after 10am (must be 24hrs after chemotherapy at minimum) prior to SCT  - right chest wall dialysis catheter in place    Dependence on hemodialysis  - see ESRD    Chronic infection of prosthetic knee  - Seen in ID clinic with Dr. Chatterjee prior to transplant   - Rec continuing doxycycline throughout transplant         VTE Risk Mitigation (From admission, onward)         Ordered     apixaban tablet 5 mg  2 times daily         12/28/22 1659     heparin (porcine) injection 1,600 Units  As needed (PRN)         12/28/22 1825     IP VTE HIGH RISK PATIENT  Once         12/28/22 1659     Place sequential compression device  Until discontinued         12/28/22 1659                Disposition: Remains inpatient    Lisa Kaplan NP  Bone Marrow Transplant  [facility] - [facility] ([facility])

## 2022-12-31 LAB
ALBUMIN SERPL BCP-MCNC: 3.3 G/DL (ref 3.5–5.2)
ALP SERPL-CCNC: 79 U/L (ref 55–135)
ALT SERPL W/O P-5'-P-CCNC: 9 U/L (ref 10–44)
ANION GAP SERPL CALC-SCNC: 12 MMOL/L (ref 8–16)
AST SERPL-CCNC: 24 U/L (ref 10–40)
BASOPHILS # BLD AUTO: 0.01 K/UL (ref 0–0.2)
BASOPHILS NFR BLD: 0.1 % (ref 0–1.9)
BILIRUB SERPL-MCNC: 0.7 MG/DL (ref 0.1–1)
BLD PROD TYP BPU: NORMAL
BLOOD UNIT EXPIRATION DATE: NORMAL
BLOOD UNIT TYPE CODE: 5100
BLOOD UNIT TYPE: NORMAL
BUN SERPL-MCNC: 36 MG/DL (ref 6–20)
C DIFF GDH STL QL: NEGATIVE
C DIFF TOX A+B STL QL IA: NEGATIVE
CALCIUM SERPL-MCNC: 7.1 MG/DL (ref 8.7–10.5)
CHLORIDE SERPL-SCNC: 105 MMOL/L (ref 95–110)
CO2 SERPL-SCNC: 21 MMOL/L (ref 23–29)
CODING SYSTEM: NORMAL
CREAT SERPL-MCNC: 4.4 MG/DL (ref 0.5–1.4)
DIFFERENTIAL METHOD: ABNORMAL
DISPENSE STATUS: NORMAL
EOSINOPHIL # BLD AUTO: 0 K/UL (ref 0–0.5)
EOSINOPHIL NFR BLD: 0 % (ref 0–8)
ERYTHROCYTE [DISTWIDTH] IN BLOOD BY AUTOMATED COUNT: 20.5 % (ref 11.5–14.5)
EST. GFR  (NO RACE VARIABLE): 14.7 ML/MIN/1.73 M^2
GLUCOSE SERPL-MCNC: 99 MG/DL (ref 70–110)
HCT VFR BLD AUTO: 20.8 % (ref 40–54)
HGB BLD-MCNC: 6.8 G/DL (ref 14–18)
IMM GRANULOCYTES # BLD AUTO: 0.06 K/UL (ref 0–0.04)
IMM GRANULOCYTES NFR BLD AUTO: 0.4 % (ref 0–0.5)
LYMPHOCYTES # BLD AUTO: 0.1 K/UL (ref 1–4.8)
LYMPHOCYTES NFR BLD: 0.3 % (ref 18–48)
MAGNESIUM SERPL-MCNC: 1.7 MG/DL (ref 1.6–2.6)
MCH RBC QN AUTO: 32.9 PG (ref 27–31)
MCHC RBC AUTO-ENTMCNC: 32.7 G/DL (ref 32–36)
MCV RBC AUTO: 101 FL (ref 82–98)
MONOCYTES # BLD AUTO: 0.2 K/UL (ref 0.3–1)
MONOCYTES NFR BLD: 1.5 % (ref 4–15)
NEUTROPHILS # BLD AUTO: 15.4 K/UL (ref 1.8–7.7)
NEUTROPHILS NFR BLD: 97.7 % (ref 38–73)
NRBC BLD-RTO: 0 /100 WBC
NUM UNITS TRANS PACKED RBC: NORMAL
PHOSPHATE SERPL-MCNC: 4.8 MG/DL (ref 2.7–4.5)
PLATELET # BLD AUTO: 104 K/UL (ref 150–450)
PMV BLD AUTO: 10.8 FL (ref 9.2–12.9)
POTASSIUM SERPL-SCNC: 4.2 MMOL/L (ref 3.5–5.1)
PROT SERPL-MCNC: 5 G/DL (ref 6–8.4)
RBC # BLD AUTO: 2.07 M/UL (ref 4.6–6.2)
SODIUM SERPL-SCNC: 138 MMOL/L (ref 136–145)
WBC # BLD AUTO: 15.78 K/UL (ref 3.9–12.7)

## 2022-12-31 PROCEDURE — 20600001 HC STEP DOWN PRIVATE ROOM

## 2022-12-31 PROCEDURE — 87324 CLOSTRIDIUM AG IA: CPT | Performed by: STUDENT IN AN ORGANIZED HEALTH CARE EDUCATION/TRAINING PROGRAM

## 2022-12-31 PROCEDURE — 99233 SBSQ HOSP IP/OBS HIGH 50: CPT | Mod: ,,, | Performed by: INTERNAL MEDICINE

## 2022-12-31 PROCEDURE — 85025 COMPLETE CBC W/AUTO DIFF WBC: CPT | Performed by: NURSE PRACTITIONER

## 2022-12-31 PROCEDURE — 25000003 PHARM REV CODE 250: Performed by: INTERNAL MEDICINE

## 2022-12-31 PROCEDURE — P9040 RBC LEUKOREDUCED IRRADIATED: HCPCS | Performed by: STUDENT IN AN ORGANIZED HEALTH CARE EDUCATION/TRAINING PROGRAM

## 2022-12-31 PROCEDURE — 99233 PR SUBSEQUENT HOSPITAL CARE,LEVL III: ICD-10-PCS | Mod: ,,, | Performed by: INTERNAL MEDICINE

## 2022-12-31 PROCEDURE — 84100 ASSAY OF PHOSPHORUS: CPT | Performed by: NURSE PRACTITIONER

## 2022-12-31 PROCEDURE — 80053 COMPREHEN METABOLIC PANEL: CPT | Performed by: NURSE PRACTITIONER

## 2022-12-31 PROCEDURE — 25000003 PHARM REV CODE 250: Performed by: NURSE PRACTITIONER

## 2022-12-31 PROCEDURE — 27000207 HC ISOLATION

## 2022-12-31 PROCEDURE — 94761 N-INVAS EAR/PLS OXIMETRY MLT: CPT

## 2022-12-31 PROCEDURE — 83735 ASSAY OF MAGNESIUM: CPT | Performed by: NURSE PRACTITIONER

## 2022-12-31 PROCEDURE — 36430 TRANSFUSION BLD/BLD COMPNT: CPT

## 2022-12-31 RX ORDER — HYDROCODONE BITARTRATE AND ACETAMINOPHEN 500; 5 MG/1; MG/1
TABLET ORAL
Status: DISCONTINUED | OUTPATIENT
Start: 2022-12-31 | End: 2023-01-03

## 2022-12-31 RX ADMIN — Medication 1 DOSE: at 08:12

## 2022-12-31 RX ADMIN — ONDANSETRON 8 MG: 8 TABLET, ORALLY DISINTEGRATING ORAL at 09:12

## 2022-12-31 RX ADMIN — PANTOPRAZOLE SODIUM 40 MG: 40 TABLET, DELAYED RELEASE ORAL at 08:12

## 2022-12-31 RX ADMIN — APIXABAN 5 MG: 5 TABLET, FILM COATED ORAL at 08:12

## 2022-12-31 RX ADMIN — Medication 1 DOSE: at 09:12

## 2022-12-31 RX ADMIN — DOXYCYCLINE HYCLATE 100 MG: 100 TABLET, COATED ORAL at 09:12

## 2022-12-31 RX ADMIN — CHOLECALCIFEROL TAB 25 MCG (1000 UNIT) 3000 UNITS: 25 TAB at 08:12

## 2022-12-31 RX ADMIN — MUPIROCIN: 20 OINTMENT TOPICAL at 09:12

## 2022-12-31 RX ADMIN — ACYCLOVIR 400 MG: 200 CAPSULE ORAL at 08:12

## 2022-12-31 RX ADMIN — Medication 1 DOSE: at 04:12

## 2022-12-31 RX ADMIN — ONDANSETRON 8 MG: 8 TABLET, ORALLY DISINTEGRATING ORAL at 05:12

## 2022-12-31 RX ADMIN — METOPROLOL SUCCINATE 25 MG: 25 TABLET, EXTENDED RELEASE ORAL at 08:12

## 2022-12-31 RX ADMIN — OLANZAPINE 5 MG: 2.5 TABLET, FILM COATED ORAL at 08:12

## 2022-12-31 RX ADMIN — SCOPALAMINE 1 PATCH: 1 PATCH, EXTENDED RELEASE TRANSDERMAL at 04:12

## 2022-12-31 RX ADMIN — ONDANSETRON 8 MG: 8 TABLET, ORALLY DISINTEGRATING ORAL at 12:12

## 2022-12-31 RX ADMIN — CALCIUM CARBONATE (ANTACID) CHEW TAB 500 MG 1000 MG: 500 CHEW TAB at 08:12

## 2022-12-31 RX ADMIN — SERTRALINE HYDROCHLORIDE 50 MG: 50 TABLET ORAL at 08:12

## 2022-12-31 RX ADMIN — OLANZAPINE 5 MG: 2.5 TABLET, FILM COATED ORAL at 09:12

## 2022-12-31 RX ADMIN — LEVOFLOXACIN 250 MG: 250 TABLET, FILM COATED ORAL at 08:12

## 2022-12-31 RX ADMIN — FLUCONAZOLE 200 MG: 200 TABLET ORAL at 08:12

## 2022-12-31 RX ADMIN — ACYCLOVIR 400 MG: 200 CAPSULE ORAL at 09:12

## 2022-12-31 RX ADMIN — APIXABAN 5 MG: 5 TABLET, FILM COATED ORAL at 09:12

## 2022-12-31 RX ADMIN — DIPHENHYDRAMINE HYDROCHLORIDE 25 MG: 25 CAPSULE ORAL at 07:12

## 2022-12-31 RX ADMIN — MUPIROCIN: 20 OINTMENT TOPICAL at 08:12

## 2022-12-31 RX ADMIN — DOXYCYCLINE HYCLATE 100 MG: 100 TABLET, COATED ORAL at 08:12

## 2022-12-31 RX ADMIN — Medication 1 DOSE: at 12:12

## 2022-12-31 NOTE — PROGRESS NOTES
Braden Cramer - Oncology ([facility])  Hematology  Bone Marrow Transplant  Progress Note    Patient Name: De Lakhani  Admission Date: 12/28/2022  Hospital Length of Stay: 3 days  Code Status: Full Code    Subjective:     Interval History: Day 1 today of Torie 140 Auto SCT for MM. Yesterday following transplant he was ambulating around his room and felt dizzy that lead to a ground-level fall. He denies hitting his head or any other trauma. Reports that he does get dizziness after HD sessions with large volume removal such as yesterday. He was educated to notify the nurse today before getting out of bed to prevent further falls. Labs this morning show a mild leukocytosis at 15K. Patient and spouse updated regarding overall plan of care.     Objective:     Vital Signs (Most Recent):  Temp: 97.8 °F (36.6 °C) (12/31/22 0324)  Pulse: 70 (12/31/22 0723)  Resp: 18 (12/31/22 0723)  BP: 120/67 (12/31/22 0723)  SpO2: 100 % (12/31/22 0723) Vital Signs (24h Range):  Temp:  [97.7 °F (36.5 °C)-98.4 °F (36.9 °C)] 97.8 °F (36.6 °C)  Pulse:  [] 70  Resp:  [15-30] 18  SpO2:  [89 %-100 %] 100 %  BP: ()/(42-88) 120/67     Weight: 112 kg (246 lb 14.6 oz)  Body mass index is 34.44 kg/m².  Body surface area is 2.37 meters squared.      Intake/Output - Last 3 Shifts         12/29 0700 12/30 0659 12/30 0700 12/31 0659 12/31 0700 01/01 0659    P.O. 1400 1140     I.V. (mL/kg) 1662 (14.8) 129.2 (1.2)     Blood 700 360     Other  600     IV Piggyback  492.5     Total Intake(mL/kg) 3762 (33.6) 2721.7 (24.3)     Urine (mL/kg/hr) 1930 (0.7) 575 (0.2)     Other  3600     Stool 0      Total Output 1930 4175     Net +1832 -1453.3            Urine Occurrence   1 x    Stool Occurrence 1 x  1 x            Physical Exam  Vitals and nursing note reviewed.   Constitutional:       Appearance: He is well-developed.   HENT:      Head: Normocephalic and atraumatic.      Mouth/Throat:      Pharynx: No oropharyngeal exudate.   Eyes:      General:          Right eye: No discharge.         Left eye: No discharge.      Conjunctiva/sclera: Conjunctivae normal.      Pupils: Pupils are equal, round, and reactive to light.   Cardiovascular:      Rate and Rhythm: Normal rate and regular rhythm.      Heart sounds: Normal heart sounds. No murmur heard.  Pulmonary:      Effort: Pulmonary effort is normal. No respiratory distress.      Breath sounds: Normal breath sounds. No wheezing or rales.   Abdominal:      General: Bowel sounds are normal. There is no distension.      Palpations: Abdomen is soft.      Tenderness: There is no abdominal tenderness.   Musculoskeletal:         General: No deformity. Normal range of motion.      Cervical back: Normal range of motion and neck supple.   Skin:     General: Skin is warm and dry.      Findings: No erythema or rash.      Comments: Right chest wall dialysis catheter. Dressing c/d/i. No sign of infection to site.   Neurological:      Mental Status: He is alert and oriented to person, place, and time.   Psychiatric:         Behavior: Behavior normal.         Thought Content: Thought content normal.         Judgment: Judgment normal.       Significant Labs:   CBC:   Recent Labs   Lab 12/30/22  0412 12/31/22  0328   WBC 9.07 15.78*   HGB 7.5* 6.8*   HCT 22.9* 20.8*   * 104*      and CMP:   Recent Labs   Lab 12/30/22  0412 12/31/22  0328    138   K 4.5 4.2   * 105   CO2 19* 21*    99   BUN 44* 36*   CREATININE 5.4* 4.4*   CALCIUM 8.3* 7.1*   PROT 5.5* 5.0*   ALBUMIN 3.6 3.3*   BILITOT 0.8 0.7   ALKPHOS 90 79   AST 10 24   ALT 11 9*   ANIONGAP 8 12         Diagnostic Results:  I have reviewed all pertinent imaging results/findings within the past 24 hours.    Assessment/Plan:     * History of autologous stem cell transplant  - Patient of Dr. King  - Diagnosed with MM in April 2022. Underwent 2 cycles CyBorD then transitioned to DVRd (now s/p C6)  - Admitted 12/28/22 for Torie 140 auto SCT   - Today is Day +1  -  Completed [id number]/29 without issue  - Transplant is scheduled for today 12/30/2022, at 3p.m. He will be receiving 6 bags with a total CD34 dose of 3.13 x10^6/kg.    Planned conditioning regimen:  Melphalan on Day -1     Antimicrobial Prophylaxis:  Acyclovir starting on Day -1  Levofloxacin starting on Day -1  Fluconazole starting on Day -1     Growth Factor Support:  Neupogen starting on Day +7      Caregiver: Wife  Post-transplant discharge plans: [facility]    Thrombocytopenia  - daily CBC  - transfuse for platelets <10K or bleeding  - stop apixaban when platelets <50K  - except further platelet decline following chemo and SCT    Anemia in neoplastic disease  - transfuse for Hgb <7, Hemoglobin of 6.8 on 12/31 prompting PRBC transfusion  - daily CBC  - expect pancytopenia following chemo and SCT    Hyperlipidemia  - Will hold home statin while inpatient    Depression  - Continue home Zoloft    Deep vein thrombosis (DVT) of upper extremity  - Continue home apixiban. Will hold with plts < 50K.    Hypertension  - Continue home metoprolol    End stage renal disease  - developed during ICU stay. Believed to be 2/2 hypoprofusion due to hypotension  - nephrology consulted on admission  - on dialysis MWF at home  - right chest wall dialysis catheter in place    Dependence on hemodialysis  - see ESRD    Chronic infection of prosthetic knee  - Seen in ID clinic with Dr. Chatterjee prior to transplant   - Rec continuing doxycycline throughout transplant     Multiple myeloma not having achieved remission  - Patient of Dr. King. Per most recent clinic note:   - 4/20/22: renal biopsy: light chain cast nephropathy, kappa light chain  - 4/26/22: right subclavian vein thrombus   - 4/27/22: M spike 0.2 with free monoclonal kappa band. B2mg 19.57, IgG 496, IgA 10, IgM <5. Kappa 17905, Lambda 7.9, ratio >1000  - 5/12/22: BMBx: plasma cell myeloma, marrow cellularity 100%, plasma cell percentage 100%. Plasma cell phenotype  +, kappa +, CD20- -, CD30-, EMA-, SADAF-, Congo red negative. Peripheral blood with pancytopenia and no circulating blasts. Decreased storage iron. FISH canceled due to quantity not sufficient  - 5/18/22: CyBorD C1D1  - 5/23/22: rasburicase  - 5/24/22: Xgeva  - 6/6/22: Kappa 38635, lambda 4.0, ratio >1000, M spike 0.1 with free monoclonal kappa band present  - 6/6/22: CyBorD C2D1  - 6/9/22: BMBx Plasma cell neoplasm compatible with plasma cell myeloma. Extent of involvement 80-90% of bone marrow elements. Cytology: Plasmablastic. FISH cytogenetics positive for 1q21/CKS1B gain. Karyotype failed. Myelofibrosis diffuse (MF-3)  - 6/20/22: CyBorD C2D8 delayed due to covid  - 6/23/22: CyBOrD C2D11  - 7/5/22: C1D1 DVRd  - 7/7/22 - 7/18/22: hospital admission for septic shock resulting in renal failure  - 7/25/22: started again on DVRd  - 8/8/2022: kappa 1402.8, lambda 7.5, ratio 167.04. M spike 0.1, two faint restricted bands in gamma globulin regions.   - 10/10/22: C5D1 DVRd. Revlimid on hold for upcoming stem   - Admitted for Torie 140 auto SCT on 12/28/22 as above        VTE Risk Mitigation (From admission, onward)         Ordered     heparin (porcine) injection 1,000 Units  As needed (PRN)         12/30/22 1243     apixaban tablet 5 mg  2 times daily         12/28/22 1659     heparin (porcine) injection 1,600 Units  As needed (PRN)         12/28/22 1825     IP VTE HIGH RISK PATIENT  Once         12/28/22 1659     Place sequential compression device  Until discontinued         12/28/22 1659                Disposition: Remain on BMT Service    Jaun Rowan, DO  Bone Marrow Transplant  Braden juany - Oncology ([facility])

## 2022-12-31 NOTE — ASSESSMENT & PLAN NOTE
- Patient of Dr. King  - Diagnosed with MM in April 2022. Underwent 2 cycles CyBorD then transitioned to DVRd (now s/p C6)  - Admitted 12/28/22 for Torie 140 auto SCT   - Today is Day +1  - Completed [id number]/29 without issue  - Transplant is scheduled for today 12/30/2022, at 3p.m. He will be receiving 6 bags with a total CD34 dose of 3.13 x10^6/kg.    Planned conditioning regimen:  Melphalan on Day -1     Antimicrobial Prophylaxis:  Acyclovir starting on Day -1  Levofloxacin starting on Day -1  Fluconazole starting on Day -1     Growth Factor Support:  Neupogen starting on Day +7      Caregiver: Wife  Post-transplant discharge plans: [facility]

## 2022-12-31 NOTE — PLAN OF CARE
Plan of care reviewed with patient. Pt is day +1 of a Torie Auto. Afebrile. Pt fell in room. Wife witnessed. Fall protocol initiated. (See notes) Bed alarm on. Avasys camera in room. No complaints of pain. Two 500 cc boluses given. Zofran ODT given as scheduled. Bed locked in lowest position, non skid socks on, call light within reach. Pt instructed to call if any assistance is needed. Vitals stable. Wife at bedside. Will cont to toña pt.

## 2022-12-31 NOTE — PLAN OF CARE
Day +1 Torie Auto. POC reviewed with patient; understanding verbalized. 1U RBC administered this shift. Bed alarm and Avasys camera in place. Regular diet with good appetite. Pt voids yellow urine via the urinal. Four loose stools this shift; CDiff collected. Wife to remain at the bedside. Pt. with nonskid footwear on, bed in lowest position, and locked with bed rails up x2. Pt. has call light and personal items within reach. VSS and afebrile this shift. All questions and concerns addressed at this time. Will continue to monitor.

## 2022-12-31 NOTE — PROGRESS NOTES
12/30/22 1930   Vital Signs   Temp (S)  98.2 °F (36.8 °C)   Pulse (S)  89   Resp (S)  18   SpO2 (S)  96 %   Device (Oxygen Therapy) (S)  room air   BP (!) (S)  106/59   MAP (mmHg) (S)  78   BP Location (S)  Left arm     Notifed by NIK Munoz that pt had an unwittnessed fall. Assessed pt at bedside. Pt stated that he had ambulated to thermostat and all of the sudden just fell. He denied any blacking out, dizziness, feeling faint, or SOB. Pt stated that he did not hit his head. Pt's wife is at bedside and said that she looked up and saw pt slowlly fall to floor, and that the medication cabinet had helped ease his fall. Vital signs taken and WNL. Pt stated that he hit his left side- no lacerations or bruising noted. Discussed with pt fall precautions and need to call for assistance before getting out of bed. Also asked pt to notify us if he starts to feel any pain or changes. Bed low and locked, non skid socks applied, overhead light was on, and call light was within reach at time of fall. Notified Dr. Rowan, BMT fellow on call. No new orders written at this time.

## 2022-12-31 NOTE — ASSESSMENT & PLAN NOTE
- transfuse for Hgb <7, Hemoglobin of 6.8 on 12/31 prompting PRBC transfusion  - daily CBC  - expect pancytopenia following chemo and SCT

## 2022-12-31 NOTE — SUBJECTIVE & OBJECTIVE
Subjective:     Interval History: Day 1 today of Torie 140 Auto SCT for MM. Yesterday following transplant he was ambulating around his room and felt dizzy that lead to a ground-level fall. He denies hitting his head or any other trauma. Reports that he does get dizziness after HD sessions with large volume removal such as yesterday. He was educated to notify the nurse today before getting out of bed to prevent further falls. Labs this morning show a mild leukocytosis at 15K. Patient and spouse updated regarding overall plan of care.     Objective:     Vital Signs (Most Recent):  Temp: 97.8 °F (36.6 °C) (12/31/22 0324)  Pulse: 70 (12/31/22 0723)  Resp: 18 (12/31/22 0723)  BP: 120/67 (12/31/22 0723)  SpO2: 100 % (12/31/22 0723) Vital Signs (24h Range):  Temp:  [97.7 °F (36.5 °C)-98.4 °F (36.9 °C)] 97.8 °F (36.6 °C)  Pulse:  [] 70  Resp:  [15-30] 18  SpO2:  [89 %-100 %] 100 %  BP: ()/(42-88) 120/67     Weight: 112 kg (246 lb 14.6 oz)  Body mass index is 34.44 kg/m².  Body surface area is 2.37 meters squared.      Intake/Output - Last 3 Shifts         12/29 0700  12/30 0659 12/30 0700 12/31 0659 12/31 0700 01/01 0659    P.O. 1400 1140     I.V. (mL/kg) 1662 (14.8) 129.2 (1.2)     Blood 700 360     Other  600     IV Piggyback  492.5     Total Intake(mL/kg) 3762 (33.6) 2721.7 (24.3)     Urine (mL/kg/hr) 1930 (0.7) 575 (0.2)     Other  3600     Stool 0      Total Output 1930 4175     Net +1832 -1453.3            Urine Occurrence   1 x    Stool Occurrence 1 x  1 x            Physical Exam  Vitals and nursing note reviewed.   Constitutional:       Appearance: He is well-developed.   HENT:      Head: Normocephalic and atraumatic.      Mouth/Throat:      Pharynx: No oropharyngeal exudate.   Eyes:      General:         Right eye: No discharge.         Left eye: No discharge.      Conjunctiva/sclera: Conjunctivae normal.      Pupils: Pupils are equal, round, and reactive to light.   Cardiovascular:      Rate and  Rhythm: Normal rate and regular rhythm.      Heart sounds: Normal heart sounds. No murmur heard.  Pulmonary:      Effort: Pulmonary effort is normal. No respiratory distress.      Breath sounds: Normal breath sounds. No wheezing or rales.   Abdominal:      General: Bowel sounds are normal. There is no distension.      Palpations: Abdomen is soft.      Tenderness: There is no abdominal tenderness.   Musculoskeletal:         General: No deformity. Normal range of motion.      Cervical back: Normal range of motion and neck supple.   Skin:     General: Skin is warm and dry.      Findings: No erythema or rash.      Comments: Right chest wall dialysis catheter. Dressing c/d/i. No sign of infection to site.   Neurological:      Mental Status: He is alert and oriented to person, place, and time.   Psychiatric:         Behavior: Behavior normal.         Thought Content: Thought content normal.         Judgment: Judgment normal.       Significant Labs:   CBC:   Recent Labs   Lab 12/30/22 0412 12/31/22  0328   WBC 9.07 15.78*   HGB 7.5* 6.8*   HCT 22.9* 20.8*   * 104*      and CMP:   Recent Labs   Lab 12/30/22 0412 12/31/22  0328    138   K 4.5 4.2   * 105   CO2 19* 21*    99   BUN 44* 36*   CREATININE 5.4* 4.4*   CALCIUM 8.3* 7.1*   PROT 5.5* 5.0*   ALBUMIN 3.6 3.3*   BILITOT 0.8 0.7   ALKPHOS 90 79   AST 10 24   ALT 11 9*   ANIONGAP 8 12         Diagnostic Results:  I have reviewed all pertinent imaging results/findings within the past 24 hours.

## 2022-12-31 NOTE — CARE UPDATE
RAPID RESPONSE NURSE PROACTIVE ROUNDING NOTE       Time of Visit: 2315    Admit Date: 2022  LOS: 3  Code Status: Full Code   Date of Visit: 2022  : 1965  Age: 57 y.o.  Sex: male  Race: White  Bed: 843/843 A:   MRN: [id number]  Was the patient discharged from an ICU this admission? No   Was the patient discharged from a PACU within last 24 hours? No   Did the patient receive conscious sedation/general anesthesia in last 24 hours? No  Was the patient in the ED within the past 24 hours? No  Was the patient on NIPPV within the past 24 hours? No   Attending Physician: Charlette Cardozo MD  Primary Service: [facility] B[facility]   Time spent at the bedside: < 15 min    SITUATION    Notified by charge RN via phone call.  Reason for alert: Decreased blood pressure  Called to evaluate the patient for Circulatory    BACKGROUND     Why is the patient in the hospital?: History of autologous stem cell transplant    Patient has a past medical history of Chronic infection of prosthetic knee, subsequent encounter, Encounter for blood transfusion, Essential (primary) hypertension, and Multiple myeloma.    Last Vitals:  Temp: 98.2 °F (36.8 °C) (1930)  Pulse: 74 (2302)  Resp: 18 (2030)  BP: 99/55 (2302)  SpO2: 96 % (2302)    24 Hours Vitals Range:  Temp:  [97.4 °F (36.3 °C)-98.4 °F (36.9 °C)]   Pulse:  []   Resp:  [15-26]   BP: ()/(42-88)   SpO2:  [89 %-100 %]     Labs:  Recent Labs     22  1259 22  0440 22  0412   WBC 5.52 3.34* 9.07   HGB 7.7* 6.5* 7.5*   HCT 23.5* 20.8* 22.9*   * 97* 117*       Recent Labs     22  1259 22  0440 22  0412    140 140   K 3.3* 4.3 4.5    110 113*   CO2 24 19* 19*   CREATININE 2.9* 4.3* 5.4*   GLU 91 85 102   PHOS  --  4.8* 3.9   MG  --  1.9 1.8        No results for input(s): PH, PCO2, PO2, HCO3, POCSATURATED, BE in the last 72 hours.     ASSESSMENT    Physical Exam  Vitals and nursing note  reviewed.   HENT:      Head: Atraumatic.   Eyes:      Extraocular Movements: Extraocular movements intact.      Pupils: Pupils are equal, round, and reactive to light.   Cardiovascular:      Rate and Rhythm: Normal rate and regular rhythm.      Pulses: Normal pulses.   Pulmonary:      Effort: Pulmonary effort is normal.      Breath sounds: Normal breath sounds.   Abdominal:      General: Abdomen is flat. There is no distension.      Palpations: Abdomen is soft.      Tenderness: There is no abdominal tenderness.   Neurological:      General: No focal deficit present.      Mental Status: He is alert and oriented to person, place, and time.      GCS: GCS eye subscore is 4. GCS verbal subscore is 5. GCS motor subscore is 6.      Motor: No weakness.     Called to bedside for BP 84/48 following 500 mL bolus. Per RN, patient had HD in the AM with removal of 3L, received SCT, and had an unwitnessed fall in room during afternoon. Patient was awake at time of assessment, AAOx4, denies headache/chest pain/shortness of breath. Patient stated he normally has ~ 1L removed during HD treatments. Dr. Roman at bedside, additional 500 mL ordered.     INTERVENTIONS    The patient was seen for Cardiac problem. Staff concerns included hypotension. The following interventions were performed: continuous cardiac monitoring continued and normal saline 500 ml IV bolus .    RECOMMENDATIONS    - Completed ordered bolus  - Continue to monitor per orders    PROVIDER ESCALATION    Yes/No  yes    Orders received and case discussed with Dr. Roman .    Disposition: Remain in room 843.    FOLLOW-UP    Bedside Gianfranco ZARAGOZA  updated on plan of care. Instructed to call the Rapid Response Nurse, Maribel Ellis RN at [phone] for additional questions or concerns.

## 2022-12-31 NOTE — ASSESSMENT & PLAN NOTE
- developed during ICU stay. Believed to be 2/2 hypoprofusion due to hypotension  - nephrology consulted on admission  - on dialysis MWF at home  - right chest wall dialysis catheter in place

## 2023-01-01 LAB
ABO + RH BLD: ABNORMAL
ALBUMIN SERPL BCP-MCNC: 3.3 G/DL (ref 3.5–5.2)
ALP SERPL-CCNC: 73 U/L (ref 55–135)
ALT SERPL W/O P-5'-P-CCNC: 8 U/L (ref 10–44)
ANION GAP SERPL CALC-SCNC: 10 MMOL/L (ref 8–16)
AST SERPL-CCNC: 14 U/L (ref 10–40)
BASOPHILS # BLD AUTO: 0.01 K/UL (ref 0–0.2)
BASOPHILS NFR BLD: 0.3 % (ref 0–1.9)
BILIRUB SERPL-MCNC: 0.7 MG/DL (ref 0.1–1)
BLD GP AB SCN CELLS X3 SERPL QL: ABNORMAL
BUN SERPL-MCNC: 55 MG/DL (ref 6–20)
CALCIUM SERPL-MCNC: 7 MG/DL (ref 8.7–10.5)
CHLORIDE SERPL-SCNC: 104 MMOL/L (ref 95–110)
CO2 SERPL-SCNC: 21 MMOL/L (ref 23–29)
CREAT SERPL-MCNC: 6.1 MG/DL (ref 0.5–1.4)
DIFFERENTIAL METHOD: ABNORMAL
EOSINOPHIL # BLD AUTO: 0 K/UL (ref 0–0.5)
EOSINOPHIL NFR BLD: 0.3 % (ref 0–8)
ERYTHROCYTE [DISTWIDTH] IN BLOOD BY AUTOMATED COUNT: 20.3 % (ref 11.5–14.5)
EST. GFR  (NO RACE VARIABLE): 10 ML/MIN/1.73 M^2
GLUCOSE SERPL-MCNC: 84 MG/DL (ref 70–110)
HCT VFR BLD AUTO: 23.8 % (ref 40–54)
HGB BLD-MCNC: 7.7 G/DL (ref 14–18)
IMM GRANULOCYTES # BLD AUTO: 0.01 K/UL (ref 0–0.04)
IMM GRANULOCYTES NFR BLD AUTO: 0.3 % (ref 0–0.5)
LYMPHOCYTES # BLD AUTO: 0 K/UL (ref 1–4.8)
LYMPHOCYTES NFR BLD: 1.1 % (ref 18–48)
MAGNESIUM SERPL-MCNC: 1.7 MG/DL (ref 1.6–2.6)
MCH RBC QN AUTO: 32.4 PG (ref 27–31)
MCHC RBC AUTO-ENTMCNC: 32.4 G/DL (ref 32–36)
MCV RBC AUTO: 100 FL (ref 82–98)
MONOCYTES # BLD AUTO: 0 K/UL (ref 0.3–1)
MONOCYTES NFR BLD: 0.8 % (ref 4–15)
NEUTROPHILS # BLD AUTO: 3.6 K/UL (ref 1.8–7.7)
NEUTROPHILS NFR BLD: 97.2 % (ref 38–73)
NRBC BLD-RTO: 0 /100 WBC
PHOSPHATE SERPL-MCNC: 6.1 MG/DL (ref 2.7–4.5)
PLATELET # BLD AUTO: 85 K/UL (ref 150–450)
PLATELET BLD QL SMEAR: ABNORMAL
PMV BLD AUTO: 11 FL (ref 9.2–12.9)
POTASSIUM SERPL-SCNC: 4.6 MMOL/L (ref 3.5–5.1)
PROT SERPL-MCNC: 5.1 G/DL (ref 6–8.4)
RBC # BLD AUTO: 2.38 M/UL (ref 4.6–6.2)
SODIUM SERPL-SCNC: 135 MMOL/L (ref 136–145)
WBC # BLD AUTO: 3.66 K/UL (ref 3.9–12.7)

## 2023-01-01 PROCEDURE — 20600001 HC STEP DOWN PRIVATE ROOM

## 2023-01-01 PROCEDURE — 63600175 PHARM REV CODE 636 W HCPCS: Performed by: INTERNAL MEDICINE

## 2023-01-01 PROCEDURE — 99233 PR SUBSEQUENT HOSPITAL CARE,LEVL III: ICD-10-PCS | Mod: ,,, | Performed by: INTERNAL MEDICINE

## 2023-01-01 PROCEDURE — 25000003 PHARM REV CODE 250: Performed by: NURSE PRACTITIONER

## 2023-01-01 PROCEDURE — 94761 N-INVAS EAR/PLS OXIMETRY MLT: CPT

## 2023-01-01 PROCEDURE — 27000207 HC ISOLATION

## 2023-01-01 PROCEDURE — 63600175 PHARM REV CODE 636 W HCPCS: Performed by: STUDENT IN AN ORGANIZED HEALTH CARE EDUCATION/TRAINING PROGRAM

## 2023-01-01 PROCEDURE — 25000003 PHARM REV CODE 250: Performed by: INTERNAL MEDICINE

## 2023-01-01 PROCEDURE — 84100 ASSAY OF PHOSPHORUS: CPT | Performed by: NURSE PRACTITIONER

## 2023-01-01 PROCEDURE — 86900 BLOOD TYPING SEROLOGIC ABO: CPT | Performed by: INTERNAL MEDICINE

## 2023-01-01 PROCEDURE — 80053 COMPREHEN METABOLIC PANEL: CPT | Performed by: NURSE PRACTITIONER

## 2023-01-01 PROCEDURE — 99233 SBSQ HOSP IP/OBS HIGH 50: CPT | Mod: ,,, | Performed by: INTERNAL MEDICINE

## 2023-01-01 PROCEDURE — 83735 ASSAY OF MAGNESIUM: CPT | Performed by: NURSE PRACTITIONER

## 2023-01-01 PROCEDURE — 85025 COMPLETE CBC W/AUTO DIFF WBC: CPT | Performed by: NURSE PRACTITIONER

## 2023-01-01 RX ORDER — LOPERAMIDE HYDROCHLORIDE 2 MG/1
2 CAPSULE ORAL 4 TIMES DAILY PRN
Status: DISCONTINUED | OUTPATIENT
Start: 2023-01-01 | End: 2023-01-02

## 2023-01-01 RX ORDER — ONDANSETRON 2 MG/ML
4 INJECTION INTRAMUSCULAR; INTRAVENOUS
Status: DISCONTINUED | OUTPATIENT
Start: 2023-01-01 | End: 2023-01-01

## 2023-01-01 RX ORDER — ONDANSETRON 2 MG/ML
4 INJECTION INTRAMUSCULAR; INTRAVENOUS EVERY 8 HOURS
Status: DISCONTINUED | OUTPATIENT
Start: 2023-01-01 | End: 2023-01-02

## 2023-01-01 RX ADMIN — Medication 1 DOSE: at 09:01

## 2023-01-01 RX ADMIN — SERTRALINE HYDROCHLORIDE 50 MG: 50 TABLET ORAL at 09:01

## 2023-01-01 RX ADMIN — PANTOPRAZOLE SODIUM 40 MG: 40 TABLET, DELAYED RELEASE ORAL at 09:01

## 2023-01-01 RX ADMIN — Medication 1 DOSE: at 01:01

## 2023-01-01 RX ADMIN — APIXABAN 5 MG: 5 TABLET, FILM COATED ORAL at 08:01

## 2023-01-01 RX ADMIN — DOXYCYCLINE HYCLATE 100 MG: 100 TABLET, COATED ORAL at 09:01

## 2023-01-01 RX ADMIN — DOXYCYCLINE HYCLATE 100 MG: 100 TABLET, COATED ORAL at 08:01

## 2023-01-01 RX ADMIN — HEPARIN SODIUM 1600 UNITS: 1000 INJECTION, SOLUTION INTRAVENOUS; SUBCUTANEOUS at 09:01

## 2023-01-01 RX ADMIN — ONDANSETRON 4 MG: 2 INJECTION INTRAMUSCULAR; INTRAVENOUS at 02:01

## 2023-01-01 RX ADMIN — MUPIROCIN: 20 OINTMENT TOPICAL at 09:01

## 2023-01-01 RX ADMIN — CALCIUM CARBONATE (ANTACID) CHEW TAB 500 MG 1000 MG: 500 CHEW TAB at 09:01

## 2023-01-01 RX ADMIN — CHOLECALCIFEROL TAB 25 MCG (1000 UNIT) 3000 UNITS: 25 TAB at 09:01

## 2023-01-01 RX ADMIN — ONDANSETRON 4 MG: 2 INJECTION INTRAMUSCULAR; INTRAVENOUS at 10:01

## 2023-01-01 RX ADMIN — FLUCONAZOLE 200 MG: 200 TABLET ORAL at 09:01

## 2023-01-01 RX ADMIN — ACYCLOVIR 400 MG: 200 CAPSULE ORAL at 08:01

## 2023-01-01 RX ADMIN — METOPROLOL SUCCINATE 25 MG: 25 TABLET, EXTENDED RELEASE ORAL at 09:01

## 2023-01-01 RX ADMIN — Medication 1 DOSE: at 08:01

## 2023-01-01 RX ADMIN — PROCHLORPERAZINE EDISYLATE 10 MG: 5 INJECTION INTRAMUSCULAR; INTRAVENOUS at 08:01

## 2023-01-01 RX ADMIN — MUPIROCIN: 20 OINTMENT TOPICAL at 08:01

## 2023-01-01 RX ADMIN — Medication 1 DOSE: at 06:01

## 2023-01-01 RX ADMIN — APIXABAN 5 MG: 5 TABLET, FILM COATED ORAL at 09:01

## 2023-01-01 RX ADMIN — ACYCLOVIR 400 MG: 200 CAPSULE ORAL at 09:01

## 2023-01-01 NOTE — PROGRESS NOTES
[facility] - Oncology ([facility])  Hematology  Bone Marrow Transplant  Progress Note    Patient Name: De Lakhani  Admission Date: 12/28/2022  Hospital Length of Stay: 4 days  Code Status: Full Code    Subjective:     Interval History: Day 2 of Torie 140 Auto SCT for MM. No recurrent dizziness or falls over the past 24 hours. He has been vitally stable with no acute complaints. He responded well to his PRBC transfusion yesterday with a hemoglobin of 7.7 this AM. He was updated regarding the overall plan of care and was agreeable and understanding.     Objective:     Vital Signs (Most Recent):  Temp: 98.2 °F (36.8 °C) (01/01/23 0734)  Pulse: 77 (01/01/23 0730)  Resp: 16 (01/01/23 0730)  BP: (!) 114/58 (01/01/23 0730)  SpO2: 99 % (01/01/23 0730) Vital Signs (24h Range):  Temp:  [97.8 °F (36.6 °C)-98.4 °F (36.9 °C)] 98.2 °F (36.8 °C)  Pulse:  [69-83] 77  Resp:  [16-18] 16  SpO2:  [94 %-99 %] 99 %  BP: ()/(57-67) 114/58     Weight: 112 kg (246 lb 14.6 oz)  Body mass index is 34.44 kg/m².  Body surface area is 2.37 meters squared.      Intake/Output - Last 3 Shifts         12/30 0700 12/31 0659 12/31 0700 01/01 0659 01/01 0700 01/02 0659    P.O. 1140 770     I.V. (mL/kg) 129.2 (1.2)      Blood 360 278     Other 600      IV Piggyback 492.5      Total Intake(mL/kg) 2721.7 (24.3) 1048 (9.4)     Urine (mL/kg/hr) 575 (0.2) 1200 (0.4) 450 (4.6)    Other 3600      Stool  0     Total Output 4175 1200 450    Net -1453.3 -152 -450           Urine Occurrence  6 x     Stool Occurrence  6 x             Physical Exam  Vitals and nursing note reviewed.   Constitutional:       Appearance: He is well-developed.   HENT:      Head: Normocephalic and atraumatic.      Mouth/Throat:      Pharynx: No oropharyngeal exudate.   Eyes:      General:         Right eye: No discharge.         Left eye: No discharge.      Conjunctiva/sclera: Conjunctivae normal.      Pupils: Pupils are equal, round, and reactive to light.   Cardiovascular:       Rate and Rhythm: Normal rate and regular rhythm.      Heart sounds: Normal heart sounds. No murmur heard.  Pulmonary:      Effort: Pulmonary effort is normal. No respiratory distress.      Breath sounds: Normal breath sounds. No wheezing or rales.   Abdominal:      General: Bowel sounds are normal. There is no distension.      Palpations: Abdomen is soft.      Tenderness: There is no abdominal tenderness.   Musculoskeletal:         General: No deformity. Normal range of motion.      Cervical back: Normal range of motion and neck supple.   Skin:     General: Skin is warm and dry.      Findings: No erythema or rash.      Comments: Right chest wall dialysis catheter. Dressing c/d/i. No sign of infection to site.   Neurological:      Mental Status: He is alert and oriented to person, place, and time.   Psychiatric:         Behavior: Behavior normal.         Thought Content: Thought content normal.         Judgment: Judgment normal.       Significant Labs:   CBC:   Recent Labs   Lab 12/31/22  0328 01/01/23  0400   WBC 15.78* 3.66*   HGB 6.8* 7.7*   HCT 20.8* 23.8*   * 85*      and CMP:   Recent Labs   Lab 12/31/22  0328 01/01/23  0400    135*   K 4.2 4.6    104   CO2 21* 21*   GLU 99 84   BUN 36* 55*   CREATININE 4.4* 6.1*   CALCIUM 7.1* 7.0*   PROT 5.0* 5.1*   ALBUMIN 3.3* 3.3*   BILITOT 0.7 0.7   ALKPHOS 79 73   AST 24 14   ALT 9* 8*   ANIONGAP 12 10         Diagnostic Results:  I have reviewed all pertinent imaging results/findings within the past 24 hours.    Assessment/Plan:     * History of autologous stem cell transplant  - Patient of Dr. King  - Diagnosed with MM in April 2022. Underwent 2 cycles CyBorD then transitioned to DVRd (now s/p C6)  - Admitted 12/28/22 for Torie 140 auto SCT   - Today is Day +2  - Completed [id number]/29 without issue  - Transplant is scheduled for today 12/30/2022, at 3p.m. He will be receiving 6 bags with a total CD34 dose of 3.13 x10^6/kg.    Planned  conditioning regimen:  Melphalan on Day -1     Antimicrobial Prophylaxis:  Acyclovir starting on Day -1  Levofloxacin starting on Day -1  Fluconazole starting on Day -1     Growth Factor Support:  Neupogen starting on Day +7      Caregiver: Wife  Post-transplant discharge plans: Javed Garcia    Thrombocytopenia  - daily CBC  - transfuse for platelets <10K or bleeding  - stop apixaban when platelets <50K  - except further platelet decline following chemo and SCT    Anemia in neoplastic disease  - transfuse for Hgb <7, Hemoglobin of 6.8 on 12/31 prompting PRBC transfusion and responded well to 7.7  - daily CBC  - expect pancytopenia following chemo and SCT    Hyperlipidemia  - Will hold home statin while inpatient    Depression  - Continue home Zoloft    Deep vein thrombosis (DVT) of upper extremity  - Continue home apixiban. Will hold with plts < 50K.    Hypertension  - Continue home metoprolol    End stage renal disease  - developed during ICU stay. Believed to be 2/2 hypoprofusion due to hypotension  - nephrology consulted on admission  - on dialysis MWF at home  - right chest wall dialysis catheter in place    Dependence on hemodialysis  - see ESRD    Chronic infection of prosthetic knee  - Seen in ID clinic with Dr. Chatterjee prior to transplant   - Rec continuing doxycycline throughout transplant     Multiple myeloma not having achieved remission  - Patient of Dr. King. Per most recent clinic note:   - 4/20/22: renal biopsy: light chain cast nephropathy, kappa light chain  - 4/26/22: right subclavian vein thrombus   - 4/27/22: M spike 0.2 with free monoclonal kappa band. B2mg 19.57, IgG 496, IgA 10, IgM <5. Kappa 44628, Lambda 7.9, ratio >1000  - 5/12/22: BMBx: plasma cell myeloma, marrow cellularity 100%, plasma cell percentage 100%. Plasma cell phenotype +, kappa +, CD20- -, CD30-, EMA-, SADAF-, Congo red negative. Peripheral blood with pancytopenia and no circulating blasts. Decreased storage iron.  FISH canceled due to quantity not sufficient  - 5/18/22: CyBorD C1D1  - 5/23/22: rasburicase  - 5/24/22: Xgeva  - 6/6/22: Kappa 38341, lambda 4.0, ratio >1000, M spike 0.1 with free monoclonal kappa band present  - 6/6/22: CyBorD C2D1  - 6/9/22: BMBx Plasma cell neoplasm compatible with plasma cell myeloma. Extent of involvement 80-90% of bone marrow elements. Cytology: Plasmablastic. FISH cytogenetics positive for 1q21/CKS1B gain. Karyotype failed. Myelofibrosis diffuse (MF-3)  - 6/20/22: CyBorD C2D8 delayed due to covid  - 6/23/22: CyBOrD C2D11  - 7/5/22: C1D1 DVRd  - 7/7/22 - 7/18/22: hospital admission for septic shock resulting in renal failure  - 7/25/22: started again on DVRd  - 8/8/2022: kappa 1402.8, lambda 7.5, ratio 167.04. M spike 0.1, two faint restricted bands in gamma globulin regions.   - 10/10/22: C5D1 DVRd. Revlimid on hold for upcoming stem   - Admitted for Torie 140 auto SCT on 12/28/22 as above        VTE Risk Mitigation (From admission, onward)         Ordered     heparin (porcine) injection 1,000 Units  As needed (PRN)         12/30/22 1243     apixaban tablet 5 mg  2 times daily         12/28/22 1659     heparin (porcine) injection 1,600 Units  As needed (PRN)         12/28/22 1825     IP VTE HIGH RISK PATIENT  Once         12/28/22 1659     Place sequential compression device  Until discontinued         12/28/22 1659                Disposition: Remain on BMT    Jaun Rowan, DO  Bone Marrow Transplant  [facility] y - Oncology ([facility])

## 2023-01-01 NOTE — ASSESSMENT & PLAN NOTE
- Patient of Dr. King  - Diagnosed with MM in April 2022. Underwent 2 cycles CyBorD then transitioned to DVRd (now s/p C6)  - Admitted 12/28/22 for Torie 140 auto SCT   - Today is Day +2  - Completed [id number]/29 without issue  - Transplant is scheduled for today 12/30/2022, at 3p.m. He will be receiving 6 bags with a total CD34 dose of 3.13 x10^6/kg.    Planned conditioning regimen:  Melphalan on Day -1     Antimicrobial Prophylaxis:  Acyclovir starting on Day -1  Levofloxacin starting on Day -1  Fluconazole starting on Day -1     Growth Factor Support:  Neupogen starting on Day +7      Caregiver: Wife  Post-transplant discharge plans: Javed House

## 2023-01-01 NOTE — ASSESSMENT & PLAN NOTE
- transfuse for Hgb <7, Hemoglobin of 6.8 on 12/31 prompting PRBC transfusion and responded well to 7.7  - daily CBC  - expect pancytopenia following chemo and SCT

## 2023-01-01 NOTE — PLAN OF CARE
Patient AAOx4. Day +2 Torie Auto SCT. Nausea managed with PRN and scheduled antiemetics. Poor appetite; oral intake encouraged. Bed in low locked position, call light and personal items within reach. Instructed to call if needed.

## 2023-01-01 NOTE — SUBJECTIVE & OBJECTIVE
Subjective:     Interval History: Day 2 of Torie 140 Auto SCT for MM. No recurrent dizziness or falls over the past 24 hours. He has been vitally stable with no acute complaints. He responded well to his PRBC transfusion yesterday with a hemoglobin of 7.7 this AM. He was updated regarding the overall plan of care and was agreeable and understanding.     Objective:     Vital Signs (Most Recent):  Temp: 98.2 °F (36.8 °C) (01/01/23 0734)  Pulse: 77 (01/01/23 0730)  Resp: 16 (01/01/23 0730)  BP: (!) 114/58 (01/01/23 0730)  SpO2: 99 % (01/01/23 0730) Vital Signs (24h Range):  Temp:  [97.8 °F (36.6 °C)-98.4 °F (36.9 °C)] 98.2 °F (36.8 °C)  Pulse:  [69-83] 77  Resp:  [16-18] 16  SpO2:  [94 %-99 %] 99 %  BP: ()/(57-67) 114/58     Weight: 112 kg (246 lb 14.6 oz)  Body mass index is 34.44 kg/m².  Body surface area is 2.37 meters squared.      Intake/Output - Last 3 Shifts         12/30 0700  12/31 0659 12/31 0700 01/01 0659 01/01 0700 01/02 0659    P.O. 1140 770     I.V. (mL/kg) 129.2 (1.2)      Blood 360 278     Other 600      IV Piggyback 492.5      Total Intake(mL/kg) 2721.7 (24.3) 1048 (9.4)     Urine (mL/kg/hr) 575 (0.2) 1200 (0.4) 450 (4.6)    Other 3600      Stool  0     Total Output 4175 1200 450    Net -1453.3 -152 -450           Urine Occurrence  6 x     Stool Occurrence  6 x             Physical Exam  Vitals and nursing note reviewed.   Constitutional:       Appearance: He is well-developed.   HENT:      Head: Normocephalic and atraumatic.      Mouth/Throat:      Pharynx: No oropharyngeal exudate.   Eyes:      General:         Right eye: No discharge.         Left eye: No discharge.      Conjunctiva/sclera: Conjunctivae normal.      Pupils: Pupils are equal, round, and reactive to light.   Cardiovascular:      Rate and Rhythm: Normal rate and regular rhythm.      Heart sounds: Normal heart sounds. No murmur heard.  Pulmonary:      Effort: Pulmonary effort is normal. No respiratory distress.      Breath  sounds: Normal breath sounds. No wheezing or rales.   Abdominal:      General: Bowel sounds are normal. There is no distension.      Palpations: Abdomen is soft.      Tenderness: There is no abdominal tenderness.   Musculoskeletal:         General: No deformity. Normal range of motion.      Cervical back: Normal range of motion and neck supple.   Skin:     General: Skin is warm and dry.      Findings: No erythema or rash.      Comments: Right chest wall dialysis catheter. Dressing c/d/i. No sign of infection to site.   Neurological:      Mental Status: He is alert and oriented to person, place, and time.   Psychiatric:         Behavior: Behavior normal.         Thought Content: Thought content normal.         Judgment: Judgment normal.       Significant Labs:   CBC:   Recent Labs   Lab 12/31/22  0328 01/01/23  0400   WBC 15.78* 3.66*   HGB 6.8* 7.7*   HCT 20.8* 23.8*   * 85*      and CMP:   Recent Labs   Lab 12/31/22  0328 01/01/23  0400    135*   K 4.2 4.6    104   CO2 21* 21*   GLU 99 84   BUN 36* 55*   CREATININE 4.4* 6.1*   CALCIUM 7.1* 7.0*   PROT 5.0* 5.1*   ALBUMIN 3.3* 3.3*   BILITOT 0.7 0.7   ALKPHOS 79 73   AST 24 14   ALT 9* 8*   ANIONGAP 12 10         Diagnostic Results:  I have reviewed all pertinent imaging results/findings within the past 24 hours.

## 2023-01-01 NOTE — PLAN OF CARE
POC reviewed with patient; understanding verbalized. 1U RBC administered this shift. Bed alarm and [employer] camera in place. Day +2 Torie Auto. Pt voids yellow urine via the urinal. Four loose stools this shift; CDiff (-). No BM overnight. Wife to remain at the bedside. Pt. with nonskid footwear on, bed in lowest position, and locked with bed rails up x2. Call light and personal items within reach. VSS and afebrile Will continue to monitor.

## 2023-01-02 PROBLEM — R11.2 NAUSEA & VOMITING: Status: ACTIVE | Noted: 2023-01-02

## 2023-01-02 PROBLEM — R19.7 DIARRHEA: Status: ACTIVE | Noted: 2023-01-02

## 2023-01-02 LAB
ALBUMIN SERPL BCP-MCNC: 3.6 G/DL (ref 3.5–5.2)
ALBUMIN SERPL BCP-MCNC: 3.7 G/DL (ref 3.5–5.2)
ALP SERPL-CCNC: 72 U/L (ref 55–135)
ALP SERPL-CCNC: 73 U/L (ref 55–135)
ALT SERPL W/O P-5'-P-CCNC: 6 U/L (ref 10–44)
ALT SERPL W/O P-5'-P-CCNC: 9 U/L (ref 10–44)
ANION GAP SERPL CALC-SCNC: 13 MMOL/L (ref 8–16)
ANION GAP SERPL CALC-SCNC: 13 MMOL/L (ref 8–16)
AST SERPL-CCNC: 11 U/L (ref 10–40)
AST SERPL-CCNC: 12 U/L (ref 10–40)
BASOPHILS # BLD AUTO: 0 K/UL (ref 0–0.2)
BASOPHILS # BLD AUTO: 0.01 K/UL (ref 0–0.2)
BASOPHILS NFR BLD: 0 % (ref 0–1.9)
BASOPHILS NFR BLD: 0.4 % (ref 0–1.9)
BILIRUB SERPL-MCNC: 0.8 MG/DL (ref 0.1–1)
BILIRUB SERPL-MCNC: 1.2 MG/DL (ref 0.1–1)
BUN SERPL-MCNC: 36 MG/DL (ref 6–20)
BUN SERPL-MCNC: 71 MG/DL (ref 6–20)
CALCIUM SERPL-MCNC: 7.1 MG/DL (ref 8.7–10.5)
CALCIUM SERPL-MCNC: 8.5 MG/DL (ref 8.7–10.5)
CHLORIDE SERPL-SCNC: 103 MMOL/L (ref 95–110)
CHLORIDE SERPL-SCNC: 106 MMOL/L (ref 95–110)
CO2 SERPL-SCNC: 20 MMOL/L (ref 23–29)
CO2 SERPL-SCNC: 22 MMOL/L (ref 23–29)
CREAT SERPL-MCNC: 4.3 MG/DL (ref 0.5–1.4)
CREAT SERPL-MCNC: 7.4 MG/DL (ref 0.5–1.4)
DIFFERENTIAL METHOD: ABNORMAL
DIFFERENTIAL METHOD: ABNORMAL
EOSINOPHIL # BLD AUTO: 0 K/UL (ref 0–0.5)
EOSINOPHIL # BLD AUTO: 0 K/UL (ref 0–0.5)
EOSINOPHIL NFR BLD: 0.3 % (ref 0–8)
EOSINOPHIL NFR BLD: 0.4 % (ref 0–8)
ERYTHROCYTE [DISTWIDTH] IN BLOOD BY AUTOMATED COUNT: 18.8 % (ref 11.5–14.5)
ERYTHROCYTE [DISTWIDTH] IN BLOOD BY AUTOMATED COUNT: 19.3 % (ref 11.5–14.5)
EST. GFR  (NO RACE VARIABLE): 15.2 ML/MIN/1.73 M^2
EST. GFR  (NO RACE VARIABLE): 7.9 ML/MIN/1.73 M^2
GLUCOSE SERPL-MCNC: 168 MG/DL (ref 70–110)
GLUCOSE SERPL-MCNC: 85 MG/DL (ref 70–110)
HCT VFR BLD AUTO: 24 % (ref 40–54)
HCT VFR BLD AUTO: 25.1 % (ref 40–54)
HGB BLD-MCNC: 8 G/DL (ref 14–18)
HGB BLD-MCNC: 8.7 G/DL (ref 14–18)
IMM GRANULOCYTES # BLD AUTO: 0.01 K/UL (ref 0–0.04)
IMM GRANULOCYTES # BLD AUTO: 0.02 K/UL (ref 0–0.04)
IMM GRANULOCYTES NFR BLD AUTO: 0.4 % (ref 0–0.5)
IMM GRANULOCYTES NFR BLD AUTO: 0.5 % (ref 0–0.5)
LYMPHOCYTES # BLD AUTO: 0 K/UL (ref 1–4.8)
LYMPHOCYTES # BLD AUTO: 0 K/UL (ref 1–4.8)
LYMPHOCYTES NFR BLD: 0.8 % (ref 18–48)
LYMPHOCYTES NFR BLD: 1.2 % (ref 18–48)
MAGNESIUM SERPL-MCNC: 1.8 MG/DL (ref 1.6–2.6)
MCH RBC QN AUTO: 32.8 PG (ref 27–31)
MCH RBC QN AUTO: 33.2 PG (ref 27–31)
MCHC RBC AUTO-ENTMCNC: 33.3 G/DL (ref 32–36)
MCHC RBC AUTO-ENTMCNC: 34.7 G/DL (ref 32–36)
MCV RBC AUTO: 96 FL (ref 82–98)
MCV RBC AUTO: 98 FL (ref 82–98)
MONOCYTES # BLD AUTO: 0 K/UL (ref 0.3–1)
MONOCYTES # BLD AUTO: 0 K/UL (ref 0.3–1)
MONOCYTES NFR BLD: 0.4 % (ref 4–15)
MONOCYTES NFR BLD: 0.5 % (ref 4–15)
NEUTROPHILS # BLD AUTO: 2.4 K/UL (ref 1.8–7.7)
NEUTROPHILS # BLD AUTO: 3.6 K/UL (ref 1.8–7.7)
NEUTROPHILS NFR BLD: 97.2 % (ref 38–73)
NEUTROPHILS NFR BLD: 97.9 % (ref 38–73)
NRBC BLD-RTO: 0 /100 WBC
NRBC BLD-RTO: 0 /100 WBC
PHOSPHATE SERPL-MCNC: 6.6 MG/DL (ref 2.7–4.5)
PLATELET # BLD AUTO: 77 K/UL (ref 150–450)
PLATELET # BLD AUTO: 98 K/UL (ref 150–450)
PMV BLD AUTO: 10.9 FL (ref 9.2–12.9)
PMV BLD AUTO: 9.9 FL (ref 9.2–12.9)
POCT GLUCOSE: 152 MG/DL (ref 70–110)
POTASSIUM SERPL-SCNC: 3.1 MMOL/L (ref 3.5–5.1)
POTASSIUM SERPL-SCNC: 4.5 MMOL/L (ref 3.5–5.1)
PROT SERPL-MCNC: 5.4 G/DL (ref 6–8.4)
PROT SERPL-MCNC: 5.7 G/DL (ref 6–8.4)
RBC # BLD AUTO: 2.44 M/UL (ref 4.6–6.2)
RBC # BLD AUTO: 2.62 M/UL (ref 4.6–6.2)
SODIUM SERPL-SCNC: 138 MMOL/L (ref 136–145)
SODIUM SERPL-SCNC: 139 MMOL/L (ref 136–145)
WBC # BLD AUTO: 2.45 K/UL (ref 3.9–12.7)
WBC # BLD AUTO: 3.65 K/UL (ref 3.9–12.7)

## 2023-01-02 PROCEDURE — 93005 ELECTROCARDIOGRAM TRACING: CPT

## 2023-01-02 PROCEDURE — 27000221 HC OXYGEN, UP TO 24 HOURS

## 2023-01-02 PROCEDURE — 20600001 HC STEP DOWN PRIVATE ROOM

## 2023-01-02 PROCEDURE — 84100 ASSAY OF PHOSPHORUS: CPT | Performed by: NURSE PRACTITIONER

## 2023-01-02 PROCEDURE — 85025 COMPLETE CBC W/AUTO DIFF WBC: CPT | Performed by: NURSE PRACTITIONER

## 2023-01-02 PROCEDURE — 80053 COMPREHEN METABOLIC PANEL: CPT | Performed by: NURSE PRACTITIONER

## 2023-01-02 PROCEDURE — 80053 COMPREHEN METABOLIC PANEL: CPT | Mod: 91 | Performed by: INTERNAL MEDICINE

## 2023-01-02 PROCEDURE — 63600175 PHARM REV CODE 636 W HCPCS: Performed by: INTERNAL MEDICINE

## 2023-01-02 PROCEDURE — 85025 COMPLETE CBC W/AUTO DIFF WBC: CPT | Mod: 91 | Performed by: INTERNAL MEDICINE

## 2023-01-02 PROCEDURE — 25000003 PHARM REV CODE 250: Performed by: NURSE PRACTITIONER

## 2023-01-02 PROCEDURE — 93010 EKG 12-LEAD: ICD-10-PCS | Mod: ,,, | Performed by: INTERNAL MEDICINE

## 2023-01-02 PROCEDURE — 99233 SBSQ HOSP IP/OBS HIGH 50: CPT | Mod: ,,, | Performed by: INTERNAL MEDICINE

## 2023-01-02 PROCEDURE — 90935 HEMODIALYSIS ONE EVALUATION: CPT | Mod: ,,, | Performed by: INTERNAL MEDICINE

## 2023-01-02 PROCEDURE — 63600175 PHARM REV CODE 636 W HCPCS: Performed by: NURSE PRACTITIONER

## 2023-01-02 PROCEDURE — 25000003 PHARM REV CODE 250: Performed by: STUDENT IN AN ORGANIZED HEALTH CARE EDUCATION/TRAINING PROGRAM

## 2023-01-02 PROCEDURE — 90935 PR HEMODIALYSIS, ONE EVALUATION: ICD-10-PCS | Mod: ,,, | Performed by: INTERNAL MEDICINE

## 2023-01-02 PROCEDURE — 99233 PR SUBSEQUENT HOSPITAL CARE,LEVL III: ICD-10-PCS | Mod: ,,, | Performed by: INTERNAL MEDICINE

## 2023-01-02 PROCEDURE — 93010 ELECTROCARDIOGRAM REPORT: CPT | Mod: ,,, | Performed by: INTERNAL MEDICINE

## 2023-01-02 PROCEDURE — 63600175 PHARM REV CODE 636 W HCPCS: Performed by: STUDENT IN AN ORGANIZED HEALTH CARE EDUCATION/TRAINING PROGRAM

## 2023-01-02 PROCEDURE — 25000003 PHARM REV CODE 250: Performed by: INTERNAL MEDICINE

## 2023-01-02 PROCEDURE — 80100014 HC HEMODIALYSIS 1:1

## 2023-01-02 PROCEDURE — 94761 N-INVAS EAR/PLS OXIMETRY MLT: CPT

## 2023-01-02 PROCEDURE — 83735 ASSAY OF MAGNESIUM: CPT | Performed by: NURSE PRACTITIONER

## 2023-01-02 RX ORDER — ONDANSETRON 2 MG/ML
4 INJECTION INTRAMUSCULAR; INTRAVENOUS EVERY 6 HOURS
Status: DISCONTINUED | OUTPATIENT
Start: 2023-01-02 | End: 2023-01-10

## 2023-01-02 RX ORDER — LOPERAMIDE HYDROCHLORIDE 2 MG/1
2 CAPSULE ORAL 4 TIMES DAILY
Status: DISCONTINUED | OUTPATIENT
Start: 2023-01-02 | End: 2023-01-03

## 2023-01-02 RX ORDER — DIPHENOXYLATE HYDROCHLORIDE AND ATROPINE SULFATE 2.5; .025 MG/1; MG/1
1 TABLET ORAL 4 TIMES DAILY PRN
Status: DISCONTINUED | OUTPATIENT
Start: 2023-01-02 | End: 2023-01-14 | Stop reason: HOSPADM

## 2023-01-02 RX ORDER — POTASSIUM CHLORIDE 7.45 MG/ML
10 INJECTION INTRAVENOUS
Status: COMPLETED | OUTPATIENT
Start: 2023-01-02 | End: 2023-01-02

## 2023-01-02 RX ADMIN — CALCIUM CARBONATE (ANTACID) CHEW TAB 500 MG 1000 MG: 500 CHEW TAB at 08:01

## 2023-01-02 RX ADMIN — LOPERAMIDE HYDROCHLORIDE 2 MG: 2 CAPSULE ORAL at 10:01

## 2023-01-02 RX ADMIN — ACYCLOVIR 400 MG: 200 CAPSULE ORAL at 08:01

## 2023-01-02 RX ADMIN — Medication 1 DOSE: at 08:01

## 2023-01-02 RX ADMIN — DOXYCYCLINE HYCLATE 100 MG: 100 TABLET, COATED ORAL at 08:01

## 2023-01-02 RX ADMIN — HEPARIN SODIUM 1000 UNITS: 1000 INJECTION, SOLUTION INTRAVENOUS; SUBCUTANEOUS at 11:01

## 2023-01-02 RX ADMIN — DOXYCYCLINE HYCLATE 100 MG: 100 TABLET, COATED ORAL at 06:01

## 2023-01-02 RX ADMIN — POTASSIUM CHLORIDE 10 MEQ: 7.46 INJECTION, SOLUTION INTRAVENOUS at 09:01

## 2023-01-02 RX ADMIN — PROCHLORPERAZINE EDISYLATE 10 MG: 5 INJECTION INTRAMUSCULAR; INTRAVENOUS at 02:01

## 2023-01-02 RX ADMIN — ONDANSETRON 4 MG: 2 INJECTION INTRAMUSCULAR; INTRAVENOUS at 01:01

## 2023-01-02 RX ADMIN — MUPIROCIN: 20 OINTMENT TOPICAL at 08:01

## 2023-01-02 RX ADMIN — POTASSIUM CHLORIDE 10 MEQ: 7.46 INJECTION, SOLUTION INTRAVENOUS at 08:01

## 2023-01-02 RX ADMIN — PANTOPRAZOLE SODIUM 40 MG: 40 TABLET, DELAYED RELEASE ORAL at 08:01

## 2023-01-02 RX ADMIN — ONDANSETRON 4 MG: 2 INJECTION INTRAMUSCULAR; INTRAVENOUS at 05:01

## 2023-01-02 RX ADMIN — APIXABAN 5 MG: 5 TABLET, FILM COATED ORAL at 08:01

## 2023-01-02 RX ADMIN — CHOLECALCIFEROL TAB 25 MCG (1000 UNIT) 3000 UNITS: 25 TAB at 08:01

## 2023-01-02 RX ADMIN — FLUCONAZOLE 200 MG: 200 TABLET ORAL at 08:01

## 2023-01-02 RX ADMIN — SERTRALINE HYDROCHLORIDE 50 MG: 50 TABLET ORAL at 08:01

## 2023-01-02 RX ADMIN — ONDANSETRON 4 MG: 2 INJECTION INTRAMUSCULAR; INTRAVENOUS at 06:01

## 2023-01-02 RX ADMIN — ONDANSETRON 4 MG: 2 INJECTION INTRAMUSCULAR; INTRAVENOUS at 11:01

## 2023-01-02 RX ADMIN — SODIUM CHLORIDE 500 ML: 9 INJECTION, SOLUTION INTRAVENOUS at 04:01

## 2023-01-02 RX ADMIN — LOPERAMIDE HYDROCHLORIDE 2 MG: 2 CAPSULE ORAL at 05:01

## 2023-01-02 RX ADMIN — LEVOFLOXACIN 250 MG: 250 TABLET, FILM COATED ORAL at 06:01

## 2023-01-02 NOTE — ASSESSMENT & PLAN NOTE
Diarrhea on 1/1/23 following AUTO SCT  C. Diff testing negative    Plan:  PRN Imodium with relief, adjust further as needed

## 2023-01-02 NOTE — PROGRESS NOTES
[facility] HEMODIALYSIS NOTE     Patient currently on hemodialysis for removal of uremic toxins and volume.     Patient seen and evaluated on hemodialysis, tolerating treatment, see HD flowsheet for vitals and assessments.     Labs have been reviewed and the dialysate bath has been adjusted.     Assessment/Plan:  -Patient seen at bedside on HD, tolerating treatment well however requesting to end session 15 minutes early   -Renal diet, if not NPO   -Strict I/O's and daily weights  -Daily renal function panels  -Keep MAP >65 while on HD   -Will continue to follow while inpatient      Ricardo Gallardo MD  Nephrology
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
## 2023-01-02 NOTE — CODE/ RAPID DOCUMENTATION
RAPID RESPONSE RESPIRATORY THERAPY CODE BLUE NOTE             Code Status: Full Code   : 1965  Bed: 843/843 A:   MRN: [id number]  Time page Received: 1632  Time Rapid Response RT at Bedside: 1634  Time Rapid Response RT left Bedside: 1645  Report given to: MARCUS patient recovered     SITUATION    Evaluated patient for: Code Blue    BACKGROUND    Why is the patient in the hospital?: History of autologous stem cell transplant    Patient has a past medical history of Chronic infection of prosthetic knee, subsequent encounter, Encounter for blood transfusion, Essential (primary) hypertension, and Multiple myeloma.    24 Hours Vitals Range:  Temp:  [97.7 °F (36.5 °C)-98.7 °F (37.1 °C)]   Pulse:  [59-97]   Resp:  [14-18]   BP: (107-154)/(52-87)   SpO2:  [91 %-100 %]     Labs:    Recent Labs     22  0328 23  0400 23  0321    135* 139   K 4.2 4.6 4.5    104 106   CO2 21* 21* 20*   CREATININE 4.4* 6.1* 7.4*   GLU 99 84 85   PHOS 4.8* 6.1* 6.6*   MG 1.7 1.7 1.8        No results for input(s): PH, PCO2, PO2, HCO3, POCSATURATED, BE in the last 72 hours.    ASSESSMENT/INTERVENTIONS    Patient was down on the floor upon arrival with several team members surrounding him. By the time the rapid team arrived patient had become conscious an was answering questions.    Was the patient on NIPPV prior to code?: No  Does the patient have a surgical airway: No  Was the patient intubated? No  ETCO2 monitored: no  Ambu at bedside:     BVM ventilation initiated at: NA    Please see Code Blue Documentation for more details.    OUTCOME    Patient is back in bed alert.    Disposition: Remain in room 843.    RT CODE TEAM MEMBERS    RRSRT: Lilli Araujo, RRT, [id number]    Additional RTs: Berta Arriaga, Yarelis Gonzalez, Prisca Daniel

## 2023-01-02 NOTE — CODE/ RAPID DOCUMENTATION
RAPID RESPONSE NURSE NOTE        Admit Date: 2022  LOS: 5  Code Status: Full Code   Date of Consult: 2023  : 1965  Age: 57 y.o.  Weight:   Wt Readings from Last 1 Encounters:   23 107.3 kg (236 lb 8.9 oz)     Sex: male  Race: White   Bed: [address] [place] [address] A:   MRN: [id number]  Time Rapid Response Team page Received: 1640  Time Rapid Response Team at Bedside: 1642  Time Rapid Response Team left Bedside: 1659  Was the patient discharged from an ICU this admission? No   Was the patient discharged from a PACU within last 24 hours? No   Did the patient receive conscious sedation/general anesthesia in last 24 hours? No  Was the patient in the ED within the past 24 hours? No  Was the patient on NIPPV within the past 24 hours? No   Did this progress into an ARC or CPA: no  Attending Physician: Charlette Cardozo MD  Primary Service: [facility] BMT       SITUATION    Notified by pager.  Reason for alert: Overhead page for code blue  Called to evaluate the patient for Circulatory    BACKGROUND     Why is the patient in the hospital?: History of autologous stem cell transplant    Patient has a past medical history of Chronic infection of prosthetic knee, subsequent encounter, Encounter for blood transfusion, Essential (primary) hypertension, and Multiple myeloma.    Last Vitals:  Temp: 98.7 °F (37.1 °C) ( 1550)  Pulse: 86 ( 1657)  Resp: 18 ( 1550)  BP: 95/61 ( 1657)  SpO2: 95 % (1657)    24 Hours Vitals Range:  Temp:  [97.7 °F (36.5 °C)-98.7 °F (37.1 °C)]   Pulse:  [59-97]   Resp:  [14-18]   BP: ()/(52-87)   SpO2:  [91 %-100 %]     Labs:  Recent Labs     23  0400 23  0321 23  1645   WBC 3.66* 2.45* 3.65*   HGB 7.7* 8.0* 8.7*   HCT 23.8* 24.0* 25.1*   PLT 85* 77* 98*       Recent Labs     22  0328 23  0400 23  03223  1645    135* 139 138   K 4.2 4.6 4.5 3.1*    104 106 103   CO2 21* 21* 20* 22*   CREATININE 4.4* 6.1* 7.4* 4.3*    GLU 99 84 85 168*   PHOS 4.8* 6.1* 6.6*  --    MG 1.7 1.7 1.8  --         No results for input(s): PH, PCO2, PO2, HCO3, POCSATURATED, BE in the last 72 hours.     ASSESSMENT    Physical Exam    INTERVENTIONS    The patient was seen for Neurological problem. Staff concerns included mental status change and acute loss of consciousness. The following interventions were performed: CT scan ordered and 500mL NS bolus.    RECOMMENDATIONS    We recommend: EKG, head CT, frquent vital sign monitoring, fluid bolus    PROVIDER ESCALATION    Orders received and case discussed with Dr. Hahn .    Primary team arrival time: 1620    Disposition: Remain in room 843.    FOLLOW UP    charge Hayley ZARAGOZA  updated on plan of care. Instructed to call the Rapid Response Nurse, Jess Rodriges RN at [phone] for additional questions or concerns.

## 2023-01-02 NOTE — PROGRESS NOTES
"Nurse heard yell for help while in another pts room. Nurse entered room found pt slumped in corner against wall. Fall witnessed by wife; wife stated he "may have" hit his head.  Pt was unconscious and unresponsive. Rapid called. . VSS. /73 HR 97 SpO2 95% on RA. Placed on bedside monitor. Staff able to lift pt back in bed. Pt became responsive. Placed on 2L NC. STAT EKG ordered. 500 ml fluid bolus running. CBC CMP sent. Awaiting STAT head CT. Pt is neurologically unchanged.  Bed alarm is set. Hospital still does not have camera or telesitters available at this time.    "

## 2023-01-02 NOTE — SUBJECTIVE & OBJECTIVE
Subjective:     Interval History: Day +3 of Torie 140 Auto SCT for MM. No recurrent dizziness or falls over the past 24 hours. He has been vitally stable with patient reporting improvement diarrhea compared to yesterday afternoon but with continued nausea and vomiting. They report the Zofran has helped some since being scheduled Q8 but not to the point of allowing consistent oral intake. We discussed the plan for increasing to Q6 with the goal of small consistent intake throughout the day and were agreeable and understanding. Labs reviewed with stable anemia and plts at 71K. Calcium of 7.1 with plans for HD.    Review of Systems   Constitutional:  Positive for malaise/fatigue.   Gastrointestinal:  Positive for nausea and vomiting.   Neurological:  Positive for weakness.    Objective:     Vital Signs (Most Recent):  Temp: 98.1 °F (36.7 °C) (01/02/23 0316)  Pulse: 67 (01/02/23 0316)  Resp: 17 (01/02/23 0316)  BP: (!) 115/55 (01/02/23 0316)  SpO2: 97 % (01/02/23 0316) Vital Signs (24h Range):  Temp:  [97.7 °F (36.5 °C)-98.5 °F (36.9 °C)] 98.1 °F (36.7 °C)  Pulse:  [65-80] 67  Resp:  [16-17] 17  SpO2:  [95 %-98 %] 97 %  BP: (107-126)/(55-79) 115/55     Weight: 107.3 kg (236 lb 8.9 oz)  Body mass index is 32.99 kg/m².  Body surface area is 2.32 meters squared.      Intake/Output - Last 3 Shifts         12/31 0700  01/01 0659 01/01 0700  01/02 0659 01/02 0700  01/03 0659    P.O. 770 350     I.V. (mL/kg)       Blood 278      Other       IV Piggyback       Total Intake(mL/kg) 1048 (9.4) 350 (3.3)     Urine (mL/kg/hr) 1200 (0.4) 950 (0.4)     Other       Stool 0 0     Total Output 1200 950     Net -152 -600            Urine Occurrence 6 x      Stool Occurrence 6 x 1 x             Physical Exam  Vitals and nursing note reviewed.   Constitutional:       Appearance: He is well-developed.   HENT:      Head: Normocephalic and atraumatic.      Mouth/Throat:      Pharynx: No oropharyngeal exudate.   Eyes:      General:          Right eye: No discharge.         Left eye: No discharge.      Conjunctiva/sclera: Conjunctivae normal.      Pupils: Pupils are equal, round, and reactive to light.   Cardiovascular:      Rate and Rhythm: Normal rate and regular rhythm.      Heart sounds: Normal heart sounds. No murmur heard.  Pulmonary:      Effort: Pulmonary effort is normal. No respiratory distress.      Breath sounds: Normal breath sounds. No wheezing or rales.   Abdominal:      General: Bowel sounds are normal. There is no distension.      Palpations: Abdomen is soft.      Tenderness: There is no abdominal tenderness.   Musculoskeletal:         General: No deformity. Normal range of motion.      Cervical back: Normal range of motion and neck supple.   Skin:     General: Skin is warm and dry.      Findings: No erythema or rash.      Comments: Right chest wall dialysis catheter. Dressing c/d/i. No sign of infection to site.   Neurological:      Mental Status: He is alert and oriented to person, place, and time.   Psychiatric:         Behavior: Behavior normal.         Thought Content: Thought content normal.         Judgment: Judgment normal.       Significant Labs:   CBC:   Recent Labs   Lab 01/01/23  0400 01/02/23  0321   WBC 3.66* 2.45*   HGB 7.7* 8.0*   HCT 23.8* 24.0*   PLT 85* 77*      and CMP:   Recent Labs   Lab 01/01/23  0400 01/02/23  0321   * 139   K 4.6 4.5    106   CO2 21* 20*   GLU 84 85   BUN 55* 71*   CREATININE 6.1* 7.4*   CALCIUM 7.0* 7.1*   PROT 5.1* 5.4*   ALBUMIN 3.3* 3.6   BILITOT 0.7 0.8   ALKPHOS 73 72   AST 14 11   ALT 8* 6*   ANIONGAP 10 13         Diagnostic Results:  I have reviewed all pertinent imaging results/findings within the past 24 hours.

## 2023-01-02 NOTE — PROGRESS NOTES
Bedside HD completed, blood returned and catheter ports flushed with normal saline. Catheter ports instilled with heparin as indicated, post B/p 134/75, pulse 75, o2 sat on room air 95%. Patient awake and alert. No issues

## 2023-01-02 NOTE — ASSESSMENT & PLAN NOTE
Worsened over the past 2 days s/p AUTO SCT      Plan:  -Zofran increased to Q6 scheduled  -Scopalamine patch in place  -Compazine PRN as well

## 2023-01-02 NOTE — PROGRESS NOTES
[facility] - Oncology ([facility])  Hematology  Bone Marrow Transplant  Progress Note    Patient Name: De Lakhani  Admission Date: 12/28/2022  Hospital Length of Stay: 5 days  Code Status: Full Code    Subjective:     Interval History: Day +3 of Torie 140 Auto SCT for MM. No recurrent dizziness or falls over the past 24 hours. He has been vitally stable with patient reporting improvement diarrhea compared to yesterday afternoon but with continued nausea and vomiting. They report the Zofran has helped some since being scheduled Q8 but not to the point of allowing consistent oral intake. We discussed the plan for increasing to Q6 with the goal of small consistent intake throughout the day and were agreeable and understanding. Labs reviewed with stable anemia and plts at 71K. Calcium of 7.1 with plans for HD.    Review of Systems   Constitutional:  Positive for malaise/fatigue.   Gastrointestinal:  Positive for nausea and vomiting.   Neurological:  Positive for weakness.    Objective:     Vital Signs (Most Recent):  Temp: 98.1 °F (36.7 °C) (01/02/23 0316)  Pulse: 67 (01/02/23 0316)  Resp: 17 (01/02/23 0316)  BP: (!) 115/55 (01/02/23 0316)  SpO2: 97 % (01/02/23 0316) Vital Signs (24h Range):  Temp:  [97.7 °F (36.5 °C)-98.5 °F (36.9 °C)] 98.1 °F (36.7 °C)  Pulse:  [65-80] 67  Resp:  [16-17] 17  SpO2:  [95 %-98 %] 97 %  BP: (107-126)/(55-79) 115/55     Weight: 107.3 kg (236 lb 8.9 oz)  Body mass index is 32.99 kg/m².  Body surface area is 2.32 meters squared.      Intake/Output - Last 3 Shifts         12/31 0700  01/01 0659 01/01 0700 01/02 0659 01/02 0700 01/03 0659    P.O. 770 350     I.V. (mL/kg)       Blood 278      Other       IV Piggyback       Total Intake(mL/kg) 1048 (9.4) 350 (3.3)     Urine (mL/kg/hr) 1200 (0.4) 950 (0.4)     Other       Stool 0 0     Total Output 1200 950     Net -152 -600            Urine Occurrence 6 x      Stool Occurrence 6 x 1 x             Physical Exam  Vitals and nursing note reviewed.    Constitutional:       Appearance: He is well-developed.   HENT:      Head: Normocephalic and atraumatic.      Mouth/Throat:      Pharynx: No oropharyngeal exudate.   Eyes:      General:         Right eye: No discharge.         Left eye: No discharge.      Conjunctiva/sclera: Conjunctivae normal.      Pupils: Pupils are equal, round, and reactive to light.   Cardiovascular:      Rate and Rhythm: Normal rate and regular rhythm.      Heart sounds: Normal heart sounds. No murmur heard.  Pulmonary:      Effort: Pulmonary effort is normal. No respiratory distress.      Breath sounds: Normal breath sounds. No wheezing or rales.   Abdominal:      General: Bowel sounds are normal. There is no distension.      Palpations: Abdomen is soft.      Tenderness: There is no abdominal tenderness.   Musculoskeletal:         General: No deformity. Normal range of motion.      Cervical back: Normal range of motion and neck supple.   Skin:     General: Skin is warm and dry.      Findings: No erythema or rash.      Comments: Right chest wall dialysis catheter. Dressing c/d/i. No sign of infection to site.   Neurological:      Mental Status: He is alert and oriented to person, place, and time.   Psychiatric:         Behavior: Behavior normal.         Thought Content: Thought content normal.         Judgment: Judgment normal.       Significant Labs:   CBC:   Recent Labs   Lab 01/01/23  0400 01/02/23  0321   WBC 3.66* 2.45*   HGB 7.7* 8.0*   HCT 23.8* 24.0*   PLT 85* 77*      and CMP:   Recent Labs   Lab 01/01/23  0400 01/02/23  0321   * 139   K 4.6 4.5    106   CO2 21* 20*   GLU 84 85   BUN 55* 71*   CREATININE 6.1* 7.4*   CALCIUM 7.0* 7.1*   PROT 5.1* 5.4*   ALBUMIN 3.3* 3.6   BILITOT 0.7 0.8   ALKPHOS 73 72   AST 14 11   ALT 8* 6*   ANIONGAP 10 13         Diagnostic Results:  I have reviewed all pertinent imaging results/findings within the past 24 hours.    Assessment/Plan:     * History of autologous stem cell  transplant  - Patient of Dr. King  - Diagnosed with MM in April 2022. Underwent 2 cycles CyBorD then transitioned to DVRd (now s/p C6)  - Admitted 12/28/22 for Torie 140 auto SCT   - Today is Day +2  - Completed [id number]/29 without issue  - Transplant is scheduled for today 12/30/2022, at 3p.m. He will be receiving 6 bags with a total CD34 dose of 3.13 x10^6/kg.    Planned conditioning regimen:  Melphalan on Day -1     Antimicrobial Prophylaxis:  Acyclovir starting on Day -1  Levofloxacin starting on Day -1  Fluconazole starting on Day -1     Growth Factor Support:  Neupogen starting on Day +7      Caregiver: Wife  Post-transplant discharge plans: Javed Garcia    Diarrhea  Diarrhea on 1/1/23 following AUTO SCT  C. Diff testing negative    Plan:  PRN Imodium with relief, adjust further as needed    Nausea & vomiting  Worsened over the past 2 days s/p AUTO SCT      Plan:  -Zofran increased to Q6 scheduled  -Scopalamine patch in place  -Compazine PRN as well      Thrombocytopenia  - daily CBC  - transfuse for platelets <10K or bleeding  - stop apixaban when platelets <50K  - except further platelet decline following chemo and SCT    Anemia in neoplastic disease  - transfuse for Hgb <7, Hemoglobin of 6.8 on 12/31 prompting PRBC transfusion and responded well to 7.7  - daily CBC  - expect pancytopenia following chemo and SCT    Hyperlipidemia  - Will hold home statin while inpatient    Depression  - Continue home Zoloft    Deep vein thrombosis (DVT) of upper extremity  - Continue home apixiban. Will hold with plts < 50K.    Hypertension  - Continue home metoprolol    End stage renal disease  - developed during ICU stay. Believed to be 2/2 hypoprofusion due to hypotension  - nephrology consulted on admission  - on dialysis MWF at home  - right chest wall dialysis catheter in place    Dependence on hemodialysis  - see ESRD    Chronic infection of prosthetic knee  - Seen in ID clinic with Dr. Chatterjee prior to  transplant   - Rec continuing doxycycline throughout transplant     Multiple myeloma not having achieved remission  - Patient of Dr. King. Per most recent clinic note:   - 4/20/22: renal biopsy: light chain cast nephropathy, kappa light chain  - 4/26/22: right subclavian vein thrombus   - 4/27/22: M spike 0.2 with free monoclonal kappa band. B2mg 19.57, IgG 496, IgA 10, IgM <5. Kappa 46646, Lambda 7.9, ratio >1000  - 5/12/22: BMBx: plasma cell myeloma, marrow cellularity 100%, plasma cell percentage 100%. Plasma cell phenotype +, kappa +, CD20- -, CD30-, EMA-, SADAF-, Congo red negative. Peripheral blood with pancytopenia and no circulating blasts. Decreased storage iron. FISH canceled due to quantity not sufficient  - 5/18/22: CyBorD C1D1  - 5/23/22: rasburicase  - 5/24/22: Xgeva  - 6/6/22: Kappa 65905, lambda 4.0, ratio >1000, M spike 0.1 with free monoclonal kappa band present  - 6/6/22: CyBorD C2D1  - 6/9/22: BMBx Plasma cell neoplasm compatible with plasma cell myeloma. Extent of involvement 80-90% of bone marrow elements. Cytology: Plasmablastic. FISH cytogenetics positive for 1q21/CKS1B gain. Karyotype failed. Myelofibrosis diffuse (MF-3)  - 6/20/22: CyBorD C2D8 delayed due to covid  - 6/23/22: CyBOrD C2D11  - 7/5/22: C1D1 DVRd  - 7/7/22 - 7/18/22: hospital admission for septic shock resulting in renal failure  - 7/25/22: started again on DVRd  - 8/8/2022: kappa 1402.8, lambda 7.5, ratio 167.04. M spike 0.1, two faint restricted bands in gamma globulin regions.   - 10/10/22: C5D1 DVRd. Revlimid on hold for upcoming stem   - Admitted for Torie 140 auto SCT on 12/28/22 as above        VTE Risk Mitigation (From admission, onward)         Ordered     heparin (porcine) injection 1,000 Units  As needed (PRN)         12/30/22 1243     apixaban tablet 5 mg  2 times daily         12/28/22 1659     heparin (porcine) injection 1,600 Units  As needed (PRN)         12/28/22 1825     IP VTE HIGH RISK PATIENT   Once         12/28/22 1659     Place sequential compression device  Until discontinued         12/28/22 1659                Disposition: Remain on BMT    Jaun Rowan, DO  Bone Marrow Transplant  [facility] - Oncology ([facility])

## 2023-01-02 NOTE — PLAN OF CARE
Plan of care note:    Rapid response called after patient had a witnessed GLF by his spouse. She reports he had no complaints other than mild fatigue following HD at which time he got up to use the restroom. She noticed him get unsteady and then fall against the wall and lose consciousness. She immediately found his nurse who called a rapid. On arrival, patient was non-responsive against the wall with a pulse. His neck was stabilized and he was laid flat. He slowly had return of consciousness and had BP and BG checks. Rapid assisted with getting him safely in bed and he denied any head, back, or extremity pain. He became alert and oriented times 3. Stat CT, CMP, CBC, & EKG ordered. Bed alarm ordered as well.     Jaun Rowan D.O.  Hematology/Oncology Fellow, PGY-IV
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
## 2023-01-03 PROBLEM — E83.39 HYPERPHOSPHATEMIA: Status: ACTIVE | Noted: 2023-01-03

## 2023-01-03 PROBLEM — T45.1X5A CHEMOTHERAPY-INDUCED NAUSEA AND VOMITING: Status: ACTIVE | Noted: 2023-01-02

## 2023-01-03 PROBLEM — K52.1 CHEMOTHERAPY-INDUCED DIARRHEA: Status: ACTIVE | Noted: 2023-01-02
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
## 2023-01-03 PROBLEM — R55 SYNCOPE: Status: ACTIVE | Noted: 2023-01-03

## 2023-01-03 PROBLEM — T45.1X5A CHEMOTHERAPY-INDUCED DIARRHEA: Status: ACTIVE | Noted: 2023-01-02
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
## 2023-01-03 LAB
ALBUMIN SERPL BCP-MCNC: 3.6 G/DL (ref 3.5–5.2)
ALP SERPL-CCNC: 70 U/L (ref 55–135)
ALT SERPL W/O P-5'-P-CCNC: 9 U/L (ref 10–44)
ANION GAP SERPL CALC-SCNC: 10 MMOL/L (ref 8–16)
ANISOCYTOSIS BLD QL SMEAR: SLIGHT
AST SERPL-CCNC: 12 U/L (ref 10–40)
BASOPHILS # BLD AUTO: 0.01 K/UL (ref 0–0.2)
BASOPHILS NFR BLD: 0.3 % (ref 0–1.9)
BILIRUB SERPL-MCNC: 0.9 MG/DL (ref 0.1–1)
BUN SERPL-MCNC: 44 MG/DL (ref 6–20)
CALCIUM SERPL-MCNC: 8.1 MG/DL (ref 8.7–10.5)
CHLORIDE SERPL-SCNC: 102 MMOL/L (ref 95–110)
CO2 SERPL-SCNC: 24 MMOL/L (ref 23–29)
CREAT SERPL-MCNC: 5.7 MG/DL (ref 0.5–1.4)
DIFFERENTIAL METHOD: ABNORMAL
EOSINOPHIL # BLD AUTO: 0 K/UL (ref 0–0.5)
EOSINOPHIL NFR BLD: 0.3 % (ref 0–8)
ERYTHROCYTE [DISTWIDTH] IN BLOOD BY AUTOMATED COUNT: 18.4 % (ref 11.5–14.5)
EST. GFR  (NO RACE VARIABLE): 10.8 ML/MIN/1.73 M^2
GLUCOSE SERPL-MCNC: 85 MG/DL (ref 70–110)
HBV SURFACE AB SER QL IA: POSITIVE
HBV SURFACE AB SERPL IA-ACNC: 114 MIU/ML
HCT VFR BLD AUTO: 23.7 % (ref 40–54)
HGB BLD-MCNC: 7.8 G/DL (ref 14–18)
HYPOCHROMIA BLD QL SMEAR: ABNORMAL
IMM GRANULOCYTES # BLD AUTO: 0.02 K/UL (ref 0–0.04)
IMM GRANULOCYTES NFR BLD AUTO: 0.6 % (ref 0–0.5)
LYMPHOCYTES # BLD AUTO: 0 K/UL (ref 1–4.8)
LYMPHOCYTES NFR BLD: 0.6 % (ref 18–48)
MAGNESIUM SERPL-MCNC: 1.7 MG/DL (ref 1.6–2.6)
MCH RBC QN AUTO: 32.2 PG (ref 27–31)
MCHC RBC AUTO-ENTMCNC: 32.9 G/DL (ref 32–36)
MCV RBC AUTO: 98 FL (ref 82–98)
MONOCYTES # BLD AUTO: 0 K/UL (ref 0.3–1)
MONOCYTES NFR BLD: 0 % (ref 4–15)
NEUTROPHILS # BLD AUTO: 3.2 K/UL (ref 1.8–7.7)
NEUTROPHILS NFR BLD: 98.2 % (ref 38–73)
NRBC BLD-RTO: 0 /100 WBC
OVALOCYTES BLD QL SMEAR: ABNORMAL
PHOSPHATE SERPL-MCNC: 5.3 MG/DL (ref 2.7–4.5)
PLATELET # BLD AUTO: 60 K/UL (ref 150–450)
PMV BLD AUTO: 10.1 FL (ref 9.2–12.9)
POIKILOCYTOSIS BLD QL SMEAR: SLIGHT
POLYCHROMASIA BLD QL SMEAR: ABNORMAL
POTASSIUM SERPL-SCNC: 4.4 MMOL/L (ref 3.5–5.1)
PROT SERPL-MCNC: 5.4 G/DL (ref 6–8.4)
RBC # BLD AUTO: 2.42 M/UL (ref 4.6–6.2)
SODIUM SERPL-SCNC: 136 MMOL/L (ref 136–145)
SPHEROCYTES BLD QL SMEAR: ABNORMAL
WBC # BLD AUTO: 3.26 K/UL (ref 3.9–12.7)

## 2023-01-03 PROCEDURE — 63600175 PHARM REV CODE 636 W HCPCS: Performed by: STUDENT IN AN ORGANIZED HEALTH CARE EDUCATION/TRAINING PROGRAM

## 2023-01-03 PROCEDURE — 63600175 PHARM REV CODE 636 W HCPCS: Performed by: INTERNAL MEDICINE

## 2023-01-03 PROCEDURE — 25000003 PHARM REV CODE 250: Performed by: INTERNAL MEDICINE

## 2023-01-03 PROCEDURE — 99233 PR SUBSEQUENT HOSPITAL CARE,LEVL III: ICD-10-PCS | Mod: ,,, | Performed by: INTERNAL MEDICINE

## 2023-01-03 PROCEDURE — 25000003 PHARM REV CODE 250: Performed by: NURSE PRACTITIONER

## 2023-01-03 PROCEDURE — 94761 N-INVAS EAR/PLS OXIMETRY MLT: CPT

## 2023-01-03 PROCEDURE — 83735 ASSAY OF MAGNESIUM: CPT | Performed by: NURSE PRACTITIONER

## 2023-01-03 PROCEDURE — 80053 COMPREHEN METABOLIC PANEL: CPT | Performed by: NURSE PRACTITIONER

## 2023-01-03 PROCEDURE — 84100 ASSAY OF PHOSPHORUS: CPT | Performed by: NURSE PRACTITIONER

## 2023-01-03 PROCEDURE — 85025 COMPLETE CBC W/AUTO DIFF WBC: CPT | Performed by: NURSE PRACTITIONER

## 2023-01-03 PROCEDURE — 20600001 HC STEP DOWN PRIVATE ROOM

## 2023-01-03 PROCEDURE — 99233 SBSQ HOSP IP/OBS HIGH 50: CPT | Mod: ,,, | Performed by: INTERNAL MEDICINE

## 2023-01-03 RX ORDER — SEVELAMER CARBONATE 800 MG/1
800 TABLET, FILM COATED ORAL
Status: DISCONTINUED | OUTPATIENT
Start: 2023-01-03 | End: 2023-01-14 | Stop reason: HOSPADM

## 2023-01-03 RX ORDER — LOPERAMIDE HYDROCHLORIDE 2 MG/1
2 CAPSULE ORAL 4 TIMES DAILY PRN
Status: DISCONTINUED | OUTPATIENT
Start: 2023-01-03 | End: 2023-01-14 | Stop reason: HOSPADM

## 2023-01-03 RX ORDER — SODIUM CHLORIDE 9 MG/ML
INJECTION, SOLUTION INTRAVENOUS ONCE
Status: DISCONTINUED | OUTPATIENT
Start: 2023-01-04 | End: 2023-01-06

## 2023-01-03 RX ADMIN — FLUCONAZOLE 200 MG: 200 TABLET ORAL at 09:01

## 2023-01-03 RX ADMIN — DOXYCYCLINE HYCLATE 100 MG: 100 TABLET, COATED ORAL at 08:01

## 2023-01-03 RX ADMIN — Medication 1 DOSE: at 09:01

## 2023-01-03 RX ADMIN — LOPERAMIDE HYDROCHLORIDE 2 MG: 2 CAPSULE ORAL at 04:01

## 2023-01-03 RX ADMIN — SERTRALINE HYDROCHLORIDE 50 MG: 50 TABLET ORAL at 09:01

## 2023-01-03 RX ADMIN — CALCIUM CARBONATE (ANTACID) CHEW TAB 500 MG 1000 MG: 500 CHEW TAB at 09:01

## 2023-01-03 RX ADMIN — DOXYCYCLINE HYCLATE 100 MG: 100 TABLET, COATED ORAL at 09:01

## 2023-01-03 RX ADMIN — Medication 1 DOSE: at 08:01

## 2023-01-03 RX ADMIN — Medication 1 DOSE: at 01:01

## 2023-01-03 RX ADMIN — ACYCLOVIR 400 MG: 200 CAPSULE ORAL at 08:01

## 2023-01-03 RX ADMIN — SCOPALAMINE 1 PATCH: 1 PATCH, EXTENDED RELEASE TRANSDERMAL at 04:01

## 2023-01-03 RX ADMIN — ONDANSETRON 4 MG: 2 INJECTION INTRAMUSCULAR; INTRAVENOUS at 05:01

## 2023-01-03 RX ADMIN — ONDANSETRON 4 MG: 2 INJECTION INTRAMUSCULAR; INTRAVENOUS at 01:01

## 2023-01-03 RX ADMIN — CHOLECALCIFEROL TAB 25 MCG (1000 UNIT) 3000 UNITS: 25 TAB at 09:01

## 2023-01-03 RX ADMIN — METOPROLOL SUCCINATE 12.5 MG: 25 TABLET, EXTENDED RELEASE ORAL at 09:01

## 2023-01-03 RX ADMIN — ACYCLOVIR 400 MG: 200 CAPSULE ORAL at 09:01

## 2023-01-03 RX ADMIN — Medication 1 DOSE: at 04:01

## 2023-01-03 RX ADMIN — PROCHLORPERAZINE EDISYLATE 10 MG: 5 INJECTION INTRAMUSCULAR; INTRAVENOUS at 02:01

## 2023-01-03 RX ADMIN — PANTOPRAZOLE SODIUM 40 MG: 40 TABLET, DELAYED RELEASE ORAL at 09:01

## 2023-01-03 NOTE — ASSESSMENT & PLAN NOTE
Zofran increased to Q6 scheduled. Nausea improved.  Scopalamine patch in place  Continue PRN compazine

## 2023-01-03 NOTE — ASSESSMENT & PLAN NOTE
See syncope  Unclear if patient hit head. CT head neg for bleed.  Tele sitter and fall precautions in place in place

## 2023-01-03 NOTE — ASSESSMENT & PLAN NOTE
Two falls since admission with reported loss of consciousness. Both following dialysis, so suspect orthostatic  Out of abundance of caution, will get EEG  Metoprolol dose decreased from 25 mg daily to 12.5 mg daily  Can consider midodrine

## 2023-01-03 NOTE — ASSESSMENT & PLAN NOTE
- Seen in ID clinic with Dr. Chatterjee prior to transplant   - ID rec'd continuing doxycycline throughout transplant

## 2023-01-03 NOTE — CARE UPDATE
RAPID RESPONSE NURSE PROACTIVE ROUNDING NOTE       Time of Visit:   Admit Date: 2022  LOS: 6  Code Status: Full Code   Date of Visit: 2023  : 1965  Age: 57 y.o.  Sex: male  Race: White  Bed: 843/843 A:   MRN: [id number]  Was the patient discharged from an ICU this admission? No   Was the patient discharged from a PACU within last 24 hours? No   Did the patient receive conscious sedation/general anesthesia in last 24 hours? No  Was the patient in the ED within the past 24 hours? No  Was the patient on NIPPV within the past 24 hours? No   Attending Physician: Charlette Cardozo MD  Primary Service: [facility]   Time spent at the bedside: < 15 min    SITUATION    Notified by previous RRN during handoff.  Reason for alert: Post fall  Called to evaluate the patient for Circulatory    BACKGROUND     Why is the patient in the hospital?: History of autologous stem cell transplant    Patient has a past medical history of Chronic infection of prosthetic knee, subsequent encounter, Encounter for blood transfusion, Essential (primary) hypertension, and Multiple myeloma.    Last Vitals:  Temp: 98.3 °F (36.8 °C) (2355)  Pulse: 79 (2355)  Resp: 20 (2355)  BP: 101/58 (2355)  SpO2: 97 % (2355)    24 Hours Vitals Range:  Temp:  [97.8 °F (36.6 °C)-98.7 °F (37.1 °C)]   Pulse:  [59-97]   Resp:  [14-20]   BP: ()/(52-87)   SpO2:  [91 %-100 %]     Labs:  Recent Labs     23  0400 23  0321 23  1645   WBC 3.66* 2.45* 3.65*   HGB 7.7* 8.0* 8.7*   HCT 23.8* 24.0* 25.1*   PLT 85* 77* 98*       Recent Labs     22  0328 23  0400 23  0321 23  1645    135* 139 138   K 4.2 4.6 4.5 3.1*    104 106 103   CO2 21* 21* 20* 22*   CREATININE 4.4* 6.1* 7.4* 4.3*   GLU 99 84 85 168*   PHOS 4.8* 6.1* 6.6*  --    MG 1.7 1.7 1.8  --         No results for input(s): PH, PCO2, PO2, HCO3, POCSATURATED, BE in the last 72 hours.      ASSESSMENT    Physical Exam Pt resting, spouse at beside. Denies any further issues. Pt's BP remains soft, 95/59(73)currently. No acute issues or complaints, resp even and unlabored.  No current needs from Rapid Response.    INTERVENTIONS    The patient was seen for Medical problem. Staff concerns included fall with hypotension and loss of consciousness post HD session. The following interventions were performed: no additional interventions needed at this time.    RECOMMENDATIONS    Call staff for ambulation, non-skid socks.    PROVIDER ESCALATION    Yes/No  no    Orders received and case discussed with NA.    Disposition: Remain in room 843.    FOLLOW-UP    Charge RNElvia  updated on plan of care. Instructed to call the Rapid Response Nurse, Ragini Rider RN at [phone] for additional questions or concerns.

## 2023-01-03 NOTE — ASSESSMENT & PLAN NOTE
- developed during ICU stay. Believed to be 2/2 hypoprofusion due to hypotension  - nephrology consulted on admission  - on dialysis MWF at home. Continuing this schedule inpatient.  - right chest wall dialysis catheter in place

## 2023-01-03 NOTE — PLAN OF CARE
Day +4 of Melph auto HSCT. Patient is progressing and involved with plan of care. Frequent rounds made to assess pain and safety. Bed alarm activated, Telesitter in room. Up with stand by assist. Tolerating diet.No c/o pain. Low UO. Pt states he is afraid to get up d/t falls. All vitals stable; no acute events this shift. Pt. Remaining free from falls or injury throughout shift; bed in lowest position; side rails up X2; call light within reach; pt instructed to call for assistance as needed - verbalized understanding. Q2h rounding on patient. Will continue to monitor.

## 2023-01-03 NOTE — ASSESSMENT & PLAN NOTE
Daily CBC  Transfuse for platelets <10K or bleeding  Stopped apixaban 12/3/23. Can resume once plts are consistently > 50K.

## 2023-01-03 NOTE — PROGRESS NOTES
Camera is at bedside, but pt is still on waiting list to be monitored. Bed alarm is set. Instructed to call staff for mobility. Wife at bedside.

## 2023-01-03 NOTE — ASSESSMENT & PLAN NOTE
- Continue home metoprolol. Decreased dose to 12.5 mg daily 1/3/23 due to soft BP and syncopal episodes x 2.

## 2023-01-03 NOTE — PROGRESS NOTES
Admit Assessment    Patient Identification  De aLkhani   :  1965  Admit Date:  2022  Attending Provider:  Charlette Cardozo MD              Referral:   Pt was admitted to  with a diagnosis of History of autologous stem cell transplant, and was admitted this hospital stay due to Awaiting organ transplant status [Z76.82]  Stem cell transplant candidate [Z76.82].   is involved was referred to the Social Work Department via routine referral.  Patient presents as a 57 y.o. year old  male.    Persons interviewed: patient and spouse.    Living Situation:  Lives with wife Darlyn ([phone]) in own single-story home.  Independent with ADLs.      Resides at [address] [place] [address] [id number] [place] [address],   phone: [phone] (home).      (RETIRED) Functional Status Prior  Ambulation Prior: 0-->independent  Transferrin-->independent  Toiletin-->independent    Current or Past Agencies and Description of Services/Supplies    DME  Agency Name: none current  Equipment Currently Used at Home: shower chair (owns RW and SPC, does not use them)    Home Health  Agency Name: past use of [facility] after knee replacement    IV Infusion  Agency Name: past use of [facility]    Nutrition: Oral, current renal diet with Boost supplements, any flavor.      Outpatient Pharmacy:     [facility] - [place], [place] - [address]  [address]  [address]  [facility] [address]  Phone: [phone] Fax: [phone]      Patient Preference of agencies include: none expressed.      Patient/Caregiver informed of right to choose providers or agencies.  Patient provides permission to release any necessary information to [facility] and to Non-[facility] agencies as needed to facilitate patient care, treatment planning, and patient discharge planning.  Written and verbal resources provided.      Coping   Coping well with support of family.  Pleased with care since admission.          Adjustment to Diagnosis and Treatment  Adequate.      Emotional/Behavioral/Cognitive Issues   Hx of depression (not disclosed in initial assessment); compliant with Zoloft 50mg daily.           History/Current Symptoms of Anxiety/Depression: Yes  History/Current Substance Use:   Social History     Tobacco Use    Smoking status: Former    Smokeless tobacco: Never    Tobacco comments:     quit smoking in the 90s   Substance and Sexual Activity    Alcohol use: Not Currently    Drug use: Not on file    Sexual activity: Not on file       Indications of Abuse/Neglect: No:   Abuse Screen (yes response referral indicated)  Feels Unsafe at Home or Work/School: no  Physical Signs of Abuse Present: no    Financial:  Payer/Plan Subscr  Sex Relation Sub. Ins. ID Effective Group Num   1. [employer] MANAGE* JUAN PABLO JUAN 1965 Male Self [id number] 11/1/21 [id number]                                   [address] [address]      Cancer policy claim in place; itemized bills from financial counselor pending.      Other identified concerns/needs: TBD.       Plan: to [facility] in care of family.      Interventions/Referrals: TBD.    Patient/caregiver engaged in treatment planning process.     providing psychosocial and supportive counseling, resources, education, assistance and discharge planning as appropriate.  Patient/caregiver state understanding of  available resources,  following, remains available.  Given SWer contact info and encouraged to call with any needs or concerns.

## 2023-01-03 NOTE — ASSESSMENT & PLAN NOTE
C-diff neg  Improved with imodium. Changed imodium from scheduled to PRN 1/3/22. Continue PRN limotil.

## 2023-01-03 NOTE — PROGRESS NOTES
[facility] - Oncology ([facility])  Hematology  Bone Marrow Transplant  Progress Note    Patient Name: De Lakhani  Admission Date: 12/28/2022  Hospital Length of Stay: 6 days  Code Status: Full Code    Subjective:     Interval History: Day +4 from a Torie 140 auto SCT for MM. Had repeat fall/syncopal episode in room yesterday. Occurred after dialysis. 900 ml fluid removed. Orthostatic BP positive. Suspect orthostasis resulted in syncope, but will get EEG out of abundance of caution. Plt 60K. Stopping Eliquis. CRISTHIAN hose ordered. Phos 5.3. Starting sevelamer. No episodes of loose stools since yesterday. Changed loperamide from scheduled to prn. Nausea better controlled with zofran ATC.    Objective:     Vital Signs (Most Recent):  Temp: 97.9 °F (36.6 °C) (01/03/23 1145)  Pulse: 99 (01/03/23 1222)  Resp: 18 (01/03/23 1220)  BP: 102/67 (01/03/23 1222)  SpO2: 100 % (01/03/23 1222) Vital Signs (24h Range):  Temp:  [97.8 °F (36.6 °C)-98.7 °F (37.1 °C)] 97.9 °F (36.6 °C)  Pulse:  [62-99] 99  Resp:  [16-20] 18  SpO2:  [91 %-100 %] 100 %  BP: ()/(52-87) 102/67     Weight: 105.7 kg (233 lb 0.4 oz)  Body mass index is 32.5 kg/m².  Body surface area is 2.3 meters squared.    ECOG SCORE           [unfilled]    Intake/Output - Last 3 Shifts         01/01 0700  01/02 0659 01/02 0700 01/03 0659 01/03 0700 01/04 0659    P.O. 350 350 150    I.V. (mL/kg)  0 (0)     Blood       Other  250     IV Piggyback   0    Total Intake(mL/kg) 350 (3.3) 600 (5.7) 150 (1.4)    Urine (mL/kg/hr) 950 (0.4) 600 (0.2)     Other  900     Stool 0      Total Output 950 1500     Net -600 -900 +150           Urine Occurrence  3 x 1 x    Stool Occurrence 1 x 2 x             Physical Exam  Constitutional:       Appearance: He is well-developed.   HENT:      Head: Normocephalic and atraumatic.      Mouth/Throat:      Pharynx: No oropharyngeal exudate.   Eyes:      General:         Right eye: No discharge.         Left eye: No discharge.       Conjunctiva/sclera: Conjunctivae normal.      Pupils: Pupils are equal, round, and reactive to light.   Cardiovascular:      Rate and Rhythm: Normal rate and regular rhythm.      Heart sounds: Normal heart sounds. No murmur heard.  Pulmonary:      Effort: Pulmonary effort is normal. No respiratory distress.      Breath sounds: Normal breath sounds. No wheezing or rales.   Abdominal:      General: Bowel sounds are normal. There is no distension.      Palpations: Abdomen is soft.      Tenderness: There is no abdominal tenderness.   Musculoskeletal:         General: No deformity. Normal range of motion.      Cervical back: Normal range of motion and neck supple.   Skin:     General: Skin is warm and dry.      Findings: No erythema or rash.      Comments: Right chest wall dialysis catheter. Dressing c/d/i. No sign of infection to site.   Neurological:      Mental Status: He is alert and oriented to person, place, and time.   Psychiatric:         Behavior: Behavior normal.         Thought Content: Thought content normal.         Judgment: Judgment normal.       Significant Labs:   CBC:   Recent Labs   Lab 01/02/23 0321 01/02/23  1645 01/03/23  0304   WBC 2.45* 3.65* 3.26*   HGB 8.0* 8.7* 7.8*   HCT 24.0* 25.1* 23.7*   PLT 77* 98* 60*    and CMP:   Recent Labs   Lab 01/02/23 0321 01/02/23  1645 01/03/23  0304    138 136   K 4.5 3.1* 4.4    103 102   CO2 20* 22* 24   GLU 85 168* 85   BUN 71* 36* 44*   CREATININE 7.4* 4.3* 5.7*   CALCIUM 7.1* 8.5* 8.1*   PROT 5.4* 5.7* 5.4*   ALBUMIN 3.6 3.7 3.6   BILITOT 0.8 1.2* 0.9   ALKPHOS 72 73 70   AST 11 12 12   ALT 6* 9* 9*   ANIONGAP 13 13 10       Diagnostic Results:  I have reviewed all pertinent imaging results/findings within the past 24 hours.    Assessment/Plan:     * History of autologous stem cell transplant  - Patient of Dr. King  - Diagnosed with MM in April 2022. Underwent 2 cycles CyBorD then transitioned to DVRd (now s/p C6)  - Admitted 12/28/22 for  Torie 140 auto SCT   - Today is Day +4  - Completed chemotherapy 12/29 without issue  - Received 6 bags with a total CD34 dose of 3.13 x10^6/kg on 12/30/23.    Planned conditioning regimen:  Melphalan on Day -1     Antimicrobial Prophylaxis:  Acyclovir starting on Day -1  Levofloxacin starting on Day -1  Fluconazole starting on Day -1     Growth Factor Support:  Neupogen starting on Day +7      Caregiver: Wife  Post-transplant discharge plans: [facility]    Multiple myeloma not having achieved remission  - Patient of Dr. King. Per most recent clinic note:   - 4/20/22: renal biopsy: light chain cast nephropathy, kappa light chain  - 4/26/22: right subclavian vein thrombus   - 4/27/22: M spike 0.2 with free monoclonal kappa band. B2mg 19.57, IgG 496, IgA 10, IgM <5. Kappa 34920, Lambda 7.9, ratio >1000  - 5/12/22: BMBx: plasma cell myeloma, marrow cellularity 100%, plasma cell percentage 100%. Plasma cell phenotype +, kappa +, CD20- -, CD30-, EMA-, SADAF-, Congo red negative. Peripheral blood with pancytopenia and no circulating blasts. Decreased storage iron. FISH canceled due to quantity not sufficient  - 5/18/22: CyBorD C1D1  - 5/23/22: rasburicase  - 5/24/22: Xgeva  - 6/6/22: Kappa 28088, lambda 4.0, ratio >1000, M spike 0.1 with free monoclonal kappa band present  - 6/6/22: CyBorD C2D1  - 6/9/22: BMBx Plasma cell neoplasm compatible with plasma cell myeloma. Extent of involvement 80-90% of bone marrow elements. Cytology: Plasmablastic. FISH cytogenetics positive for 1q21/CKS1B gain. Karyotype failed. Myelofibrosis diffuse (MF-3)  - 6/20/22: CyBorD C2D8 delayed due to covid  - 6/23/22: CyBOrD C2D11  - 7/5/22: C1D1 DVRd  - 7/7/22 - 7/18/22: hospital admission for septic shock resulting in renal failure  - 7/25/22: started again on DVRd  - 8/8/2022: kappa 1402.8, lambda 7.5, ratio 167.04. M spike 0.1, two faint restricted bands in gamma globulin regions.   - 10/10/22: C5D1 DVRd. Revlimid on hold for  upcoming stem   - Admitted for Torie 140 auto SCT on 12/28/22 as above    Hyperphosphatemia  Likely 2/2 renal disease  Sevelamer started with meals    Fall during current hospitalization  See syncope  Unclear if patient hit head. CT head neg for bleed.  Tele sitter and fall precautions in place in place    Syncope  Two falls since admission with reported loss of consciousness. Both following dialysis, so suspect orthostatic  Out of abundance of caution, will get EEG  Metoprolol dose decreased from 25 mg daily to 12.5 mg daily  Can consider midodrine    Chemotherapy-induced diarrhea  C-diff neg  Improved with imodium. Changed imodium from scheduled to PRN 1/3/22. Continue PRN limotil.    Chemotherapy-induced nausea and vomiting  Zofran increased to Q6 scheduled. Nausea improved.  Scopalamine patch in place  Continue PRN compazine    Thrombocytopenia  Daily CBC  Transfuse for platelets <10K or bleeding  Stopped apixaban 12/3/23. Can resume once plts are consistently > 50K.      Anemia in neoplastic disease  - transfuse for Hgb <7  - daily CBC  - expect pancytopenia following chemo and SCT    Hyperlipidemia  - Will hold home statin while inpatient    Depression  - Continue home Zoloft    Deep vein thrombosis (DVT) of upper extremity  - Held home apixiban for thrombocytopenia. Will resume once plts are consistently > 50K.    Hypertension  - Continue home metoprolol. Decreased dose to 12.5 mg daily 1/3/23 due to soft BP and syncopal episodes x 2.    End stage renal disease  - developed during ICU stay. Believed to be 2/2 hypoprofusion due to hypotension  - nephrology consulted on admission  - on dialysis MWF at home. Continuing this schedule inpatient.  - right chest wall dialysis catheter in place    Dependence on hemodialysis  - see ESRD    Chronic infection of prosthetic knee  - Seen in ID clinic with Dr. Chatterjee prior to transplant   - ID rec'd continuing doxycycline throughout transplant         VTE Risk Mitigation  (From admission, onward)         Ordered     Place CRISTHIAN hose  Until discontinued         01/03/23 0911     heparin (porcine) injection 1,000 Units  As needed (PRN)         12/30/22 1243     heparin (porcine) injection 1,600 Units  As needed (PRN)         12/28/22 1825     IP VTE HIGH RISK PATIENT  Once         12/28/22 1659     Place sequential compression device  Until discontinued         12/28/22 1659                Disposition: Inpatient for SCT.    Amy Thomas, NP  Bone Marrow Transplant  [facility] - Oncology ([facility])

## 2023-01-03 NOTE — ASSESSMENT & PLAN NOTE
- Patient of Dr. King  - Diagnosed with MM in April 2022. Underwent 2 cycles CyBorD then transitioned to DVRd (now s/p C6)  - Admitted 12/28/22 for Torie 140 auto SCT   - Today is Day +4  - Completed chemotherapy 12/29 without issue  - Received 6 bags with a total CD34 dose of 3.13 x10^6/kg on 12/30/23.    Planned conditioning regimen:  Melphalan on Day -1     Antimicrobial Prophylaxis:  Acyclovir starting on Day -1  Levofloxacin starting on Day -1  Fluconazole starting on Day -1     Growth Factor Support:  Neupogen starting on Day +7      Caregiver: Wife  Post-transplant discharge plans: [facility]

## 2023-01-03 NOTE — SUBJECTIVE & OBJECTIVE
Subjective:     Interval History: Day +4 from a Torie 140 auto SCT for MM. Had repeat fall/syncopal episode in room yesterday. Occurred after dialysis. 900 ml fluid removed. Orthostatic BP positive. Suspect orthostasis resulted in syncope, but will get EEG out of abundance of caution. Plt 60K. Stopping Eliquis. CRISTHIAN hose ordered. Phos 5.3. Starting sevelamer. No episodes of loose stools since yesterday. Changed loperamide from scheduled to prn. Nausea better controlled with zofran ATC.    Objective:     Vital Signs (Most Recent):  Temp: 97.9 °F (36.6 °C) (01/03/23 1145)  Pulse: 99 (01/03/23 1222)  Resp: 18 (01/03/23 1220)  BP: 102/67 (01/03/23 1222)  SpO2: 100 % (01/03/23 1222) Vital Signs (24h Range):  Temp:  [97.8 °F (36.6 °C)-98.7 °F (37.1 °C)] 97.9 °F (36.6 °C)  Pulse:  [62-99] 99  Resp:  [16-20] 18  SpO2:  [91 %-100 %] 100 %  BP: ()/(52-87) 102/67     Weight: 105.7 kg (233 lb 0.4 oz)  Body mass index is 32.5 kg/m².  Body surface area is 2.3 meters squared.    ECOG SCORE           [unfilled]    Intake/Output - Last 3 Shifts         01/01 0700  01/02 0659 01/02 0700 01/03 0659 01/03 0700  01/04 0659    P.O. 350 350 150    I.V. (mL/kg)  0 (0)     Blood       Other  250     IV Piggyback   0    Total Intake(mL/kg) 350 (3.3) 600 (5.7) 150 (1.4)    Urine (mL/kg/hr) 950 (0.4) 600 (0.2)     Other  900     Stool 0      Total Output 950 1500     Net -600 -900 +150           Urine Occurrence  3 x 1 x    Stool Occurrence 1 x 2 x             Physical Exam  Constitutional:       Appearance: He is well-developed.   HENT:      Head: Normocephalic and atraumatic.      Mouth/Throat:      Pharynx: No oropharyngeal exudate.   Eyes:      General:         Right eye: No discharge.         Left eye: No discharge.      Conjunctiva/sclera: Conjunctivae normal.      Pupils: Pupils are equal, round, and reactive to light.   Cardiovascular:      Rate and Rhythm: Normal rate and regular rhythm.      Heart sounds: Normal heart sounds. No  murmur heard.  Pulmonary:      Effort: Pulmonary effort is normal. No respiratory distress.      Breath sounds: Normal breath sounds. No wheezing or rales.   Abdominal:      General: Bowel sounds are normal. There is no distension.      Palpations: Abdomen is soft.      Tenderness: There is no abdominal tenderness.   Musculoskeletal:         General: No deformity. Normal range of motion.      Cervical back: Normal range of motion and neck supple.   Skin:     General: Skin is warm and dry.      Findings: No erythema or rash.      Comments: Right chest wall dialysis catheter. Dressing c/d/i. No sign of infection to site.   Neurological:      Mental Status: He is alert and oriented to person, place, and time.   Psychiatric:         Behavior: Behavior normal.         Thought Content: Thought content normal.         Judgment: Judgment normal.       Significant Labs:   CBC:   Recent Labs   Lab 01/02/23 0321 01/02/23 1645 01/03/23  0304   WBC 2.45* 3.65* 3.26*   HGB 8.0* 8.7* 7.8*   HCT 24.0* 25.1* 23.7*   PLT 77* 98* 60*    and CMP:   Recent Labs   Lab 01/02/23 0321 01/02/23 1645 01/03/23  0304    138 136   K 4.5 3.1* 4.4    103 102   CO2 20* 22* 24   GLU 85 168* 85   BUN 71* 36* 44*   CREATININE 7.4* 4.3* 5.7*   CALCIUM 7.1* 8.5* 8.1*   PROT 5.4* 5.7* 5.4*   ALBUMIN 3.6 3.7 3.6   BILITOT 0.8 1.2* 0.9   ALKPHOS 72 73 70   AST 11 12 12   ALT 6* 9* 9*   ANIONGAP 13 13 10       Diagnostic Results:  I have reviewed all pertinent imaging results/findings within the past 24 hours.

## 2023-01-03 NOTE — ASSESSMENT & PLAN NOTE
- Patient of Dr. King. Per most recent clinic note:   - 4/20/22: renal biopsy: light chain cast nephropathy, kappa light chain  - 4/26/22: right subclavian vein thrombus   - 4/27/22: M spike 0.2 with free monoclonal kappa band. B2mg 19.57, IgG 496, IgA 10, IgM <5. Kappa 76552, Lambda 7.9, ratio >1000  - 5/12/22: BMBx: plasma cell myeloma, marrow cellularity 100%, plasma cell percentage 100%. Plasma cell phenotype +, kappa +, CD20- -, CD30-, EMA-, SADAF-, Congo red negative. Peripheral blood with pancytopenia and no circulating blasts. Decreased storage iron. FISH canceled due to quantity not sufficient  - 5/18/22: CyBorD C1D1  - 5/23/22: rasburicase  - 5/24/22: Xgeva  - 6/6/22: Kappa 47147, lambda 4.0, ratio >1000, M spike 0.1 with free monoclonal kappa band present  - 6/6/22: CyBorD C2D1  - 6/9/22: BMBx Plasma cell neoplasm compatible with plasma cell myeloma. Extent of involvement 80-90% of bone marrow elements. Cytology: Plasmablastic. FISH cytogenetics positive for 1q21/CKS1B gain. Karyotype failed. Myelofibrosis diffuse (MF-3)  - 6/20/22: CyBorD C2D8 delayed due to covid  - 6/23/22: CyBOrD C2D11  - 7/5/22: C1D1 DVRd  - 7/7/22 - 7/18/22: hospital admission for septic shock resulting in renal failure  - 7/25/22: started again on DVRd  - 8/8/2022: kappa 1402.8, lambda 7.5, ratio 167.04. M spike 0.1, two faint restricted bands in gamma globulin regions.   - 10/10/22: C5D1 DVRd. Revlimid on hold for upcoming stem   - Admitted for Torie 140 auto SCT on 12/28/22 as above

## 2023-01-04 PROBLEM — T45.1X5A PANCYTOPENIA DUE TO ANTINEOPLASTIC CHEMOTHERAPY: Status: ACTIVE | Noted: 2022-12-30

## 2023-01-04 PROBLEM — D61.810 PANCYTOPENIA DUE TO ANTINEOPLASTIC CHEMOTHERAPY: Status: ACTIVE | Noted: 2022-12-30

## 2023-01-04 LAB
ABO + RH BLD: ABNORMAL
ALBUMIN SERPL BCP-MCNC: 3.6 G/DL (ref 3.5–5.2)
ALP SERPL-CCNC: 68 U/L (ref 55–135)
ALT SERPL W/O P-5'-P-CCNC: 8 U/L (ref 10–44)
ANION GAP SERPL CALC-SCNC: 12 MMOL/L (ref 8–16)
ANISOCYTOSIS BLD QL SMEAR: SLIGHT
AST SERPL-CCNC: 10 U/L (ref 10–40)
BASOPHILS NFR BLD: 0 % (ref 0–1.9)
BILIRUB SERPL-MCNC: 0.7 MG/DL (ref 0.1–1)
BLD GP AB SCN CELLS X3 SERPL QL: ABNORMAL
BUN SERPL-MCNC: 57 MG/DL (ref 6–20)
CALCIUM SERPL-MCNC: 7.3 MG/DL (ref 8.7–10.5)
CHLORIDE SERPL-SCNC: 105 MMOL/L (ref 95–110)
CO2 SERPL-SCNC: 23 MMOL/L (ref 23–29)
CREAT SERPL-MCNC: 6.7 MG/DL (ref 0.5–1.4)
DIFFERENTIAL METHOD: ABNORMAL
EOSINOPHIL NFR BLD: 0 % (ref 0–8)
ERYTHROCYTE [DISTWIDTH] IN BLOOD BY AUTOMATED COUNT: 18.4 % (ref 11.5–14.5)
EST. GFR  (NO RACE VARIABLE): 8.9 ML/MIN/1.73 M^2
GLUCOSE SERPL-MCNC: 85 MG/DL (ref 70–110)
HCT VFR BLD AUTO: 24.5 % (ref 40–54)
HGB BLD-MCNC: 8 G/DL (ref 14–18)
IMM GRANULOCYTES # BLD AUTO: ABNORMAL K/UL (ref 0–0.04)
IMM GRANULOCYTES NFR BLD AUTO: ABNORMAL % (ref 0–0.5)
LYMPHOCYTES NFR BLD: 0 % (ref 18–48)
MAGNESIUM SERPL-MCNC: 1.7 MG/DL (ref 1.6–2.6)
MCH RBC QN AUTO: 32.9 PG (ref 27–31)
MCHC RBC AUTO-ENTMCNC: 32.7 G/DL (ref 32–36)
MCV RBC AUTO: 101 FL (ref 82–98)
METAMYELOCYTES NFR BLD MANUAL: 1 %
MONOCYTES NFR BLD: 0 % (ref 4–15)
NEUTROPHILS NFR BLD: 96 % (ref 38–73)
NEUTS BAND NFR BLD MANUAL: 3 %
NRBC BLD-RTO: 0 /100 WBC
OVALOCYTES BLD QL SMEAR: ABNORMAL
PHOSPHATE SERPL-MCNC: 6.2 MG/DL (ref 2.7–4.5)
PLATELET # BLD AUTO: 46 K/UL (ref 150–450)
PLATELET BLD QL SMEAR: ABNORMAL
PMV BLD AUTO: 11 FL (ref 9.2–12.9)
POIKILOCYTOSIS BLD QL SMEAR: SLIGHT
POLYCHROMASIA BLD QL SMEAR: ABNORMAL
POTASSIUM SERPL-SCNC: 4.5 MMOL/L (ref 3.5–5.1)
PROT SERPL-MCNC: 5.5 G/DL (ref 6–8.4)
RBC # BLD AUTO: 2.43 M/UL (ref 4.6–6.2)
SODIUM SERPL-SCNC: 140 MMOL/L (ref 136–145)
WBC # BLD AUTO: 1.48 K/UL (ref 3.9–12.7)

## 2023-01-04 PROCEDURE — 20600001 HC STEP DOWN PRIVATE ROOM

## 2023-01-04 PROCEDURE — 80100014 HC HEMODIALYSIS 1:1

## 2023-01-04 PROCEDURE — 80053 COMPREHEN METABOLIC PANEL: CPT | Performed by: NURSE PRACTITIONER

## 2023-01-04 PROCEDURE — 85027 COMPLETE CBC AUTOMATED: CPT | Performed by: NURSE PRACTITIONER

## 2023-01-04 PROCEDURE — 63600175 PHARM REV CODE 636 W HCPCS: Performed by: INTERNAL MEDICINE

## 2023-01-04 PROCEDURE — 99233 SBSQ HOSP IP/OBS HIGH 50: CPT | Mod: ,,, | Performed by: NURSE PRACTITIONER

## 2023-01-04 PROCEDURE — 99233 SBSQ HOSP IP/OBS HIGH 50: CPT | Mod: ,,, | Performed by: INTERNAL MEDICINE

## 2023-01-04 PROCEDURE — 85007 BL SMEAR W/DIFF WBC COUNT: CPT | Performed by: NURSE PRACTITIONER

## 2023-01-04 PROCEDURE — 63600175 PHARM REV CODE 636 W HCPCS: Performed by: STUDENT IN AN ORGANIZED HEALTH CARE EDUCATION/TRAINING PROGRAM

## 2023-01-04 PROCEDURE — 25000003 PHARM REV CODE 250: Performed by: INTERNAL MEDICINE

## 2023-01-04 PROCEDURE — 83735 ASSAY OF MAGNESIUM: CPT | Performed by: NURSE PRACTITIONER

## 2023-01-04 PROCEDURE — 63600175 PHARM REV CODE 636 W HCPCS: Performed by: NURSE PRACTITIONER

## 2023-01-04 PROCEDURE — 25000003 PHARM REV CODE 250: Performed by: NURSE PRACTITIONER

## 2023-01-04 PROCEDURE — 86900 BLOOD TYPING SEROLOGIC ABO: CPT | Performed by: INTERNAL MEDICINE

## 2023-01-04 PROCEDURE — 99233 PR SUBSEQUENT HOSPITAL CARE,LEVL III: ICD-10-PCS | Mod: ,,, | Performed by: INTERNAL MEDICINE

## 2023-01-04 PROCEDURE — 99233 PR SUBSEQUENT HOSPITAL CARE,LEVL III: ICD-10-PCS | Mod: ,,, | Performed by: NURSE PRACTITIONER

## 2023-01-04 PROCEDURE — 84100 ASSAY OF PHOSPHORUS: CPT | Performed by: NURSE PRACTITIONER

## 2023-01-04 RX ORDER — CALCIUM CARBONATE 200(500)MG
1000 TABLET,CHEWABLE ORAL 2 TIMES DAILY
Status: DISCONTINUED | OUTPATIENT
Start: 2023-01-04 | End: 2023-01-11

## 2023-01-04 RX ADMIN — ERGOCALCIFEROL 50000 UNITS: 1.25 CAPSULE ORAL at 08:01

## 2023-01-04 RX ADMIN — LEVOFLOXACIN 250 MG: 250 TABLET, FILM COATED ORAL at 08:01

## 2023-01-04 RX ADMIN — ONDANSETRON 4 MG: 2 INJECTION INTRAMUSCULAR; INTRAVENOUS at 12:01

## 2023-01-04 RX ADMIN — ONDANSETRON 4 MG: 2 INJECTION INTRAMUSCULAR; INTRAVENOUS at 06:01

## 2023-01-04 RX ADMIN — PANTOPRAZOLE SODIUM 40 MG: 40 TABLET, DELAYED RELEASE ORAL at 08:01

## 2023-01-04 RX ADMIN — ONDANSETRON 4 MG: 2 INJECTION INTRAMUSCULAR; INTRAVENOUS at 05:01

## 2023-01-04 RX ADMIN — DOXYCYCLINE HYCLATE 100 MG: 100 TABLET, COATED ORAL at 08:01

## 2023-01-04 RX ADMIN — ACYCLOVIR 400 MG: 200 CAPSULE ORAL at 08:01

## 2023-01-04 RX ADMIN — CALCIUM CARBONATE (ANTACID) CHEW TAB 500 MG 1000 MG: 500 CHEW TAB at 08:01

## 2023-01-04 RX ADMIN — Medication 1 DOSE: at 08:01

## 2023-01-04 RX ADMIN — FLUCONAZOLE 200 MG: 200 TABLET ORAL at 08:01

## 2023-01-04 RX ADMIN — CHOLECALCIFEROL TAB 25 MCG (1000 UNIT) 3000 UNITS: 25 TAB at 08:01

## 2023-01-04 RX ADMIN — SERTRALINE HYDROCHLORIDE 50 MG: 50 TABLET ORAL at 08:01

## 2023-01-04 RX ADMIN — ONDANSETRON 4 MG: 2 INJECTION INTRAMUSCULAR; INTRAVENOUS at 11:01

## 2023-01-04 RX ADMIN — HEPARIN SODIUM 1000 UNITS: 1000 INJECTION, SOLUTION INTRAVENOUS; SUBCUTANEOUS at 10:01

## 2023-01-04 RX ADMIN — Medication 1 DOSE: at 01:01

## 2023-01-04 RX ADMIN — PROCHLORPERAZINE EDISYLATE 10 MG: 5 INJECTION INTRAMUSCULAR; INTRAVENOUS at 08:01

## 2023-01-04 RX ADMIN — LOPERAMIDE HYDROCHLORIDE 2 MG: 2 CAPSULE ORAL at 06:01

## 2023-01-04 RX ADMIN — Medication 1 DOSE: at 05:01

## 2023-01-04 RX ADMIN — SEVELAMER CARBONATE 800 MG: 800 TABLET, FILM COATED ORAL at 08:01

## 2023-01-04 NOTE — ASSESSMENT & PLAN NOTE
- C-diff neg  - Improved with imodium. Changed imodium from scheduled to PRN 1/3/22. Continue PRN limotil.

## 2023-01-04 NOTE — PT/OT/SLP PROGRESS
Physical Therapy      Patient Name:  De Lakhani   MRN:  [id number]    Patient not seen today secondary to  (pt. unable/unwilling to particpate with PT today). Will follow-up tomorrow.    Sony Sotelo, PT  1/4/2023

## 2023-01-04 NOTE — PROGRESS NOTES
[facility] - Oncology ([facility])  Hematology  Bone Marrow Transplant  Progress Note    Patient Name: De Lakhani  Admission Date: 12/28/2022  Hospital Length of Stay: 7 days  Code Status: Full Code    Subjective:     Interval History: Day +5 from a Torie 140 auto SCT fo MM. Remains afebrile. VSS. EEG still pending performance but suspect that syncopal episodes are due to orthostasis following dialysis. Patient reports experiencing dizziness at home following dialysis as well. Will receive dialysis today. Diarrhea improved. Nausea controlled with existing meds. Encouraged increased oral intake. Weight up 9 lbs from admission, but down the last 2 days, so will defer lasix for now--particularly as patient will be dialyzed today.    Objective:     Vital Signs (Most Recent):  Temp: 98.1 °F (36.7 °C) (01/04/23 0759)  Pulse: 75 (01/04/23 0759)  Resp: 19 (01/04/23 0759)  BP: 135/60 (01/04/23 0759)  SpO2: 99 % (01/04/23 0759) Vital Signs (24h Range):  Temp:  [97.9 °F (36.6 °C)-98.5 °F (36.9 °C)] 98.1 °F (36.7 °C)  Pulse:  [71-89] 75  Resp:  [16-19] 19  SpO2:  [94 %-100 %] 99 %  BP: ()/(54-93) 135/60     Weight: 105 kg (231 lb 7.7 oz)  Body mass index is 32.29 kg/m².  Body surface area is 2.29 meters squared.    ECOG SCORE           [unfilled]    Intake/Output - Last 3 Shifts         01/02 0700  01/03 0659 01/03 0700 01/04 0659 01/04 0700 01/05 0659    P.O. 350 250 170    I.V. (mL/kg) 0 (0)      Other 250      IV Piggyback  0     Total Intake(mL/kg) 600 (5.7) 250 (2.4) 170 (1.6)    Urine (mL/kg/hr) 600 (0.2) 600 (0.2) 400 (0.7)    Other 900      Stool       Total Output 1500 600 400    Net -900 -350 -230           Urine Occurrence 3 x 2 x 1 x    Stool Occurrence 2 x 1 x             Physical Exam  Constitutional:       Appearance: He is well-developed.   HENT:      Head: Normocephalic and atraumatic.      Mouth/Throat:      Pharynx: No oropharyngeal exudate.   Eyes:      General:         Right eye: No discharge.          Left eye: No discharge.      Conjunctiva/sclera: Conjunctivae normal.      Pupils: Pupils are equal, round, and reactive to light.   Cardiovascular:      Rate and Rhythm: Normal rate and regular rhythm.      Heart sounds: Normal heart sounds. No murmur heard.    No gallop.   Pulmonary:      Effort: Pulmonary effort is normal. No respiratory distress.      Breath sounds: Normal breath sounds. No wheezing or rales.   Abdominal:      General: Bowel sounds are normal. There is no distension.      Palpations: Abdomen is soft.      Tenderness: There is no abdominal tenderness.   Musculoskeletal:         General: No deformity. Normal range of motion.      Cervical back: Normal range of motion and neck supple.   Skin:     General: Skin is warm and dry.      Findings: No erythema or rash.      Comments: Right chest wall dialysis catheter. Dressing c/d/i. No sign of infection to site.   Neurological:      Mental Status: He is alert and oriented to person, place, and time.   Psychiatric:         Behavior: Behavior normal.         Thought Content: Thought content normal.         Judgment: Judgment normal.       Significant Labs:   CBC:   Recent Labs   Lab 01/02/23  1645 01/03/23  0304 01/04/23  0345   WBC 3.65* 3.26* 1.48*   HGB 8.7* 7.8* 8.0*   HCT 25.1* 23.7* 24.5*   PLT 98* 60* 46*      and CMP:   Recent Labs   Lab 01/02/23 1645 01/03/23  0304 01/04/23  0345    136 140   K 3.1* 4.4 4.5    102 105   CO2 22* 24 23   * 85 85   BUN 36* 44* 57*   CREATININE 4.3* 5.7* 6.7*   CALCIUM 8.5* 8.1* 7.3*   PROT 5.7* 5.4* 5.5*   ALBUMIN 3.7 3.6 3.6   BILITOT 1.2* 0.9 0.7   ALKPHOS 73 70 68   AST 12 12 10   ALT 9* 9* 8*   ANIONGAP 13 10 12         Diagnostic Results:  I have reviewed all pertinent imaging results/findings within the past 24 hours.    Assessment/Plan:     * History of autologous stem cell transplant  - Patient of Dr. King  - Diagnosed with MM in April 2022. Underwent 2 cycles CyBorD then  transitioned to DVRd (now s/p C6)  - Admitted 12/28/22 for Torie 140 auto SCT   - Today is Day +5  - Completed chemotherapy 12/29 without issue  - Received 6 bags with a total CD34 dose of 3.13 x10^6/kg on 12/30/23.    Planned conditioning regimen:  Melphalan on Day -1     Antimicrobial Prophylaxis:  Acyclovir starting on Day -1  Levofloxacin starting on Day -1  Fluconazole starting on Day -1     Growth Factor Support:  Neupogen starting on Day +7      Caregiver: Wife  Post-transplant discharge plans: [facility]    Multiple myeloma not having achieved remission  - Patient of Dr. King. Per most recent clinic note:   - 4/20/22: renal biopsy: light chain cast nephropathy, kappa light chain  - 4/26/22: right subclavian vein thrombus   - 4/27/22: M spike 0.2 with free monoclonal kappa band. B2mg 19.57, IgG 496, IgA 10, IgM <5. Kappa 10348, Lambda 7.9, ratio >1000  - 5/12/22: BMBx: plasma cell myeloma, marrow cellularity 100%, plasma cell percentage 100%. Plasma cell phenotype +, kappa +, CD20- -, CD30-, EMA-, SADAF-, [place] red negative. Peripheral blood with pancytopenia and no circulating blasts. Decreased storage iron. FISH canceled due to quantity not sufficient  - 5/18/22: CyBorD C1D1  - 5/23/22: rasburicase  - 5/24/22: Xgeva  - 6/6/22: Kappa 48525, lambda 4.0, ratio >1000, M spike 0.1 with free monoclonal kappa band present  - 6/6/22: CyBorD C2D1  - 6/9/22: BMBx Plasma cell neoplasm compatible with plasma cell myeloma. Extent of involvement 80-90% of bone marrow elements. Cytology: Plasmablastic. FISH cytogenetics positive for 1q21/CKS1B gain. Karyotype failed. Myelofibrosis diffuse (MF-3)  - 6/20/22: CyBorD C2D8 delayed due to covid  - 6/23/22: CyBOrD C2D11  - 7/5/22: C1D1 DVRd  - 7/7/22 - 7/18/22: hospital admission for septic shock resulting in renal failure  - 7/25/22: started again on DVRd  - 8/8/2022: kappa 1402.8, lambda 7.5, ratio 167.04. M spike 0.1, two faint restricted bands in gamma  globulin regions.   - 10/10/22: C5D1 DVRd. Revlimid on hold for upcoming stem   - Admitted for Torie 140 auto SCT on 12/28/22 as above    Pancytopenia due to antineoplastic chemotherapy  - transfuse for Hgb <7 or plt < 10K  - daily CBC while inpatient  - continue antimicrobial ppx    Hyperphosphatemia  - Likely 2/2 renal disease  - Sevelamer started with meals    Fall during current hospitalization  See syncope  Unclear if patient hit head. CT head neg for bleed.  Tele sitter and fall precautions in place in place    Syncope  Two falls since admission with reported loss of consciousness. Both following dialysis, so suspect orthostatic  Out of abundance of caution, will get EEG  Metoprolol dose decreased from 25 mg daily to 12.5 mg daily  Can consider midodrine    Chemotherapy-induced diarrhea  - C-diff neg  - Improved with imodium. Changed imodium from scheduled to PRN 1/3/22. Continue PRN limotil.    Chemotherapy-induced nausea and vomiting  - Zofran increased to Q6 scheduled. Nausea improved.  - Scopalamine patch in place  - Continue PRN compazine    Thrombocytopenia  Daily CBC  Transfuse for platelets <10K or bleeding  Stopped apixaban 12/3/23. Can resume once plts are consistently > 50K.      Hyperlipidemia  - Will hold home statin while inpatient    Depression  - Continue home Zoloft    Deep vein thrombosis (DVT) of upper extremity  - Held home apixiban for thrombocytopenia. Will resume once plts are consistently > 50K.    Hypertension  - Continue home metoprolol. Decreased dose to 12.5 mg daily 1/3/23 due to soft BP and syncopal episodes x 2.    End stage renal disease  - developed during ICU stay. Believed to be 2/2 hypoprofusion due to hypotension  - nephrology consulted on admission  - on dialysis MWF at home. Continuing this schedule inpatient.  - right chest wall dialysis catheter in place    Dependence on hemodialysis  - see ESRD    Chronic infection of prosthetic knee  - Seen in ID clinic with Dr. Chatterjee  prior to transplant   - ID rec'd continuing doxycycline throughout transplant         VTE Risk Mitigation (From admission, onward)         Ordered     Place CRISTHIAN hose  Until discontinued         01/03/23 0911     heparin (porcine) injection 1,000 Units  As needed (PRN)         12/30/22 1243     heparin (porcine) injection 1,600 Units  As needed (PRN)         12/28/22 1825     IP VTE HIGH RISK PATIENT  Once         12/28/22 1659     Place sequential compression device  Until discontinued         12/28/22 1659                Disposition: Inpatient for SCT.    Amy Thomas, NP  Bone Marrow Transplant  [facility] - Oncology ([facility])

## 2023-01-04 NOTE — PROGRESS NOTES
[facility] - Oncology ([facility])  Nephrology  Progress Note    Patient Name: De Lakhani  MRN: [id number]  Admission Date: 12/28/2022  Hospital Length of Stay: 7 days  Attending Provider: Charlette Cardozo MD   Primary Care Physician: To Obtain Unable  Principal Problem:History of autologous stem cell transplant    Subjective:     Interval History:   No acute issues overnight. HD performed 2 days with episodes of hypotension. Patient appears to be under his dry weight of 107 kg.  mL/24 hrs. Appears comfortable on exam. Electrolytes non emergent. Due for HD today.     Review of patient's allergies indicates:  No Known Allergies  Current Facility-Administered Medications   Medication Frequency    0.9%  NaCl infusion Once    acyclovir capsule 400 mg BID    alteplase injection 2 mg PRN    aluminum-magnesium hydroxide-simethicone 200-200-20 mg/5 mL suspension 30 mL Q6H PRN    calcium carbonate 200 mg calcium (500 mg) chewable tablet 1,000 mg BID    diphenhydrAMINE capsule 25 mg PRN    diphenoxylate-atropine 2.5-0.025 mg per tablet 1 tablet QID PRN    doxycycline tablet 100 mg Q12H    ergocalciferol capsule 50,000 Units Q14 Days    [START ON 1/6/2023] filgrastim (NEUPOGEN) injection 480 mcg/1.6 ml Daily    fluconazole tablet 200 mg Daily    heparin (porcine) injection 1,000 Units PRN    heparin (porcine) injection 1,600 Units PRN    levoFLOXacin tablet 250 mg Every other day    loperamide capsule 2 mg QID PRN    LORazepam injection 1 mg Q6H PRN    metoprolol succinate (TOPROL-XL) 24 hr split tablet 12.5 mg Daily    ondansetron injection 4 mg Q6H    pantoprazole EC tablet 40 mg Daily    prochlorperazine injection Soln 10 mg Q6H PRN    scopolamine 1.3-1.5 mg (1 mg over 3 days) 1 patch Q3 Days    sertraline tablet 50 mg Daily    sevelamer carbonate tablet 800 mg TID WM    sodium bicarb-sodium chloride powder 1 Dose QID    sodium chloride 0.9% flush 10 mL PRN    vitamin D 1000 units tablet 3,000  Units Daily       Objective:     Vital Signs (Most Recent):  Temp: 98.1 °F (36.7 °C) (01/04/23 0759)  Pulse: 75 (01/04/23 0759)  Resp: 19 (01/04/23 0759)  BP: 135/60 (01/04/23 0759)  SpO2: 99 % (01/04/23 0759) Vital Signs (24h Range):  Temp:  [97.9 °F (36.6 °C)-98.5 °F (36.9 °C)] 98.1 °F (36.7 °C)  Pulse:  [71-99] 75  Resp:  [16-19] 19  SpO2:  [94 %-100 %] 99 %  BP: ()/(54-93) 135/60     Weight: 105 kg (231 lb 7.7 oz) (01/04/23 0600)  Body mass index is 32.29 kg/m².  Body surface area is 2.29 meters squared.    I/O last 3 completed shifts:  In: 250 [P.O.:250]  Out: 1200 [Urine:1200]    Physical Exam  Vitals and nursing note reviewed.   Constitutional:       General: He is not in acute distress.     Appearance: He is not toxic-appearing.   HENT:      Head: Normocephalic.      Mouth/Throat:      Pharynx: Oropharynx is clear.   Eyes:      Conjunctiva/sclera: Conjunctivae normal.   Cardiovascular:      Rate and Rhythm: Normal rate and regular rhythm.      Pulses: Normal pulses.   Pulmonary:      Effort: Pulmonary effort is normal.      Breath sounds: Normal breath sounds.   Abdominal:      Palpations: Abdomen is soft.   Musculoskeletal:      Cervical back: Neck supple.      Right lower leg: No edema.      Left lower leg: No edema.   Skin:     Comments: R. Permcath    Neurological:      Mental Status: He is alert and oriented to person, place, and time.   Psychiatric:         Mood and Affect: Mood normal.         Behavior: Behavior normal.         Thought Content: Thought content normal.         Judgment: Judgment normal.       Significant Labs:  CBC:   Recent Labs   Lab 01/04/23 0345   WBC 1.48*   RBC 2.43*   HGB 8.0*   HCT 24.5*   PLT 46*   *   MCH 32.9*   MCHC 32.7     CMP:   Recent Labs   Lab 01/04/23  0345   GLU 85   CALCIUM 7.3*   ALBUMIN 3.6   PROT 5.5*      K 4.5   CO2 23      BUN 57*   CREATININE 6.7*   ALKPHOS 68   ALT 8*   AST 10   BILITOT 0.7     All labs within the past 24 hours  have been reviewed.       Assessment/Plan:     * History of autologous stem cell transplant  -management per primary team     End stage renal disease  Mr. Lakhani was diagnosed with multiple myeloma in April 2022 after presenting with ASHLEE. 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 He had bone marrow biopsy which showed 100% involvement by plasma cells. He was started on CyBorD on 5/18 and received two cycles of treatment. His bone marrow biopsy after the first cycle showed a decrease in his plasma cell involvement to 80-90%. Dr. Bunch then started DVRd on 7/5/22. However, he was admitted to the hospital on 7/7/22 with septic shock requiring ICU care. He improved with antibiotics but had an ASHLEE thought to be due to hypotension which resulted in renal failure. Dialysis dependent since 07/22.     Last HD prior to presentation 12/28. S/p chemo on 12/29 and SCT on 12/30.    Nephrology History  iHD Schedule: MWF   Unit/MD: [facility]   Duration: 4 hours   UF: ?  EDW: 107kg   Access: R permcath   Residual Renal Function: moderate     Plan/Reccomendations     Will plan for HD today for clearance and volume management. Target UF 0.5 L.   Caution in administering IVF in ESRD patient   Strict I&Os   Pre and post HD weight   Renal diet, if not NPO    Phos 6.0 - please continue binders with meals     Multiple myeloma not having achieved remission  -management per primary         Thank you for your consult. I will follow-up with patient. Please contact us if you have any additional questions.    Kate Pepe, RAJ, FNP-C  Nephrology  [facility] - Oncology ([facility])

## 2023-01-04 NOTE — ASSESSMENT & PLAN NOTE
- Patient of Dr. King  - Diagnosed with MM in April 2022. Underwent 2 cycles CyBorD then transitioned to DVRd (now s/p C6)  - Admitted 12/28/22 for Torie 140 auto SCT   - Today is Day +5  - Completed chemotherapy 12/29 without issue  - Received 6 bags with a total CD34 dose of 3.13 x10^6/kg on 12/30/23.    Planned conditioning regimen:  Melphalan on Day -1     Antimicrobial Prophylaxis:  Acyclovir starting on Day -1  Levofloxacin starting on Day -1  Fluconazole starting on Day -1     Growth Factor Support:  Neupogen starting on Day +7      Caregiver: Wife  Post-transplant discharge plans: [facility]

## 2023-01-04 NOTE — PROGRESS NOTES
Day +4 kortney auto SCT. No acute events this shift. VSS on room air; afebrile. Appetite continues to be very poor. PRN compazine required to control nausea. Pt continues to endorse dizziness when standing so most of the day was spent in bed. Metoprolol dose decreased.  Neuro consulted.  1 liquid stool; PRN imodium given X1. Bed alarm is set, telesitter in place. Pt encouraged to call before getting up. Bed bath today; CHG wipes used; linens changed. Compression socks on. All questions and concerns addressed.

## 2023-01-04 NOTE — ASSESSMENT & PLAN NOTE
Mr. Lakhani was diagnosed with multiple myeloma in April 2022 after presenting with ASHLEE. 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 He had bone marrow biopsy which showed 100% involvement by plasma cells. He was started on CyBorD on 5/18 and received two cycles of treatment. His bone marrow biopsy after the first cycle showed a decrease in his plasma cell involvement to 80-90%. Dr. Bunch then started DVRd on 7/5/22. However, he was admitted to the hospital on 7/7/22 with septic shock requiring ICU care. He improved with antibiotics but had an ASHLEE thought to be due to hypotension which resulted in renal failure. Dialysis dependent since 07/22.     Last HD prior to presentation 12/28. S/p chemo on 12/29 and SCT on 12/30.    Nephrology History  iHD Schedule: MWF   Unit/MD: [facility]   Duration: 4 hours   UF: ?  EDW: 107kg   Access: R permcath   Residual Renal Function: moderate     Plan/Reccomendations     Will plan for HD today for clearance and volume management. Target UF 0.5 L.   Caution in administering IVF in ESRD patient   Strict I&Os   Pre and post HD weight   Renal diet, if not NPO    Phos 6.0 - please continue binders with meals

## 2023-01-04 NOTE — PLAN OF CARE
Day +5 of Melph auto HSCT. Patient is progressing and involved with plan of care. Frequent rounds made to assess pain and safety. Bed alarm activated, Telesitter in room. Up with stand by assist. Tolerating diet. No c/o pain.  All vitals stable; no acute events this shift. Pt. Remaining free from falls or injury throughout shift; bed in lowest position; side rails up X2; call light within reach; pt instructed to call for assistance as needed - verbalized understanding. Q2h rounding on patient. Will continue to monitor.

## 2023-01-04 NOTE — ASSESSMENT & PLAN NOTE
- Patient of Dr. King. Per most recent clinic note:   - 4/20/22: renal biopsy: light chain cast nephropathy, kappa light chain  - 4/26/22: right subclavian vein thrombus   - 4/27/22: M spike 0.2 with free monoclonal kappa band. B2mg 19.57, IgG 496, IgA 10, IgM <5. Kappa 27809, Lambda 7.9, ratio >1000  - 5/12/22: BMBx: plasma cell myeloma, marrow cellularity 100%, plasma cell percentage 100%. Plasma cell phenotype +, kappa +, CD20- -, CD30-, EMA-, SADAF-, [place] red negative. Peripheral blood with pancytopenia and no circulating blasts. Decreased storage iron. FISH canceled due to quantity not sufficient  - 5/18/22: CyBorD C1D1  - 5/23/22: rasburicase  - 5/24/22: Xgeva  - 6/6/22: Kappa 58018, lambda 4.0, ratio >1000, M spike 0.1 with free monoclonal kappa band present  - 6/6/22: CyBorD C2D1  - 6/9/22: BMBx Plasma cell neoplasm compatible with plasma cell myeloma. Extent of involvement 80-90% of bone marrow elements. Cytology: Plasmablastic. FISH cytogenetics positive for 1q21/CKS1B gain. Karyotype failed. Myelofibrosis diffuse (MF-3)  - 6/20/22: CyBorD C2D8 delayed due to covid  - 6/23/22: CyBOrD C2D11  - 7/5/22: C1D1 DVRd  - 7/7/22 - 7/18/22: hospital admission for septic shock resulting in renal failure  - 7/25/22: started again on DVRd  - 8/8/2022: kappa 1402.8, lambda 7.5, ratio 167.04. M spike 0.1, two faint restricted bands in gamma globulin regions.   - 10/10/22: C5D1 DVRd. Revlimid on hold for upcoming stem   - Admitted for Torie 140 auto SCT on 12/28/22 as above

## 2023-01-04 NOTE — SUBJECTIVE & OBJECTIVE
Subjective:     Interval History: Day +5 from a Torie 140 auto SCT fo MM. Remains afebrile. VSS. EEG still pending performance but suspect that syncopal episodes are due to orthostasis following dialysis. Patient reports experiencing dizziness at home following dialysis as well. Will receive dialysis today. Diarrhea improved. Nausea controlled with existing meds. Encouraged increased oral intake. Weight up 9 lbs from admission, but down the last 2 days, so will defer lasix for now--particularly as patient will be dialyzed today.    Objective:     Vital Signs (Most Recent):  Temp: 98.1 °F (36.7 °C) (01/04/23 0759)  Pulse: 75 (01/04/23 0759)  Resp: 19 (01/04/23 0759)  BP: 135/60 (01/04/23 0759)  SpO2: 99 % (01/04/23 0759) Vital Signs (24h Range):  Temp:  [97.9 °F (36.6 °C)-98.5 °F (36.9 °C)] 98.1 °F (36.7 °C)  Pulse:  [71-89] 75  Resp:  [16-19] 19  SpO2:  [94 %-100 %] 99 %  BP: ()/(54-93) 135/60     Weight: 105 kg (231 lb 7.7 oz)  Body mass index is 32.29 kg/m².  Body surface area is 2.29 meters squared.    ECOG SCORE           [unfilled]    Intake/Output - Last 3 Shifts         01/02 0700  01/03 0659 01/03 0700 01/04 0659 01/04 0700  01/05 0659    P.O. 350 250 170    I.V. (mL/kg) 0 (0)      Other 250      IV Piggyback  0     Total Intake(mL/kg) 600 (5.7) 250 (2.4) 170 (1.6)    Urine (mL/kg/hr) 600 (0.2) 600 (0.2) 400 (0.7)    Other 900      Stool       Total Output 1500 600 400    Net -900 -350 -230           Urine Occurrence 3 x 2 x 1 x    Stool Occurrence 2 x 1 x             Physical Exam  Constitutional:       Appearance: He is well-developed.   HENT:      Head: Normocephalic and atraumatic.      Mouth/Throat:      Pharynx: No oropharyngeal exudate.   Eyes:      General:         Right eye: No discharge.         Left eye: No discharge.      Conjunctiva/sclera: Conjunctivae normal.      Pupils: Pupils are equal, round, and reactive to light.   Cardiovascular:      Rate and Rhythm: Normal rate and regular  rhythm.      Heart sounds: Normal heart sounds. No murmur heard.    No gallop.   Pulmonary:      Effort: Pulmonary effort is normal. No respiratory distress.      Breath sounds: Normal breath sounds. No wheezing or rales.   Abdominal:      General: Bowel sounds are normal. There is no distension.      Palpations: Abdomen is soft.      Tenderness: There is no abdominal tenderness.   Musculoskeletal:         General: No deformity. Normal range of motion.      Cervical back: Normal range of motion and neck supple.   Skin:     General: Skin is warm and dry.      Findings: No erythema or rash.      Comments: Right chest wall dialysis catheter. Dressing c/d/i. No sign of infection to site.   Neurological:      Mental Status: He is alert and oriented to person, place, and time.   Psychiatric:         Behavior: Behavior normal.         Thought Content: Thought content normal.         Judgment: Judgment normal.       Significant Labs:   CBC:   Recent Labs   Lab 01/02/23  1645 01/03/23  0304 01/04/23  0345   WBC 3.65* 3.26* 1.48*   HGB 8.7* 7.8* 8.0*   HCT 25.1* 23.7* 24.5*   PLT 98* 60* 46*      and CMP:   Recent Labs   Lab 01/02/23  1645 01/03/23  0304 01/04/23  0345    136 140   K 3.1* 4.4 4.5    102 105   CO2 22* 24 23   * 85 85   BUN 36* 44* 57*   CREATININE 4.3* 5.7* 6.7*   CALCIUM 8.5* 8.1* 7.3*   PROT 5.7* 5.4* 5.5*   ALBUMIN 3.7 3.6 3.6   BILITOT 1.2* 0.9 0.7   ALKPHOS 73 70 68   AST 12 12 10   ALT 9* 9* 8*   ANIONGAP 13 10 12         Diagnostic Results:  I have reviewed all pertinent imaging results/findings within the past 24 hours.

## 2023-01-04 NOTE — SUBJECTIVE & OBJECTIVE
Interval History:   No acute issues overnight. HD performed 2 days with episodes of hypotension. Patient appears to be under his dry weight of 107 kg.  mL/24 hrs. Appears comfortable on exam. Electrolytes non emergent. Due for HD today.     Review of patient's allergies indicates:  No Known Allergies  Current Facility-Administered Medications   Medication Frequency    0.9%  NaCl infusion Once    acyclovir capsule 400 mg BID    alteplase injection 2 mg PRN    aluminum-magnesium hydroxide-simethicone 200-200-20 mg/5 mL suspension 30 mL Q6H PRN    calcium carbonate 200 mg calcium (500 mg) chewable tablet 1,000 mg BID    diphenhydrAMINE capsule 25 mg PRN    diphenoxylate-atropine 2.5-0.025 mg per tablet 1 tablet QID PRN    doxycycline tablet 100 mg Q12H    ergocalciferol capsule 50,000 Units Q14 Days    [START ON 1/6/2023] filgrastim (NEUPOGEN) injection 480 mcg/1.6 ml Daily    fluconazole tablet 200 mg Daily    heparin (porcine) injection 1,000 Units PRN    heparin (porcine) injection 1,600 Units PRN    levoFLOXacin tablet 250 mg Every other day    loperamide capsule 2 mg QID PRN    LORazepam injection 1 mg Q6H PRN    metoprolol succinate (TOPROL-XL) 24 hr split tablet 12.5 mg Daily    ondansetron injection 4 mg Q6H    pantoprazole EC tablet 40 mg Daily    prochlorperazine injection Soln 10 mg Q6H PRN    scopolamine 1.3-1.5 mg (1 mg over 3 days) 1 patch Q3 Days    sertraline tablet 50 mg Daily    sevelamer carbonate tablet 800 mg TID WM    sodium bicarb-sodium chloride powder 1 Dose QID    sodium chloride 0.9% flush 10 mL PRN    vitamin D 1000 units tablet 3,000 Units Daily       Objective:     Vital Signs (Most Recent):  Temp: 98.1 °F (36.7 °C) (01/04/23 0759)  Pulse: 75 (01/04/23 0759)  Resp: 19 (01/04/23 0759)  BP: 135/60 (01/04/23 0759)  SpO2: 99 % (01/04/23 0759) Vital Signs (24h Range):  Temp:  [97.9 °F (36.6 °C)-98.5 °F (36.9 °C)] 98.1 °F (36.7 °C)  Pulse:  [71-99] 75  Resp:  [16-19] 19  SpO2:  [94 %-100  %] 99 %  BP: ()/(54-93) 135/60     Weight: 105 kg (231 lb 7.7 oz) (01/04/23 0600)  Body mass index is 32.29 kg/m².  Body surface area is 2.29 meters squared.    I/O last 3 completed shifts:  In: 250 [P.O.:250]  Out: 1200 [Urine:1200]    Physical Exam  Vitals and nursing note reviewed.   Constitutional:       General: He is not in acute distress.     Appearance: He is not toxic-appearing.   HENT:      Head: Normocephalic.      Mouth/Throat:      Pharynx: Oropharynx is clear.   Eyes:      Conjunctiva/sclera: Conjunctivae normal.   Cardiovascular:      Rate and Rhythm: Normal rate and regular rhythm.      Pulses: Normal pulses.   Pulmonary:      Effort: Pulmonary effort is normal.      Breath sounds: Normal breath sounds.   Abdominal:      Palpations: Abdomen is soft.   Musculoskeletal:      Cervical back: Neck supple.      Right lower leg: No edema.      Left lower leg: No edema.   Skin:     Comments: EDA Boogie    Neurological:      Mental Status: He is alert and oriented to person, place, and time.   Psychiatric:         Mood and Affect: Mood normal.         Behavior: Behavior normal.         Thought Content: Thought content normal.         Judgment: Judgment normal.       Significant Labs:  CBC:   Recent Labs   Lab 01/04/23  0345   WBC 1.48*   RBC 2.43*   HGB 8.0*   HCT 24.5*   PLT 46*   *   MCH 32.9*   MCHC 32.7     CMP:   Recent Labs   Lab 01/04/23  0345   GLU 85   CALCIUM 7.3*   ALBUMIN 3.6   PROT 5.5*      K 4.5   CO2 23      BUN 57*   CREATININE 6.7*   ALKPHOS 68   ALT 8*   AST 10   BILITOT 0.7     All labs within the past 24 hours have been reviewed.

## 2023-01-04 NOTE — PLAN OF CARE
Pt involved in plan of care and communicating needs throughout shift. Day +5 Torie Auto SCT. Bed alarm on and Telesitter in room. Pt up with stand by assist d/t previous falls and c/o of dizziness. Scheduled antiemetics given. Poor PO intake. Dialysis scheduled for today. 1 episode of diarrhea. Pt requested PRN Imodium. Dialysis pending: nurse called and reported that dialysis would happen after 7p. All VSS; no acute events so far this shift.  Pt remaining free from falls or injury throughout shift; bed locked and in lowest position; call light within reach.  Pt instructed to call for assistance as needed.  Q1H rounding done on pt.

## 2023-01-04 NOTE — ASSESSMENT & PLAN NOTE
- Zofran increased to Q6 scheduled. Nausea improved.  - Scopalamine patch in place  - Continue PRN compazine

## 2023-01-05 PROBLEM — D69.6 THROMBOCYTOPENIA: Status: RESOLVED | Noted: 2022-12-30 | Resolved: 2023-01-05

## 2023-01-05 PROBLEM — K12.31 MUCOSITIS DUE TO CHEMOTHERAPY: Status: ACTIVE | Noted: 2023-01-05

## 2023-01-05 LAB
ALBUMIN SERPL BCP-MCNC: 3.6 G/DL (ref 3.5–5.2)
ALP SERPL-CCNC: 68 U/L (ref 55–135)
ALT SERPL W/O P-5'-P-CCNC: 5 U/L (ref 10–44)
ANION GAP SERPL CALC-SCNC: 11 MMOL/L (ref 8–16)
AST SERPL-CCNC: 11 U/L (ref 10–40)
BASOPHILS # BLD AUTO: 0 K/UL (ref 0–0.2)
BASOPHILS NFR BLD: 0 % (ref 0–1.9)
BILIRUB SERPL-MCNC: 0.8 MG/DL (ref 0.1–1)
BUN SERPL-MCNC: 40 MG/DL (ref 6–20)
CALCIUM SERPL-MCNC: 8 MG/DL (ref 8.7–10.5)
CHLORIDE SERPL-SCNC: 107 MMOL/L (ref 95–110)
CO2 SERPL-SCNC: 23 MMOL/L (ref 23–29)
CREAT SERPL-MCNC: 5.8 MG/DL (ref 0.5–1.4)
DIFFERENTIAL METHOD: ABNORMAL
EOSINOPHIL # BLD AUTO: 0 K/UL (ref 0–0.5)
EOSINOPHIL NFR BLD: 2 % (ref 0–8)
ERYTHROCYTE [DISTWIDTH] IN BLOOD BY AUTOMATED COUNT: 18 % (ref 11.5–14.5)
EST. GFR  (NO RACE VARIABLE): 10.6 ML/MIN/1.73 M^2
GLUCOSE SERPL-MCNC: 86 MG/DL (ref 70–110)
HCT VFR BLD AUTO: 24.6 % (ref 40–54)
HGB BLD-MCNC: 8.2 G/DL (ref 14–18)
IMM GRANULOCYTES # BLD AUTO: 0.01 K/UL (ref 0–0.04)
IMM GRANULOCYTES NFR BLD AUTO: 2 % (ref 0–0.5)
LYMPHOCYTES # BLD AUTO: 0 K/UL (ref 1–4.8)
LYMPHOCYTES NFR BLD: 2 % (ref 18–48)
MAGNESIUM SERPL-MCNC: 1.7 MG/DL (ref 1.6–2.6)
MCH RBC QN AUTO: 33.2 PG (ref 27–31)
MCHC RBC AUTO-ENTMCNC: 33.3 G/DL (ref 32–36)
MCV RBC AUTO: 100 FL (ref 82–98)
MONOCYTES # BLD AUTO: 0 K/UL (ref 0.3–1)
MONOCYTES NFR BLD: 0 % (ref 4–15)
NEUTROPHILS # BLD AUTO: 0.5 K/UL (ref 1.8–7.7)
NEUTROPHILS NFR BLD: 94 % (ref 38–73)
NRBC BLD-RTO: 0 /100 WBC
OVALOCYTES BLD QL SMEAR: ABNORMAL
PHOSPHATE SERPL-MCNC: 4.8 MG/DL (ref 2.7–4.5)
PLATELET # BLD AUTO: 46 K/UL (ref 150–450)
PLATELET BLD QL SMEAR: ABNORMAL
PMV BLD AUTO: 10.3 FL (ref 9.2–12.9)
POIKILOCYTOSIS BLD QL SMEAR: SLIGHT
POTASSIUM SERPL-SCNC: 4.2 MMOL/L (ref 3.5–5.1)
PROT SERPL-MCNC: 5.6 G/DL (ref 6–8.4)
RBC # BLD AUTO: 2.47 M/UL (ref 4.6–6.2)
SODIUM SERPL-SCNC: 141 MMOL/L (ref 136–145)
WBC # BLD AUTO: 0.51 K/UL (ref 3.9–12.7)

## 2023-01-05 PROCEDURE — 25000003 PHARM REV CODE 250: Performed by: STUDENT IN AN ORGANIZED HEALTH CARE EDUCATION/TRAINING PROGRAM

## 2023-01-05 PROCEDURE — 97116 GAIT TRAINING THERAPY: CPT | Mod: CQ

## 2023-01-05 PROCEDURE — 84100 ASSAY OF PHOSPHORUS: CPT | Performed by: NURSE PRACTITIONER

## 2023-01-05 PROCEDURE — 80053 COMPREHEN METABOLIC PANEL: CPT | Performed by: NURSE PRACTITIONER

## 2023-01-05 PROCEDURE — 20600001 HC STEP DOWN PRIVATE ROOM

## 2023-01-05 PROCEDURE — 99233 PR SUBSEQUENT HOSPITAL CARE,LEVL III: ICD-10-PCS | Mod: ,,, | Performed by: INTERNAL MEDICINE

## 2023-01-05 PROCEDURE — 85025 COMPLETE CBC W/AUTO DIFF WBC: CPT | Performed by: NURSE PRACTITIONER

## 2023-01-05 PROCEDURE — 25000003 PHARM REV CODE 250: Performed by: NURSE PRACTITIONER

## 2023-01-05 PROCEDURE — 25000003 PHARM REV CODE 250: Performed by: INTERNAL MEDICINE

## 2023-01-05 PROCEDURE — 83735 ASSAY OF MAGNESIUM: CPT | Performed by: NURSE PRACTITIONER

## 2023-01-05 PROCEDURE — 63600175 PHARM REV CODE 636 W HCPCS: Performed by: STUDENT IN AN ORGANIZED HEALTH CARE EDUCATION/TRAINING PROGRAM

## 2023-01-05 PROCEDURE — 99233 SBSQ HOSP IP/OBS HIGH 50: CPT | Mod: ,,, | Performed by: INTERNAL MEDICINE

## 2023-01-05 RX ORDER — SCOLOPAMINE TRANSDERMAL SYSTEM 1 MG/1
1 PATCH, EXTENDED RELEASE TRANSDERMAL
Status: DISCONTINUED | OUTPATIENT
Start: 2023-01-06 | End: 2023-01-14 | Stop reason: HOSPADM

## 2023-01-05 RX ADMIN — Medication 1 DOSE: at 08:01

## 2023-01-05 RX ADMIN — ALUMINUM HYDROXIDE, MAGNESIUM HYDROXIDE, AND DIMETHICONE 10 ML: 400; 400; 40 SUSPENSION ORAL at 06:01

## 2023-01-05 RX ADMIN — ACYCLOVIR 400 MG: 200 CAPSULE ORAL at 08:01

## 2023-01-05 RX ADMIN — DOXYCYCLINE HYCLATE 100 MG: 100 TABLET, COATED ORAL at 08:01

## 2023-01-05 RX ADMIN — ALUMINUM HYDROXIDE, MAGNESIUM HYDROXIDE, AND DIMETHICONE 10 ML: 400; 400; 40 SUSPENSION ORAL at 08:01

## 2023-01-05 RX ADMIN — CALCIUM CARBONATE (ANTACID) CHEW TAB 500 MG 1000 MG: 500 CHEW TAB at 08:01

## 2023-01-05 RX ADMIN — ALUMINUM HYDROXIDE, MAGNESIUM HYDROXIDE, AND DIMETHICONE 10 ML: 400; 400; 40 SUSPENSION ORAL at 10:01

## 2023-01-05 RX ADMIN — Medication 1 DOSE: at 06:01

## 2023-01-05 RX ADMIN — FLUCONAZOLE 200 MG: 200 TABLET ORAL at 08:01

## 2023-01-05 RX ADMIN — ONDANSETRON 4 MG: 2 INJECTION INTRAMUSCULAR; INTRAVENOUS at 06:01

## 2023-01-05 RX ADMIN — DIPHENOXYLATE HYDROCHLORIDE AND ATROPINE SULFATE 1 TABLET: 2.5; .025 TABLET ORAL at 06:01

## 2023-01-05 RX ADMIN — ONDANSETRON 4 MG: 2 INJECTION INTRAMUSCULAR; INTRAVENOUS at 12:01

## 2023-01-05 RX ADMIN — SEVELAMER CARBONATE 800 MG: 800 TABLET, FILM COATED ORAL at 07:01

## 2023-01-05 RX ADMIN — CHOLECALCIFEROL TAB 25 MCG (1000 UNIT) 3000 UNITS: 25 TAB at 08:01

## 2023-01-05 RX ADMIN — ONDANSETRON 4 MG: 2 INJECTION INTRAMUSCULAR; INTRAVENOUS at 11:01

## 2023-01-05 RX ADMIN — Medication 1 DOSE: at 12:01

## 2023-01-05 RX ADMIN — SODIUM CHLORIDE 500 ML: 9 INJECTION, SOLUTION INTRAVENOUS at 10:01

## 2023-01-05 RX ADMIN — ALUMINUM HYDROXIDE, MAGNESIUM HYDROXIDE, AND DIMETHICONE 10 ML: 400; 400; 40 SUSPENSION ORAL at 12:01

## 2023-01-05 RX ADMIN — PANTOPRAZOLE SODIUM 40 MG: 40 TABLET, DELAYED RELEASE ORAL at 08:01

## 2023-01-05 RX ADMIN — SERTRALINE HYDROCHLORIDE 50 MG: 50 TABLET ORAL at 08:01

## 2023-01-05 RX ADMIN — METOPROLOL SUCCINATE 12.5 MG: 25 TABLET, EXTENDED RELEASE ORAL at 08:01

## 2023-01-05 NOTE — ASSESSMENT & PLAN NOTE
- Patient of Dr. King  - Diagnosed with MM in April 2022. Underwent 2 cycles CyBorD then transitioned to DVRd (now s/p C6)  - Admitted 12/28/22 for Torie 140 auto SCT   - Today is Day +6  - Completed chemotherapy 12/29 without issue  - Received 6 bags with a total CD34 dose of 3.13 x10^6/kg on 12/30/23.    Planned conditioning regimen:  Melphalan on Day -1     Antimicrobial Prophylaxis:  Acyclovir starting on Day -1  Levofloxacin starting on Day -1  Fluconazole starting on Day -1     Growth Factor Support:  Neupogen starting on Day +7      Caregiver: Wife  Post-transplant discharge plans: [facility]

## 2023-01-05 NOTE — PROGRESS NOTES
01/04/23 2200   Vital Signs   Temp 97.8 °F (36.6 °C)   Temp src Oral   Pulse 77   /77   BP Location Right arm   BP Method Automatic   Patient Position Lying   Pre-Hemodialysis Assessment   Treatment Status   (TX Terminated early due to pt nausea)   During Hemodialysis Assessment   Blood Volume Processed (Liters) 32.7 L   UF Removed (mL) 300 mL   Intake (mL) 250 mL   Intra-Hemodialysis Comments TX terminated early due to pt nausea   Post-Hemodialysis Assessment   Rinseback Volume (mL) 500 mL   Blood Volume Processed (Liters) 32.7 L   Dialyzer Clearance Lightly streaked   Duration of Treatment 90 minutes   Total UF (mL) 300 mL   Net Fluid Removal -200   Patient Response to Treatment pt nausea; TX terminated early   Post-Hemodialysis Comments TX terminated early due to pt nausea     Pt did not  tolerated TX well; access secured; total net UF -200; pt stable at this time; see flow sheet for details.

## 2023-01-05 NOTE — PROGRESS NOTES
Braden Cramer - Oncology ([facility])  Hematology  Bone Marrow Transplant  Progress Note    Patient Name: De Lakhani  Admission Date: 12/28/2022  Hospital Length of Stay: 8 days  Code Status: Full Code    Subjective:     Interval History: Day +6 s/p Torie 140 Auto SCT for MM. Reports no syncope/dizziness this AM, had dialysis yesterday. Continues with nausea, controlled with scheduled zofran and prn compazine. Denies diarrhea. Still awaiting EEG but will discuss need given improved status. Encouraged increased PO intake as tolerated, especially fluids. Mild mucositis pain, using dukes. Afebrile, VSS    Objective:     Vital Signs (Most Recent):  Temp: 98.1 °F (36.7 °C) (01/05/23 0337)  Pulse: 80 (01/05/23 0337)  Resp: 16 (01/05/23 0337)  BP: 105/60 (01/05/23 0337)  SpO2: 97 % (01/05/23 0337) Vital Signs (24h Range):  Temp:  [97.8 °F (36.6 °C)-98.5 °F (36.9 °C)] 98.1 °F (36.7 °C)  Pulse:  [60-82] 80  Resp:  [15-20] 16  SpO2:  [97 %-100 %] 97 %  BP: ()/(57-81) 105/60     Weight: 104.6 kg (230 lb 11.4 oz)  Body mass index is 32.18 kg/m².  Body surface area is 2.29 meters squared.      Intake/Output - Last 3 Shifts         01/03 0700  01/04 0659 01/04 0700  01/05 0659 01/05 0700  01/06 0659    P.O. 250 460     I.V. (mL/kg)       Other       IV Piggyback 0      Total Intake(mL/kg) 250 (2.4) 460 (4.4)     Urine (mL/kg/hr) 600 (0.2) 400 (0.2)     Emesis/NG output  2     Other  300     Total Output 600 702     Net -350 -242            Urine Occurrence 2 x 3 x     Stool Occurrence 1 x              Physical Exam  Vitals and nursing note reviewed.   Constitutional:       Appearance: He is well-developed.   HENT:      Head: Normocephalic and atraumatic.      Mouth/Throat:      Pharynx: No oropharyngeal exudate.   Eyes:      General:         Right eye: No discharge.         Left eye: No discharge.      Conjunctiva/sclera: Conjunctivae normal.      Pupils: Pupils are equal, round, and reactive to light.   Cardiovascular:      Rate  and Rhythm: Normal rate and regular rhythm.      Heart sounds: Normal heart sounds. No murmur heard.  Pulmonary:      Effort: Pulmonary effort is normal. No respiratory distress.      Breath sounds: Normal breath sounds. No wheezing or rales.   Abdominal:      General: Bowel sounds are normal. There is no distension.      Palpations: Abdomen is soft.      Tenderness: There is no abdominal tenderness.   Musculoskeletal:         General: No deformity. Normal range of motion.      Cervical back: Normal range of motion and neck supple.   Skin:     General: Skin is warm and dry.      Findings: No erythema or rash.      Comments: Right chest wall dialysis catheter. Dressing c/d/i. No sign of infection to site.   Neurological:      Mental Status: He is alert and oriented to person, place, and time.   Psychiatric:         Behavior: Behavior normal.         Thought Content: Thought content normal.         Judgment: Judgment normal.       Significant Labs:   CBC:   Recent Labs   Lab 01/04/23 0345 01/05/23 0344   WBC 1.48* 0.51*   HGB 8.0* 8.2*   HCT 24.5* 24.6*   PLT 46* 46*      and CMP:   Recent Labs   Lab 01/04/23 0345 01/05/23  0344    141   K 4.5 4.2    107   CO2 23 23   GLU 85 86   BUN 57* 40*   CREATININE 6.7* 5.8*   CALCIUM 7.3* 8.0*   PROT 5.5* 5.6*   ALBUMIN 3.6 3.6   BILITOT 0.7 0.8   ALKPHOS 68 68   AST 10 11   ALT 8* 5*   ANIONGAP 12 11         Diagnostic Results:  I have reviewed all pertinent imaging results/findings within the past 24 hours.    Assessment/Plan:     * History of autologous stem cell transplant  - Patient of Dr. King  - Diagnosed with MM in April 2022. Underwent 2 cycles CyBorD then transitioned to DVRd (now s/p C6)  - Admitted 12/28/22 for Torie 140 auto SCT   - Today is Day +6  - Completed chemotherapy 12/29 without issue  - Received 6 bags with a total CD34 dose of 3.13 x10^6/kg on 12/30/23.    Planned conditioning regimen:  Melphalan on Day -1     Antimicrobial  Prophylaxis:  Acyclovir starting on Day -1  Levofloxacin starting on Day -1  Fluconazole starting on Day -1     Growth Factor Support:  Neupogen starting on Day +7      Caregiver: Wife  Post-transplant discharge plans: Javed Garcia    Multiple myeloma not having achieved remission  - Patient of Dr. King. Per most recent clinic note:   - 4/20/22: renal biopsy: light chain cast nephropathy, kappa light chain  - 4/26/22: right subclavian vein thrombus   - 4/27/22: M spike 0.2 with free monoclonal kappa band. B2mg 19.57, IgG 496, IgA 10, IgM <5. Kappa 08331, Lambda 7.9, ratio >1000  - 5/12/22: BMBx: plasma cell myeloma, marrow cellularity 100%, plasma cell percentage 100%. Plasma cell phenotype +, kappa +, CD20- -, CD30-, EMA-, SADAF-, Congo red negative. Peripheral blood with pancytopenia and no circulating blasts. Decreased storage iron. FISH canceled due to quantity not sufficient  - 5/18/22: CyBorD C1D1  - 5/23/22: rasburicase  - 5/24/22: Xgeva  - 6/6/22: Kappa 70659, lambda 4.0, ratio >1000, M spike 0.1 with free monoclonal kappa band present  - 6/6/22: CyBorD C2D1  - 6/9/22: BMBx Plasma cell neoplasm compatible with plasma cell myeloma. Extent of involvement 80-90% of bone marrow elements. Cytology: Plasmablastic. FISH cytogenetics positive for 1q21/CKS1B gain. Karyotype failed. Myelofibrosis diffuse (MF-3)  - 6/20/22: CyBorD C2D8 delayed due to covid  - 6/23/22: CyBOrD C2D11  - 7/5/22: C1D1 DVRd  - 7/7/22 - 7/18/22: hospital admission for septic shock resulting in renal failure  - 7/25/22: started again on DVRd  - 8/8/2022: kappa 1402.8, lambda 7.5, ratio 167.04. M spike 0.1, two faint restricted bands in gamma globulin regions.   - 10/10/22: C5D1 DVRd. Revlimid on hold for upcoming stem   - Admitted for Torie 140 auto SCT on 12/28/22 as above    Pancytopenia due to antineoplastic chemotherapy  - transfuse for Hgb <7 or plt < 10K  - daily CBC while inpatient  - continue antimicrobial  ppx    Chemotherapy-induced nausea and vomiting  - Zofran increased to Q6 scheduled. Nausea improved.  - Scopalamine patch in place  - Continue PRN compazine    Mucositis due to chemotherapy  - continues dukes    Chemotherapy-induced diarrhea  - C-diff neg 12/31  - Improved with imodium. Changed imodium from scheduled to PRN 1/3/22. Continue PRN limotil.    Hyperphosphatemia  - Likely 2/2 renal disease  - continue Sevelamer 800mg TIDWM    Fall during current hospitalization  - See syncope  - Unclear if patient hit head. CT head neg 1/2 for bleed.  - Tele sitter and fall precautions in place in place    Syncope  - Two falls since admission with reported loss of consciousness. Both following dialysis, so suspect orthostatic  - Metoprolol dose decreased from 25 mg daily to 12.5 mg daily  - Can consider midodrine  - EEG ordered but still not yet done; symptoms now improving so will discuss need for EEG    Hyperlipidemia  - Will hold home statin while inpatient    Depression  - Continue home Zoloft    Deep vein thrombosis (DVT) of upper extremity  - Held home apixiban for thrombocytopenia. Will resume once plts are consistently > 50K.    Hypertension  - Continue home metoprolol. Decreased dose to 12.5 mg daily 1/3/23 due to soft BP and syncopal episodes x 2.    End stage renal disease  - developed during ICU stay. Believed to be 2/2 hypoprofusion due to hypotension  - nephrology consulted on admission  - on dialysis MWF at home. Continuing this schedule inpatient.  - right chest wall dialysis catheter in place    Dependence on hemodialysis  - see ESRD    Chronic infection of prosthetic knee  - Seen in ID clinic with Dr. Chatterjee prior to transplant   - ID rec'd continuing doxycycline throughout transplant         VTE Risk Mitigation (From admission, onward)         Ordered     Place RCISTHIAN hose  Until discontinued         01/03/23 0911     heparin (porcine) injection 1,000 Units  As needed (PRN)         12/30/22 5336      heparin (porcine) injection 1,600 Units  As needed (PRN)         12/28/22 1825     IP VTE HIGH RISK PATIENT  Once         12/28/22 1659     Place sequential compression device  Until discontinued         12/28/22 1659                Disposition: Remains inpatient    Lisa Kaplan NP  Bone Marrow Transplant  Braden juany - Oncology ([facility])

## 2023-01-05 NOTE — ASSESSMENT & PLAN NOTE
- Patient of Dr. King. Per most recent clinic note:   - 4/20/22: renal biopsy: light chain cast nephropathy, kappa light chain  - 4/26/22: right subclavian vein thrombus   - 4/27/22: M spike 0.2 with free monoclonal kappa band. B2mg 19.57, IgG 496, IgA 10, IgM <5. Kappa 12163, Lambda 7.9, ratio >1000  - 5/12/22: BMBx: plasma cell myeloma, marrow cellularity 100%, plasma cell percentage 100%. Plasma cell phenotype +, kappa +, CD20- -, CD30-, EMA-, SADAF-, Congo red negative. Peripheral blood with pancytopenia and no circulating blasts. Decreased storage iron. FISH canceled due to quantity not sufficient  - 5/18/22: CyBorD C1D1  - 5/23/22: rasburicase  - 5/24/22: Xgeva  - 6/6/22: Kappa 40898, lambda 4.0, ratio >1000, M spike 0.1 with free monoclonal kappa band present  - 6/6/22: CyBorD C2D1  - 6/9/22: BMBx Plasma cell neoplasm compatible with plasma cell myeloma. Extent of involvement 80-90% of bone marrow elements. Cytology: Plasmablastic. FISH cytogenetics positive for 1q21/CKS1B gain. Karyotype failed. Myelofibrosis diffuse (MF-3)  - 6/20/22: CyBorD C2D8 delayed due to covid  - 6/23/22: CyBOrD C2D11  - 7/5/22: C1D1 DVRd  - 7/7/22 - 7/18/22: hospital admission for septic shock resulting in renal failure  - 7/25/22: started again on DVRd  - 8/8/2022: kappa 1402.8, lambda 7.5, ratio 167.04. M spike 0.1, two faint restricted bands in gamma globulin regions.   - 10/10/22: C5D1 DVRd. Revlimid on hold for upcoming stem   - Admitted for Torie 140 auto SCT on 12/28/22 as above

## 2023-01-05 NOTE — ASSESSMENT & PLAN NOTE
- C-diff neg 12/31  - Improved with imodium. Changed imodium from scheduled to PRN 1/3/22. Continue PRN limotil.

## 2023-01-05 NOTE — PT/OT/SLP PROGRESS
Physical Therapy Treatment    Patient Name:  De Lakhani   MRN:  [id number]    Recommendations:     Discharge Recommendations: home  Discharge Equipment Recommendations: none  Barriers to discharge: None    Assessment:     De Lakhani is a 57 y.o. male admitted with a medical diagnosis of History of autologous stem cell transplant.  He presents with the following impairments/functional limitations: impaired endurance, weakness Pt tolerated treatment well, and will continue to benefit from PT services to regain strength, and endurance.    Rehab Prognosis: Good; patient would benefit from acute skilled PT services to address these deficits and reach maximum level of function.    Recent Surgery: * No surgery found *      Plan:     During this hospitalization, patient to be seen 1 x/week to address the identified rehab impairments via gait training, therapeutic activities, therapeutic exercises and progress toward the following goals:    Plan of Care Expires:  01/28/23    Subjective     Chief Complaint: none stated  Patient/Family Comments/goals: none stated  Pain/Comfort:  Pain Rating 1: 0/10  Pain Rating Post-Intervention 1: 0/10      Objective:     Communicated with nursing prior to session.  Patient found up in chair with  (all lines intact and spouse in room) upon PT entry to room.     General Precautions: Standard, fall  Orthopedic Precautions: N/A  Braces: N/A  Respiratory Status: Room air     Functional Mobility:  Transfers:  Sit to Stand:  independence with no AD  Gait: 100ft pushing IV pole with Mod I. Reciprocal gait pattern, and no LOB noted.       AM-PAC 6 CLICK MOBILITY  Turning over in bed (including adjusting bedclothes, sheets and blankets)?: 4  Sitting down on and standing up from a chair with arms (e.g., wheelchair, bedside commode, etc.): 4  Moving from lying on back to sitting on the side of the bed?: 4  Moving to and from a bed to a chair (including a wheelchair)?: 4  Need to walk in hospital  room?: 4  Climbing 3-5 steps with a railing?: 4  Basic Mobility Total Score: 24       Treatment & Education:  -Pt performed standing heel raises x20 reps w/ uni lateral support for balance. Pt performed LAQ, and HF in sitting x20 reps.  -Questions answered within PTA scope of practice.     Patient left up in chair with all lines intact, call button in reach, nursing notified, and spouse present..    GOALS:   Multidisciplinary Problems       Physical Therapy Goals          Problem: Physical Therapy    Goal Priority Disciplines Outcome Goal Variances Interventions   Physical Therapy Goal     PT, PT/OT Ongoing, Progressing     Description: Goals to be met by: 1/15     Patient will increase functional independence with mobility by performin. Gait  x 500 feet with Modified [place] using No Assistive Device.   2. Lower and upper extremity exercise program x20 reps per handout, with independence  3. Ascend/descend 3 steps with L HR with modified independence and no AD.                          Time Tracking:     PT Received On: 23  PT Start Time: 903     PT Stop Time: 919  PT Total Time (min): 16 min     Billable Minutes: Gait Training 16    Treatment Type: Treatment  PT/PTA: PTA     PTA Visit Number: 1     2023

## 2023-01-05 NOTE — PROGRESS NOTES
Braden Cramer - Oncology ([facility])  Adult Nutrition  Progress Note    SUMMARY       Recommendations    1. Continue Regular diet-- add renal restrictions, if warranted.   2. Change ONS-- Novasource Renal (RD added).   3. Monitor Labs: BUN, Creat, Phos.   4. RD to monitor and follow-up.     If TPN warranted rec's 160 g AA + 311 g D + IV Lipids (Provide 2200 kcals, 160 g AA (GIR 2.06))    Goals: Meet % een/epn by next rd f./u  Nutrition Goal Status: progressing towards goal  Communication of RD Recs:  (POC)    Assessment and Plan    Nutrition Problem  Increased nutrition needs      Related to (etiology):   Stem cell transplant candidate      Signs and Symptoms (as evidenced by):   50% intake of meals     Interventions/Recommendations (treatment strategy):  Collaboration with other providers  ONS     Nutrition Diagnosis Status:   Continues       Reason for Assessment    Reason For Assessment: RD follow-up  Diagnosis: cancer diagnosis/related complications (Stem cell transplant candidate)  Relevant Medical History: high-risk kappa light chain multiple myeloma , HTN,  chronic infection of prosthetic knee , ESRD (hemodialysis), HLD  Interdisciplinary Rounds: did not attend  General Information Comments: RD follow-up. RD contacted to change ONS-- to a kidney friendly option (Novasource Renal). Decrease appetite continues at this time, PO intake ~25% at meals. RD encourages pt to increase PO intake as tolerated. Pt receives HD. Denies N/V/C at this time, endorses diarrhea but receives Imodium. -240#. Pt appears nourished at this time, no physical signs of malnutrition at this time.    Nutrition Discharge Planning: EDU: high calorie, high protein diet education and food safety education for bone marrow transplant 12/29/22    Nutrition Risk Screen    Nutrition Risk Screen: no indicators present    Nutrition/Diet History    Patient Reported Diet/Restrictions/Preferences: general  Typical Food/Fluid Intake: 3  "small/medium meals/day  Food Preferences: cheeze its for snack  Spiritual, Cultural Beliefs, Moravian Practices, Values that Affect Care: no  Vitamin/Mineral/Herbal Supplements: vitamin D  Factors Affecting Nutritional Intake: decreased appetite, nausea/vomiting    Anthropometrics    Temp: 98.3 °F (36.8 °C)  Height Method: Stated  Height: 5' 11" (180.3 cm)  Height (inches): 71 in  Weight Method: Standard Scale  Weight: 104.6 kg (230 lb 11.4 oz)  Weight (lb): 230.71 lb  Ideal Body Weight (IBW), Male: 172 lb  % Ideal Body Weight, Male (lb): 129.65 %  BMI (Calculated): 32.2  BMI Grade: 30 - 34.9- obesity - grade I  Usual Body Weight (UBW), k.81 kg  % Usual Body Weight: 94.9  % Weight Change From Usual Weight: -5.3 %       Lab/Procedures/Meds    Pertinent Labs Reviewed: reviewed  Pertinent Labs Comments: H/H: 8.2/24.6, BUN 40, Creat 5.8, GFR 10.6, [place] 8.0, Phos 4.8, ALT 5  Pertinent Medications Reviewed: reviewed  Pertinent Medications Comments: calcium carbonate, ergocalciferol, pantoprazole, vitamin D, NaCl, acyclovir, doxycycline, sevelamer, sodium bicarb        Estimated/Assessed Needs    Weight Used For Calorie Calculations: 101.2 kg (223 lb)  Energy Calorie Requirements (kcal): 7411-6871 (20-25 kcal/kg (high wyatt/ high pro stem cell transplant candidate))  Energy Need Method: Kcal/kg  Protein Requirements: 141-162 (1.4-1.6 g/kg (stem cell trans candidate))  Weight Used For Protein Calculations: 101.2 kg (223 lb)        RDA Method (mL):          Nutrition Prescription Ordered    Current Diet Order: Regular  Oral Nutrition Supplement: Novasource Renal    Evaluation of Received Nutrient/Fluid Intake    I/O: +180 mL  Energy Calories Required: not meeting needs  Protein Required: not meeting needs  Fluid Required: not meeting needs  Comments: LBM /  Tolerance: tolerating  % Intake of Estimated Energy Needs: 75 - 100 %  % Meal Intake: 25 - 50 %    Nutrition Risk    Level of Risk/Frequency of Follow-up: " moderate (1x/ week)     Monitor and Evaluation    Food and Nutrient Intake: energy intake, food and beverage intake  Food and Nutrient Adminstration: diet order  Knowledge/Beliefs/Attitudes: food and nutrition knowledge/skill, beliefs and attitudes  Physical Activity and Function: nutrition-related ADLs and IADLs, factors affecting access to physical activity  Anthropometric Measurements: weight, body mass index, weight change  Biochemical Data, Medical Tests and Procedures: electrolyte and renal panel, gastrointestinal profile, glucose/endocrine profile, inflammatory profile, lipid profile  Nutrition-Focused Physical Findings: overall appearance, extremities, muscles and bones, head and eyes, skin     Nutrition Follow-Up    RD Follow-up?: Yes    Joel Sorto Registration Eligible, Provisional LDN

## 2023-01-05 NOTE — ASSESSMENT & PLAN NOTE
- Two falls since admission with reported loss of consciousness. Both following dialysis, so suspect orthostatic  - Metoprolol dose decreased from 25 mg daily to 12.5 mg daily  - Can consider midodrine  - EEG ordered but still not yet done; symptoms now improving so will discuss need for EEG

## 2023-01-05 NOTE — ASSESSMENT & PLAN NOTE
- See syncope  - Unclear if patient hit head. CT head neg 1/2 for bleed.  - Tele sitter and fall precautions in place in place

## 2023-01-05 NOTE — PLAN OF CARE
POC reviewed with pt and his wife at the start of shift. Pt verbalized his understanding of POC. Hemodialysis started this shift; treatment only 2 hours due to pt continued nausea/vomiting despite medications. BP dropped during dialysis and pt given extra fluid; no fluid removed. Routine VSS and afebrile. Pt nausea subsided after dialysis done. Pt staying in bed for most of the shift. Pt denies other discomforts. Will continue to monitor pt.

## 2023-01-05 NOTE — SUBJECTIVE & OBJECTIVE
Subjective:     Interval History: Day +6 s/p Torie 140 Auto SCT for MM. Reports no syncope/dizziness this AM, had dialysis yesterday. Continues with nausea, controlled with scheduled zofran and prn compazine. Denies diarrhea. Still awaiting EEG but will discuss need given improved status. Encouraged increased PO intake as tolerated, especially fluids. Mild mucositis pain, using dukes. Afebrile, VSS    Objective:     Vital Signs (Most Recent):  Temp: 98.1 °F (36.7 °C) (01/05/23 0337)  Pulse: 80 (01/05/23 0337)  Resp: 16 (01/05/23 0337)  BP: 105/60 (01/05/23 0337)  SpO2: 97 % (01/05/23 0337) Vital Signs (24h Range):  Temp:  [97.8 °F (36.6 °C)-98.5 °F (36.9 °C)] 98.1 °F (36.7 °C)  Pulse:  [60-82] 80  Resp:  [15-20] 16  SpO2:  [97 %-100 %] 97 %  BP: ()/(57-81) 105/60     Weight: 104.6 kg (230 lb 11.4 oz)  Body mass index is 32.18 kg/m².  Body surface area is 2.29 meters squared.      Intake/Output - Last 3 Shifts         01/03 0700  01/04 0659 01/04 0700  01/05 0659 01/05 0700  01/06 0659    P.O. 250 460     I.V. (mL/kg)       Other       IV Piggyback 0      Total Intake(mL/kg) 250 (2.4) 460 (4.4)     Urine (mL/kg/hr) 600 (0.2) 400 (0.2)     Emesis/NG output  2     Other  300     Total Output 600 702     Net -350 -242            Urine Occurrence 2 x 3 x     Stool Occurrence 1 x              Physical Exam  Vitals and nursing note reviewed.   Constitutional:       Appearance: He is well-developed.   HENT:      Head: Normocephalic and atraumatic.      Mouth/Throat:      Pharynx: No oropharyngeal exudate.   Eyes:      General:         Right eye: No discharge.         Left eye: No discharge.      Conjunctiva/sclera: Conjunctivae normal.      Pupils: Pupils are equal, round, and reactive to light.   Cardiovascular:      Rate and Rhythm: Normal rate and regular rhythm.      Heart sounds: Normal heart sounds. No murmur heard.  Pulmonary:      Effort: Pulmonary effort is normal. No respiratory distress.      Breath sounds:  Normal breath sounds. No wheezing or rales.   Abdominal:      General: Bowel sounds are normal. There is no distension.      Palpations: Abdomen is soft.      Tenderness: There is no abdominal tenderness.   Musculoskeletal:         General: No deformity. Normal range of motion.      Cervical back: Normal range of motion and neck supple.   Skin:     General: Skin is warm and dry.      Findings: No erythema or rash.      Comments: Right chest wall dialysis catheter. Dressing c/d/i. No sign of infection to site.   Neurological:      Mental Status: He is alert and oriented to person, place, and time.   Psychiatric:         Behavior: Behavior normal.         Thought Content: Thought content normal.         Judgment: Judgment normal.       Significant Labs:   CBC:   Recent Labs   Lab 01/04/23 0345 01/05/23 0344   WBC 1.48* 0.51*   HGB 8.0* 8.2*   HCT 24.5* 24.6*   PLT 46* 46*      and CMP:   Recent Labs   Lab 01/04/23 0345 01/05/23 0344    141   K 4.5 4.2    107   CO2 23 23   GLU 85 86   BUN 57* 40*   CREATININE 6.7* 5.8*   CALCIUM 7.3* 8.0*   PROT 5.5* 5.6*   ALBUMIN 3.6 3.6   BILITOT 0.7 0.8   ALKPHOS 68 68   AST 10 11   ALT 8* 5*   ANIONGAP 12 11         Diagnostic Results:  I have reviewed all pertinent imaging results/findings within the past 24 hours.

## 2023-01-05 NOTE — PLAN OF CARE
Recommendations    1. Continue Regular diet-- add renal restrictions, if warranted.   2. Change ONS-- Novasource Renal (RD added).   3. Monitor Labs: BUN, Creat, Phos.   4. RD to monitor and follow-up.     If TPN warranted rec's 160 g AA + 311 g D + IV Lipids (Provide 2200 kcals, 160 g AA (GIR 2.06))    Goals: Meet % een/epn by next rd f./u  Nutrition Goal Status: progressing towards goal  Communication of RD Recs:  (POC)

## 2023-01-05 NOTE — PROGRESS NOTES
01/04/23 2030   Vital Signs   Temp 98.4 °F (36.9 °C)   Temp src Oral   Pulse 69   /68   BP Location Right arm   BP Method Automatic   Patient Position Lying   Pre-Hemodialysis Assessment   Treatment Status Started   Gross Bleach Negative Yes   Total Chlorine is less than 0.1 ppm Yes   Machine Number 26   Alarms Verified No   Reverse Osmosis Machine Log Completed Yes   Extracorporeal Circuit Tested for Integrity Yes   pH 7   Machine Temperature 97.7 °F (36.5 °C)   Dialyzer F-160   Machine Conductivity 14   Meter Conductivity 14   Dialysate Na (mEq/L) 140   Dialysate K (mEq/L) 2   Dialysate CA (mEq/L) 3   Dialysate HCO3 (mEq/L) 30   Prime Ordered (mL) 500 mL   Treatment Initiation with Dialysis Precautions All connections secured;Saline line double clamped;Venous parameters set;Arterial parameters set;Prime given;Air foam detector engaged   Pre-Hemodialysis Comments pt nausea upon TX initiation        Hemodialysis Catheter right subclavian   No placement date or time found.   Present Prior to Hospital Arrival?: Yes  Location: right subclavian   Site Assessment No drainage;No redness;No warmth   Line Securement Device Secured with sutureless device   Dressing Type Biopatch in place   Dressing Status Clean;Dry;Intact   Dressing Intervention Integrity maintained   During Hemodialysis Assessment   Blood Flow Rate (mL/min) 400 mL/min   Dialysate Flow Rate (mL/min) 800 ml/min   Ultrafiltration Rate (mL/Hr) 150 mL/Hr   Arteriovenous Lines Secure Yes   Arterial Pressure (mmHg) -130 mmHg   Venous Pressure (mmHg) 70   UF Removed (mL) 0 mL   TMP 10   Venous Line in Air Detector Yes   Intake (mL) 250 mL   Transducer Dry Yes   Access Visible Yes   Intra-Hemodialysis Comments TX started; pt nausea primary nurse notified         Bedside HD TX started; access secured; Flush lines double clamped; orders verified; pt  nauseated upon initiation; primary nurse notified will continue to monitor.

## 2023-01-06 ENCOUNTER — TELEPHONE (OUTPATIENT)
Dept: HEMATOLOGY/ONCOLOGY | Facility: CLINIC | Age: 58
End: 2023-01-06
Payer: MEDICARE

## 2023-01-06 LAB
ALBUMIN SERPL BCP-MCNC: 3.7 G/DL (ref 3.5–5.2)
ALP SERPL-CCNC: 68 U/L (ref 55–135)
ALT SERPL W/O P-5'-P-CCNC: 6 U/L (ref 10–44)
ANION GAP SERPL CALC-SCNC: 12 MMOL/L (ref 8–16)
ANISOCYTOSIS BLD QL SMEAR: SLIGHT
AST SERPL-CCNC: 11 U/L (ref 10–40)
BASOPHILS # BLD AUTO: 0 K/UL (ref 0–0.2)
BASOPHILS NFR BLD: 0 % (ref 0–1.9)
BILIRUB SERPL-MCNC: 0.6 MG/DL (ref 0.1–1)
BUN SERPL-MCNC: 49 MG/DL (ref 6–20)
CALCIUM SERPL-MCNC: 7.5 MG/DL (ref 8.7–10.5)
CHLORIDE SERPL-SCNC: 108 MMOL/L (ref 95–110)
CO2 SERPL-SCNC: 20 MMOL/L (ref 23–29)
CREAT SERPL-MCNC: 6.8 MG/DL (ref 0.5–1.4)
DIFFERENTIAL METHOD: ABNORMAL
EOSINOPHIL # BLD AUTO: 0 K/UL (ref 0–0.5)
EOSINOPHIL NFR BLD: 25 % (ref 0–8)
ERYTHROCYTE [DISTWIDTH] IN BLOOD BY AUTOMATED COUNT: 17.6 % (ref 11.5–14.5)
EST. GFR  (NO RACE VARIABLE): 8.7 ML/MIN/1.73 M^2
GLUCOSE SERPL-MCNC: 98 MG/DL (ref 70–110)
HCT VFR BLD AUTO: 23.9 % (ref 40–54)
HGB BLD-MCNC: 7.7 G/DL (ref 14–18)
HYPOCHROMIA BLD QL SMEAR: ABNORMAL
IMM GRANULOCYTES # BLD AUTO: 0 K/UL (ref 0–0.04)
IMM GRANULOCYTES NFR BLD AUTO: 0 % (ref 0–0.5)
LYMPHOCYTES # BLD AUTO: 0 K/UL (ref 1–4.8)
LYMPHOCYTES NFR BLD: 0 % (ref 18–48)
MAGNESIUM SERPL-MCNC: 1.7 MG/DL (ref 1.6–2.6)
MCH RBC QN AUTO: 32.2 PG (ref 27–31)
MCHC RBC AUTO-ENTMCNC: 32.2 G/DL (ref 32–36)
MCV RBC AUTO: 100 FL (ref 82–98)
MONOCYTES # BLD AUTO: 0 K/UL (ref 0.3–1)
MONOCYTES NFR BLD: 0 % (ref 4–15)
NEUTROPHILS # BLD AUTO: 0 K/UL (ref 1.8–7.7)
NEUTROPHILS NFR BLD: 75 % (ref 38–73)
NRBC BLD-RTO: 0 /100 WBC
OVALOCYTES BLD QL SMEAR: ABNORMAL
PHOSPHATE SERPL-MCNC: 5.6 MG/DL (ref 2.7–4.5)
PLATELET # BLD AUTO: 24 K/UL (ref 150–450)
PMV BLD AUTO: 11.1 FL (ref 9.2–12.9)
POIKILOCYTOSIS BLD QL SMEAR: SLIGHT
POLYCHROMASIA BLD QL SMEAR: ABNORMAL
POTASSIUM SERPL-SCNC: 4.1 MMOL/L (ref 3.5–5.1)
PROT SERPL-MCNC: 5.6 G/DL (ref 6–8.4)
RBC # BLD AUTO: 2.39 M/UL (ref 4.6–6.2)
SODIUM SERPL-SCNC: 140 MMOL/L (ref 136–145)
WBC # BLD AUTO: 0.04 K/UL (ref 3.9–12.7)

## 2023-01-06 PROCEDURE — 20600001 HC STEP DOWN PRIVATE ROOM

## 2023-01-06 PROCEDURE — 63600175 PHARM REV CODE 636 W HCPCS: Performed by: STUDENT IN AN ORGANIZED HEALTH CARE EDUCATION/TRAINING PROGRAM

## 2023-01-06 PROCEDURE — 25000003 PHARM REV CODE 250: Performed by: STUDENT IN AN ORGANIZED HEALTH CARE EDUCATION/TRAINING PROGRAM

## 2023-01-06 PROCEDURE — 63600175 PHARM REV CODE 636 W HCPCS: Performed by: INTERNAL MEDICINE

## 2023-01-06 PROCEDURE — 25000003 PHARM REV CODE 250: Performed by: NURSE PRACTITIONER

## 2023-01-06 PROCEDURE — 80053 COMPREHEN METABOLIC PANEL: CPT | Performed by: NURSE PRACTITIONER

## 2023-01-06 PROCEDURE — 80100014 HC HEMODIALYSIS 1:1

## 2023-01-06 PROCEDURE — 84100 ASSAY OF PHOSPHORUS: CPT | Performed by: NURSE PRACTITIONER

## 2023-01-06 PROCEDURE — 99232 SBSQ HOSP IP/OBS MODERATE 35: CPT | Mod: ,,, | Performed by: INTERNAL MEDICINE

## 2023-01-06 PROCEDURE — 85025 COMPLETE CBC W/AUTO DIFF WBC: CPT | Performed by: NURSE PRACTITIONER

## 2023-01-06 PROCEDURE — 90935 HEMODIALYSIS ONE EVALUATION: CPT | Mod: ,,, | Performed by: NURSE PRACTITIONER

## 2023-01-06 PROCEDURE — 90935 PR HEMODIALYSIS, ONE EVALUATION: ICD-10-PCS | Mod: ,,, | Performed by: NURSE PRACTITIONER

## 2023-01-06 PROCEDURE — 63600175 PHARM REV CODE 636 W HCPCS: Performed by: NURSE PRACTITIONER

## 2023-01-06 PROCEDURE — 83735 ASSAY OF MAGNESIUM: CPT | Performed by: NURSE PRACTITIONER

## 2023-01-06 PROCEDURE — 25000003 PHARM REV CODE 250: Performed by: INTERNAL MEDICINE

## 2023-01-06 PROCEDURE — 99232 PR SUBSEQUENT HOSPITAL CARE,LEVL II: ICD-10-PCS | Mod: ,,, | Performed by: INTERNAL MEDICINE

## 2023-01-06 RX ORDER — SODIUM CHLORIDE 9 MG/ML
INJECTION, SOLUTION INTRAVENOUS
Status: DISCONTINUED | OUTPATIENT
Start: 2023-01-06 | End: 2023-01-14 | Stop reason: HOSPADM

## 2023-01-06 RX ORDER — OXYCODONE HYDROCHLORIDE 5 MG/1
5 TABLET ORAL EVERY 6 HOURS PRN
Status: DISCONTINUED | OUTPATIENT
Start: 2023-01-06 | End: 2023-01-14 | Stop reason: HOSPADM

## 2023-01-06 RX ORDER — OLANZAPINE 2.5 MG/1
5 TABLET ORAL NIGHTLY
Status: DISCONTINUED | OUTPATIENT
Start: 2023-01-06 | End: 2023-01-08

## 2023-01-06 RX ADMIN — DOXYCYCLINE HYCLATE 100 MG: 100 TABLET, COATED ORAL at 08:01

## 2023-01-06 RX ADMIN — FILGRASTIM 480 MCG: 480 INJECTION, SOLUTION INTRAVENOUS; SUBCUTANEOUS at 08:01

## 2023-01-06 RX ADMIN — CALCIUM CARBONATE (ANTACID) CHEW TAB 500 MG 1000 MG: 500 CHEW TAB at 08:01

## 2023-01-06 RX ADMIN — HEPARIN SODIUM 1600 UNITS: 1000 INJECTION, SOLUTION INTRAVENOUS; SUBCUTANEOUS at 11:01

## 2023-01-06 RX ADMIN — SODIUM CHLORIDE: 9 INJECTION, SOLUTION INTRAVENOUS at 10:01

## 2023-01-06 RX ADMIN — SCOPALAMINE 1 PATCH: 1 PATCH, EXTENDED RELEASE TRANSDERMAL at 11:01

## 2023-01-06 RX ADMIN — FLUCONAZOLE 200 MG: 200 TABLET ORAL at 08:01

## 2023-01-06 RX ADMIN — LEVOFLOXACIN 250 MG: 250 TABLET, FILM COATED ORAL at 08:01

## 2023-01-06 RX ADMIN — ONDANSETRON 4 MG: 2 INJECTION INTRAMUSCULAR; INTRAVENOUS at 11:01

## 2023-01-06 RX ADMIN — ACYCLOVIR 400 MG: 200 CAPSULE ORAL at 09:01

## 2023-01-06 RX ADMIN — OLANZAPINE 5 MG: 2.5 TABLET, FILM COATED ORAL at 08:01

## 2023-01-06 RX ADMIN — ACYCLOVIR 400 MG: 200 CAPSULE ORAL at 08:01

## 2023-01-06 RX ADMIN — PANTOPRAZOLE SODIUM 40 MG: 40 TABLET, DELAYED RELEASE ORAL at 08:01

## 2023-01-06 RX ADMIN — Medication 1 DOSE: at 01:01

## 2023-01-06 RX ADMIN — SEVELAMER CARBONATE 800 MG: 800 TABLET, FILM COATED ORAL at 08:01

## 2023-01-06 RX ADMIN — ONDANSETRON 4 MG: 2 INJECTION INTRAMUSCULAR; INTRAVENOUS at 05:01

## 2023-01-06 RX ADMIN — ONDANSETRON 4 MG: 2 INJECTION INTRAMUSCULAR; INTRAVENOUS at 01:01

## 2023-01-06 RX ADMIN — HEPARIN SODIUM 1000 UNITS: 1000 INJECTION, SOLUTION INTRAVENOUS; SUBCUTANEOUS at 01:01

## 2023-01-06 RX ADMIN — Medication 1 DOSE: at 08:01

## 2023-01-06 RX ADMIN — DIPHENOXYLATE HYDROCHLORIDE AND ATROPINE SULFATE 1 TABLET: 2.5; .025 TABLET ORAL at 08:01

## 2023-01-06 RX ADMIN — CHOLECALCIFEROL TAB 25 MCG (1000 UNIT) 3000 UNITS: 25 TAB at 08:01

## 2023-01-06 RX ADMIN — METOPROLOL SUCCINATE 12.5 MG: 25 TABLET, EXTENDED RELEASE ORAL at 08:01

## 2023-01-06 RX ADMIN — SERTRALINE HYDROCHLORIDE 50 MG: 50 TABLET ORAL at 08:01

## 2023-01-06 RX ADMIN — HEPARIN SODIUM 1600 UNITS: 1000 INJECTION, SOLUTION INTRAVENOUS; SUBCUTANEOUS at 08:01

## 2023-01-06 NOTE — PROGRESS NOTES
01/06/23 1300   Post-Hemodialysis Assessment   Rinseback Volume (mL) 250 mL   Blood Volume Processed (Liters) 52.5 L   Dialyzer Clearance Lightly streaked   Duration of Treatment 180 minutes   Additional Fluid Intake (mL) 300 mL   Total UF (mL) 550 mL   Net Fluid Removal 0   Patient Response to Treatment tolerated   Post-Hemodialysis Comments see note     HD complete. Pt tolerated. Net removal 0 ml. Pt awake, alert oriented, VSS, family at bedside. Report given to primary nurse at bedside. NAD

## 2023-01-06 NOTE — PROGRESS NOTES
[facility] STAFF HEMODIALYSIS NOTE     Patient currently on hemodialysis for removal of uremic toxins and volume.     Patient seen and evaluated on hemodialysis, tolerating treatment, see HD flowsheet for vitals and assessments.    Labs have been reviewed and the dialysate bath has been adjusted.       Assessment/Plan:    -ESRD on HD   -Patient seen on HD, tolerating treatment well, w/o complaints   -UF goal of 0L; minimal PO intake/nausea, still produces urine   -Renal diet, if not NPO   -Continue renvela   -Strict I/O's and daily weights  -Daily renal function panels  -Keep MAP >65 while on HD   -Maintain Hgb >7.0  -Will continue to follow while inpatient       Wolf Deal NP  Nephrology  Pager:  [phone]

## 2023-01-06 NOTE — ASSESSMENT & PLAN NOTE
- See syncope  - Unclear if patient hit head. CT head neg 1/2 for bleed.  - Tele sitter and fall precautions in place

## 2023-01-06 NOTE — ASSESSMENT & PLAN NOTE
- Patient of Dr. King. Per most recent clinic note:   - 4/20/22: renal biopsy: light chain cast nephropathy, kappa light chain  - 4/26/22: right subclavian vein thrombus   - 4/27/22: M spike 0.2 with free monoclonal kappa band. B2mg 19.57, IgG 496, IgA 10, IgM <5. Kappa 33370, Lambda 7.9, ratio >1000  - 5/12/22: BMBx: plasma cell myeloma, marrow cellularity 100%, plasma cell percentage 100%. Plasma cell phenotype +, kappa +, CD20- -, CD30-, EMA-, SADAF-, Congo red negative. Peripheral blood with pancytopenia and no circulating blasts. Decreased storage iron. FISH canceled due to quantity not sufficient  - 5/18/22: CyBorD C1D1  - 5/23/22: rasburicase  - 5/24/22: Xgeva  - 6/6/22: Kappa 34158, lambda 4.0, ratio >1000, M spike 0.1 with free monoclonal kappa band present  - 6/6/22: CyBorD C2D1  - 6/9/22: BMBx Plasma cell neoplasm compatible with plasma cell myeloma. Extent of involvement 80-90% of bone marrow elements. Cytology: Plasmablastic. FISH cytogenetics positive for 1q21/CKS1B gain. Karyotype failed. Myelofibrosis diffuse (MF-3)  - 6/20/22: CyBorD C2D8 delayed due to covid  - 6/23/22: CyBOrD C2D11  - 7/5/22: C1D1 DVRd  - 7/7/22 - 7/18/22: hospital admission for septic shock resulting in renal failure  - 7/25/22: started again on DVRd  - 8/8/2022: kappa 1402.8, lambda 7.5, ratio 167.04. M spike 0.1, two faint restricted bands in gamma globulin regions.   - 10/10/22: C5D1 DVRd. Revlimid on hold for upcoming stem   - Admitted for Torie 140 auto SCT on 12/28/22 as above

## 2023-01-06 NOTE — PLAN OF CARE
Patient AAOX4, up with stand-by assistance to the bathroom, and fall precautions maintained with bed alarm and telesitter. Shower completed and linens changed. Wife at bedside and involved in care. Day +7 AUTO SCT. Complaints of throat pain 8/10, refusing duke's. Chloraseptic obtained and provided to patient for relief. Dialysis completed at bedside. Patient stable at this time.

## 2023-01-06 NOTE — PLAN OF CARE
POC reviewed with pt and his wife at the start of shift. VSS and afebrile. Pt very quiet with flat affect this shift. Pt ambulating with assistance of his wife. Pt denies discomforts. Pt's personal items and call light with in reach and his wife remains at beside. Labs monitored. Will continue to monitor pt.   Bilateral UE muscle strength grossly 3/5 Throughout; Bilateral LE muscle strength grossly 3/5 Throughout.

## 2023-01-06 NOTE — ASSESSMENT & PLAN NOTE
- Two falls since admission with reported loss of consciousness. Both following dialysis, so suspect orthostatic  - Metoprolol dose decreased from 25 mg daily to 12.5 mg daily  - Can consider midodrine  - EEG ordered but still not yet done; symptoms now improving so discontinued order for EEG.

## 2023-01-06 NOTE — SUBJECTIVE & OBJECTIVE
Subjective:     Interval History: Day +7 from a Torie 140 auto SCT for MM. ANC 0 today. Remains afebrile. VSS. Denies dizziness on exam this morning. Will maintain fall precautions given syncopal episodes/falls. Diarrhea and n/v stable. Main complaint today is esophagitis. Patient states that Duke's makes him nauseous. Changed Garvin's to PRN, and started prn oxy. Nurse will try to get chloraseptic spray from central supply.    Objective:     Vital Signs (Most Recent):  Temp: 98.3 °F (36.8 °C) (01/06/23 0808)  Pulse: 63 (01/06/23 1015)  Resp: 16 (01/06/23 1015)  BP: 125/76 (01/06/23 1015)  SpO2: 100 % (01/06/23 0808) Vital Signs (24h Range):  Temp:  [97.8 °F (36.6 °C)-99 °F (37.2 °C)] 98.3 °F (36.8 °C)  Pulse:  [63-88] 63  Resp:  [13-20] 16  SpO2:  [97 %-100 %] 100 %  BP: (104-134)/(53-76) 125/76     Weight: 103.1 kg (227 lb 6.5 oz)  Body mass index is 31.72 kg/m².  Body surface area is 2.27 meters squared.    ECOG SCORE           [unfilled]    Intake/Output - Last 3 Shifts         01/04 0700  01/05 0659 01/05 0700  01/06 0659 01/06 0700  01/07 0659    P.O. 460 530     IV Piggyback       Total Intake(mL/kg) 460 (4.4) 530 (5)     Urine (mL/kg/hr) 400 (0.2)      Emesis/NG output 2      Other 300      Total Output 702      Net -242 +530            Urine Occurrence 3 x 4 x             Physical Exam  Constitutional:       Appearance: He is well-developed.   HENT:      Head: Normocephalic and atraumatic.      Mouth/Throat:      Pharynx: No oropharyngeal exudate.   Eyes:      General:         Right eye: No discharge.         Left eye: No discharge.      Conjunctiva/sclera: Conjunctivae normal.      Pupils: Pupils are equal, round, and reactive to light.   Cardiovascular:      Rate and Rhythm: Normal rate and regular rhythm.      Heart sounds: Normal heart sounds. No murmur heard.    No gallop.   Pulmonary:      Effort: Pulmonary effort is normal. No respiratory distress.      Breath sounds: Normal breath sounds. No wheezing or  rales.   Abdominal:      General: Bowel sounds are normal. There is no distension.      Palpations: Abdomen is soft.      Tenderness: There is no abdominal tenderness.   Musculoskeletal:         General: No deformity. Normal range of motion.      Cervical back: Normal range of motion and neck supple.   Skin:     General: Skin is warm and dry.      Findings: No erythema or rash.      Comments: Right chest wall dialysis catheter. Dressing c/d/i. No sign of infection to site.   Neurological:      Mental Status: He is alert and oriented to person, place, and time.   Psychiatric:         Behavior: Behavior normal.         Thought Content: Thought content normal.         Judgment: Judgment normal.       Significant Labs:   CBC:   Recent Labs   Lab 01/05/23  0344 01/06/23  0357   WBC 0.51* 0.04*   HGB 8.2* 7.7*   HCT 24.6* 23.9*   PLT 46* 24*      and CMP:   Recent Labs   Lab 01/05/23  0344 01/06/23  0357    140   K 4.2 4.1    108   CO2 23 20*   GLU 86 98   BUN 40* 49*   CREATININE 5.8* 6.8*   CALCIUM 8.0* 7.5*   PROT 5.6* 5.6*   ALBUMIN 3.6 3.7   BILITOT 0.8 0.6   ALKPHOS 68 68   AST 11 11   ALT 5* 6*   ANIONGAP 11 12         Diagnostic Results:  I have reviewed all pertinent imaging results/findings within the past 24 hours.

## 2023-01-06 NOTE — PROGRESS NOTES
Pt AAO, VSS, NAD. 1:1 Bedside HD started to right chest wall cath. Wolf KEATING assessed pt in room. Verbal orders received for 0 ml Net removal and low blood flow rate 300 .

## 2023-01-06 NOTE — ASSESSMENT & PLAN NOTE
- Patient of Dr. King  - Diagnosed with MM in April 2022. Underwent 2 cycles CyBorD then transitioned to DVRd (now s/p C6)  - Admitted 12/28/22 for Torie 140 auto SCT   - Today is Day +7  - Completed chemotherapy 12/29 without issue  - Received 6 bags with a total CD34 dose of 3.13 x10^6/kg on 12/30/23.    Planned conditioning regimen:  Melphalan on Day -1     Antimicrobial Prophylaxis:  Acyclovir starting on Day -1  Levofloxacin starting on Day -1  Fluconazole starting on Day -1     Growth Factor Support:  Neupogen starting on Day +7      Caregiver: Wife  Post-transplant discharge plans: Javed House

## 2023-01-06 NOTE — PROGRESS NOTES
[facility] - Oncology ([facility])  Hematology  Bone Marrow Transplant  Progress Note    Patient Name: De Lakhani  Admission Date: 12/28/2022  Hospital Length of Stay: 9 days  Code Status: Full Code    Subjective:     Interval History: Day +7 from a Torie 140 auto SCT for MM. ANC 0 today. Remains afebrile. VSS. Denies dizziness on exam this morning. Will maintain fall precautions given syncopal episodes/falls. Diarrhea and n/v stable. Main complaint today is esophagitis. Patient states that Duke's makes him nauseous. Changed Garvin's to PRN, and started prn oxy. Nurse will try to get chloraseptic spray from central supply.    Objective:     Vital Signs (Most Recent):  Temp: 98.3 °F (36.8 °C) (01/06/23 0808)  Pulse: 63 (01/06/23 1015)  Resp: 16 (01/06/23 1015)  BP: 125/76 (01/06/23 1015)  SpO2: 100 % (01/06/23 0808) Vital Signs (24h Range):  Temp:  [97.8 °F (36.6 °C)-99 °F (37.2 °C)] 98.3 °F (36.8 °C)  Pulse:  [63-88] 63  Resp:  [13-20] 16  SpO2:  [97 %-100 %] 100 %  BP: (104-134)/(53-76) 125/76     Weight: 103.1 kg (227 lb 6.5 oz)  Body mass index is 31.72 kg/m².  Body surface area is 2.27 meters squared.    ECOG SCORE           [unfilled]    Intake/Output - Last 3 Shifts         01/04 0700 01/05 0659 01/05 0700 01/06 0659 01/06 0700 01/07 0659    P.O. 460 530     IV Piggyback       Total Intake(mL/kg) 460 (4.4) 530 (5)     Urine (mL/kg/hr) 400 (0.2)      Emesis/NG output 2      Other 300      Total Output 702      Net -242 +530            Urine Occurrence 3 x 4 x             Physical Exam  Constitutional:       Appearance: He is well-developed.   HENT:      Head: Normocephalic and atraumatic.      Mouth/Throat:      Pharynx: No oropharyngeal exudate.   Eyes:      General:         Right eye: No discharge.         Left eye: No discharge.      Conjunctiva/sclera: Conjunctivae normal.      Pupils: Pupils are equal, round, and reactive to light.   Cardiovascular:      Rate and Rhythm: Normal rate and regular rhythm.       Heart sounds: Normal heart sounds. No murmur heard.    No gallop.   Pulmonary:      Effort: Pulmonary effort is normal. No respiratory distress.      Breath sounds: Normal breath sounds. No wheezing or rales.   Abdominal:      General: Bowel sounds are normal. There is no distension.      Palpations: Abdomen is soft.      Tenderness: There is no abdominal tenderness.   Musculoskeletal:         General: No deformity. Normal range of motion.      Cervical back: Normal range of motion and neck supple.   Skin:     General: Skin is warm and dry.      Findings: No erythema or rash.      Comments: Right chest wall dialysis catheter. Dressing c/d/i. No sign of infection to site.   Neurological:      Mental Status: He is alert and oriented to person, place, and time.   Psychiatric:         Behavior: Behavior normal.         Thought Content: Thought content normal.         Judgment: Judgment normal.       Significant Labs:   CBC:   Recent Labs   Lab 01/05/23  0344 01/06/23  0357   WBC 0.51* 0.04*   HGB 8.2* 7.7*   HCT 24.6* 23.9*   PLT 46* 24*      and CMP:   Recent Labs   Lab 01/05/23  0344 01/06/23  0357    140   K 4.2 4.1    108   CO2 23 20*   GLU 86 98   BUN 40* 49*   CREATININE 5.8* 6.8*   CALCIUM 8.0* 7.5*   PROT 5.6* 5.6*   ALBUMIN 3.6 3.7   BILITOT 0.8 0.6   ALKPHOS 68 68   AST 11 11   ALT 5* 6*   ANIONGAP 11 12         Diagnostic Results:  I have reviewed all pertinent imaging results/findings within the past 24 hours.    Assessment/Plan:     * History of autologous stem cell transplant  - Patient of Dr. King  - Diagnosed with MM in April 2022. Underwent 2 cycles CyBorD then transitioned to DVRd (now s/p C6)  - Admitted 12/28/22 for Torie 140 auto SCT   - Today is Day +7  - Completed chemotherapy 12/29 without issue  - Received 6 bags with a total CD34 dose of 3.13 x10^6/kg on 12/30/23.    Planned conditioning regimen:  Melphalan on Day -1     Antimicrobial Prophylaxis:  Acyclovir starting on Day  -1  Levofloxacin starting on Day -1  Fluconazole starting on Day -1     Growth Factor Support:  Neupogen starting on Day +7      Caregiver: Wife  Post-transplant discharge plans: Javed Garcia    Multiple myeloma not having achieved remission  - Patient of Dr. King. Per most recent clinic note:   - 4/20/22: renal biopsy: light chain cast nephropathy, kappa light chain  - 4/26/22: right subclavian vein thrombus   - 4/27/22: M spike 0.2 with free monoclonal kappa band. B2mg 19.57, IgG 496, IgA 10, IgM <5. Kappa 84678, Lambda 7.9, ratio >1000  - 5/12/22: BMBx: plasma cell myeloma, marrow cellularity 100%, plasma cell percentage 100%. Plasma cell phenotype +, kappa +, CD20- -, CD30-, EMA-, SADAF-, Congo red negative. Peripheral blood with pancytopenia and no circulating blasts. Decreased storage iron. FISH canceled due to quantity not sufficient  - 5/18/22: CyBorD C1D1  - 5/23/22: rasburicase  - 5/24/22: Xgeva  - 6/6/22: Kappa 73376, lambda 4.0, ratio >1000, M spike 0.1 with free monoclonal kappa band present  - 6/6/22: CyBorD C2D1  - 6/9/22: BMBx Plasma cell neoplasm compatible with plasma cell myeloma. Extent of involvement 80-90% of bone marrow elements. Cytology: Plasmablastic. FISH cytogenetics positive for 1q21/CKS1B gain. Karyotype failed. Myelofibrosis diffuse (MF-3)  - 6/20/22: CyBorD C2D8 delayed due to covid  - 6/23/22: CyBOrD C2D11  - 7/5/22: C1D1 DVRd  - 7/7/22 - 7/18/22: hospital admission for septic shock resulting in renal failure  - 7/25/22: started again on DVRd  - 8/8/2022: kappa 1402.8, lambda 7.5, ratio 167.04. M spike 0.1, two faint restricted bands in gamma globulin regions.   - 10/10/22: C5D1 DVRd. Revlimid on hold for upcoming stem   - Admitted for Torie 140 auto SCT on 12/28/22 as above    Pancytopenia due to antineoplastic chemotherapy  - transfuse for Hgb <7 or plt < 10K  - daily CBC while inpatient  - continue antimicrobial ppx    Mucositis due to chemotherapy  - continues  robin    Hyperphosphatemia  - Likely 2/2 renal disease  - continue Sevelamer 800mg TIDWM    Fall during current hospitalization  - See syncope  - Unclear if patient hit head. CT head neg 1/2 for bleed.  - Tele sitter and fall precautions in place    Syncope  - Two falls since admission with reported loss of consciousness. Both following dialysis, so suspect orthostatic  - Metoprolol dose decreased from 25 mg daily to 12.5 mg daily  - Can consider midodrine  - EEG ordered but still not yet done; symptoms now improving so discontinued order for EEG.    Chemotherapy-induced diarrhea  - C-diff neg 12/31  - Improved with imodium. Changed imodium from scheduled to PRN 1/3/22. Continue PRN limotil.    Chemotherapy-induced nausea and vomiting  - Zofran increased to Q6 scheduled. Nausea improved.  - Scopalamine patch in place  - Continue PRN compazine    Hyperlipidemia  - Will hold home statin while inpatient    Depression  - Continue home Zoloft    Deep vein thrombosis (DVT) of upper extremity  - Held home apixiban for thrombocytopenia. Will resume once plts are consistently > 50K.    Hypertension  - Continue home metoprolol. Decreased dose to 12.5 mg daily 1/3/23 due to soft BP and syncopal episodes x 2.    End stage renal disease  - developed during ICU stay. Believed to be 2/2 hypoprofusion due to hypotension  - nephrology consulted on admission  - on dialysis MWF at home. Continuing this schedule inpatient.  - right chest wall dialysis catheter in place    Dependence on hemodialysis  - see ESRD    Chronic infection of prosthetic knee  - Seen in ID clinic with Dr. Chatterjee prior to transplant   - ID rec'd continuing doxycycline throughout transplant         VTE Risk Mitigation (From admission, onward)         Ordered     Place CRISTHIAN hose  Until discontinued         01/03/23 0911     heparin (porcine) injection 1,000 Units  As needed (PRN)         12/30/22 1243     heparin (porcine) injection 1,600 Units  As needed (PRN)          12/28/22 1825     IP VTE HIGH RISK PATIENT  Once         12/28/22 1659     Place sequential compression device  Until discontinued         12/28/22 1659                Disposition: Inpatient for SCT.    mAy Thomas, NP  Bone Marrow Transplant  [facility] - [facility] ([facility])

## 2023-01-07 LAB
ABO + RH BLD: ABNORMAL
ALBUMIN SERPL BCP-MCNC: 3.6 G/DL (ref 3.5–5.2)
ALP SERPL-CCNC: 71 U/L (ref 55–135)
ALT SERPL W/O P-5'-P-CCNC: 6 U/L (ref 10–44)
ANION GAP SERPL CALC-SCNC: 11 MMOL/L (ref 8–16)
ANISOCYTOSIS BLD QL SMEAR: SLIGHT
AST SERPL-CCNC: 10 U/L (ref 10–40)
BASOPHILS # BLD AUTO: 0 K/UL (ref 0–0.2)
BASOPHILS NFR BLD: 0 % (ref 0–1.9)
BILIRUB SERPL-MCNC: 0.7 MG/DL (ref 0.1–1)
BLD GP AB SCN CELLS X3 SERPL QL: ABNORMAL
BLOOD GROUP ANTIBODIES SERPL: NORMAL
BUN SERPL-MCNC: 38 MG/DL (ref 6–20)
CALCIUM SERPL-MCNC: 7.9 MG/DL (ref 8.7–10.5)
CHLORIDE SERPL-SCNC: 104 MMOL/L (ref 95–110)
CO2 SERPL-SCNC: 24 MMOL/L (ref 23–29)
CREAT SERPL-MCNC: 5.7 MG/DL (ref 0.5–1.4)
DAT IGG-SP REAG RBC-IMP: NORMAL
DIFFERENTIAL METHOD: ABNORMAL
EOSINOPHIL # BLD AUTO: 0 K/UL (ref 0–0.5)
EOSINOPHIL NFR BLD: 0 % (ref 0–8)
ERYTHROCYTE [DISTWIDTH] IN BLOOD BY AUTOMATED COUNT: 17.4 % (ref 11.5–14.5)
EST. GFR  (NO RACE VARIABLE): 10.8 ML/MIN/1.73 M^2
GLUCOSE SERPL-MCNC: 93 MG/DL (ref 70–110)
HCT VFR BLD AUTO: 23.2 % (ref 40–54)
HGB BLD-MCNC: 7.7 G/DL (ref 14–18)
HYPOCHROMIA BLD QL SMEAR: ABNORMAL
IMM GRANULOCYTES # BLD AUTO: 0 K/UL (ref 0–0.04)
IMM GRANULOCYTES NFR BLD AUTO: 0 % (ref 0–0.5)
LYMPHOCYTES # BLD AUTO: 0 K/UL (ref 1–4.8)
LYMPHOCYTES NFR BLD: 0 % (ref 18–48)
MAGNESIUM SERPL-MCNC: 1.7 MG/DL (ref 1.6–2.6)
MCH RBC QN AUTO: 32.6 PG (ref 27–31)
MCHC RBC AUTO-ENTMCNC: 33.2 G/DL (ref 32–36)
MCV RBC AUTO: 98 FL (ref 82–98)
MONOCYTES # BLD AUTO: 0 K/UL (ref 0.3–1)
MONOCYTES NFR BLD: 0 % (ref 4–15)
NEUTROPHILS # BLD AUTO: 0 K/UL (ref 1.8–7.7)
NEUTROPHILS NFR BLD: 100 % (ref 38–73)
NRBC BLD-RTO: 0 /100 WBC
PHOSPHATE SERPL-MCNC: 4.7 MG/DL (ref 2.7–4.5)
PLATELET # BLD AUTO: 16 K/UL (ref 150–450)
PLATELET BLD QL SMEAR: ABNORMAL
PMV BLD AUTO: 10.6 FL (ref 9.2–12.9)
POTASSIUM SERPL-SCNC: 4 MMOL/L (ref 3.5–5.1)
PROT SERPL-MCNC: 5.6 G/DL (ref 6–8.4)
RBC # BLD AUTO: 2.36 M/UL (ref 4.6–6.2)
SODIUM SERPL-SCNC: 139 MMOL/L (ref 136–145)
WBC # BLD AUTO: 0.01 K/UL (ref 3.9–12.7)

## 2023-01-07 PROCEDURE — 86900 BLOOD TYPING SEROLOGIC ABO: CPT | Performed by: INTERNAL MEDICINE

## 2023-01-07 PROCEDURE — 99232 SBSQ HOSP IP/OBS MODERATE 35: CPT | Mod: ,,, | Performed by: INTERNAL MEDICINE

## 2023-01-07 PROCEDURE — 25000003 PHARM REV CODE 250: Performed by: INTERNAL MEDICINE

## 2023-01-07 PROCEDURE — 25000003 PHARM REV CODE 250: Performed by: NURSE PRACTITIONER

## 2023-01-07 PROCEDURE — 83735 ASSAY OF MAGNESIUM: CPT | Performed by: NURSE PRACTITIONER

## 2023-01-07 PROCEDURE — 86922 COMPATIBILITY TEST ANTIGLOB: CPT | Performed by: STUDENT IN AN ORGANIZED HEALTH CARE EDUCATION/TRAINING PROGRAM

## 2023-01-07 PROCEDURE — 85025 COMPLETE CBC W/AUTO DIFF WBC: CPT | Performed by: NURSE PRACTITIONER

## 2023-01-07 PROCEDURE — 63600175 PHARM REV CODE 636 W HCPCS: Performed by: STUDENT IN AN ORGANIZED HEALTH CARE EDUCATION/TRAINING PROGRAM

## 2023-01-07 PROCEDURE — 84100 ASSAY OF PHOSPHORUS: CPT | Performed by: NURSE PRACTITIONER

## 2023-01-07 PROCEDURE — 63600175 PHARM REV CODE 636 W HCPCS: Performed by: INTERNAL MEDICINE

## 2023-01-07 PROCEDURE — 25000003 PHARM REV CODE 250: Performed by: STUDENT IN AN ORGANIZED HEALTH CARE EDUCATION/TRAINING PROGRAM

## 2023-01-07 PROCEDURE — 86870 RBC ANTIBODY IDENTIFICATION: CPT | Performed by: INTERNAL MEDICINE

## 2023-01-07 PROCEDURE — 80053 COMPREHEN METABOLIC PANEL: CPT | Performed by: NURSE PRACTITIONER

## 2023-01-07 PROCEDURE — 99232 PR SUBSEQUENT HOSPITAL CARE,LEVL II: ICD-10-PCS | Mod: ,,, | Performed by: INTERNAL MEDICINE

## 2023-01-07 PROCEDURE — 86880 COOMBS TEST DIRECT: CPT | Performed by: INTERNAL MEDICINE

## 2023-01-07 PROCEDURE — 20600001 HC STEP DOWN PRIVATE ROOM

## 2023-01-07 RX ORDER — PROCHLORPERAZINE EDISYLATE 5 MG/ML
10 INJECTION INTRAMUSCULAR; INTRAVENOUS EVERY 6 HOURS
Status: DISCONTINUED | OUTPATIENT
Start: 2023-01-07 | End: 2023-01-07

## 2023-01-07 RX ORDER — LIDOCAINE HYDROCHLORIDE 20 MG/ML
JELLY TOPICAL
Status: DISCONTINUED | OUTPATIENT
Start: 2023-01-07 | End: 2023-01-07

## 2023-01-07 RX ORDER — LIDOCAINE HYDROCHLORIDE 20 MG/ML
15 SOLUTION OROPHARYNGEAL EVERY 6 HOURS PRN
Status: DISCONTINUED | OUTPATIENT
Start: 2023-01-07 | End: 2023-01-07

## 2023-01-07 RX ORDER — LIDOCAINE HYDROCHLORIDE 20 MG/ML
15 SOLUTION OROPHARYNGEAL EVERY 6 HOURS PRN
Status: DISCONTINUED | OUTPATIENT
Start: 2023-01-07 | End: 2023-01-14 | Stop reason: HOSPADM

## 2023-01-07 RX ORDER — SODIUM CHLORIDE 9 MG/ML
INJECTION, SOLUTION INTRAVENOUS CONTINUOUS
Status: ACTIVE | OUTPATIENT
Start: 2023-01-07 | End: 2023-01-08

## 2023-01-07 RX ORDER — PROCHLORPERAZINE EDISYLATE 5 MG/ML
10 INJECTION INTRAMUSCULAR; INTRAVENOUS
Status: DISCONTINUED | OUTPATIENT
Start: 2023-01-07 | End: 2023-01-10

## 2023-01-07 RX ORDER — SODIUM CHLORIDE 9 MG/ML
INJECTION, SOLUTION INTRAVENOUS ONCE
Status: DISCONTINUED | OUTPATIENT
Start: 2023-01-09 | End: 2023-01-10

## 2023-01-07 RX ADMIN — METOPROLOL SUCCINATE 12.5 MG: 25 TABLET, EXTENDED RELEASE ORAL at 09:01

## 2023-01-07 RX ADMIN — PANTOPRAZOLE SODIUM 40 MG: 40 TABLET, DELAYED RELEASE ORAL at 09:01

## 2023-01-07 RX ADMIN — CHOLECALCIFEROL TAB 25 MCG (1000 UNIT) 3000 UNITS: 25 TAB at 09:01

## 2023-01-07 RX ADMIN — ONDANSETRON 4 MG: 2 INJECTION INTRAMUSCULAR; INTRAVENOUS at 05:01

## 2023-01-07 RX ADMIN — HEPARIN SODIUM 1600 UNITS: 1000 INJECTION, SOLUTION INTRAVENOUS; SUBCUTANEOUS at 06:01

## 2023-01-07 RX ADMIN — DOXYCYCLINE HYCLATE 100 MG: 100 TABLET, COATED ORAL at 09:01

## 2023-01-07 RX ADMIN — ONDANSETRON 4 MG: 2 INJECTION INTRAMUSCULAR; INTRAVENOUS at 06:01

## 2023-01-07 RX ADMIN — ONDANSETRON 4 MG: 2 INJECTION INTRAMUSCULAR; INTRAVENOUS at 11:01

## 2023-01-07 RX ADMIN — Medication 1 DOSE: at 08:01

## 2023-01-07 RX ADMIN — CALCIUM CARBONATE (ANTACID) CHEW TAB 500 MG 1000 MG: 500 CHEW TAB at 08:01

## 2023-01-07 RX ADMIN — ACYCLOVIR 400 MG: 200 CAPSULE ORAL at 08:01

## 2023-01-07 RX ADMIN — ACYCLOVIR 400 MG: 200 CAPSULE ORAL at 09:01

## 2023-01-07 RX ADMIN — SODIUM CHLORIDE: 9 INJECTION, SOLUTION INTRAVENOUS at 11:01

## 2023-01-07 RX ADMIN — SERTRALINE HYDROCHLORIDE 50 MG: 50 TABLET ORAL at 09:01

## 2023-01-07 RX ADMIN — FLUCONAZOLE 200 MG: 200 TABLET ORAL at 09:01

## 2023-01-07 RX ADMIN — PROCHLORPERAZINE EDISYLATE 10 MG: 5 INJECTION INTRAMUSCULAR; INTRAVENOUS at 07:01

## 2023-01-07 RX ADMIN — LIDOCAINE HYDROCHLORIDE 15 ML: 20 SOLUTION ORAL; TOPICAL at 05:01

## 2023-01-07 RX ADMIN — OLANZAPINE 5 MG: 2.5 TABLET, FILM COATED ORAL at 08:01

## 2023-01-07 RX ADMIN — PROCHLORPERAZINE EDISYLATE 10 MG: 5 INJECTION INTRAMUSCULAR; INTRAVENOUS at 01:01

## 2023-01-07 RX ADMIN — FILGRASTIM 480 MCG: 480 INJECTION, SOLUTION INTRAVENOUS; SUBCUTANEOUS at 09:01

## 2023-01-07 RX ADMIN — DOXYCYCLINE HYCLATE 100 MG: 100 TABLET, COATED ORAL at 08:01

## 2023-01-07 NOTE — ASSESSMENT & PLAN NOTE
- Patient of Dr. King  - Diagnosed with MM in April 2022. Underwent 2 cycles CyBorD then transitioned to DVRd (now s/p C6)  - Admitted 12/28/22 for Torie 140 auto SCT   - Today is Day +8  - Completed chemotherapy 12/29 without issue  - Received 6 bags with a total CD34 dose of 3.13 x10^6/kg on 12/30/23.  - Awaiting engraftment.     Planned conditioning regimen:  Melphalan on Day -1     Antimicrobial Prophylaxis:  Acyclovir starting on Day -1  Levofloxacin starting on Day -1  Fluconazole starting on Day -1     Growth Factor Support:  Neupogen starting on Day +7      Caregiver: Wife  Post-transplant discharge plans: Javed House

## 2023-01-07 NOTE — PLAN OF CARE
Pt is Day +8 for Torie Auto SCT. Pt with c/o worsening mucositis. Topical lidocaine ordered for mouth sores. Pt declining dukes d/t worsened nausea. Compazine scheduled round-the-clock. Poor oral intake. Started on NS @ 50. Pt voiding without difficulty. Diarrhea controlled. Wife at bedside and active in patient care. Pt remains afebrile and VSS. WCTM.

## 2023-01-07 NOTE — SUBJECTIVE & OBJECTIVE
Subjective:     Interval History: Day +8 following auto SCT conditioned with [id number] for MM. Diarrhea controlled with Lomotil. Nausea improved with the addition of Zyprexa. Continue Zofran PRN. He was more active yesterday. Topical lidocaine added for tongue ulceration.    Objective:     Vital Signs (Most Recent):  Temp: 98.3 °F (36.8 °C) (01/07/23 1150)  Pulse: 79 (01/07/23 1150)  Resp: 18 (01/07/23 1150)  BP: (!) 102/59 (01/07/23 1150)  SpO2: 96 % (01/07/23 1150) Vital Signs (24h Range):  Temp:  [98.3 °F (36.8 °C)-99.3 °F (37.4 °C)] 98.3 °F (36.8 °C)  Pulse:  [75-96] 79  Resp:  [15-18] 18  SpO2:  [96 %-100 %] 96 %  BP: (102-122)/(59-73) 102/59     Weight: 102.7 kg (226 lb 4.8 oz)  Body mass index is 31.56 kg/m².  Body surface area is 2.27 meters squared.    ECOG SCORE           [unfilled]    Intake/Output - Last 3 Shifts         01/05 0700  01/06 0659 01/06 0700  01/07 0659 01/07 0700  01/08 0659    P.O. 530 900     Other  300     Total Intake(mL/kg) 530 (5) 1200 (11.7)     Urine (mL/kg/hr)  825 (0.3)     Emesis/NG output       Other  550     Stool  2     Total Output  1377     Net +530 -177            Urine Occurrence 4 x      Stool Occurrence  2 x             Physical Exam  Constitutional:       Appearance: He is well-developed.   HENT:      Head: Normocephalic and atraumatic.      Mouth/Throat:      Pharynx: No oropharyngeal exudate.   Eyes:      General:         Right eye: No discharge.         Left eye: No discharge.      Conjunctiva/sclera: Conjunctivae normal.      Pupils: Pupils are equal, round, and reactive to light.   Cardiovascular:      Rate and Rhythm: Normal rate and regular rhythm.      Heart sounds: Normal heart sounds. No murmur heard.    No gallop.   Pulmonary:      Effort: Pulmonary effort is normal. No respiratory distress.      Breath sounds: Normal breath sounds. No wheezing or rales.   Abdominal:      General: Bowel sounds are normal. There is no distension.      Palpations: Abdomen is soft.       Tenderness: There is no abdominal tenderness.   Musculoskeletal:         General: No deformity. Normal range of motion.      Cervical back: Normal range of motion and neck supple.   Skin:     General: Skin is warm and dry.      Findings: No erythema or rash.      Comments: Right chest wall dialysis catheter. Dressing c/d/i. No sign of infection to site.   Neurological:      Mental Status: He is alert and oriented to person, place, and time.   Psychiatric:         Behavior: Behavior normal.         Thought Content: Thought content normal.         Judgment: Judgment normal.       Significant Labs:   CBC:   Recent Labs   Lab 01/06/23  0357 01/07/23  0350   WBC 0.04* 0.01*   HGB 7.7* 7.7*   HCT 23.9* 23.2*   PLT 24* 16*      and CMP:   Recent Labs   Lab 01/06/23 0357 01/07/23  0350    139   K 4.1 4.0    104   CO2 20* 24   GLU 98 93   BUN 49* 38*   CREATININE 6.8* 5.7*   CALCIUM 7.5* 7.9*   PROT 5.6* 5.6*   ALBUMIN 3.7 3.6   BILITOT 0.6 0.7   ALKPHOS 68 71   AST 11 10   ALT 6* 6*   ANIONGAP 12 11         Diagnostic Results:  I have reviewed all pertinent imaging results/findings within the past 24 hours.

## 2023-01-07 NOTE — PLAN OF CARE
Plan of care reviewed with pt and his wife at the start of shift. VSS and afebrile. Pt ambulating in room with standby assist. Pt has continuous video monitoring. Bed alarm in use. Pt denies discomfort. Pt reports 2 diarrhea stool. Labs monitored. Will continue to monitor pt

## 2023-01-07 NOTE — ASSESSMENT & PLAN NOTE
- Zofran increased to Q6 scheduled. Zyprexa added 1/6/23. Nausea improved.  - Scopalamine patch in place  - Continue PRN compazine

## 2023-01-07 NOTE — PROGRESS NOTES
[facility] - Oncology ([facility])  Hematology  Bone Marrow Transplant  Progress Note    Patient Name: De Lakhani  Admission Date: 12/28/2022  Hospital Length of Stay: 10 days  Code Status: Full Code    Subjective:     Interval History: Day +8 following auto SCT conditioned with [id number] for MM. Diarrhea controlled with Lomotil. Nausea improved with the addition of Zyprexa. Continue Zofran PRN. He was more active yesterday. Topical lidocaine added for tongue ulceration.    Objective:     Vital Signs (Most Recent):  Temp: 98.3 °F (36.8 °C) (01/07/23 1150)  Pulse: 79 (01/07/23 1150)  Resp: 18 (01/07/23 1150)  BP: (!) 102/59 (01/07/23 1150)  SpO2: 96 % (01/07/23 1150) Vital Signs (24h Range):  Temp:  [98.3 °F (36.8 °C)-99.3 °F (37.4 °C)] 98.3 °F (36.8 °C)  Pulse:  [75-96] 79  Resp:  [15-18] 18  SpO2:  [96 %-100 %] 96 %  BP: (102-122)/(59-73) 102/59     Weight: 102.7 kg (226 lb 4.8 oz)  Body mass index is 31.56 kg/m².  Body surface area is 2.27 meters squared.    ECOG SCORE           [unfilled]    Intake/Output - Last 3 Shifts         01/05 0700  01/06 0659 01/06 0700  01/07 0659 01/07 0700  01/08 0659    P.O. 530 900     Other  300     Total Intake(mL/kg) 530 (5) 1200 (11.7)     Urine (mL/kg/hr)  825 (0.3)     Emesis/NG output       Other  550     Stool  2     Total Output  1377     Net +530 -177            Urine Occurrence 4 x      Stool Occurrence  2 x             Physical Exam  Constitutional:       Appearance: He is well-developed.   HENT:      Head: Normocephalic and atraumatic.      Mouth/Throat:      Pharynx: No oropharyngeal exudate.   Eyes:      General:         Right eye: No discharge.         Left eye: No discharge.      Conjunctiva/sclera: Conjunctivae normal.      Pupils: Pupils are equal, round, and reactive to light.   Cardiovascular:      Rate and Rhythm: Normal rate and regular rhythm.      Heart sounds: Normal heart sounds. No murmur heard.    No gallop.   Pulmonary:      Effort: Pulmonary effort is  normal. No respiratory distress.      Breath sounds: Normal breath sounds. No wheezing or rales.   Abdominal:      General: Bowel sounds are normal. There is no distension.      Palpations: Abdomen is soft.      Tenderness: There is no abdominal tenderness.   Musculoskeletal:         General: No deformity. Normal range of motion.      Cervical back: Normal range of motion and neck supple.   Skin:     General: Skin is warm and dry.      Findings: No erythema or rash.      Comments: Right chest wall dialysis catheter. Dressing c/d/i. No sign of infection to site.   Neurological:      Mental Status: He is alert and oriented to person, place, and time.   Psychiatric:         Behavior: Behavior normal.         Thought Content: Thought content normal.         Judgment: Judgment normal.       Significant Labs:   CBC:   Recent Labs   Lab 01/06/23  0357 01/07/23  0350   WBC 0.04* 0.01*   HGB 7.7* 7.7*   HCT 23.9* 23.2*   PLT 24* 16*      and CMP:   Recent Labs   Lab 01/06/23  0357 01/07/23  0350    139   K 4.1 4.0    104   CO2 20* 24   GLU 98 93   BUN 49* 38*   CREATININE 6.8* 5.7*   CALCIUM 7.5* 7.9*   PROT 5.6* 5.6*   ALBUMIN 3.7 3.6   BILITOT 0.6 0.7   ALKPHOS 68 71   AST 11 10   ALT 6* 6*   ANIONGAP 12 11         Diagnostic Results:  I have reviewed all pertinent imaging results/findings within the past 24 hours.    Assessment/Plan:     * History of autologous stem cell transplant  - Patient of Dr. King  - Diagnosed with MM in April 2022. Underwent 2 cycles CyBorD then transitioned to DVRd (now s/p C6)  - Admitted 12/28/22 for Torie 140 auto SCT   - Today is Day +8  - Completed chemotherapy 12/29 without issue  - Received 6 bags with a total CD34 dose of 3.13 x10^6/kg on 12/30/23.  - Awaiting engraftment.     Planned conditioning regimen:  Melphalan on Day -1     Antimicrobial Prophylaxis:  Acyclovir starting on Day -1  Levofloxacin starting on Day -1  Fluconazole starting on Day -1     Growth Factor  Support:  Neupogen starting on Day +7      Caregiver: Wife  Post-transplant discharge plans: [facility]    Mucositis due to chemotherapy  - continues dukes    Hyperphosphatemia  - Likely 2/2 renal disease  - continue Sevelamer 800mg TIDWM    Fall during current hospitalization  - See syncope  - Unclear if patient hit head. CT head neg 1/2 for bleed.  - Tele sitter and fall precautions in place    Syncope  - Two falls since admission with reported loss of consciousness. Both following dialysis, so suspect orthostatic  - Metoprolol dose decreased from 25 mg daily to 12.5 mg daily  - Can consider midodrine  - EEG ordered but still not yet done; symptoms now improving so discontinued order for EEG.    Chemotherapy-induced diarrhea  - C-diff neg 12/31  - Improved with imodium. Changed imodium from scheduled to PRN 1/3/22. Continue PRN limotil.    Chemotherapy-induced nausea and vomiting  - Zofran increased to Q6 scheduled. Zyprexa added 1/6/23. Nausea improved.  - Scopalamine patch in place  - Continue PRN compazine    Pancytopenia due to antineoplastic chemotherapy  - transfuse for Hgb <7 or plt < 10K  - daily CBC while inpatient  - continue antimicrobial ppx    Hyperlipidemia  - Will hold home statin while inpatient    Depression  - Continue home Zoloft    Deep vein thrombosis (DVT) of upper extremity  - Held home apixiban for thrombocytopenia. Will resume once plts are consistently > 50K.    Hypertension  - Continue home metoprolol. Decreased dose to 12.5 mg daily 1/3/23 due to soft BP and syncopal episodes x 2.    End stage renal disease  - developed during ICU stay. Believed to be 2/2 hypoprofusion due to hypotension  - nephrology consulted on admission  - on dialysis MWF at home. Continuing this schedule inpatient.  - right chest wall dialysis catheter in place    Dependence on hemodialysis  - see ESRD    Chronic infection of prosthetic knee  - Seen in ID clinic with Dr. Chatterjee prior to transplant   - ID rec'd  continuing doxycycline throughout transplant     Multiple myeloma not having achieved remission  - Patient of Dr. King. Per most recent clinic note:   - 4/20/22: renal biopsy: light chain cast nephropathy, kappa light chain  - 4/26/22: right subclavian vein thrombus   - 4/27/22: M spike 0.2 with free monoclonal kappa band. B2mg 19.57, IgG 496, IgA 10, IgM <5. Kappa 98190, Lambda 7.9, ratio >1000  - 5/12/22: BMBx: plasma cell myeloma, marrow cellularity 100%, plasma cell percentage 100%. Plasma cell phenotype +, kappa +, CD20- -, CD30-, EMA-, SADAF-, Congo red negative. Peripheral blood with pancytopenia and no circulating blasts. Decreased storage iron. FISH canceled due to quantity not sufficient  - 5/18/22: CyBorD C1D1  - 5/23/22: rasburicase  - 5/24/22: Xgeva  - 6/6/22: Kappa 38989, lambda 4.0, ratio >1000, M spike 0.1 with free monoclonal kappa band present  - 6/6/22: CyBorD C2D1  - 6/9/22: BMBx Plasma cell neoplasm compatible with plasma cell myeloma. Extent of involvement 80-90% of bone marrow elements. Cytology: Plasmablastic. FISH cytogenetics positive for 1q21/CKS1B gain. Karyotype failed. Myelofibrosis diffuse (MF-3)  - 6/20/22: CyBorD C2D8 delayed due to covid  - 6/23/22: CyBOrD C2D11  - 7/5/22: C1D1 DVRd  - 7/7/22 - 7/18/22: hospital admission for septic shock resulting in renal failure  - 7/25/22: started again on DVRd  - 8/8/2022: kappa 1402.8, lambda 7.5, ratio 167.04. M spike 0.1, two faint restricted bands in gamma globulin regions.   - 10/10/22: C5D1 DVRd. Revlimid on hold for upcoming stem   - Admitted for Torie 140 auto SCT on 12/28/22 as above        VTE Risk Mitigation (From admission, onward)         Ordered     Place CRISTHIAN hose  Until discontinued         01/03/23 0911     heparin (porcine) injection 1,000 Units  As needed (PRN)         12/30/22 1243     heparin (porcine) injection 1,600 Units  As needed (PRN)         12/28/22 9951     IP VTE HIGH RISK PATIENT  Once          12/28/22 1659     Place sequential compression device  Until discontinued         12/28/22 1659                Disposition:  Awaiting engraftment following autologous stem cell transplantation.  Continue inpatient management of associated symptoms.    Matteo Tellez MD  Bone Marrow Transplant  [facility] - Oncology [facility])

## 2023-01-08 LAB
ALBUMIN SERPL BCP-MCNC: 3.4 G/DL (ref 3.5–5.2)
ALP SERPL-CCNC: 70 U/L (ref 55–135)
ALT SERPL W/O P-5'-P-CCNC: <5 U/L (ref 10–44)
ANION GAP SERPL CALC-SCNC: 9 MMOL/L (ref 8–16)
ANISOCYTOSIS BLD QL SMEAR: SLIGHT
AST SERPL-CCNC: 9 U/L (ref 10–40)
BASOPHILS # BLD AUTO: 0 K/UL (ref 0–0.2)
BASOPHILS NFR BLD: 0 % (ref 0–1.9)
BILIRUB SERPL-MCNC: 0.5 MG/DL (ref 0.1–1)
BLD PROD TYP BPU: NORMAL
BLOOD UNIT EXPIRATION DATE: NORMAL
BLOOD UNIT TYPE CODE: 6200
BLOOD UNIT TYPE: NORMAL
BUN SERPL-MCNC: 46 MG/DL (ref 6–20)
CALCIUM SERPL-MCNC: 7.5 MG/DL (ref 8.7–10.5)
CHLORIDE SERPL-SCNC: 107 MMOL/L (ref 95–110)
CO2 SERPL-SCNC: 22 MMOL/L (ref 23–29)
CODING SYSTEM: NORMAL
CREAT SERPL-MCNC: 7.2 MG/DL (ref 0.5–1.4)
DIFFERENTIAL METHOD: ABNORMAL
DISPENSE STATUS: NORMAL
EOSINOPHIL # BLD AUTO: 0 K/UL (ref 0–0.5)
EOSINOPHIL NFR BLD: 0 % (ref 0–8)
ERYTHROCYTE [DISTWIDTH] IN BLOOD BY AUTOMATED COUNT: 17 % (ref 11.5–14.5)
EST. GFR  (NO RACE VARIABLE): 8.2 ML/MIN/1.73 M^2
GLUCOSE SERPL-MCNC: 100 MG/DL (ref 70–110)
HCT VFR BLD AUTO: 22.6 % (ref 40–54)
HGB BLD-MCNC: 7.6 G/DL (ref 14–18)
HYPOCHROMIA BLD QL SMEAR: ABNORMAL
IMM GRANULOCYTES # BLD AUTO: 0 K/UL (ref 0–0.04)
IMM GRANULOCYTES NFR BLD AUTO: 0 % (ref 0–0.5)
LYMPHOCYTES # BLD AUTO: 0 K/UL (ref 1–4.8)
LYMPHOCYTES NFR BLD: 0 % (ref 18–48)
MAGNESIUM SERPL-MCNC: 1.6 MG/DL (ref 1.6–2.6)
MCH RBC QN AUTO: 33.2 PG (ref 27–31)
MCHC RBC AUTO-ENTMCNC: 33.6 G/DL (ref 32–36)
MCV RBC AUTO: 99 FL (ref 82–98)
MONOCYTES # BLD AUTO: 0 K/UL (ref 0.3–1)
MONOCYTES NFR BLD: 0 % (ref 4–15)
NEUTROPHILS # BLD AUTO: 0 K/UL (ref 1.8–7.7)
NEUTROPHILS NFR BLD: 100 % (ref 38–73)
NRBC BLD-RTO: 0 /100 WBC
PHOSPHATE SERPL-MCNC: 4.5 MG/DL (ref 2.7–4.5)
PLATELET # BLD AUTO: 9 K/UL (ref 150–450)
PLATELET BLD QL SMEAR: ABNORMAL
PMV BLD AUTO: 12 FL (ref 9.2–12.9)
POTASSIUM SERPL-SCNC: 4 MMOL/L (ref 3.5–5.1)
PROT SERPL-MCNC: 5.6 G/DL (ref 6–8.4)
RBC # BLD AUTO: 2.29 M/UL (ref 4.6–6.2)
SODIUM SERPL-SCNC: 138 MMOL/L (ref 136–145)
UNIT NUMBER: NORMAL
WBC # BLD AUTO: 0.01 K/UL (ref 3.9–12.7)

## 2023-01-08 PROCEDURE — 63600175 PHARM REV CODE 636 W HCPCS: Performed by: STUDENT IN AN ORGANIZED HEALTH CARE EDUCATION/TRAINING PROGRAM

## 2023-01-08 PROCEDURE — 25000003 PHARM REV CODE 250: Performed by: INTERNAL MEDICINE

## 2023-01-08 PROCEDURE — 63600175 PHARM REV CODE 636 W HCPCS: Mod: JG | Performed by: INTERNAL MEDICINE

## 2023-01-08 PROCEDURE — 36430 TRANSFUSION BLD/BLD COMPNT: CPT

## 2023-01-08 PROCEDURE — 99900035 HC TECH TIME PER 15 MIN (STAT)

## 2023-01-08 PROCEDURE — P9037 PLATE PHERES LEUKOREDU IRRAD: HCPCS | Performed by: STUDENT IN AN ORGANIZED HEALTH CARE EDUCATION/TRAINING PROGRAM

## 2023-01-08 PROCEDURE — 80053 COMPREHEN METABOLIC PANEL: CPT | Performed by: NURSE PRACTITIONER

## 2023-01-08 PROCEDURE — 99232 PR SUBSEQUENT HOSPITAL CARE,LEVL II: ICD-10-PCS | Mod: ,,, | Performed by: INTERNAL MEDICINE

## 2023-01-08 PROCEDURE — 25000003 PHARM REV CODE 250: Performed by: STUDENT IN AN ORGANIZED HEALTH CARE EDUCATION/TRAINING PROGRAM

## 2023-01-08 PROCEDURE — 25000003 PHARM REV CODE 250: Performed by: NURSE PRACTITIONER

## 2023-01-08 PROCEDURE — 94761 N-INVAS EAR/PLS OXIMETRY MLT: CPT

## 2023-01-08 PROCEDURE — 20600001 HC STEP DOWN PRIVATE ROOM

## 2023-01-08 PROCEDURE — 85025 COMPLETE CBC W/AUTO DIFF WBC: CPT | Performed by: NURSE PRACTITIONER

## 2023-01-08 PROCEDURE — 83735 ASSAY OF MAGNESIUM: CPT | Performed by: NURSE PRACTITIONER

## 2023-01-08 PROCEDURE — 99232 SBSQ HOSP IP/OBS MODERATE 35: CPT | Mod: ,,, | Performed by: INTERNAL MEDICINE

## 2023-01-08 PROCEDURE — C9113 INJ PANTOPRAZOLE SODIUM, VIA: HCPCS | Performed by: STUDENT IN AN ORGANIZED HEALTH CARE EDUCATION/TRAINING PROGRAM

## 2023-01-08 PROCEDURE — 84100 ASSAY OF PHOSPHORUS: CPT | Performed by: NURSE PRACTITIONER

## 2023-01-08 RX ORDER — HYDROCODONE BITARTRATE AND ACETAMINOPHEN 500; 5 MG/1; MG/1
TABLET ORAL
Status: DISCONTINUED | OUTPATIENT
Start: 2023-01-08 | End: 2023-01-09

## 2023-01-08 RX ORDER — OLANZAPINE 5 MG/1
5 TABLET, ORALLY DISINTEGRATING ORAL NIGHTLY
Status: DISCONTINUED | OUTPATIENT
Start: 2023-01-08 | End: 2023-01-10

## 2023-01-08 RX ORDER — SODIUM CHLORIDE 9 MG/ML
INJECTION, SOLUTION INTRAVENOUS CONTINUOUS
Status: ACTIVE | OUTPATIENT
Start: 2023-01-08 | End: 2023-01-09

## 2023-01-08 RX ORDER — FLUCONAZOLE 2 MG/ML
200 INJECTION, SOLUTION INTRAVENOUS
Status: DISCONTINUED | OUTPATIENT
Start: 2023-01-08 | End: 2023-01-10

## 2023-01-08 RX ORDER — PANTOPRAZOLE SODIUM 40 MG/10ML
40 INJECTION, POWDER, LYOPHILIZED, FOR SOLUTION INTRAVENOUS DAILY
Status: DISCONTINUED | OUTPATIENT
Start: 2023-01-08 | End: 2023-01-10

## 2023-01-08 RX ADMIN — PROCHLORPERAZINE EDISYLATE 10 MG: 5 INJECTION INTRAMUSCULAR; INTRAVENOUS at 08:01

## 2023-01-08 RX ADMIN — ONDANSETRON 4 MG: 2 INJECTION INTRAMUSCULAR; INTRAVENOUS at 11:01

## 2023-01-08 RX ADMIN — ACYCLOVIR SODIUM 200 MG: 1000 INJECTION, SOLUTION INTRAVENOUS at 12:01

## 2023-01-08 RX ADMIN — Medication 1 DOSE: at 09:01

## 2023-01-08 RX ADMIN — PROCHLORPERAZINE EDISYLATE 10 MG: 5 INJECTION INTRAMUSCULAR; INTRAVENOUS at 02:01

## 2023-01-08 RX ADMIN — OLANZAPINE 5 MG: 5 TABLET, ORALLY DISINTEGRATING ORAL at 09:01

## 2023-01-08 RX ADMIN — FLUCONAZOLE 200 MG: 2 INJECTION, SOLUTION INTRAVENOUS at 11:01

## 2023-01-08 RX ADMIN — SODIUM CHLORIDE: 9 INJECTION, SOLUTION INTRAVENOUS at 05:01

## 2023-01-08 RX ADMIN — FILGRASTIM 480 MCG: 480 INJECTION, SOLUTION INTRAVENOUS; SUBCUTANEOUS at 09:01

## 2023-01-08 RX ADMIN — SODIUM CHLORIDE: 9 INJECTION, SOLUTION INTRAVENOUS at 08:01

## 2023-01-08 RX ADMIN — SERTRALINE HYDROCHLORIDE 50 MG: 50 TABLET ORAL at 09:01

## 2023-01-08 RX ADMIN — METOPROLOL SUCCINATE 12.5 MG: 25 TABLET, EXTENDED RELEASE ORAL at 09:01

## 2023-01-08 RX ADMIN — LIDOCAINE HYDROCHLORIDE 15 ML: 20 SOLUTION ORAL; TOPICAL at 09:01

## 2023-01-08 RX ADMIN — DOXYCYCLINE HYCLATE 100 MG: 100 TABLET, COATED ORAL at 08:01

## 2023-01-08 RX ADMIN — ONDANSETRON 4 MG: 2 INJECTION INTRAMUSCULAR; INTRAVENOUS at 06:01

## 2023-01-08 RX ADMIN — PANTOPRAZOLE SODIUM 40 MG: 40 INJECTION, POWDER, FOR SOLUTION INTRAVENOUS at 11:01

## 2023-01-08 RX ADMIN — ONDANSETRON 4 MG: 2 INJECTION INTRAMUSCULAR; INTRAVENOUS at 05:01

## 2023-01-08 RX ADMIN — LEVOFLOXACIN 250 MG: 250 TABLET, FILM COATED ORAL at 09:01

## 2023-01-08 RX ADMIN — ACYCLOVIR SODIUM 200 MG: 1000 INJECTION, SOLUTION INTRAVENOUS at 11:01

## 2023-01-08 RX ADMIN — SODIUM CHLORIDE: 9 INJECTION, SOLUTION INTRAVENOUS at 06:01

## 2023-01-08 NOTE — PLAN OF CARE
Pt is Day +9 for Torie Auto SCT. 1u PLT transfused without issue. Pt with c/o throat pain with swallowing pills. PO meds switched to IV for now. Topical lidocaine applied to tongue sores. Pt declining dukes at this time. Nausea controlled with scheduled zofran and compazine. NS @ 50. Wife at bedside and active in patient care. Avasys monitoring continued due to Pt's fall history. Pt aware of necessity at this time. Pt does endorse some dizziness this evening. Pt remains afebrile and VSS. WCTM.

## 2023-01-08 NOTE — PLAN OF CARE
Pt is Day +9 Torie Auto, AAOx4 and ambulates w/a stand-by assist. Pt has had 2 falls on this admission and is a high falls risk. Bed alarm set, ANNA SYS in use. VSS, pt had a t-max of 99.7. Pt c/o pain r/t lesions to the tongue and soreness when swallowing medication. Pt did not ask for offered pain medication. Pt refused bicarb swish and spit and pt refused duke's solution. Electrolytes were not replaced as Cr is currently 7.2. NS @ 50/hr. Pt urinated approx 700cc overnight, concentrated, yellow output. 1u plts ordered for transfusion later today. Wife and personal effects @ bedside. Pt remained safe and free of injury through the night. Bed in low and locked position, bed alarm set, ANNA SYS in use, rails up x3, call bell in reach, non skid socks worn out of bed. Will continue to monitor.

## 2023-01-08 NOTE — ASSESSMENT & PLAN NOTE
- Patient of Dr. King. Per most recent clinic note:   - 4/20/22: renal biopsy: light chain cast nephropathy, kappa light chain  - 4/26/22: right subclavian vein thrombus   - 4/27/22: M spike 0.2 with free monoclonal kappa band. B2mg 19.57, IgG 496, IgA 10, IgM <5. Kappa 51166, Lambda 7.9, ratio >1000  - 5/12/22: BMBx: plasma cell myeloma, marrow cellularity 100%, plasma cell percentage 100%. Plasma cell phenotype +, kappa +, CD20- -, CD30-, EMA-, SADAF-, Congo red negative. Peripheral blood with pancytopenia and no circulating blasts. Decreased storage iron. FISH canceled due to quantity not sufficient  - 5/18/22: CyBorD C1D1  - 5/23/22: rasburicase  - 5/24/22: Xgeva  - 6/6/22: Kappa 53766, lambda 4.0, ratio >1000, M spike 0.1 with free monoclonal kappa band present  - 6/6/22: CyBorD C2D1  - 6/9/22: BMBx Plasma cell neoplasm compatible with plasma cell myeloma. Extent of involvement 80-90% of bone marrow elements. Cytology: Plasmablastic. FISH cytogenetics positive for 1q21/CKS1B gain. Karyotype failed. Myelofibrosis diffuse (MF-3)  - 6/20/22: CyBorD C2D8 delayed due to covid  - 6/23/22: CyBOrD C2D11  - 7/5/22: C1D1 DVRd  - 7/7/22 - 7/18/22: hospital admission for septic shock resulting in renal failure  - 7/25/22: started again on DVRd  - 8/8/2022: kappa 1402.8, lambda 7.5, ratio 167.04. M spike 0.1, two faint restricted bands in gamma globulin regions.   - 10/10/22: C5D1 DVRd. Revlimid on hold for upcoming stem   - Admitted for Torie 140 auto SCT on 12/28/22 as above

## 2023-01-08 NOTE — NURSING
"@ the start of the shift, pt expressed his frustration over having an ANNA SYS at the bedside. Pt insisted that it be removed. RN and Charge RN both explained why it is necessary for the pt's safety to have the camera, as he has had 2 previous falls on this admission and currently has a very low platelet count. Pt agreed to keep it overnight but expressed that he would "talk to the drZenaida In the morning" about having it removed. Will continue to monitor.   "

## 2023-01-08 NOTE — ASSESSMENT & PLAN NOTE
- Zofran increased to Q6 scheduled. Zyprexa added 1/6/23. Nausea controlled.  - Scopalamine patch in place  - Continue PRN compazine

## 2023-01-08 NOTE — SUBJECTIVE & OBJECTIVE
Subjective:     Interval History: Day +9 following auto SCT for MM conditioned with [id number].  Diarrhea and nausea are now controlled.  He is frustrated with remote monitoring as he would like to get up more frequently.  Will ask PT to re-evaluate tomorrow.    Objective:     Vital Signs (Most Recent):  Temp: 99.2 °F (37.3 °C) (01/08/23 1128)  Pulse: 86 (01/08/23 1128)  Resp: 18 (01/08/23 1128)  BP: 125/76 (01/08/23 1128)  SpO2: 98 % (01/08/23 1128) Vital Signs (24h Range):  Temp:  [98.2 °F (36.8 °C)-99.2 °F (37.3 °C)] 99.2 °F (37.3 °C)  Pulse:  [74-87] 86  Resp:  [18-20] 18  SpO2:  [95 %-99 %] 98 %  BP: (103-131)/(53-76) 125/76     Weight: 102.5 kg (225 lb 15.5 oz)  Body mass index is 31.52 kg/m².  Body surface area is 2.27 meters squared.    ECOG SCORE           [unfilled]    Intake/Output - Last 3 Shifts         01/06 0700  01/07 0659 01/07 0700  01/08 0659 01/08 0700 01/09 0659    P.O. 900      I.V. (mL/kg)  315.5 (3.1)     Blood   226    Other 300      Total Intake(mL/kg) 1200 (11.7) 315.5 (3.1) 226 (2.2)    Urine (mL/kg/hr) 825 (0.3) 1850 (0.8)     Other 550      Stool 2 0     Total Output 1377 1850     Net -177 -1534.5 +226           Stool Occurrence 2 x 1 x             Physical Exam  Constitutional:       Appearance: He is well-developed.   HENT:      Head: Normocephalic and atraumatic.      Mouth/Throat:      Pharynx: No oropharyngeal exudate.   Eyes:      General:         Right eye: No discharge.         Left eye: No discharge.      Conjunctiva/sclera: Conjunctivae normal.      Pupils: Pupils are equal, round, and reactive to light.   Cardiovascular:      Rate and Rhythm: Normal rate and regular rhythm.      Heart sounds: Normal heart sounds. No murmur heard.    No gallop.   Pulmonary:      Effort: Pulmonary effort is normal. No respiratory distress.      Breath sounds: Normal breath sounds. No wheezing or rales.   Abdominal:      General: Bowel sounds are normal. There is no distension.      Palpations:  Abdomen is soft.      Tenderness: There is no abdominal tenderness.   Musculoskeletal:         General: No deformity. Normal range of motion.      Cervical back: Normal range of motion and neck supple.   Skin:     General: Skin is warm and dry.      Findings: No erythema or rash.      Comments: Right chest wall dialysis catheter. Dressing c/d/i. No sign of infection to site.   Neurological:      Mental Status: He is alert and oriented to person, place, and time.   Psychiatric:         Behavior: Behavior normal.         Thought Content: Thought content normal.         Judgment: Judgment normal.       Significant Labs:   CBC:   Recent Labs   Lab 01/07/23  0350 01/08/23 0352   WBC 0.01* 0.01*   HGB 7.7* 7.6*   HCT 23.2* 22.6*   PLT 16* 9*      and CMP:   Recent Labs   Lab 01/07/23 0350 01/08/23 0352    138   K 4.0 4.0    107   CO2 24 22*   GLU 93 100   BUN 38* 46*   CREATININE 5.7* 7.2*   CALCIUM 7.9* 7.5*   PROT 5.6* 5.6*   ALBUMIN 3.6 3.4*   BILITOT 0.7 0.5   ALKPHOS 71 70   AST 10 9*   ALT 6* <5*   ANIONGAP 11 9         Diagnostic Results:  I have reviewed all pertinent imaging results/findings within the past 24 hours.

## 2023-01-08 NOTE — ASSESSMENT & PLAN NOTE
- See syncope  - Unclear if patient hit head. CT head neg 1/2 for bleed.  - Tele sitter and fall precautions in place.  - Re-evaluate with PT tomorrow (1/9/23).

## 2023-01-08 NOTE — ASSESSMENT & PLAN NOTE
- Patient of Dr. King  - Diagnosed with MM in April 2022. Underwent 2 cycles CyBorD then transitioned to DVRd (now s/p C6)  - Admitted 12/28/22 for Torie 140 auto SCT   - Today is Day +9  - Completed chemotherapy 12/29 without issue  - Received 6 bags with a total CD34 dose of 3.13 x10^6/kg on 12/30/23.  - Awaiting engraftment.     Planned conditioning regimen:  Melphalan on Day -1     Antimicrobial Prophylaxis:  Acyclovir starting on Day -1  Levofloxacin starting on Day -1  Fluconazole starting on Day -1     Growth Factor Support:  Neupogen starting on Day +7      Caregiver: Wife  Post-transplant discharge plans: [facility]

## 2023-01-08 NOTE — PROGRESS NOTES
[facility] - Oncology ([facility])  Hematology  Bone Marrow Transplant  Progress Note    Patient Name: De Lakhani  Admission Date: 12/28/2022  Hospital Length of Stay: 11 days  Code Status: Full Code    Subjective:     Interval History: Day +9 following auto SCT for MM conditioned with [id number].  Diarrhea and nausea are now controlled.  He is frustrated with remote monitoring as he would like to get up more frequently.  Will ask PT to re-evaluate tomorrow.    Objective:     Vital Signs (Most Recent):  Temp: 99.2 °F (37.3 °C) (01/08/23 1128)  Pulse: 86 (01/08/23 1128)  Resp: 18 (01/08/23 1128)  BP: 125/76 (01/08/23 1128)  SpO2: 98 % (01/08/23 1128) Vital Signs (24h Range):  Temp:  [98.2 °F (36.8 °C)-99.2 °F (37.3 °C)] 99.2 °F (37.3 °C)  Pulse:  [74-87] 86  Resp:  [18-20] 18  SpO2:  [95 %-99 %] 98 %  BP: (103-131)/(53-76) 125/76     Weight: 102.5 kg (225 lb 15.5 oz)  Body mass index is 31.52 kg/m².  Body surface area is 2.27 meters squared.    ECOG SCORE           [unfilled]    Intake/Output - Last 3 Shifts         01/06 0700  01/07 0659 01/07 0700  01/08 0659 01/08 0700  01/09 0659    P.O. 900      I.V. (mL/kg)  315.5 (3.1)     Blood   226    Other 300      Total Intake(mL/kg) 1200 (11.7) 315.5 (3.1) 226 (2.2)    Urine (mL/kg/hr) 825 (0.3) 1850 (0.8)     Other 550      Stool 2 0     Total Output 1377 1850     Net -177 -1534.5 +226           Stool Occurrence 2 x 1 x             Physical Exam  Constitutional:       Appearance: He is well-developed.   HENT:      Head: Normocephalic and atraumatic.      Mouth/Throat:      Pharynx: No oropharyngeal exudate.   Eyes:      General:         Right eye: No discharge.         Left eye: No discharge.      Conjunctiva/sclera: Conjunctivae normal.      Pupils: Pupils are equal, round, and reactive to light.   Cardiovascular:      Rate and Rhythm: Normal rate and regular rhythm.      Heart sounds: Normal heart sounds. No murmur heard.    No gallop.   Pulmonary:      Effort: Pulmonary  effort is normal. No respiratory distress.      Breath sounds: Normal breath sounds. No wheezing or rales.   Abdominal:      General: Bowel sounds are normal. There is no distension.      Palpations: Abdomen is soft.      Tenderness: There is no abdominal tenderness.   Musculoskeletal:         General: No deformity. Normal range of motion.      Cervical back: Normal range of motion and neck supple.   Skin:     General: Skin is warm and dry.      Findings: No erythema or rash.      Comments: Right chest wall dialysis catheter. Dressing c/d/i. No sign of infection to site.   Neurological:      Mental Status: He is alert and oriented to person, place, and time.   Psychiatric:         Behavior: Behavior normal.         Thought Content: Thought content normal.         Judgment: Judgment normal.       Significant Labs:   CBC:   Recent Labs   Lab 01/07/23  0350 01/08/23  0352   WBC 0.01* 0.01*   HGB 7.7* 7.6*   HCT 23.2* 22.6*   PLT 16* 9*      and CMP:   Recent Labs   Lab 01/07/23  0350 01/08/23  0352    138   K 4.0 4.0    107   CO2 24 22*   GLU 93 100   BUN 38* 46*   CREATININE 5.7* 7.2*   CALCIUM 7.9* 7.5*   PROT 5.6* 5.6*   ALBUMIN 3.6 3.4*   BILITOT 0.7 0.5   ALKPHOS 71 70   AST 10 9*   ALT 6* <5*   ANIONGAP 11 9         Diagnostic Results:  I have reviewed all pertinent imaging results/findings within the past 24 hours.    Assessment/Plan:     * History of autologous stem cell transplant  - Patient of Dr. King  - Diagnosed with MM in April 2022. Underwent 2 cycles CyBorD then transitioned to DVRd (now s/p C6)  - Admitted 12/28/22 for Torie 140 auto SCT   - Today is Day +9  - Completed chemotherapy 12/29 without issue  - Received 6 bags with a total CD34 dose of 3.13 x10^6/kg on 12/30/23.  - Awaiting engraftment.     Planned conditioning regimen:  Melphalan on Day -1     Antimicrobial Prophylaxis:  Acyclovir starting on Day -1  Levofloxacin starting on Day -1  Fluconazole starting on Day -1     Growth  Factor Support:  Neupogen starting on Day +7      Caregiver: Wife  Post-transplant discharge plans: [facility]    Mucositis due to chemotherapy  - continues dukes    Hyperphosphatemia  - Likely 2/2 renal disease  - continue Sevelamer 800mg TIDWM    Fall during current hospitalization  - See syncope  - Unclear if patient hit head. CT head neg 1/2 for bleed.  - Tele sitter and fall precautions in place.  - Re-evaluate with PT tomorrow (1/9/23).    Syncope  - Two falls since admission with reported loss of consciousness. Both following dialysis, so suspect orthostatic  - Metoprolol dose decreased from 25 mg daily to 12.5 mg daily  - Can consider midodrine  - EEG ordered but still not yet done; symptoms now improving so discontinued order for EEG.    Chemotherapy-induced diarrhea  - C-diff neg 12/31  - Improved with imodium. Changed imodium from scheduled to PRN 1/3/22. Continue PRN limotil.    Chemotherapy-induced nausea and vomiting  - Zofran increased to Q6 scheduled. Zyprexa added 1/6/23. Nausea controlled.  - Scopalamine patch in place  - Continue PRN compazine    Pancytopenia due to antineoplastic chemotherapy  - transfuse for Hgb <7 or plt < 10K  - daily CBC while inpatient  - continue antimicrobial ppx    Hyperlipidemia  - Will hold home statin while inpatient    Depression  - Continue home Zoloft    Deep vein thrombosis (DVT) of upper extremity  - Held home apixiban for thrombocytopenia. Will resume once plts are consistently > 50K.    Hypertension  - Continue home metoprolol. Decreased dose to 12.5 mg daily 1/3/23 due to soft BP and syncopal episodes x 2.    End stage renal disease  - developed during ICU stay. Believed to be 2/2 hypoprofusion due to hypotension  - nephrology consulted on admission  - on dialysis MWF at home. Continuing this schedule inpatient.  - right chest wall dialysis catheter in place    Dependence on hemodialysis  - see ESRD    Chronic infection of prosthetic knee  - Seen in ID clinic  with Dr. Chatterjee prior to transplant   - ID rec'd continuing doxycycline throughout transplant     Multiple myeloma not having achieved remission  - Patient of Dr. King. Per most recent clinic note:   - 4/20/22: renal biopsy: light chain cast nephropathy, kappa light chain  - 4/26/22: right subclavian vein thrombus   - 4/27/22: M spike 0.2 with free monoclonal kappa band. B2mg 19.57, IgG 496, IgA 10, IgM <5. Kappa 10771, Lambda 7.9, ratio >1000  - 5/12/22: BMBx: plasma cell myeloma, marrow cellularity 100%, plasma cell percentage 100%. Plasma cell phenotype +, kappa +, CD20- -, CD30-, EMA-, SADAF-, Congo red negative. Peripheral blood with pancytopenia and no circulating blasts. Decreased storage iron. FISH canceled due to quantity not sufficient  - 5/18/22: CyBorD C1D1  - 5/23/22: rasburicase  - 5/24/22: Xgeva  - 6/6/22: Kappa 00633, lambda 4.0, ratio >1000, M spike 0.1 with free monoclonal kappa band present  - 6/6/22: CyBorD C2D1  - 6/9/22: BMBx Plasma cell neoplasm compatible with plasma cell myeloma. Extent of involvement 80-90% of bone marrow elements. Cytology: Plasmablastic. FISH cytogenetics positive for 1q21/CKS1B gain. Karyotype failed. Myelofibrosis diffuse (MF-3)  - 6/20/22: CyBorD C2D8 delayed due to covid  - 6/23/22: CyBOrD C2D11  - 7/5/22: C1D1 DVRd  - 7/7/22 - 7/18/22: hospital admission for septic shock resulting in renal failure  - 7/25/22: started again on DVRd  - 8/8/2022: kappa 1402.8, lambda 7.5, ratio 167.04. M spike 0.1, two faint restricted bands in gamma globulin regions.   - 10/10/22: C5D1 DVRd. Revlimid on hold for upcoming stem   - Admitted for Torie 140 auto SCT on 12/28/22 as above        VTE Risk Mitigation (From admission, onward)         Ordered     Place CRISTHIAN hose  Until discontinued         01/03/23 0911     heparin (porcine) injection 1,000 Units  As needed (PRN)         12/30/22 1243     heparin (porcine) injection 1,600 Units  As needed (PRN)         12/28/22 182      IP VTE HIGH RISK PATIENT  Once         12/28/22 1659     Place sequential compression device  Until discontinued         12/28/22 1659                Disposition:  Inpatient for supportive care following autologous stem cell transplantation.    Matteo Tellez MD  Bone Marrow Transplant  [facility] - On[facility] ([facility])

## 2023-01-09 PROBLEM — N18.6 ANEMIA IN ESRD (END-STAGE RENAL DISEASE): Status: ACTIVE | Noted: 2023-01-09

## 2023-01-09 PROBLEM — D61.818 PANCYTOPENIA: Status: ACTIVE | Noted: 2023-01-09

## 2023-01-09 PROBLEM — N18.9 CHRONIC KIDNEY DISEASE-MINERAL AND BONE DISORDER: Status: ACTIVE | Noted: 2023-01-09

## 2023-01-09 PROBLEM — M89.9 CHRONIC KIDNEY DISEASE-MINERAL AND BONE DISORDER: Status: ACTIVE | Noted: 2023-01-09

## 2023-01-09 PROBLEM — D84.9 IMMUNOSUPPRESSION: Status: ACTIVE | Noted: 2023-01-09

## 2023-01-09 PROBLEM — E83.9 CHRONIC KIDNEY DISEASE-MINERAL AND BONE DISORDER: Status: ACTIVE | Noted: 2023-01-09

## 2023-01-09 PROBLEM — D63.1 ANEMIA IN ESRD (END-STAGE RENAL DISEASE): Status: ACTIVE | Noted: 2023-01-09

## 2023-01-09 PROBLEM — E83.51 HYPOCALCEMIA: Status: ACTIVE | Noted: 2023-01-09

## 2023-01-09 LAB
ALBUMIN SERPL BCP-MCNC: 3.4 G/DL (ref 3.5–5.2)
ALP SERPL-CCNC: 68 U/L (ref 55–135)
ALT SERPL W/O P-5'-P-CCNC: 6 U/L (ref 10–44)
ANION GAP SERPL CALC-SCNC: 11 MMOL/L (ref 8–16)
ANISOCYTOSIS BLD QL SMEAR: SLIGHT
AST SERPL-CCNC: 7 U/L (ref 10–40)
BASOPHILS # BLD AUTO: 0 K/UL (ref 0–0.2)
BASOPHILS NFR BLD: 0 % (ref 0–1.9)
BILIRUB SERPL-MCNC: 0.5 MG/DL (ref 0.1–1)
BLD PROD TYP BPU: NORMAL
BLOOD UNIT EXPIRATION DATE: NORMAL
BLOOD UNIT TYPE CODE: 5100
BLOOD UNIT TYPE: NORMAL
BUN SERPL-MCNC: 50 MG/DL (ref 6–20)
CALCIUM SERPL-MCNC: 7 MG/DL (ref 8.7–10.5)
CHLORIDE SERPL-SCNC: 107 MMOL/L (ref 95–110)
CO2 SERPL-SCNC: 19 MMOL/L (ref 23–29)
CODING SYSTEM: NORMAL
CREAT SERPL-MCNC: 7.4 MG/DL (ref 0.5–1.4)
DIFFERENTIAL METHOD: ABNORMAL
DISPENSE STATUS: NORMAL
EOSINOPHIL # BLD AUTO: 0 K/UL (ref 0–0.5)
EOSINOPHIL NFR BLD: 0 % (ref 0–8)
ERYTHROCYTE [DISTWIDTH] IN BLOOD BY AUTOMATED COUNT: 17.3 % (ref 11.5–14.5)
EST. GFR  (NO RACE VARIABLE): 7.9 ML/MIN/1.73 M^2
GLUCOSE SERPL-MCNC: 98 MG/DL (ref 70–110)
HCT VFR BLD AUTO: 21.5 % (ref 40–54)
HGB BLD-MCNC: 6.9 G/DL (ref 14–18)
HYPOCHROMIA BLD QL SMEAR: ABNORMAL
IMM GRANULOCYTES # BLD AUTO: 0 K/UL (ref 0–0.04)
IMM GRANULOCYTES NFR BLD AUTO: 0 % (ref 0–0.5)
LYMPHOCYTES # BLD AUTO: 0 K/UL (ref 1–4.8)
LYMPHOCYTES NFR BLD: 50 % (ref 18–48)
MAGNESIUM SERPL-MCNC: 1.5 MG/DL (ref 1.6–2.6)
MCH RBC QN AUTO: 31.9 PG (ref 27–31)
MCHC RBC AUTO-ENTMCNC: 32.1 G/DL (ref 32–36)
MCV RBC AUTO: 100 FL (ref 82–98)
MONOCYTES # BLD AUTO: 0 K/UL (ref 0.3–1)
MONOCYTES NFR BLD: 0 % (ref 4–15)
NEUTROPHILS # BLD AUTO: 0 K/UL (ref 1.8–7.7)
NEUTROPHILS NFR BLD: 50 % (ref 38–73)
NRBC BLD-RTO: 0 /100 WBC
NUM UNITS TRANS PACKED RBC: NORMAL
OVALOCYTES BLD QL SMEAR: ABNORMAL
PHOSPHATE SERPL-MCNC: 4 MG/DL (ref 2.7–4.5)
PLATELET # BLD AUTO: 10 K/UL (ref 150–450)
PMV BLD AUTO: 11.1 FL (ref 9.2–12.9)
POIKILOCYTOSIS BLD QL SMEAR: SLIGHT
POLYCHROMASIA BLD QL SMEAR: ABNORMAL
POTASSIUM SERPL-SCNC: 3.8 MMOL/L (ref 3.5–5.1)
PROT SERPL-MCNC: 5.4 G/DL (ref 6–8.4)
RBC # BLD AUTO: 2.16 M/UL (ref 4.6–6.2)
SODIUM SERPL-SCNC: 137 MMOL/L (ref 136–145)
SPHEROCYTES BLD QL SMEAR: ABNORMAL
WBC # BLD AUTO: 0.02 K/UL (ref 3.9–12.7)

## 2023-01-09 PROCEDURE — 20600001 HC STEP DOWN PRIVATE ROOM

## 2023-01-09 PROCEDURE — 25000003 PHARM REV CODE 250: Performed by: NURSE PRACTITIONER

## 2023-01-09 PROCEDURE — 94761 N-INVAS EAR/PLS OXIMETRY MLT: CPT

## 2023-01-09 PROCEDURE — 63600175 PHARM REV CODE 636 W HCPCS: Performed by: STUDENT IN AN ORGANIZED HEALTH CARE EDUCATION/TRAINING PROGRAM

## 2023-01-09 PROCEDURE — 25000003 PHARM REV CODE 250: Performed by: STUDENT IN AN ORGANIZED HEALTH CARE EDUCATION/TRAINING PROGRAM

## 2023-01-09 PROCEDURE — 63600175 PHARM REV CODE 636 W HCPCS: Mod: JG | Performed by: INTERNAL MEDICINE

## 2023-01-09 PROCEDURE — 36430 TRANSFUSION BLD/BLD COMPNT: CPT

## 2023-01-09 PROCEDURE — 85025 COMPLETE CBC W/AUTO DIFF WBC: CPT | Performed by: NURSE PRACTITIONER

## 2023-01-09 PROCEDURE — 99232 SBSQ HOSP IP/OBS MODERATE 35: CPT | Mod: ,,, | Performed by: INTERNAL MEDICINE

## 2023-01-09 PROCEDURE — 80100014 HC HEMODIALYSIS 1:1

## 2023-01-09 PROCEDURE — 84100 ASSAY OF PHOSPHORUS: CPT | Performed by: NURSE PRACTITIONER

## 2023-01-09 PROCEDURE — 63600175 PHARM REV CODE 636 W HCPCS: Performed by: NURSE PRACTITIONER

## 2023-01-09 PROCEDURE — P9040 RBC LEUKOREDUCED IRRADIATED: HCPCS | Performed by: STUDENT IN AN ORGANIZED HEALTH CARE EDUCATION/TRAINING PROGRAM

## 2023-01-09 PROCEDURE — 25000003 PHARM REV CODE 250: Performed by: INTERNAL MEDICINE

## 2023-01-09 PROCEDURE — 99232 PR SUBSEQUENT HOSPITAL CARE,LEVL II: ICD-10-PCS | Mod: ,,, | Performed by: INTERNAL MEDICINE

## 2023-01-09 PROCEDURE — C9113 INJ PANTOPRAZOLE SODIUM, VIA: HCPCS | Performed by: STUDENT IN AN ORGANIZED HEALTH CARE EDUCATION/TRAINING PROGRAM

## 2023-01-09 PROCEDURE — 80053 COMPREHEN METABOLIC PANEL: CPT | Performed by: NURSE PRACTITIONER

## 2023-01-09 PROCEDURE — 83735 ASSAY OF MAGNESIUM: CPT | Performed by: NURSE PRACTITIONER

## 2023-01-09 PROCEDURE — 99233 SBSQ HOSP IP/OBS HIGH 50: CPT | Mod: ,,, | Performed by: NURSE PRACTITIONER

## 2023-01-09 PROCEDURE — 99233 PR SUBSEQUENT HOSPITAL CARE,LEVL III: ICD-10-PCS | Mod: ,,, | Performed by: NURSE PRACTITIONER

## 2023-01-09 RX ORDER — HYDROCODONE BITARTRATE AND ACETAMINOPHEN 500; 5 MG/1; MG/1
TABLET ORAL
Status: DISCONTINUED | OUTPATIENT
Start: 2023-01-09 | End: 2023-01-14 | Stop reason: HOSPADM

## 2023-01-09 RX ORDER — LANOLIN ALCOHOL/MO/W.PET/CERES
800 CREAM (GRAM) TOPICAL ONCE
Status: COMPLETED | OUTPATIENT
Start: 2023-01-09 | End: 2023-01-09

## 2023-01-09 RX ADMIN — SERTRALINE HYDROCHLORIDE 50 MG: 50 TABLET ORAL at 09:01

## 2023-01-09 RX ADMIN — HEPARIN SODIUM 1000 UNITS: 1000 INJECTION, SOLUTION INTRAVENOUS; SUBCUTANEOUS at 05:01

## 2023-01-09 RX ADMIN — ACYCLOVIR SODIUM 200 MG: 1000 INJECTION, SOLUTION INTRAVENOUS at 10:01

## 2023-01-09 RX ADMIN — CALCIUM CARBONATE (ANTACID) CHEW TAB 500 MG 1000 MG: 500 CHEW TAB at 09:01

## 2023-01-09 RX ADMIN — METOPROLOL SUCCINATE 12.5 MG: 25 TABLET, EXTENDED RELEASE ORAL at 09:01

## 2023-01-09 RX ADMIN — Medication 1 DOSE: at 09:01

## 2023-01-09 RX ADMIN — CHOLECALCIFEROL TAB 25 MCG (1000 UNIT) 3000 UNITS: 25 TAB at 09:01

## 2023-01-09 RX ADMIN — DOXYCYCLINE HYCLATE 100 MG: 100 TABLET, COATED ORAL at 09:01

## 2023-01-09 RX ADMIN — CALCIUM CARBONATE (ANTACID) CHEW TAB 500 MG 1000 MG: 500 CHEW TAB at 08:01

## 2023-01-09 RX ADMIN — FLUCONAZOLE 200 MG: 2 INJECTION, SOLUTION INTRAVENOUS at 10:01

## 2023-01-09 RX ADMIN — Medication 1 DOSE: at 08:01

## 2023-01-09 RX ADMIN — SEVELAMER CARBONATE 800 MG: 800 TABLET, FILM COATED ORAL at 09:01

## 2023-01-09 RX ADMIN — Medication 800 MG: at 09:01

## 2023-01-09 RX ADMIN — DOXYCYCLINE HYCLATE 100 MG: 100 TABLET, COATED ORAL at 08:01

## 2023-01-09 RX ADMIN — ACYCLOVIR SODIUM 200 MG: 1000 INJECTION, SOLUTION INTRAVENOUS at 11:01

## 2023-01-09 RX ADMIN — PANTOPRAZOLE SODIUM 40 MG: 40 INJECTION, POWDER, FOR SOLUTION INTRAVENOUS at 09:01

## 2023-01-09 RX ADMIN — SCOPALAMINE 1 PATCH: 1 PATCH, EXTENDED RELEASE TRANSDERMAL at 09:01

## 2023-01-09 RX ADMIN — FILGRASTIM 480 MCG: 480 INJECTION, SOLUTION INTRAVENOUS; SUBCUTANEOUS at 09:01

## 2023-01-09 RX ADMIN — DIPHENOXYLATE HYDROCHLORIDE AND ATROPINE SULFATE 1 TABLET: 2.5; .025 TABLET ORAL at 09:01

## 2023-01-09 RX ADMIN — OLANZAPINE 5 MG: 5 TABLET, ORALLY DISINTEGRATING ORAL at 08:01

## 2023-01-09 NOTE — PROGRESS NOTES
Pt and seen for follow up. Pleased with improvements, including decreasing nausea and dizziness.  They will pursue their reservation at [facility] for post-transplant stay.  Spoke to [facility] where his chair remains on hold; they will explore options for local dialysis for estimated post-transplant stay and call back this writer when an option is available.    Received callback from Gil at [facility] who will work on transfer to [facility], as it is closest option; completed his screening questions.  He will fax chair letter when available.  No other needs identified at this time. Will continue to follow and assist as needed.

## 2023-01-09 NOTE — ASSESSMENT & PLAN NOTE
- Patient of Dr. King. Per most recent clinic note:   - 4/20/22: renal biopsy: light chain cast nephropathy, kappa light chain  - 4/26/22: right subclavian vein thrombus   - 4/27/22: M spike 0.2 with free monoclonal kappa band. B2mg 19.57, IgG 496, IgA 10, IgM <5. Kappa 89374, Lambda 7.9, ratio >1000  - 5/12/22: BMBx: plasma cell myeloma, marrow cellularity 100%, plasma cell percentage 100%. Plasma cell phenotype +, kappa +, CD20- -, CD30-, EMA-, SADAF-, Congo red negative. Peripheral blood with pancytopenia and no circulating blasts. Decreased storage iron. FISH canceled due to quantity not sufficient  - 5/18/22: CyBorD C1D1  - 5/23/22: rasburicase  - 5/24/22: Xgeva  - 6/6/22: Kappa 98220, lambda 4.0, ratio >1000, M spike 0.1 with free monoclonal kappa band present  - 6/6/22: CyBorD C2D1  - 6/9/22: BMBx Plasma cell neoplasm compatible with plasma cell myeloma. Extent of involvement 80-90% of bone marrow elements. Cytology: Plasmablastic. FISH cytogenetics positive for 1q21/CKS1B gain. Karyotype failed. Myelofibrosis diffuse (MF-3)  - 6/20/22: CyBorD C2D8 delayed due to covid  - 6/23/22: CyBOrD C2D11  - 7/5/22: C1D1 DVRd  - 7/7/22 - 7/18/22: hospital admission for septic shock resulting in renal failure  - 7/25/22: started again on DVRd  - 8/8/2022: kappa 1402.8, lambda 7.5, ratio 167.04. M spike 0.1, two faint restricted bands in gamma globulin regions.   - 10/10/22: C5D1 DVRd. Revlimid on hold for upcoming stem   - Admitted for Torie 140 auto SCT on 12/28/22 as above

## 2023-01-09 NOTE — PROGRESS NOTES
[facility] - Oncology ([facility])  Hematology  Bone Marrow Transplant  Progress Note    Patient Name: De Lakhani  Admission Date: 12/28/2022  Hospital Length of Stay: 12 days  Code Status: Full Code    Subjective:     Interval History: Day +10 from a Torie 140 auto SCT for MM. Remains afebrile. VSS. Diarrhea and nausea stable. Dialysis planned for today. Will receive 1 unit prbc with dialysis for hgb of 6.9. ANC 10 today.    Objective:     Vital Signs (Most Recent):  Temp: 98.4 °F (36.9 °C) (01/09/23 0752)  Pulse: 76 (01/09/23 0752)  Resp: 19 (01/09/23 0752)  BP: 136/75 (01/09/23 0752)  SpO2: 100 % (01/09/23 0752) Vital Signs (24h Range):  Temp:  [98.2 °F (36.8 °C)-99.9 °F (37.7 °C)] 98.4 °F (36.9 °C)  Pulse:  [76-97] 76  Resp:  [18-20] 19  SpO2:  [97 %-100 %] 100 %  BP: (110-136)/(63-77) 136/75     Weight: 102.5 kg (225 lb 15.5 oz)  Body mass index is 31.52 kg/m².  Body surface area is 2.27 meters squared.    ECOG SCORE           [unfilled]    Intake/Output - Last 3 Shifts         01/07 0700  01/08 0659 01/08 0700 01/09 0659 01/09 0700  01/10 0659    P.O.       I.V. (mL/kg) 315.5 (3.1) 990.7 (9.7)     Blood  226     Other       IV Piggyback  150     Total Intake(mL/kg) 315.5 (3.1) 1366.7 (13.3)     Urine (mL/kg/hr) 1850 (0.8) 2200 (0.9)     Other       Stool 0 0     Total Output 1850 2200     Net -1534.5 -833.3            Urine Occurrence  2 x     Stool Occurrence 1 x 3 x 1 x            Physical Exam  Constitutional:       Appearance: He is well-developed.   HENT:      Head: Normocephalic and atraumatic.      Mouth/Throat:      Pharynx: No oropharyngeal exudate.   Eyes:      General:         Right eye: No discharge.         Left eye: No discharge.      Conjunctiva/sclera: Conjunctivae normal.      Pupils: Pupils are equal, round, and reactive to light.   Cardiovascular:      Rate and Rhythm: Normal rate and regular rhythm.      Heart sounds: Normal heart sounds. No murmur heard.    No gallop.   Pulmonary:      Effort:  Pulmonary effort is normal. No respiratory distress.      Breath sounds: Normal breath sounds. No wheezing or rales.   Abdominal:      General: Bowel sounds are normal. There is no distension.      Palpations: Abdomen is soft.      Tenderness: There is no abdominal tenderness.   Musculoskeletal:         General: No deformity. Normal range of motion.      Cervical back: Normal range of motion and neck supple.   Skin:     General: Skin is warm and dry.      Findings: No erythema or rash.      Comments: Right chest wall dialysis catheter. Dressing c/d/i. No sign of infection to site.   Neurological:      Mental Status: He is alert and oriented to person, place, and time.   Psychiatric:         Behavior: Behavior normal.         Thought Content: Thought content normal.         Judgment: Judgment normal.       Significant Labs:   CBC:   Recent Labs   Lab 01/08/23  0352 01/09/23  0408   WBC 0.01* 0.02*   HGB 7.6* 6.9*   HCT 22.6* 21.5*   PLT 9* 10*      and CMP:   Recent Labs   Lab 01/08/23  0352 01/09/23  0408    137   K 4.0 3.8    107   CO2 22* 19*    98   BUN 46* 50*   CREATININE 7.2* 7.4*   CALCIUM 7.5* 7.0*   PROT 5.6* 5.4*   ALBUMIN 3.4* 3.4*   BILITOT 0.5 0.5   ALKPHOS 70 68   AST 9* 7*   ALT <5* 6*   ANIONGAP 9 11         Diagnostic Results:  I have reviewed all pertinent imaging results/findings within the past 24 hours.    Assessment/Plan:     * History of autologous stem cell transplant  - Patient of Dr. King  - Diagnosed with MM in April 2022. Underwent 2 cycles CyBorD then transitioned to DVRd (now s/p C6)  - Admitted 12/28/22 for Torie 140 auto SCT   - Today is Day +10  - Completed chemotherapy 12/29 without issue  - Received 6 bags with a total CD34 dose of 3.13 x10^6/kg on 12/30/23.  - Awaiting engraftment. ANC 10 today.    Planned conditioning regimen:  Melphalan on Day -1     Antimicrobial Prophylaxis:  Acyclovir starting on Day -1  Levofloxacin starting on Day -1  Fluconazole  starting on Day -1     Growth Factor Support:  Neupogen starting on Day +7      Caregiver: Wife  Post-transplant discharge plans: Javed Garcia    Multiple myeloma not having achieved remission  - Patient of Dr. King. Per most recent clinic note:   - 4/20/22: renal biopsy: light chain cast nephropathy, kappa light chain  - 4/26/22: right subclavian vein thrombus   - 4/27/22: M spike 0.2 with free monoclonal kappa band. B2mg 19.57, IgG 496, IgA 10, IgM <5. Kappa 75209, Lambda 7.9, ratio >1000  - 5/12/22: BMBx: plasma cell myeloma, marrow cellularity 100%, plasma cell percentage 100%. Plasma cell phenotype +, kappa +, CD20- -, CD30-, EMA-, SADAF-, Congo red negative. Peripheral blood with pancytopenia and no circulating blasts. Decreased storage iron. FISH canceled due to quantity not sufficient  - 5/18/22: CyBorD C1D1  - 5/23/22: rasburicase  - 5/24/22: Xgeva  - 6/6/22: Kappa 74078, lambda 4.0, ratio >1000, M spike 0.1 with free monoclonal kappa band present  - 6/6/22: CyBorD C2D1  - 6/9/22: BMBx Plasma cell neoplasm compatible with plasma cell myeloma. Extent of involvement 80-90% of bone marrow elements. Cytology: Plasmablastic. FISH cytogenetics positive for 1q21/CKS1B gain. Karyotype failed. Myelofibrosis diffuse (MF-3)  - 6/20/22: CyBorD C2D8 delayed due to covid  - 6/23/22: CyBOrD C2D11  - 7/5/22: C1D1 DVRd  - 7/7/22 - 7/18/22: hospital admission for septic shock resulting in renal failure  - 7/25/22: started again on DVRd  - 8/8/2022: kappa 1402.8, lambda 7.5, ratio 167.04. M spike 0.1, two faint restricted bands in gamma globulin regions.   - 10/10/22: C5D1 DVRd. Revlimid on hold for upcoming stem   - Admitted for Torie 140 auto SCT on 12/28/22 as above    Pancytopenia due to antineoplastic chemotherapy  - transfuse for Hgb <7 or plt < 10K  - daily CBC while inpatient  - continue antimicrobial ppx  - transfusing 1 unit prbc for hgb 6.9 today    Hypocalcemia  - Continue Ca+ supplement    Mucositis  due to chemotherapy  - continues dukes    Hyperphosphatemia  - Likely 2/2 renal disease  - continue Sevelamer 800mg TIDWM    Fall during current hospitalization  - See syncope  - Unclear if patient hit head. CT head neg 1/2 for bleed.  - Tele sitter and fall precautions in place.      Syncope  - Two falls since admission with reported loss of consciousness. Both following dialysis, so suspect orthostatic  - Metoprolol dose decreased from 25 mg daily to 12.5 mg daily  - Can consider midodrine  - EEG ordered; symptoms now improved so discontinued order for EEG.    Chemotherapy-induced diarrhea  - C-diff neg 12/31  - Improved with imodium. Changed imodium from scheduled to PRN 1/3/22. Continue PRN limotil.    Chemotherapy-induced nausea and vomiting  - Zofran increased to Q6 scheduled. Zyprexa added 1/6/23. Nausea controlled.  - Scopalamine patch in place  - Continue PRN compazine    Hyperlipidemia  - Will hold home statin while inpatient    Depression  - Continue home Zoloft    Deep vein thrombosis (DVT) of upper extremity  - Held home apixiban for thrombocytopenia. Will resume once plts are consistently > 50K.    Hypertension  - Continue home metoprolol. Decreased dose to 12.5 mg daily 1/3/23 due to soft BP and syncopal episodes x 2.    End stage renal disease  - developed during ICU stay. Believed to be 2/2 hypoprofusion due to hypotension  - nephrology consulted on admission  - on dialysis MWF at home. Continuing this schedule inpatient.  - right chest wall dialysis catheter in place    Dependence on hemodialysis  - see ESRD    Chronic infection of prosthetic knee  - Seen in ID clinic with Dr. Chatterjee prior to transplant   - ID rec'd continuing doxycycline throughout transplant         VTE Risk Mitigation (From admission, onward)         Ordered     Place CRISTHIAN hose  Until discontinued         01/03/23 0911     heparin (porcine) injection 1,000 Units  As needed (PRN)         12/30/22 1243     heparin (porcine)  injection 1,600 Units  As needed (PRN)         12/28/22 1825     IP VTE HIGH RISK PATIENT  Once         12/28/22 1659     Place sequential compression device  Until discontinued         12/28/22 1659                Disposition: Inpatient for SCT.    Amy Thomas, NP  Bone Marrow Transplant  [facility] - Oncology ([facility])

## 2023-01-09 NOTE — PLAN OF CARE
Patient AAOX4, up with stand-by assistance to the bathroom, previous falls post-dialysis, and fall precautions maintained with bed alarm and telesitter. Wife at bedside and involved in care. Day +10 AUTO SCT. Dialysis completed and 1U PRBCs infused, as ordered. Patient refused anti-emetics this shift; poor appetite noted. Mucositis present but no pain medication required. Patient stable at this time.

## 2023-01-09 NOTE — PROGRESS NOTES
HD TX completed.  VSS.   Tolerated dialysis and blood transfusion well.  Report given to primary nurse.

## 2023-01-09 NOTE — ASSESSMENT & PLAN NOTE
Mr. Lakhani was diagnosed with multiple myeloma in April 2022 after presenting with ASHLEE. 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 He had bone marrow biopsy which showed 100% involvement by plasma cells. He was started on CyBorD on 5/18 and received two cycles of treatment. His bone marrow biopsy after the first cycle showed a decrease in his plasma cell involvement to 80-90%. Dr. Bunch then started DVRd on 7/5/22. However, he was admitted to the hospital on 7/7/22 with septic shock requiring ICU care. He improved with antibiotics but had an ASHLEE thought to be due to hypotension which resulted in renal failure. Dialysis dependent since 07/22.     Last HD prior to presentation 12/28. S/p chemo on 12/29 and SCT on 12/30.    Nephrology History  iHD Schedule: MWF   Unit/MD: Theresa   Duration: 4 hours   UF: ?  EDW: 107kg   Access: R permcath   Residual Renal Function: moderate     Plan/Reccomendations     Will plan for HD today for clearance. No UF.   Caution in administering IVF in ESRD patient   Strict I&Os   Pre and post HD weight

## 2023-01-09 NOTE — ASSESSMENT & PLAN NOTE
- transfuse for Hgb <7 or plt < 10K  - daily CBC while inpatient  - continue antimicrobial ppx  - transfusing 1 unit prbc for hgb 6.9 today

## 2023-01-09 NOTE — PLAN OF CARE
Pt is Day +10 Torie Auto, AAOx4 and ambulates w/a stand-by assist. Pt has had 2 falls on this admission and is a high falls risk. Bed alarm set, ANNA SYS in use. VSS, pt remained afebrile. Pt c/o pain r/t lesions to the tongue and soreness when swallowing medication. Pt used oral Lidocaine to numb oral mucosa to swallow medication. Pt did not ask for offered pain medication. Pt refused bicarb swish and spit and pt refused duke's solution, as well as anti-diarrheals, in spite of having diarrhea overnight. Pt also refused 0000, 0600 IV Zofran and 0200 IV Compazine. Calcium Carbonate tablets from an earlier dose were still in med cup at bedside, therefore pm dose was held. NS @ 50/hr. Pt urinated cc overnight, concentrated, yellow output. Pt had one episode of fecal incontinence. Wife and personal effects @ bedside. Pt remained safe and free of injury through the night. Bed in low and locked position, bed alarm set, ANNA SYS in use, rails up x3, call bell in reach, non skid socks worn out of bed. Will continue to monitor.

## 2023-01-09 NOTE — ASSESSMENT & PLAN NOTE
- Patient of Dr. King  - Diagnosed with MM in April 2022. Underwent 2 cycles CyBorD then transitioned to DVRd (now s/p C6)  - Admitted 12/28/22 for Torie 140 auto SCT   - Today is Day +10  - Completed chemotherapy 12/29 without issue  - Received 6 bags with a total CD34 dose of 3.13 x10^6/kg on 12/30/23.  - Awaiting engraftment. ANC 10 today.    Planned conditioning regimen:  Melphalan on Day -1     Antimicrobial Prophylaxis:  Acyclovir starting on Day -1  Levofloxacin starting on Day -1  Fluconazole starting on Day -1     Growth Factor Support:  Neupogen starting on Day +7      Caregiver: Wife  Post-transplant discharge plans: Javed House

## 2023-01-09 NOTE — ASSESSMENT & PLAN NOTE
- See syncope  - Unclear if patient hit head. CT head neg 1/2 for bleed.  - Tele sitter and fall precautions in place.

## 2023-01-09 NOTE — SUBJECTIVE & OBJECTIVE
Interval History:   No issues this AM. Ongoing c/o nausea. Patient with 2200 ml in urine output over the last 24 hrs.   Due for HD today.     Review of patient's allergies indicates:  No Known Allergies  Current Facility-Administered Medications   Medication Frequency    0.9%  NaCl infusion (for blood administration) Q24H PRN    0.9%  NaCl infusion (for blood administration) Q24H PRN    0.9%  NaCl infusion PRN    0.9%  NaCl infusion Once    0.9%  NaCl infusion Continuous    acyclovir (ZOVIRAX) 200 mg in dextrose 5 % 50 mL IVPB Q12H    alteplase injection 2 mg PRN    aluminum-magnesium hydroxide-simethicone 200-200-20 mg/5 mL suspension 30 mL Q6H PRN    calcium carbonate 200 mg calcium (500 mg) chewable tablet 1,000 mg BID    diphenhydrAMINE capsule 25 mg PRN    diphenoxylate-atropine 2.5-0.025 mg per tablet 1 tablet QID PRN    doxycycline tablet 100 mg Q12H    duke's soln (benadryl 30 mL, mylanta 30 mL, LIDOcaine 30 mL, nystatin 30 mL) 120 mL QID PRN    ergocalciferol capsule 50,000 Units Q14 Days    filgrastim (NEUPOGEN) injection 480 mcg/1.6 ml Daily    fluconazole (DIFLUCAN) IVPB 200 mg Q24H    heparin (porcine) injection 1,000 Units PRN    heparin (porcine) injection 1,600 Units PRN    levoFLOXacin tablet 250 mg Every other day    LIDOcaine HCl 2% oral solution 15 mL Q6H PRN    loperamide capsule 2 mg QID PRN    LORazepam injection 1 mg Q6H PRN    metoprolol succinate (TOPROL-XL) 24 hr split tablet 12.5 mg Daily    OLANZapine zydis disintegrating tablet 5 mg QHS    ondansetron injection 4 mg Q6H    oxyCODONE immediate release tablet 5 mg Q6H PRN    pantoprazole injection 40 mg Daily    prochlorperazine injection Soln 10 mg Q6H    scopolamine 1.3-1.5 mg (1 mg over 3 days) 1 patch Q3 Days    sertraline tablet 50 mg Daily    sevelamer carbonate tablet 800 mg TID WM    sodium bicarb-sodium chloride powder 1 Dose QID    sodium chloride 0.9% flush 10 mL PRN    vitamin D 1000 units tablet 3,000 Units Daily        Objective:     Vital Signs (Most Recent):  Temp: 98.4 °F (36.9 °C) (01/09/23 0752)  Pulse: 76 (01/09/23 0752)  Resp: 19 (01/09/23 0752)  BP: 136/75 (01/09/23 0752)  SpO2: 100 % (01/09/23 0752) Vital Signs (24h Range):  Temp:  [98.2 °F (36.8 °C)-99.9 °F (37.7 °C)] 98.4 °F (36.9 °C)  Pulse:  [76-97] 76  Resp:  [18-20] 19  SpO2:  [97 %-100 %] 100 %  BP: (110-136)/(63-77) 136/75     Weight: 102.5 kg (225 lb 15.5 oz) (01/09/23 0407)  Body mass index is 31.52 kg/m².  Body surface area is 2.27 meters squared.    I/O last 3 completed shifts:  In: 1366.7 [I.V.:990.7; Blood:226; IV Piggyback:150]  Out: 2975 [Urine:2975]    Physical Exam  Vitals and nursing note reviewed.   Constitutional:       General: He is not in acute distress.     Appearance: He is not toxic-appearing.   HENT:      Head: Normocephalic.      Mouth/Throat:      Pharynx: Oropharynx is clear.   Eyes:      Conjunctiva/sclera: Conjunctivae normal.   Cardiovascular:      Rate and Rhythm: Normal rate and regular rhythm.      Pulses: Normal pulses.   Pulmonary:      Effort: Pulmonary effort is normal.      Breath sounds: Normal breath sounds.   Abdominal:      Palpations: Abdomen is soft.   Musculoskeletal:      Cervical back: Neck supple.      Right lower leg: No edema.      Left lower leg: No edema.   Skin:     Comments: R. Permcath    Neurological:      Mental Status: He is alert and oriented to person, place, and time.   Psychiatric:         Mood and Affect: Mood normal.         Behavior: Behavior normal.         Thought Content: Thought content normal.         Judgment: Judgment normal.       Significant Labs:  CBC:   Recent Labs   Lab 01/09/23 0408   WBC 0.02*   RBC 2.16*   HGB 6.9*   HCT 21.5*   PLT 10*   *   MCH 31.9*   MCHC 32.1     CMP:   Recent Labs   Lab 01/09/23 0408   GLU 98   CALCIUM 7.0*   ALBUMIN 3.4*   PROT 5.4*      K 3.8   CO2 19*      BUN 50*   CREATININE 7.4*   ALKPHOS 68   ALT 6*   AST 7*   BILITOT 0.5     All  labs within the past 24 hours have been reviewed.

## 2023-01-09 NOTE — PROGRESS NOTES
[facility] - Oncology ([facility])  Nephrology  Progress Note    Patient Name: De Lakhani  MRN: [id number]  Admission Date: 12/28/2022  Hospital Length of Stay: 12 days  Attending Provider: Matteo Tellez MD   Primary Care Physician: To Obtain Unable  Principal Problem:History of autologous stem cell transplant    Subjective:     Interval History:   No issues this AM. Ongoing c/o nausea. Patient with 2200 ml in urine output over the last 24 hrs.   Due for HD today.     Review of patient's allergies indicates:  No Known Allergies  Current Facility-Administered Medications   Medication Frequency    0.9%  NaCl infusion (for blood administration) Q24H PRN    0.9%  NaCl infusion (for blood administration) Q24H PRN    0.9%  NaCl infusion PRN    0.9%  NaCl infusion Once    0.9%  NaCl infusion Continuous    acyclovir (ZOVIRAX) 200 mg in dextrose 5 % 50 mL IVPB Q12H    alteplase injection 2 mg PRN    aluminum-magnesium hydroxide-simethicone 200-200-20 mg/5 mL suspension 30 mL Q6H PRN    calcium carbonate 200 mg calcium (500 mg) chewable tablet 1,000 mg BID    diphenhydrAMINE capsule 25 mg PRN    diphenoxylate-atropine 2.5-0.025 mg per tablet 1 tablet QID PRN    doxycycline tablet 100 mg Q12H    duke's soln (benadryl 30 mL, mylanta 30 mL, LIDOcaine 30 mL, nystatin 30 mL) 120 mL QID PRN    ergocalciferol capsule 50,000 Units Q14 Days    filgrastim (NEUPOGEN) injection 480 mcg/1.6 ml Daily    fluconazole (DIFLUCAN) IVPB 200 mg Q24H    heparin (porcine) injection 1,000 Units PRN    heparin (porcine) injection 1,600 Units PRN    levoFLOXacin tablet 250 mg Every other day    LIDOcaine HCl 2% oral solution 15 mL Q6H PRN    loperamide capsule 2 mg QID PRN    LORazepam injection 1 mg Q6H PRN    metoprolol succinate (TOPROL-XL) 24 hr split tablet 12.5 mg Daily    OLANZapine zydis disintegrating tablet 5 mg QHS    ondansetron injection 4 mg Q6H    oxyCODONE immediate release tablet 5 mg Q6H PRN     pantoprazole injection 40 mg Daily    prochlorperazine injection Soln 10 mg Q6H    scopolamine 1.3-1.5 mg (1 mg over 3 days) 1 patch Q3 Days    sertraline tablet 50 mg Daily    sevelamer carbonate tablet 800 mg TID WM    sodium bicarb-sodium chloride powder 1 Dose QID    sodium chloride 0.9% flush 10 mL PRN    vitamin D 1000 units tablet 3,000 Units Daily       Objective:     Vital Signs (Most Recent):  Temp: 98.4 °F (36.9 °C) (01/09/23 0752)  Pulse: 76 (01/09/23 0752)  Resp: 19 (01/09/23 0752)  BP: 136/75 (01/09/23 0752)  SpO2: 100 % (01/09/23 0752) Vital Signs (24h Range):  Temp:  [98.2 °F (36.8 °C)-99.9 °F (37.7 °C)] 98.4 °F (36.9 °C)  Pulse:  [76-97] 76  Resp:  [18-20] 19  SpO2:  [97 %-100 %] 100 %  BP: (110-136)/(63-77) 136/75     Weight: 102.5 kg (225 lb 15.5 oz) (01/09/23 0407)  Body mass index is 31.52 kg/m².  Body surface area is 2.27 meters squared.    I/O last 3 completed shifts:  In: 1366.7 [I.V.:990.7; Blood:226; IV Piggyback:150]  Out: 2975 [Urine:2975]    Physical Exam  Vitals and nursing note reviewed.   Constitutional:       General: He is not in acute distress.     Appearance: He is not toxic-appearing.   HENT:      Head: Normocephalic.      Mouth/Throat:      Pharynx: Oropharynx is clear.   Eyes:      Conjunctiva/sclera: Conjunctivae normal.   Cardiovascular:      Rate and Rhythm: Normal rate and regular rhythm.      Pulses: Normal pulses.   Pulmonary:      Effort: Pulmonary effort is normal.      Breath sounds: Normal breath sounds.   Abdominal:      Palpations: Abdomen is soft.   Musculoskeletal:      Cervical back: Neck supple.      Right lower leg: No edema.      Left lower leg: No edema.   Skin:     Comments: EDA Boogie    Neurological:      Mental Status: He is alert and oriented to person, place, and time.   Psychiatric:         Mood and Affect: Mood normal.         Behavior: Behavior normal.         Thought Content: Thought content normal.         Judgment: Judgment normal.        Significant Labs:  CBC:   Recent Labs   Lab 01/09/23  0408   WBC 0.02*   RBC 2.16*   HGB 6.9*   HCT 21.5*   PLT 10*   *   MCH 31.9*   MCHC 32.1     CMP:   Recent Labs   Lab 01/09/23  0408   GLU 98   CALCIUM 7.0*   ALBUMIN 3.4*   PROT 5.4*      K 3.8   CO2 19*      BUN 50*   CREATININE 7.4*   ALKPHOS 68   ALT 6*   AST 7*   BILITOT 0.5     All labs within the past 24 hours have been reviewed.       Assessment/Plan:     * History of autologous stem cell transplant  -management per primary team     Anemia in ESRD (end-stage renal disease)  Recent Labs   Lab 01/07/23  0350 01/08/23  0352 01/09/23  0408   WBC 0.01* 0.01* 0.02*   HGB 7.7* 7.6* 6.9*   HCT 23.2* 22.6* 21.5*   PLT 16* 9* 10*       - Target Hgb 10-11 gm/dL. Hgb 6.9.  - Will defer Epogen/pRBCs to Hematology team.         Chronic kidney disease-mineral and bone disorder  Renal diet, if not NPO    Phos 4.0 - please continue binders with meals     End stage renal disease  Mr. Lakhani was diagnosed with multiple myeloma in April 2022 after presenting with ASHLEE. 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 He had bone marrow biopsy which showed 100% involvement by plasma cells. He was started on CyBorD on 5/18 and received two cycles of treatment. His bone marrow biopsy after the first cycle showed a decrease in his plasma cell involvement to 80-90%. Dr. Bunch then started DVRd on 7/5/22. However, he was admitted to the hospital on 7/7/22 with septic shock requiring ICU care. He improved with antibiotics but had an ASHLEE thought to be due to hypotension which resulted in renal failure. Dialysis dependent since 07/22.     Last HD prior to presentation 12/28. S/p chemo on 12/29 and SCT on 12/30.    Nephrology History  iHD Schedule: MWF   Unit/MD: [facility]   Duration: 4 hours   UF: ?  EDW: 107kg   Access: R permcath   Residual Renal Function: moderate     Plan/Reccomendations     Will plan for HD today for clearance. No UF.    Caution in administering IVF in ESRD patient   Strict I&Os   Pre and post HD weight     Multiple myeloma not having achieved remission  -management per primary         Thank you for your consult. I will follow-up with patient. Please contact us if you have any additional questions.    Kate Pepe DNP, FNP-C  Nephrology  [facility] - Oncology ([facility])

## 2023-01-09 NOTE — SUBJECTIVE & OBJECTIVE
Subjective:     Interval History: Day +10 from a Torie 140 auto SCT for MM. Remains afebrile. VSS. Diarrhea and nausea stable. Dialysis planned for today. Will receive 1 unit prbc with dialysis for hgb of 6.9. ANC 10 today.    Objective:     Vital Signs (Most Recent):  Temp: 98.4 °F (36.9 °C) (01/09/23 0752)  Pulse: 76 (01/09/23 0752)  Resp: 19 (01/09/23 0752)  BP: 136/75 (01/09/23 0752)  SpO2: 100 % (01/09/23 0752) Vital Signs (24h Range):  Temp:  [98.2 °F (36.8 °C)-99.9 °F (37.7 °C)] 98.4 °F (36.9 °C)  Pulse:  [76-97] 76  Resp:  [18-20] 19  SpO2:  [97 %-100 %] 100 %  BP: (110-136)/(63-77) 136/75     Weight: 102.5 kg (225 lb 15.5 oz)  Body mass index is 31.52 kg/m².  Body surface area is 2.27 meters squared.    ECOG SCORE           [unfilled]    Intake/Output - Last 3 Shifts         01/07 0700  01/08 0659 01/08 0700 01/09 0659 01/09 0700  01/10 0659    P.O.       I.V. (mL/kg) 315.5 (3.1) 990.7 (9.7)     Blood  226     Other       IV Piggyback  150     Total Intake(mL/kg) 315.5 (3.1) 1366.7 (13.3)     Urine (mL/kg/hr) 1850 (0.8) 2200 (0.9)     Other       Stool 0 0     Total Output 1850 2200     Net -1534.5 -833.3            Urine Occurrence  2 x     Stool Occurrence 1 x 3 x 1 x            Physical Exam  Constitutional:       Appearance: He is well-developed.   HENT:      Head: Normocephalic and atraumatic.      Mouth/Throat:      Pharynx: No oropharyngeal exudate.   Eyes:      General:         Right eye: No discharge.         Left eye: No discharge.      Conjunctiva/sclera: Conjunctivae normal.      Pupils: Pupils are equal, round, and reactive to light.   Cardiovascular:      Rate and Rhythm: Normal rate and regular rhythm.      Heart sounds: Normal heart sounds. No murmur heard.    No gallop.   Pulmonary:      Effort: Pulmonary effort is normal. No respiratory distress.      Breath sounds: Normal breath sounds. No wheezing or rales.   Abdominal:      General: Bowel sounds are normal. There is no distension.       Palpations: Abdomen is soft.      Tenderness: There is no abdominal tenderness.   Musculoskeletal:         General: No deformity. Normal range of motion.      Cervical back: Normal range of motion and neck supple.   Skin:     General: Skin is warm and dry.      Findings: No erythema or rash.      Comments: Right chest wall dialysis catheter. Dressing c/d/i. No sign of infection to site.   Neurological:      Mental Status: He is alert and oriented to person, place, and time.   Psychiatric:         Behavior: Behavior normal.         Thought Content: Thought content normal.         Judgment: Judgment normal.       Significant Labs:   CBC:   Recent Labs   Lab 01/08/23  0352 01/09/23  0408   WBC 0.01* 0.02*   HGB 7.6* 6.9*   HCT 22.6* 21.5*   PLT 9* 10*      and CMP:   Recent Labs   Lab 01/08/23  0352 01/09/23  0408    137   K 4.0 3.8    107   CO2 22* 19*    98   BUN 46* 50*   CREATININE 7.2* 7.4*   CALCIUM 7.5* 7.0*   PROT 5.6* 5.4*   ALBUMIN 3.4* 3.4*   BILITOT 0.5 0.5   ALKPHOS 70 68   AST 9* 7*   ALT <5* 6*   ANIONGAP 9 11         Diagnostic Results:  I have reviewed all pertinent imaging results/findings within the past 24 hours.

## 2023-01-09 NOTE — ASSESSMENT & PLAN NOTE
- Two falls since admission with reported loss of consciousness. Both following dialysis, so suspect orthostatic  - Metoprolol dose decreased from 25 mg daily to 12.5 mg daily  - Can consider midodrine  - EEG ordered; symptoms now improved so discontinued order for EEG.

## 2023-01-09 NOTE — ASSESSMENT & PLAN NOTE
Recent Labs   Lab 01/07/23  0350 01/08/23  0352 01/09/23  0408   WBC 0.01* 0.01* 0.02*   HGB 7.7* 7.6* 6.9*   HCT 23.2* 22.6* 21.5*   PLT 16* 9* 10*       - Target Hgb 10-11 gm/dL. Hgb 6.9.  - Will defer Epogen/pRBCs to Hematology team.

## 2023-01-10 PROBLEM — E83.9 CHRONIC KIDNEY DISEASE-MINERAL AND BONE DISORDER: Status: RESOLVED | Noted: 2023-01-09 | Resolved: 2023-01-10

## 2023-01-10 PROBLEM — N18.6 ANEMIA IN ESRD (END-STAGE RENAL DISEASE): Status: RESOLVED | Noted: 2023-01-09 | Resolved: 2023-01-10

## 2023-01-10 PROBLEM — D63.1 ANEMIA IN ESRD (END-STAGE RENAL DISEASE): Status: RESOLVED | Noted: 2023-01-09 | Resolved: 2023-01-10

## 2023-01-10 PROBLEM — M89.9 CHRONIC KIDNEY DISEASE-MINERAL AND BONE DISORDER: Status: RESOLVED | Noted: 2023-01-09 | Resolved: 2023-01-10

## 2023-01-10 PROBLEM — D61.818 PANCYTOPENIA: Status: RESOLVED | Noted: 2023-01-09 | Resolved: 2023-01-10

## 2023-01-10 PROBLEM — E83.42 HYPOMAGNESEMIA: Status: ACTIVE | Noted: 2023-01-10

## 2023-01-10 PROBLEM — D84.9 IMMUNOSUPPRESSION: Status: RESOLVED | Noted: 2023-01-09 | Resolved: 2023-01-10

## 2023-01-10 PROBLEM — N18.9 CHRONIC KIDNEY DISEASE-MINERAL AND BONE DISORDER: Status: RESOLVED | Noted: 2023-01-09 | Resolved: 2023-01-10

## 2023-01-10 PROBLEM — D69.6 THROMBOCYTOPENIA, UNSPECIFIED: Status: RESOLVED | Noted: 2022-11-14 | Resolved: 2023-01-10

## 2023-01-10 LAB
ABO + RH BLD: ABNORMAL
ALBUMIN SERPL BCP-MCNC: 3.4 G/DL (ref 3.5–5.2)
ALP SERPL-CCNC: 72 U/L (ref 55–135)
ALT SERPL W/O P-5'-P-CCNC: 6 U/L (ref 10–44)
ANION GAP SERPL CALC-SCNC: 11 MMOL/L (ref 8–16)
ANISOCYTOSIS BLD QL SMEAR: SLIGHT
AST SERPL-CCNC: 6 U/L (ref 10–40)
BASOPHILS # BLD AUTO: 0 K/UL (ref 0–0.2)
BASOPHILS NFR BLD: 0 % (ref 0–1.9)
BILIRUB SERPL-MCNC: 0.8 MG/DL (ref 0.1–1)
BLD GP AB SCN CELLS X3 SERPL QL: ABNORMAL
BLD PROD TYP BPU: NORMAL
BLOOD UNIT EXPIRATION DATE: NORMAL
BLOOD UNIT TYPE CODE: 5100
BLOOD UNIT TYPE: NORMAL
BUN SERPL-MCNC: 31 MG/DL (ref 6–20)
CALCIUM SERPL-MCNC: 7.9 MG/DL (ref 8.7–10.5)
CHLORIDE SERPL-SCNC: 105 MMOL/L (ref 95–110)
CO2 SERPL-SCNC: 24 MMOL/L (ref 23–29)
CODING SYSTEM: NORMAL
CREAT SERPL-MCNC: 5.8 MG/DL (ref 0.5–1.4)
DACRYOCYTES BLD QL SMEAR: ABNORMAL
DIFFERENTIAL METHOD: ABNORMAL
DISPENSE STATUS: NORMAL
EOSINOPHIL # BLD AUTO: 0 K/UL (ref 0–0.5)
EOSINOPHIL NFR BLD: 0 % (ref 0–8)
ERYTHROCYTE [DISTWIDTH] IN BLOOD BY AUTOMATED COUNT: 17.2 % (ref 11.5–14.5)
EST. GFR  (NO RACE VARIABLE): 10.6 ML/MIN/1.73 M^2
GLUCOSE SERPL-MCNC: 104 MG/DL (ref 70–110)
HCT VFR BLD AUTO: 23.7 % (ref 40–54)
HGB BLD-MCNC: 8.2 G/DL (ref 14–18)
HYPOCHROMIA BLD QL SMEAR: ABNORMAL
IMM GRANULOCYTES # BLD AUTO: 0 K/UL (ref 0–0.04)
IMM GRANULOCYTES NFR BLD AUTO: 0 % (ref 0–0.5)
LYMPHOCYTES # BLD AUTO: 0 K/UL (ref 1–4.8)
LYMPHOCYTES NFR BLD: 50 % (ref 18–48)
MAGNESIUM SERPL-MCNC: 1.5 MG/DL (ref 1.6–2.6)
MCH RBC QN AUTO: 32.8 PG (ref 27–31)
MCHC RBC AUTO-ENTMCNC: 34.6 G/DL (ref 32–36)
MCV RBC AUTO: 95 FL (ref 82–98)
MONOCYTES # BLD AUTO: 0 K/UL (ref 0.3–1)
MONOCYTES NFR BLD: 50 % (ref 4–15)
NEUTROPHILS # BLD AUTO: 0 K/UL (ref 1.8–7.7)
NEUTROPHILS NFR BLD: 0 % (ref 38–73)
NRBC BLD-RTO: 0 /100 WBC
OVALOCYTES BLD QL SMEAR: ABNORMAL
PHOSPHATE SERPL-MCNC: 3.4 MG/DL (ref 2.7–4.5)
PLATELET # BLD AUTO: 9 K/UL (ref 150–450)
PLATELET BLD QL SMEAR: ABNORMAL
PMV BLD AUTO: ABNORMAL FL (ref 9.2–12.9)
POIKILOCYTOSIS BLD QL SMEAR: SLIGHT
POTASSIUM SERPL-SCNC: 3.6 MMOL/L (ref 3.5–5.1)
PROT SERPL-MCNC: 5.7 G/DL (ref 6–8.4)
RBC # BLD AUTO: 2.5 M/UL (ref 4.6–6.2)
SODIUM SERPL-SCNC: 140 MMOL/L (ref 136–145)
UNIT NUMBER: NORMAL
WBC # BLD AUTO: 0.02 K/UL (ref 3.9–12.7)

## 2023-01-10 PROCEDURE — 99232 SBSQ HOSP IP/OBS MODERATE 35: CPT | Mod: ,,, | Performed by: INTERNAL MEDICINE

## 2023-01-10 PROCEDURE — P9037 PLATE PHERES LEUKOREDU IRRAD: HCPCS | Performed by: NURSE PRACTITIONER

## 2023-01-10 PROCEDURE — 84100 ASSAY OF PHOSPHORUS: CPT | Performed by: NURSE PRACTITIONER

## 2023-01-10 PROCEDURE — 25000003 PHARM REV CODE 250: Performed by: INTERNAL MEDICINE

## 2023-01-10 PROCEDURE — 36430 TRANSFUSION BLD/BLD COMPNT: CPT

## 2023-01-10 PROCEDURE — 63600175 PHARM REV CODE 636 W HCPCS: Performed by: STUDENT IN AN ORGANIZED HEALTH CARE EDUCATION/TRAINING PROGRAM

## 2023-01-10 PROCEDURE — 20600001 HC STEP DOWN PRIVATE ROOM

## 2023-01-10 PROCEDURE — C9113 INJ PANTOPRAZOLE SODIUM, VIA: HCPCS | Performed by: STUDENT IN AN ORGANIZED HEALTH CARE EDUCATION/TRAINING PROGRAM

## 2023-01-10 PROCEDURE — 99233 PR SUBSEQUENT HOSPITAL CARE,LEVL III: ICD-10-PCS | Mod: ,,, | Performed by: NURSE PRACTITIONER

## 2023-01-10 PROCEDURE — 99232 PR SUBSEQUENT HOSPITAL CARE,LEVL II: ICD-10-PCS | Mod: ,,, | Performed by: INTERNAL MEDICINE

## 2023-01-10 PROCEDURE — 83735 ASSAY OF MAGNESIUM: CPT | Performed by: NURSE PRACTITIONER

## 2023-01-10 PROCEDURE — 63600175 PHARM REV CODE 636 W HCPCS: Mod: JG | Performed by: INTERNAL MEDICINE

## 2023-01-10 PROCEDURE — 25000003 PHARM REV CODE 250: Performed by: NURSE PRACTITIONER

## 2023-01-10 PROCEDURE — 85025 COMPLETE CBC W/AUTO DIFF WBC: CPT | Performed by: NURSE PRACTITIONER

## 2023-01-10 PROCEDURE — 86900 BLOOD TYPING SEROLOGIC ABO: CPT | Performed by: INTERNAL MEDICINE

## 2023-01-10 PROCEDURE — 80053 COMPREHEN METABOLIC PANEL: CPT | Performed by: NURSE PRACTITIONER

## 2023-01-10 PROCEDURE — 99233 SBSQ HOSP IP/OBS HIGH 50: CPT | Mod: ,,, | Performed by: NURSE PRACTITIONER

## 2023-01-10 RX ORDER — ACYCLOVIR 800 MG/1
800 TABLET ORAL 2 TIMES DAILY
Status: DISCONTINUED | OUTPATIENT
Start: 2023-01-10 | End: 2023-01-10

## 2023-01-10 RX ORDER — FLUCONAZOLE 200 MG/1
200 TABLET ORAL DAILY
Status: DISCONTINUED | OUTPATIENT
Start: 2023-01-10 | End: 2023-01-14 | Stop reason: HOSPADM

## 2023-01-10 RX ORDER — ONDANSETRON 2 MG/ML
4 INJECTION INTRAMUSCULAR; INTRAVENOUS EVERY 6 HOURS PRN
Status: DISCONTINUED | OUTPATIENT
Start: 2023-01-10 | End: 2023-01-14 | Stop reason: HOSPADM

## 2023-01-10 RX ORDER — PANTOPRAZOLE SODIUM 40 MG/1
40 TABLET, DELAYED RELEASE ORAL DAILY
Status: DISCONTINUED | OUTPATIENT
Start: 2023-01-11 | End: 2023-01-14 | Stop reason: HOSPADM

## 2023-01-10 RX ORDER — PROCHLORPERAZINE EDISYLATE 5 MG/ML
10 INJECTION INTRAMUSCULAR; INTRAVENOUS EVERY 6 HOURS PRN
Status: DISCONTINUED | OUTPATIENT
Start: 2023-01-10 | End: 2023-01-14 | Stop reason: HOSPADM

## 2023-01-10 RX ORDER — ACYCLOVIR 200 MG/1
400 CAPSULE ORAL 2 TIMES DAILY
Status: DISCONTINUED | OUTPATIENT
Start: 2023-01-10 | End: 2023-01-14 | Stop reason: HOSPADM

## 2023-01-10 RX ORDER — SODIUM CHLORIDE 9 MG/ML
INJECTION, SOLUTION INTRAVENOUS ONCE
Status: COMPLETED | OUTPATIENT
Start: 2023-01-11 | End: 2023-01-11

## 2023-01-10 RX ORDER — LANOLIN ALCOHOL/MO/W.PET/CERES
400 CREAM (GRAM) TOPICAL 2 TIMES DAILY
Status: COMPLETED | OUTPATIENT
Start: 2023-01-10 | End: 2023-01-10

## 2023-01-10 RX ADMIN — SEVELAMER CARBONATE 800 MG: 800 TABLET, FILM COATED ORAL at 09:01

## 2023-01-10 RX ADMIN — Medication 1 DOSE: at 08:01

## 2023-01-10 RX ADMIN — METOPROLOL SUCCINATE 12.5 MG: 25 TABLET, EXTENDED RELEASE ORAL at 09:01

## 2023-01-10 RX ADMIN — DOXYCYCLINE HYCLATE 100 MG: 100 TABLET, COATED ORAL at 09:01

## 2023-01-10 RX ADMIN — CALCIUM CARBONATE (ANTACID) CHEW TAB 500 MG 1000 MG: 500 CHEW TAB at 08:01

## 2023-01-10 RX ADMIN — CALCIUM CARBONATE (ANTACID) CHEW TAB 500 MG 1000 MG: 500 CHEW TAB at 09:01

## 2023-01-10 RX ADMIN — ACYCLOVIR 400 MG: 200 CAPSULE ORAL at 08:01

## 2023-01-10 RX ADMIN — CHOLECALCIFEROL TAB 25 MCG (1000 UNIT) 3000 UNITS: 25 TAB at 09:01

## 2023-01-10 RX ADMIN — FILGRASTIM 480 MCG: 480 INJECTION, SOLUTION INTRAVENOUS; SUBCUTANEOUS at 09:01

## 2023-01-10 RX ADMIN — PANTOPRAZOLE SODIUM 40 MG: 40 INJECTION, POWDER, FOR SOLUTION INTRAVENOUS at 09:01

## 2023-01-10 RX ADMIN — SERTRALINE HYDROCHLORIDE 50 MG: 50 TABLET ORAL at 09:01

## 2023-01-10 RX ADMIN — Medication 1 DOSE: at 09:01

## 2023-01-10 RX ADMIN — ACYCLOVIR 400 MG: 200 CAPSULE ORAL at 10:01

## 2023-01-10 RX ADMIN — FLUCONAZOLE 200 MG: 200 TABLET ORAL at 10:01

## 2023-01-10 RX ADMIN — Medication 400 MG: at 09:01

## 2023-01-10 RX ADMIN — DOXYCYCLINE HYCLATE 100 MG: 100 TABLET, COATED ORAL at 08:01

## 2023-01-10 RX ADMIN — Medication 400 MG: at 08:01

## 2023-01-10 RX ADMIN — LEVOFLOXACIN 250 MG: 250 TABLET, FILM COATED ORAL at 09:01

## 2023-01-10 NOTE — PROGRESS NOTES
[facility]en Cramer - Oncology ([facility])  Nephrology  Progress Note    Patient Name: De Lakhani  MRN: [id number]  Admission Date: 12/28/2022  Hospital Length of Stay: 13 days  Attending Provider: Matteo Tellez MD   Primary Care Physician: To Obtain Unable  Principal Problem:History of autologous stem cell transplant    Subjective:     Interval History:   HD completed on yesterday without issues. No UF. 24 hr  ml with 5 unmeasured occurrences. Appears comfortable on RA.   Day +11 of an Auto SCT.     Review of patient's allergies indicates:  No Known Allergies  Current Facility-Administered Medications   Medication Frequency    0.9%  NaCl infusion (for blood administration) Q24H PRN    0.9%  NaCl infusion PRN    0.9%  NaCl infusion Once    acyclovir capsule 400 mg BID    alteplase injection 2 mg PRN    aluminum-magnesium hydroxide-simethicone 200-200-20 mg/5 mL suspension 30 mL Q6H PRN    calcium carbonate 200 mg calcium (500 mg) chewable tablet 1,000 mg BID    diphenhydrAMINE capsule 25 mg PRN    diphenoxylate-atropine 2.5-0.025 mg per tablet 1 tablet QID PRN    doxycycline tablet 100 mg Q12H    duke's soln (benadryl 30 mL, mylanta 30 mL, LIDOcaine 30 mL, nystatin 30 mL) 120 mL QID PRN    ergocalciferol capsule 50,000 Units Q14 Days    filgrastim (NEUPOGEN) injection 480 mcg/1.6 ml Daily    fluconazole tablet 200 mg Daily    heparin (porcine) injection 1,000 Units PRN    heparin (porcine) injection 1,600 Units PRN    levoFLOXacin tablet 250 mg Every other day    LIDOcaine HCl 2% oral solution 15 mL Q6H PRN    loperamide capsule 2 mg QID PRN    LORazepam injection 1 mg Q6H PRN    magnesium oxide tablet 400 mg BID    metoprolol succinate (TOPROL-XL) 24 hr split tablet 12.5 mg Daily    ondansetron injection 4 mg Q6H PRN    oxyCODONE immediate release tablet 5 mg Q6H PRN    pantoprazole injection 40 mg Daily    prochlorperazine injection Soln 10 mg Q6H PRN    scopolamine 1.3-1.5 mg (1 mg  over 3 days) 1 patch Q3 Days    sertraline tablet 50 mg Daily    sevelamer carbonate tablet 800 mg TID WM    sodium bicarb-sodium chloride powder 1 Dose QID    sodium chloride 0.9% flush 10 mL PRN    vitamin D 1000 units tablet 3,000 Units Daily       Objective:     Vital Signs (Most Recent):  Temp: 98.5 °F (36.9 °C) (01/10/23 0729)  Pulse: 80 (01/10/23 0729)  Resp: 16 (01/10/23 0729)  BP: (!) 115/59 (01/10/23 0729)  SpO2: 97 % (01/10/23 0729)   Vital Signs (24h Range):  Temp:  [98.2 °F (36.8 °C)-98.9 °F (37.2 °C)] 98.5 °F (36.9 °C)  Pulse:  [] 80  Resp:  [16-18] 16  SpO2:  [93 %-100 %] 97 %  BP: (115-149)/(59-89) 115/59     Weight: 102.6 kg (226 lb 3.1 oz) (01/10/23 0324)  Body mass index is 31.55 kg/m².  Body surface area is 2.27 meters squared.    I/O last 3 completed shifts:  In: 998 [P.O.:445; Blood:303; Other:250]  Out: 2950 [Urine:2100; Other:850]    Physical Exam  Vitals and nursing note reviewed.   Constitutional:       General: He is not in acute distress.     Appearance: He is not toxic-appearing.   HENT:      Head: Normocephalic.      Mouth/Throat:      Pharynx: Oropharynx is clear.   Eyes:      Conjunctiva/sclera: Conjunctivae normal.   Cardiovascular:      Rate and Rhythm: Normal rate and regular rhythm.      Pulses: Normal pulses.   Pulmonary:      Effort: Pulmonary effort is normal.      Breath sounds: Normal breath sounds.   Abdominal:      Palpations: Abdomen is soft.   Musculoskeletal:      Cervical back: Neck supple.      Right lower leg: No edema.      Left lower leg: No edema.   Skin:     Comments: EDA Boogie    Neurological:      Mental Status: He is alert and oriented to person, place, and time.   Psychiatric:         Mood and Affect: Mood normal.         Behavior: Behavior normal.         Thought Content: Thought content normal.         Judgment: Judgment normal.       Significant Labs:  CBC:   Recent Labs   Lab 01/09/23  0408 01/10/23  0325   WBC 0.02* 0.02*   RBC 2.16* 2.50*    HGB 6.9* 8.2*   HCT 21.5* 23.7*   PLT 10*  --    * 95   MCH 31.9* 32.8*   MCHC 32.1 34.6     CMP:   Recent Labs   Lab 01/10/23  0325      CALCIUM 7.9*   ALBUMIN 3.4*   PROT 5.7*      K 3.6   CO2 24      BUN 31*   CREATININE 5.8*   ALKPHOS 72   ALT 6*   AST 6*   BILITOT 0.8     All labs within the past 24 hours have been reviewed.       Assessment/Plan:     * History of autologous stem cell transplant  -management per primary team     End stage renal disease  Mr. Lakhani was diagnosed with multiple myeloma in April 2022 after presenting with ASHLEE. 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 He had bone marrow biopsy which showed 100% involvement by plasma cells. He was started on CyBorD on 5/18 and received two cycles of treatment. His bone marrow biopsy after the first cycle showed a decrease in his plasma cell involvement to 80-90%. Dr. Bunch then started DVRd on 7/5/22. However, he was admitted to the hospital on 7/7/22 with septic shock requiring ICU care. He improved with antibiotics but had an ASHLEE thought to be due to hypotension which resulted in renal failure. Dialysis dependent since 07/22.     Last HD prior to presentation 12/28. S/p chemo on 12/29 and SCT on 12/30.    Nephrology History  iHD Schedule: F   Unit/MD: [facility]   Duration: 4 hours   UF: ?  EDW: 107kg   Access: R permcath   Residual Renal Function: moderate     Plan/Reccomendations     No further HD needs today.   Caution in administering IVF in ESRD patient   Strict I&Os   Pre and post HD weight     Multiple myeloma not having achieved remission  -management per primary         Thank you for your consult. I will follow-up with patient. Please contact us if you have any additional questions.    Kate Pepe DNP, FNP-C  Nephrology  [facility] - Oncology ([facility])

## 2023-01-10 NOTE — ASSESSMENT & PLAN NOTE
- Nausea much improved per patient  - changed zofran and compazine back to PRN  - stopped zyprexa  - home Scopalamine patch in place

## 2023-01-10 NOTE — ASSESSMENT & PLAN NOTE
- Patient of Dr. King  - Diagnosed with MM in April 2022. Underwent 2 cycles CyBorD then transitioned to DVRd (now s/p C6)  - Admitted 12/28/22 for Troie 140 auto SCT   - Today is Day +11  - Completed chemotherapy 12/29 without issue  - Received 6 bags with a total CD34 dose of 3.13 x10^6/kg on 12/30/23.  - Awaiting engraftment    Planned conditioning regimen:  Melphalan on Day -1     Antimicrobial Prophylaxis:  Acyclovir starting on Day -1  Levofloxacin starting on Day -1  Fluconazole starting on Day -1     Growth Factor Support:  Neupogen starting on Day +7      Caregiver: Wife  Post-transplant discharge plans: [facility]

## 2023-01-10 NOTE — ASSESSMENT & PLAN NOTE
- Two falls since admission with reported loss of consciousness. Both following dialysis, so suspect orthostatic  - Metoprolol dose decreased from 25 mg daily to 12.5 mg daily  - Can consider midodrine  - EEG ordered; symptoms now improved so discontinued order for EEG  - continue working with PT/OT  - tele sitter in place

## 2023-01-10 NOTE — ASSESSMENT & PLAN NOTE
Mr. Lakhani was diagnosed with multiple myeloma in April 2022 after presenting with ASHLEE. 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 He had bone marrow biopsy which showed 100% involvement by plasma cells. He was started on CyBorD on 5/18 and received two cycles of treatment. His bone marrow biopsy after the first cycle showed a decrease in his plasma cell involvement to 80-90%. Dr. Bunch then started DVRd on 7/5/22. However, he was admitted to the hospital on 7/7/22 with septic shock requiring ICU care. He improved with antibiotics but had an ASHLEE thought to be due to hypotension which resulted in renal failure. Dialysis dependent since 07/22.     Last HD prior to presentation 12/28. S/p chemo on 12/29 and SCT on 12/30.    Nephrology History  iHD Schedule: MWF   Unit/MD: [facility]   Duration: 4 hours   UF: ?  EDW: 107kg   Access: R permcath   Residual Renal Function: moderate     Plan/Reccomendations     No further HD needs today.   Caution in administering IVF in ESRD patient   Strict I&Os   Pre and post HD weight

## 2023-01-10 NOTE — PROGRESS NOTES
Braden Cramer - Oncology ([facility])  Hematology  Bone Marrow Transplant  Progress Note    Patient Name: De Lakhani  Admission Date: 12/28/2022  Hospital Length of Stay: 13 days  Code Status: Full Code    Subjective:     Interval History: Day +11 s/p Torie 140 Auto SCT for MM. No issues with dialysis yesterday. Patient reports feeling well overall. Appetite improving. Mildly sore throat. Denies n/v/d/c. Continue to work with PT s/p falls as patient desires to ambulate more but currently feel limited by telesitter and requirement to ask nursing staff for assistance. Replacing Mag cautiously today given ESRD for level of 1.5. Afebrile, VSS    Objective:     Vital Signs (Most Recent):  Temp: 98.5 °F (36.9 °C) (01/10/23 0729)  Pulse: 80 (01/10/23 0729)  Resp: 16 (01/10/23 0729)  BP: (!) 115/59 (01/10/23 0729)  SpO2: 97 % (01/10/23 0729) Vital Signs (24h Range):  Temp:  [98.2 °F (36.8 °C)-98.9 °F (37.2 °C)] 98.5 °F (36.9 °C)  Pulse:  [] 80  Resp:  [16-18] 16  SpO2:  [93 %-100 %] 97 %  BP: (115-149)/(59-89) 115/59     Weight: 102.6 kg (226 lb 3.1 oz)  Body mass index is 31.55 kg/m².  Body surface area is 2.27 meters squared.      Intake/Output - Last 3 Shifts         01/08 0700  01/09 0659 01/09 0700  01/10 0659 01/10 0700  01/11 0659    P.O.  445     I.V. (mL/kg) 990.7 (9.7)      Blood 226 303     Other  250     IV Piggyback 150      Total Intake(mL/kg) 1366.7 (13.3) 998 (9.7)     Urine (mL/kg/hr) 2200 (0.9) 750 (0.3)     Other  850     Stool 0 0     Total Output 2200 1600     Net -833.3 -602            Urine Occurrence 2 x 5 x     Stool Occurrence 3 x 5 x 1 x            Physical Exam  Vitals and nursing note reviewed.   Constitutional:       Appearance: Normal appearance. He is well-developed.   HENT:      Head: Normocephalic and atraumatic.      Mouth/Throat:      Pharynx: Posterior oropharyngeal erythema present. No oropharyngeal exudate.   Eyes:      General:         Right eye: No discharge.         Left eye: No  discharge.      Extraocular Movements: Extraocular movements intact.      Conjunctiva/sclera: Conjunctivae normal.   Cardiovascular:      Rate and Rhythm: Normal rate and regular rhythm.      Pulses: Normal pulses.      Heart sounds: Normal heart sounds. No murmur heard.    No gallop.   Pulmonary:      Effort: Pulmonary effort is normal. No respiratory distress.      Breath sounds: Normal breath sounds. No wheezing or rales.   Abdominal:      General: Bowel sounds are normal. There is no distension.      Palpations: Abdomen is soft.      Tenderness: There is no abdominal tenderness.   Musculoskeletal:         General: No swelling or deformity. Normal range of motion.      Cervical back: Normal range of motion and neck supple.      Right lower leg: No edema.      Left lower leg: No edema.   Skin:     General: Skin is warm and dry.      Findings: No erythema or rash.      Comments: Right chest wall dialysis catheter. Dressing c/d/i. No sign of infection to site.   Neurological:      General: No focal deficit present.      Mental Status: He is alert and oriented to person, place, and time.   Psychiatric:         Mood and Affect: Mood normal.         Behavior: Behavior normal.         Thought Content: Thought content normal.         Judgment: Judgment normal.       Significant Labs:   CBC:   Recent Labs   Lab 01/09/23  0408 01/10/23  0325   WBC 0.02* 0.02*   HGB 6.9* 8.2*   HCT 21.5* 23.7*   PLT 10*  --       and CMP:   Recent Labs   Lab 01/09/23  0408 01/10/23  0325    140   K 3.8 3.6    105   CO2 19* 24   GLU 98 104   BUN 50* 31*   CREATININE 7.4* 5.8*   CALCIUM 7.0* 7.9*   PROT 5.4* 5.7*   ALBUMIN 3.4* 3.4*   BILITOT 0.5 0.8   ALKPHOS 68 72   AST 7* 6*   ALT 6* 6*   ANIONGAP 11 11         Diagnostic Results:  I have reviewed all pertinent imaging results/findings within the past 24 hours.    Assessment/Plan:     * History of autologous stem cell transplant  - Patient of Dr. King  - Diagnosed with MM  in April 2022. Underwent 2 cycles CyBorD then transitioned to DVRd (now s/p C6)  - Admitted 12/28/22 for Torie 140 auto SCT   - Today is Day +11  - Completed chemotherapy 12/29 without issue  - Received 6 bags with a total CD34 dose of 3.13 x10^6/kg on 12/30/23.  - Awaiting engraftment    Planned conditioning regimen:  Melphalan on Day -1     Antimicrobial Prophylaxis:  Acyclovir starting on Day -1  Levofloxacin starting on Day -1  Fluconazole starting on Day -1     Growth Factor Support:  Neupogen starting on Day +7      Caregiver: Wife  Post-transplant discharge plans: Javed Garcia    Multiple myeloma not having achieved remission  - Patient of Dr. King. Per most recent clinic note:   - 4/20/22: renal biopsy: light chain cast nephropathy, kappa light chain  - 4/26/22: right subclavian vein thrombus   - 4/27/22: M spike 0.2 with free monoclonal kappa band. B2mg 19.57, IgG 496, IgA 10, IgM <5. Kappa 87607, Lambda 7.9, ratio >1000  - 5/12/22: BMBx: plasma cell myeloma, marrow cellularity 100%, plasma cell percentage 100%. Plasma cell phenotype +, kappa +, CD20- -, CD30-, EMA-, SADAF-, [place] red negative. Peripheral blood with pancytopenia and no circulating blasts. Decreased storage iron. FISH canceled due to quantity not sufficient  - 5/18/22: CyBorD C1D1  - 5/23/22: rasburicase  - 5/24/22: Xgeva  - 6/6/22: Kappa 90529, lambda 4.0, ratio >1000, M spike 0.1 with free monoclonal kappa band present  - 6/6/22: CyBorD C2D1  - 6/9/22: BMBx Plasma cell neoplasm compatible with plasma cell myeloma. Extent of involvement 80-90% of bone marrow elements. Cytology: Plasmablastic. FISH cytogenetics positive for 1q21/CKS1B gain. Karyotype failed. Myelofibrosis diffuse (MF-3)  - 6/20/22: CyBorD C2D8 delayed due to covid  - 6/23/22: CyBOrD C2D11  - 7/5/22: C1D1 DVRd  - 7/7/22 - 7/18/22: hospital admission for septic shock resulting in renal failure  - 7/25/22: started again on DVRd  - 8/8/2022: kappa 1402.8, lambda 7.5,  ratio 167.04. M spike 0.1, two faint restricted bands in gamma globulin regions.   - 10/10/22: C5D1 DVRd. Revlimid on hold for upcoming stem   - Admitted for Torie 140 auto SCT on 12/28/22 as above    Pancytopenia due to antineoplastic chemotherapy  - transfuse for Hgb <7 or plt < 10K  - daily CBC while inpatient  - continue antimicrobial ppx    CINV (chemotherapy-induced nausea and vomiting)  - Nausea much improved per patient  - changed zofran and compazine back to PRN  - stopped zyprexa  - home Scopalamine patch in place    Mucositis due to chemotherapy  - continues [facility]    Chemotherapy induced diarrhea  - C-diff neg 12/31  - Improved with imodium. Changed imodium from scheduled to PRN 1/3/22. Continue PRN limotil.    Hypomagnesemia  - daily mag levels  - cautious replacement PRN with ESRD    Hypocalcemia  - Continue Ca+ supplement    Hyperphosphatemia  - Likely 2/2 renal disease  - continue Sevelamer 800mg TIDWM    Fall during current hospitalization  - See syncope  - Unclear if patient hit head. CT head neg 1/2 for bleed.  - Tele sitter and fall precautions in place  - continue working with PT/OT      Syncope  - Two falls since admission with reported loss of consciousness. Both following dialysis, so suspect orthostatic  - Metoprolol dose decreased from 25 mg daily to 12.5 mg daily  - Can consider midodrine  - EEG ordered; symptoms now improved so discontinued order for EEG  - continue working with PT/OT  - tele sitter in place    Hyperlipidemia  - Will hold home statin while inpatient    Depression  - Continue home Zoloft    Deep vein thrombosis (DVT) of upper extremity  - Held home apixiban for thrombocytopenia. Will resume once plts are consistently > 50K.    Hypertension  - Continue home metoprolol. Decreased dose to 12.5 mg daily 1/3/23 due to soft BP and syncopal episodes x 2.    End stage renal disease  - developed during ICU stay. Believed to be 2/2 hypoprofusion due to hypotension  - nephrology  consulted on admission  - on dialysis MWF at home. Continuing this schedule inpatient.  - right chest wall dialysis catheter in place    Dependence on hemodialysis  - see ESRD    Chronic infection of prosthetic knee  - Seen in ID clinic with Dr. Chatterjee prior to transplant   - ID rec'd continuing doxycycline throughout transplant         VTE Risk Mitigation (From admission, onward)         Ordered     Place CRISTHIAN hose  Until discontinued         01/03/23 0911     heparin (porcine) injection 1,000 Units  As needed (PRN)         12/30/22 1243     heparin (porcine) injection 1,600 Units  As needed (PRN)         12/28/22 1825     IP VTE HIGH RISK PATIENT  Once         12/28/22 1659     Place sequential compression device  Until discontinued         12/28/22 1659                Disposition: Remains inpatient pending engraftment    Lisa Kaplan NP  Bone Marrow Transplant  Braden Cramer - Oncology ([facility])

## 2023-01-10 NOTE — ASSESSMENT & PLAN NOTE
- See syncope  - Unclear if patient hit head. CT head neg 1/2 for bleed.  - Tele sitter and fall precautions in place  - continue working with PT/OT

## 2023-01-10 NOTE — PLAN OF CARE
Patient AAOX4, up with stand-by assistance to the bathroom, up in chair, and bed alarm/telesitter maintained. Day +11 AUTO SCT. 1U PLT administered, as ordered. Mucositis present but no pain medication given. Wife at bedside and involved in care. Patient stable at this time.

## 2023-01-10 NOTE — SUBJECTIVE & OBJECTIVE
Interval History:   HD completed on yesterday without issues. No UF. 24 hr  ml with 5 unmeasured occurrences. Appears comfortable on RA.   Day +11 of an Auto SCT.     Review of patient's allergies indicates:  No Known Allergies  Current Facility-Administered Medications   Medication Frequency    0.9%  NaCl infusion (for blood administration) Q24H PRN    0.9%  NaCl infusion PRN    0.9%  NaCl infusion Once    acyclovir capsule 400 mg BID    alteplase injection 2 mg PRN    aluminum-magnesium hydroxide-simethicone 200-200-20 mg/5 mL suspension 30 mL Q6H PRN    calcium carbonate 200 mg calcium (500 mg) chewable tablet 1,000 mg BID    diphenhydrAMINE capsule 25 mg PRN    diphenoxylate-atropine 2.5-0.025 mg per tablet 1 tablet QID PRN    doxycycline tablet 100 mg Q12H    duke's soln (benadryl 30 mL, mylanta 30 mL, LIDOcaine 30 mL, nystatin 30 mL) 120 mL QID PRN    ergocalciferol capsule 50,000 Units Q14 Days    filgrastim (NEUPOGEN) injection 480 mcg/1.6 ml Daily    fluconazole tablet 200 mg Daily    heparin (porcine) injection 1,000 Units PRN    heparin (porcine) injection 1,600 Units PRN    levoFLOXacin tablet 250 mg Every other day    LIDOcaine HCl 2% oral solution 15 mL Q6H PRN    loperamide capsule 2 mg QID PRN    LORazepam injection 1 mg Q6H PRN    magnesium oxide tablet 400 mg BID    metoprolol succinate (TOPROL-XL) 24 hr split tablet 12.5 mg Daily    ondansetron injection 4 mg Q6H PRN    oxyCODONE immediate release tablet 5 mg Q6H PRN    pantoprazole injection 40 mg Daily    prochlorperazine injection Soln 10 mg Q6H PRN    scopolamine 1.3-1.5 mg (1 mg over 3 days) 1 patch Q3 Days    sertraline tablet 50 mg Daily    sevelamer carbonate tablet 800 mg TID WM    sodium bicarb-sodium chloride powder 1 Dose QID    sodium chloride 0.9% flush 10 mL PRN    vitamin D 1000 units tablet 3,000 Units Daily       Objective:     Vital Signs (Most Recent):  Temp: 98.5 °F (36.9 °C) (01/10/23 0729)  Pulse: 80 (01/10/23  0729)  Resp: 16 (01/10/23 0729)  BP: (!) 115/59 (01/10/23 0729)  SpO2: 97 % (01/10/23 0729)   Vital Signs (24h Range):  Temp:  [98.2 °F (36.8 °C)-98.9 °F (37.2 °C)] 98.5 °F (36.9 °C)  Pulse:  [] 80  Resp:  [16-18] 16  SpO2:  [93 %-100 %] 97 %  BP: (115-149)/(59-89) 115/59     Weight: 102.6 kg (226 lb 3.1 oz) (01/10/23 0324)  Body mass index is 31.55 kg/m².  Body surface area is 2.27 meters squared.    I/O last 3 completed shifts:  In: 998 [P.O.:445; Blood:303; Other:250]  Out: 2950 [Urine:2100; Other:850]    Physical Exam  Vitals and nursing note reviewed.   Constitutional:       General: He is not in acute distress.     Appearance: He is not toxic-appearing.   HENT:      Head: Normocephalic.      Mouth/Throat:      Pharynx: Oropharynx is clear.   Eyes:      Conjunctiva/sclera: Conjunctivae normal.   Cardiovascular:      Rate and Rhythm: Normal rate and regular rhythm.      Pulses: Normal pulses.   Pulmonary:      Effort: Pulmonary effort is normal.      Breath sounds: Normal breath sounds.   Abdominal:      Palpations: Abdomen is soft.   Musculoskeletal:      Cervical back: Neck supple.      Right lower leg: No edema.      Left lower leg: No edema.   Skin:     Comments: EDA Boogie    Neurological:      Mental Status: He is alert and oriented to person, place, and time.   Psychiatric:         Mood and Affect: Mood normal.         Behavior: Behavior normal.         Thought Content: Thought content normal.         Judgment: Judgment normal.       Significant Labs:  CBC:   Recent Labs   Lab 01/09/23  0408 01/10/23  0325   WBC 0.02* 0.02*   RBC 2.16* 2.50*   HGB 6.9* 8.2*   HCT 21.5* 23.7*   PLT 10*  --    * 95   MCH 31.9* 32.8*   MCHC 32.1 34.6     CMP:   Recent Labs   Lab 01/10/23  0325      CALCIUM 7.9*   ALBUMIN 3.4*   PROT 5.7*      K 3.6   CO2 24      BUN 31*   CREATININE 5.8*   ALKPHOS 72   ALT 6*   AST 6*   BILITOT 0.8     All labs within the past 24 hours have been reviewed.

## 2023-01-10 NOTE — ASSESSMENT & PLAN NOTE
- Patient of Dr. King. Per most recent clinic note:   - 4/20/22: renal biopsy: light chain cast nephropathy, kappa light chain  - 4/26/22: right subclavian vein thrombus   - 4/27/22: M spike 0.2 with free monoclonal kappa band. B2mg 19.57, IgG 496, IgA 10, IgM <5. Kappa 02847, Lambda 7.9, ratio >1000  - 5/12/22: BMBx: plasma cell myeloma, marrow cellularity 100%, plasma cell percentage 100%. Plasma cell phenotype +, kappa +, CD20- -, CD30-, EMA-, SADAF-, [place] red negative. Peripheral blood with pancytopenia and no circulating blasts. Decreased storage iron. FISH canceled due to quantity not sufficient  - 5/18/22: CyBorD C1D1  - 5/23/22: rasburicase  - 5/24/22: Xgeva  - 6/6/22: Kappa 22674, lambda 4.0, ratio >1000, M spike 0.1 with free monoclonal kappa band present  - 6/6/22: CyBorD C2D1  - 6/9/22: BMBx Plasma cell neoplasm compatible with plasma cell myeloma. Extent of involvement 80-90% of bone marrow elements. Cytology: Plasmablastic. FISH cytogenetics positive for 1q21/CKS1B gain. Karyotype failed. Myelofibrosis diffuse (MF-3)  - 6/20/22: CyBorD C2D8 delayed due to covid  - 6/23/22: CyBOrD C2D11  - 7/5/22: C1D1 DVRd  - 7/7/22 - 7/18/22: hospital admission for septic shock resulting in renal failure  - 7/25/22: started again on DVRd  - 8/8/2022: kappa 1402.8, lambda 7.5, ratio 167.04. M spike 0.1, two faint restricted bands in gamma globulin regions.   - 10/10/22: C5D1 DVRd. Revlimid on hold for upcoming stem   - Admitted for Torie 140 auto SCT on 12/28/22 as above

## 2023-01-10 NOTE — PLAN OF CARE
Plan of care reviewed with patient. Pt is day +11 of an Auto SCT. Afebrile. Free from falls or injury. No complaints of pain. Up with assistance to bathroom. Bed locked in lowest position, non skid socks on, call light within reach. Pt instructed to call if any assistance is needed. Vitals stable. Wife at bedside. Bed alarm on. [employer] camera in use. Will cont to toña pt.

## 2023-01-10 NOTE — SUBJECTIVE & OBJECTIVE
Subjective:     Interval History: Day +11 s/p Torie 140 Auto SCT for MM. No issues with dialysis yesterday. Patient reports feeling well overall. Appetite improving. Mildly sore throat. Denies n/v/d/c. Continue to work with PT s/p falls as patient desires to ambulate more but currently feel limited by telesitter and requirement to ask nursing staff for assistance. Replacing Mag cautiously today given ESRD for level of 1.5. Afebrile, VSS    Objective:     Vital Signs (Most Recent):  Temp: 98.5 °F (36.9 °C) (01/10/23 0729)  Pulse: 80 (01/10/23 0729)  Resp: 16 (01/10/23 0729)  BP: (!) 115/59 (01/10/23 0729)  SpO2: 97 % (01/10/23 0729) Vital Signs (24h Range):  Temp:  [98.2 °F (36.8 °C)-98.9 °F (37.2 °C)] 98.5 °F (36.9 °C)  Pulse:  [] 80  Resp:  [16-18] 16  SpO2:  [93 %-100 %] 97 %  BP: (115-149)/(59-89) 115/59     Weight: 102.6 kg (226 lb 3.1 oz)  Body mass index is 31.55 kg/m².  Body surface area is 2.27 meters squared.      Intake/Output - Last 3 Shifts         01/08 0700  01/09 0659 01/09 0700  01/10 0659 01/10 0700  01/11 0659    P.O.  445     I.V. (mL/kg) 990.7 (9.7)      Blood 226 303     Other  250     IV Piggyback 150      Total Intake(mL/kg) 1366.7 (13.3) 998 (9.7)     Urine (mL/kg/hr) 2200 (0.9) 750 (0.3)     Other  850     Stool 0 0     Total Output 2200 1600     Net -833.3 -602            Urine Occurrence 2 x 5 x     Stool Occurrence 3 x 5 x 1 x            Physical Exam  Vitals and nursing note reviewed.   Constitutional:       Appearance: Normal appearance. He is well-developed.   HENT:      Head: Normocephalic and atraumatic.      Mouth/Throat:      Pharynx: Posterior oropharyngeal erythema present. No oropharyngeal exudate.   Eyes:      General:         Right eye: No discharge.         Left eye: No discharge.      Extraocular Movements: Extraocular movements intact.      Conjunctiva/sclera: Conjunctivae normal.   Cardiovascular:      Rate and Rhythm: Normal rate and regular rhythm.      Pulses:  Normal pulses.      Heart sounds: Normal heart sounds. No murmur heard.    No gallop.   Pulmonary:      Effort: Pulmonary effort is normal. No respiratory distress.      Breath sounds: Normal breath sounds. No wheezing or rales.   Abdominal:      General: Bowel sounds are normal. There is no distension.      Palpations: Abdomen is soft.      Tenderness: There is no abdominal tenderness.   Musculoskeletal:         General: No swelling or deformity. Normal range of motion.      Cervical back: Normal range of motion and neck supple.      Right lower leg: No edema.      Left lower leg: No edema.   Skin:     General: Skin is warm and dry.      Findings: No erythema or rash.      Comments: Right chest wall dialysis catheter. Dressing c/d/i. No sign of infection to site.   Neurological:      General: No focal deficit present.      Mental Status: He is alert and oriented to person, place, and time.   Psychiatric:         Mood and Affect: Mood normal.         Behavior: Behavior normal.         Thought Content: Thought content normal.         Judgment: Judgment normal.       Significant Labs:   CBC:   Recent Labs   Lab 01/09/23  0408 01/10/23  0325   WBC 0.02* 0.02*   HGB 6.9* 8.2*   HCT 21.5* 23.7*   PLT 10*  --       and CMP:   Recent Labs   Lab 01/09/23  0408 01/10/23  0325    140   K 3.8 3.6    105   CO2 19* 24   GLU 98 104   BUN 50* 31*   CREATININE 7.4* 5.8*   CALCIUM 7.0* 7.9*   PROT 5.4* 5.7*   ALBUMIN 3.4* 3.4*   BILITOT 0.5 0.8   ALKPHOS 68 72   AST 7* 6*   ALT 6* 6*   ANIONGAP 11 11         Diagnostic Results:  I have reviewed all pertinent imaging results/findings within the past 24 hours.

## 2023-01-11 PROBLEM — D63.1 ANEMIA IN ESRD (END-STAGE RENAL DISEASE): Status: ACTIVE | Noted: 2023-01-11

## 2023-01-11 PROBLEM — N18.6 ANEMIA IN ESRD (END-STAGE RENAL DISEASE): Status: ACTIVE | Noted: 2023-01-11

## 2023-01-11 LAB
ALBUMIN SERPL BCP-MCNC: 3.5 G/DL (ref 3.5–5.2)
ALP SERPL-CCNC: 74 U/L (ref 55–135)
ALT SERPL W/O P-5'-P-CCNC: 5 U/L (ref 10–44)
ANION GAP SERPL CALC-SCNC: 13 MMOL/L (ref 8–16)
ANISOCYTOSIS BLD QL SMEAR: SLIGHT
AST SERPL-CCNC: 7 U/L (ref 10–40)
BASOPHILS # BLD AUTO: 0 K/UL (ref 0–0.2)
BASOPHILS NFR BLD: 0 % (ref 0–1.9)
BILIRUB SERPL-MCNC: 0.7 MG/DL (ref 0.1–1)
BUN SERPL-MCNC: 38 MG/DL (ref 6–20)
CALCIUM SERPL-MCNC: 7.5 MG/DL (ref 8.7–10.5)
CHLORIDE SERPL-SCNC: 104 MMOL/L (ref 95–110)
CO2 SERPL-SCNC: 22 MMOL/L (ref 23–29)
CREAT SERPL-MCNC: 6.2 MG/DL (ref 0.5–1.4)
DIFFERENTIAL METHOD: ABNORMAL
EOSINOPHIL # BLD AUTO: 0 K/UL (ref 0–0.5)
EOSINOPHIL NFR BLD: 0 % (ref 0–8)
ERYTHROCYTE [DISTWIDTH] IN BLOOD BY AUTOMATED COUNT: 16.8 % (ref 11.5–14.5)
EST. GFR  (NO RACE VARIABLE): 9.8 ML/MIN/1.73 M^2
GLUCOSE SERPL-MCNC: 96 MG/DL (ref 70–110)
HCT VFR BLD AUTO: 23.4 % (ref 40–54)
HGB BLD-MCNC: 8 G/DL (ref 14–18)
IMM GRANULOCYTES # BLD AUTO: 0 K/UL (ref 0–0.04)
IMM GRANULOCYTES NFR BLD AUTO: 0 % (ref 0–0.5)
LYMPHOCYTES # BLD AUTO: 0 K/UL (ref 1–4.8)
LYMPHOCYTES NFR BLD: 14.3 % (ref 18–48)
MAGNESIUM SERPL-MCNC: 1.5 MG/DL (ref 1.6–2.6)
MCH RBC QN AUTO: 32.7 PG (ref 27–31)
MCHC RBC AUTO-ENTMCNC: 34.2 G/DL (ref 32–36)
MCV RBC AUTO: 96 FL (ref 82–98)
MONOCYTES # BLD AUTO: 0 K/UL (ref 0.3–1)
MONOCYTES NFR BLD: 28.6 % (ref 4–15)
NEUTROPHILS # BLD AUTO: 0 K/UL (ref 1.8–7.7)
NEUTROPHILS NFR BLD: 57.1 % (ref 38–73)
NRBC BLD-RTO: 0 /100 WBC
OVALOCYTES BLD QL SMEAR: ABNORMAL
PHOSPHATE SERPL-MCNC: 3.3 MG/DL (ref 2.7–4.5)
PLATELET # BLD AUTO: 12 K/UL (ref 150–450)
PLATELET BLD QL SMEAR: ABNORMAL
PMV BLD AUTO: 9.6 FL (ref 9.2–12.9)
POIKILOCYTOSIS BLD QL SMEAR: SLIGHT
POTASSIUM SERPL-SCNC: 3.6 MMOL/L (ref 3.5–5.1)
PROT SERPL-MCNC: 5.9 G/DL (ref 6–8.4)
RBC # BLD AUTO: 2.45 M/UL (ref 4.6–6.2)
SODIUM SERPL-SCNC: 139 MMOL/L (ref 136–145)
SPHEROCYTES BLD QL SMEAR: ABNORMAL
WBC # BLD AUTO: 0.07 K/UL (ref 3.9–12.7)

## 2023-01-11 PROCEDURE — 83735 ASSAY OF MAGNESIUM: CPT | Performed by: NURSE PRACTITIONER

## 2023-01-11 PROCEDURE — 80100014 HC HEMODIALYSIS 1:1

## 2023-01-11 PROCEDURE — 63600175 PHARM REV CODE 636 W HCPCS: Performed by: NURSE PRACTITIONER

## 2023-01-11 PROCEDURE — 99233 PR SUBSEQUENT HOSPITAL CARE,LEVL III: ICD-10-PCS | Mod: ,,, | Performed by: NURSE PRACTITIONER

## 2023-01-11 PROCEDURE — 25000003 PHARM REV CODE 250: Performed by: NURSE PRACTITIONER

## 2023-01-11 PROCEDURE — 97530 THERAPEUTIC ACTIVITIES: CPT

## 2023-01-11 PROCEDURE — 99233 SBSQ HOSP IP/OBS HIGH 50: CPT | Mod: ,,, | Performed by: NURSE PRACTITIONER

## 2023-01-11 PROCEDURE — 84100 ASSAY OF PHOSPHORUS: CPT | Performed by: NURSE PRACTITIONER

## 2023-01-11 PROCEDURE — 94761 N-INVAS EAR/PLS OXIMETRY MLT: CPT

## 2023-01-11 PROCEDURE — 20600001 HC STEP DOWN PRIVATE ROOM

## 2023-01-11 PROCEDURE — 99232 PR SUBSEQUENT HOSPITAL CARE,LEVL II: ICD-10-PCS | Mod: ,,, | Performed by: INTERNAL MEDICINE

## 2023-01-11 PROCEDURE — 99232 SBSQ HOSP IP/OBS MODERATE 35: CPT | Mod: ,,, | Performed by: INTERNAL MEDICINE

## 2023-01-11 PROCEDURE — 25000003 PHARM REV CODE 250: Performed by: INTERNAL MEDICINE

## 2023-01-11 PROCEDURE — 85025 COMPLETE CBC W/AUTO DIFF WBC: CPT | Performed by: NURSE PRACTITIONER

## 2023-01-11 PROCEDURE — 80100016 HC MAINTENANCE HEMODIALYSIS

## 2023-01-11 PROCEDURE — 80053 COMPREHEN METABOLIC PANEL: CPT | Performed by: NURSE PRACTITIONER

## 2023-01-11 PROCEDURE — 63600175 PHARM REV CODE 636 W HCPCS: Performed by: INTERNAL MEDICINE

## 2023-01-11 RX ORDER — CALCIUM CARBONATE 200(500)MG
1000 TABLET,CHEWABLE ORAL 3 TIMES DAILY
Status: DISCONTINUED | OUTPATIENT
Start: 2023-01-11 | End: 2023-01-14 | Stop reason: HOSPADM

## 2023-01-11 RX ORDER — LANOLIN ALCOHOL/MO/W.PET/CERES
800 CREAM (GRAM) TOPICAL EVERY 4 HOURS
Status: COMPLETED | OUTPATIENT
Start: 2023-01-11 | End: 2023-01-11

## 2023-01-11 RX ADMIN — Medication 800 MG: at 02:01

## 2023-01-11 RX ADMIN — SODIUM CHLORIDE: 9 INJECTION, SOLUTION INTRAVENOUS at 03:01

## 2023-01-11 RX ADMIN — FLUCONAZOLE 200 MG: 200 TABLET ORAL at 09:01

## 2023-01-11 RX ADMIN — Medication 800 MG: at 09:01

## 2023-01-11 RX ADMIN — Medication 1 DOSE: at 09:01

## 2023-01-11 RX ADMIN — DOXYCYCLINE HYCLATE 100 MG: 100 TABLET, COATED ORAL at 08:01

## 2023-01-11 RX ADMIN — ACYCLOVIR 400 MG: 200 CAPSULE ORAL at 09:01

## 2023-01-11 RX ADMIN — HEPARIN SODIUM 1600 UNITS: 1000 INJECTION, SOLUTION INTRAVENOUS; SUBCUTANEOUS at 03:01

## 2023-01-11 RX ADMIN — CALCIUM CARBONATE (ANTACID) CHEW TAB 500 MG 1000 MG: 500 CHEW TAB at 02:01

## 2023-01-11 RX ADMIN — Medication 1 DOSE: at 02:01

## 2023-01-11 RX ADMIN — FILGRASTIM 480 MCG: 480 INJECTION, SOLUTION INTRAVENOUS; SUBCUTANEOUS at 09:01

## 2023-01-11 RX ADMIN — HEPARIN SODIUM 1000 UNITS: 1000 INJECTION, SOLUTION INTRAVENOUS; SUBCUTANEOUS at 06:01

## 2023-01-11 RX ADMIN — DOXYCYCLINE HYCLATE 100 MG: 100 TABLET, COATED ORAL at 09:01

## 2023-01-11 RX ADMIN — CALCIUM CARBONATE (ANTACID) CHEW TAB 500 MG 1000 MG: 500 CHEW TAB at 09:01

## 2023-01-11 RX ADMIN — PANTOPRAZOLE SODIUM 40 MG: 40 TABLET, DELAYED RELEASE ORAL at 09:01

## 2023-01-11 RX ADMIN — CHOLECALCIFEROL TAB 25 MCG (1000 UNIT) 3000 UNITS: 25 TAB at 09:01

## 2023-01-11 RX ADMIN — SERTRALINE HYDROCHLORIDE 50 MG: 50 TABLET ORAL at 09:01

## 2023-01-11 RX ADMIN — ACYCLOVIR 400 MG: 200 CAPSULE ORAL at 08:01

## 2023-01-11 NOTE — ASSESSMENT & PLAN NOTE
- Patient of Dr. King. Per most recent clinic note:   - 4/20/22: renal biopsy: light chain cast nephropathy, kappa light chain  - 4/26/22: right subclavian vein thrombus   - 4/27/22: M spike 0.2 with free monoclonal kappa band. B2mg 19.57, IgG 496, IgA 10, IgM <5. Kappa 88371, Lambda 7.9, ratio >1000  - 5/12/22: BMBx: plasma cell myeloma, marrow cellularity 100%, plasma cell percentage 100%. Plasma cell phenotype +, kappa +, CD20- -, CD30-, EMA-, SADAF-, [place] red negative. Peripheral blood with pancytopenia and no circulating blasts. Decreased storage iron. FISH canceled due to quantity not sufficient  - 5/18/22: CyBorD C1D1  - 5/23/22: rasburicase  - 5/24/22: Xgeva  - 6/6/22: Kappa 06937, lambda 4.0, ratio >1000, M spike 0.1 with free monoclonal kappa band present  - 6/6/22: CyBorD C2D1  - 6/9/22: BMBx Plasma cell neoplasm compatible with plasma cell myeloma. Extent of involvement 80-90% of bone marrow elements. Cytology: Plasmablastic. FISH cytogenetics positive for 1q21/CKS1B gain. Karyotype failed. Myelofibrosis diffuse (MF-3)  - 6/20/22: CyBorD C2D8 delayed due to covid  - 6/23/22: CyBOrD C2D11  - 7/5/22: C1D1 DVRd  - 7/7/22 - 7/18/22: hospital admission for septic shock resulting in renal failure  - 7/25/22: started again on DVRd  - 8/8/2022: kappa 1402.8, lambda 7.5, ratio 167.04. M spike 0.1, two faint restricted bands in gamma globulin regions.   - 10/10/22: C5D1 DVRd. Revlimid on hold for upcoming stem   - Admitted for Torie 140 auto SCT on 12/28/22 as above

## 2023-01-11 NOTE — PT/OT/SLP PROGRESS
"Physical Therapy Treatment    Patient Name:  De Lakhani   MRN:  [id number]    Recommendations:     Discharge Recommendations: home  Discharge Equipment Recommendations: none  Barriers to discharge: None    Assessment:     De Lakhani is a 57 y.o. male admitted with a medical diagnosis of History of autologous stem cell transplant.  He presents with the following impairments/functional limitations: weakness, impaired endurance. The patient was motivated to ambulate with therapy, he ambulated 300' with no AD and supervision assistance. Reviewed LE HEP standing exercises and UE theraband therex.     Rehab Prognosis: Good; patient would benefit from acute skilled PT services to address these deficits and reach maximum level of function.    Recent Surgery: * No surgery found *      Plan:     During this hospitalization, patient to be seen 2 x/week to address the identified rehab impairments via gait training, therapeutic activities, therapeutic exercises and progress toward the following goals:    Plan of Care Expires:  01/28/23    Subjective     Chief Complaint: "Now they won't let me walk, they have all these alarms on me"  Patient/Family Comments/goals: maintain independence with mobility  Pain/Comfort:  Pain Rating 1: 0/10      Objective:     Communicated with RN prior to session.  Patient found up in chair with peripheral IV upon PT entry to room.     General Precautions: Standard, fall  Orthopedic Precautions: N/A  Braces: N/A  Respiratory Status: Room air     Functional Mobility:    Bed Mobility  Deferred, sitting up in chair   Transfers Sit to Stand:  supervision assistance    Gait  Gait Distance: 300 ft with no AD  Assistance Level: supervision assistance   Description: reciprocal strides, good speed, mild fatigue following gait        AM-PAC 6 CLICK MOBILITY  Turning over in bed (including adjusting bedclothes, sheets and blankets)?: 4  Sitting down on and standing up from a chair with arms (e.g., " wheelchair, bedside commode, etc.): 4  Moving from lying on back to sitting on the side of the bed?: 4  Moving to and from a bed to a chair (including a wheelchair)?: 4  Need to walk in hospital room?: 4  Climbing 3-5 steps with a railing?: 4  Basic Mobility Total Score: 24       Treatment & Education:  Patient educated on role of therapy, goals of session, benefits of out of bed mobility. Patient agreeable to mobilize with therapy.      Reviewed standing therex HEP handout for LE, UE therex with red theraband, patient too fatigued following gait to perform therex.     Patient educated on PT schedule.  Encouraged patient to ambulate, sit up in chair 3x/day to prevent deconditioning during hospitalization. Patient verbalized understanding and agreement not to mobilize without RN assist. Patient in agreement with PT POC.    Patient safe to ambulate with supervision assistance.       Patient left up in chair with all lines intact and call button in reach..    GOALS:   Multidisciplinary Problems       Physical Therapy Goals          Problem: Physical Therapy    Goal Priority Disciplines Outcome Goal Variances Interventions   Physical Therapy Goal     PT, PT/OT Ongoing, Progressing     Description: Goals to be met by: 1/15     Patient will increase functional independence with mobility by performin. Gait  x 500 feet with Modified [place] using No Assistive Device.   2. Lower and upper extremity exercise program x20 reps per handout, with independence  3. Ascend/descend 3 steps with L HR with modified independence and no AD.                          Time Tracking:     PT Received On: 23  PT Start Time: 1200     PT Stop Time: 1210  PT Total Time (min): 10 min     Billable Minutes: Therapeutic Activity 10    Treatment Type: Treatment  PT/PTA: PT     PTA Visit Number: 0     2023

## 2023-01-11 NOTE — PROGRESS NOTES
[facility] - Oncology ([facility]  Nephrology  Progress Note    Patient Name: De Lakhani  MRN: [id number]  Admission Date: 12/28/2022  Hospital Length of Stay: 14 days  Attending Provider: Matteo Tellez MD   Primary Care Physician: To Obtain Unable  Principal Problem:History of autologous stem cell transplant    Subjective:     Interval History:   HD today. 24 hr UOP 1125 ml. No complaints today.     Review of patient's allergies indicates:  No Known Allergies  Current Facility-Administered Medications   Medication Frequency    0.9%  NaCl infusion (for blood administration) Q24H PRN    0.9%  NaCl infusion PRN    0.9%  NaCl infusion Once    acyclovir capsule 400 mg BID    alteplase injection 2 mg PRN    aluminum-magnesium hydroxide-simethicone 200-200-20 mg/5 mL suspension 30 mL Q6H PRN    calcium carbonate 200 mg calcium (500 mg) chewable tablet 1,000 mg TID    diphenhydrAMINE capsule 25 mg PRN    diphenoxylate-atropine 2.5-0.025 mg per tablet 1 tablet QID PRN    doxycycline tablet 100 mg Q12H    duke's soln (benadryl 30 mL, mylanta 30 mL, LIDOcaine 30 mL, nystatin 30 mL) 120 mL QID PRN    ergocalciferol capsule 50,000 Units Q14 Days    filgrastim (NEUPOGEN) injection 480 mcg/1.6 ml Daily    fluconazole tablet 200 mg Daily    heparin (porcine) injection 1,000 Units PRN    heparin (porcine) injection 1,600 Units PRN    levoFLOXacin tablet 250 mg Every other day    LIDOcaine HCl 2% oral solution 15 mL Q6H PRN    loperamide capsule 2 mg QID PRN    LORazepam injection 1 mg Q6H PRN    magnesium oxide tablet 800 mg Q4H    metoprolol succinate (TOPROL-XL) 24 hr split tablet 12.5 mg Daily    ondansetron injection 4 mg Q6H PRN    oxyCODONE immediate release tablet 5 mg Q6H PRN    pantoprazole EC tablet 40 mg Daily    prochlorperazine injection Soln 10 mg Q6H PRN    scopolamine 1.3-1.5 mg (1 mg over 3 days) 1 patch Q3 Days    sertraline tablet 50 mg Daily    sevelamer carbonate tablet 800 mg  TID WM    sodium bicarb-sodium chloride powder 1 Dose QID    sodium chloride 0.9% flush 10 mL PRN    vitamin D 1000 units tablet 3,000 Units Daily       Objective:     Vital Signs (Most Recent):  Temp: 98.4 °F (36.9 °C) (01/11/23 0738)  Pulse: 87 (01/11/23 0738)  Resp: 20 (01/11/23 0738)  BP: 117/77 (01/11/23 0738)  SpO2: 97 % (01/11/23 0738) Vital Signs (24h Range):  Temp:  [98.1 °F (36.7 °C)-99.1 °F (37.3 °C)] 98.4 °F (36.9 °C)  Pulse:  [78-94] 87  Resp:  [16-20] 20  SpO2:  [96 %-100 %] 97 %  BP: (116-142)/(62-77) 117/77     Weight: 102.3 kg (225 lb 6.7 oz) (01/11/23 0251)  Body mass index is 31.44 kg/m².  Body surface area is 2.26 meters squared.    I/O last 3 completed shifts:  In: 1110 [P.O.:760; Blood:350]  Out: 1575 [Urine:1575]    Physical Exam  Vitals and nursing note reviewed.   Constitutional:       General: He is not in acute distress.     Appearance: He is not toxic-appearing.   HENT:      Head: Normocephalic.      Mouth/Throat:      Pharynx: Oropharynx is clear.   Eyes:      Conjunctiva/sclera: Conjunctivae normal.   Cardiovascular:      Rate and Rhythm: Normal rate and regular rhythm.      Pulses: Normal pulses.   Pulmonary:      Effort: Pulmonary effort is normal.      Breath sounds: Normal breath sounds.   Abdominal:      Palpations: Abdomen is soft.   Musculoskeletal:      Cervical back: Neck supple.      Right lower leg: No edema.      Left lower leg: No edema.   Skin:     Comments: R. Keagan    Neurological:      Mental Status: He is alert and oriented to person, place, and time.   Psychiatric:         Mood and Affect: Mood normal.         Behavior: Behavior normal.         Thought Content: Thought content normal.         Judgment: Judgment normal.       Significant Labs:  CBC:   Recent Labs   Lab 01/11/23  0305   WBC 0.07*   RBC 2.45*   HGB 8.0*   HCT 23.4*   PLT 12*   MCV 96   MCH 32.7*   MCHC 34.2     CMP:   Recent Labs   Lab 01/11/23  0305   GLU 96   CALCIUM 7.5*   ALBUMIN 3.5   PROT  5.9*      K 3.6   CO2 22*      BUN 38*   CREATININE 6.2*   ALKPHOS 74   ALT 5*   AST 7*   BILITOT 0.7     All labs within the past 24 hours have been reviewed.       Assessment/Plan:     * History of autologous stem cell transplant  -management per primary team     Anemia in ESRD (end-stage renal disease)  Recent Labs   Lab 01/09/23  0408 01/10/23  0325 01/11/23  0305   WBC 0.02* 0.02* 0.07*   HGB 6.9* 8.2* 8.0*   HCT 21.5* 23.7* 23.4*   PLT 10* 9* 12*       - Target Hgb 10-11 gm/dL. Hgb 8.0  - Will defer Epogen to primary team         Chronic kidney disease-mineral and bone disorder  Renal diet, if not NPO    Continue binders    End stage renal disease  Mr. Lakhani was diagnosed with multiple myeloma in April 2022 after presenting with ASHLEE. 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 He had bone marrow biopsy which showed 100% involvement by plasma cells. He was started on CyBorD on 5/18 and received two cycles of treatment. His bone marrow biopsy after the first cycle showed a decrease in his plasma cell involvement to 80-90%. Dr. Bunch then started DVRd on 7/5/22. However, he was admitted to the hospital on 7/7/22 with septic shock requiring ICU care. He improved with antibiotics but had an ASHLEE thought to be due to hypotension which resulted in renal failure. Dialysis dependent since 07/22.     Last HD prior to presentation 12/28. S/p chemo on 12/29 and SCT on 12/30.    Nephrology History  iHD Schedule: MWF   Unit/MD: [facility]   Duration: 4 hours   UF: ?  EDW: 107kg   Access: R permcath   Residual Renal Function: moderate     Plan/Reccomendations     HD today for clearance and volume management.   Caution in administering IVF in ESRD patient   Strict I&Os   Pre and post HD weight     Multiple myeloma not having achieved remission  -management per primary         Thank you for your consult. I will follow-up with patient. Please contact us if you have any additional  questions.    Kate Pepe DNP, FNP-C  Nephrology  [facility] - Oncology ([facility])

## 2023-01-11 NOTE — NURSING
Arrived at pt's bedside for 1:1 hemodialysis tx. Pt  in NAD, VSS, AAOx4. Right chest wall CVC aspirated, flushed, and accessed using aseptic technique. Lines connected and secured- tx initiated at 1545.

## 2023-01-11 NOTE — SUBJECTIVE & OBJECTIVE
Interval History:   HD today. 24 hr UOP 1125 ml. No complaints today.     Review of patient's allergies indicates:  No Known Allergies  Current Facility-Administered Medications   Medication Frequency    0.9%  NaCl infusion (for blood administration) Q24H PRN    0.9%  NaCl infusion PRN    0.9%  NaCl infusion Once    acyclovir capsule 400 mg BID    alteplase injection 2 mg PRN    aluminum-magnesium hydroxide-simethicone 200-200-20 mg/5 mL suspension 30 mL Q6H PRN    calcium carbonate 200 mg calcium (500 mg) chewable tablet 1,000 mg TID    diphenhydrAMINE capsule 25 mg PRN    diphenoxylate-atropine 2.5-0.025 mg per tablet 1 tablet QID PRN    doxycycline tablet 100 mg Q12H    duke's soln (benadryl 30 mL, mylanta 30 mL, LIDOcaine 30 mL, nystatin 30 mL) 120 mL QID PRN    ergocalciferol capsule 50,000 Units Q14 Days    filgrastim (NEUPOGEN) injection 480 mcg/1.6 ml Daily    fluconazole tablet 200 mg Daily    heparin (porcine) injection 1,000 Units PRN    heparin (porcine) injection 1,600 Units PRN    levoFLOXacin tablet 250 mg Every other day    LIDOcaine HCl 2% oral solution 15 mL Q6H PRN    loperamide capsule 2 mg QID PRN    LORazepam injection 1 mg Q6H PRN    magnesium oxide tablet 800 mg Q4H    metoprolol succinate (TOPROL-XL) 24 hr split tablet 12.5 mg Daily    ondansetron injection 4 mg Q6H PRN    oxyCODONE immediate release tablet 5 mg Q6H PRN    pantoprazole EC tablet 40 mg Daily    prochlorperazine injection Soln 10 mg Q6H PRN    scopolamine 1.3-1.5 mg (1 mg over 3 days) 1 patch Q3 Days    sertraline tablet 50 mg Daily    sevelamer carbonate tablet 800 mg TID WM    sodium bicarb-sodium chloride powder 1 Dose QID    sodium chloride 0.9% flush 10 mL PRN    vitamin D 1000 units tablet 3,000 Units Daily       Objective:     Vital Signs (Most Recent):  Temp: 98.4 °F (36.9 °C) (01/11/23 0738)  Pulse: 87 (01/11/23 0738)  Resp: 20 (01/11/23 0738)  BP: 117/77 (01/11/23 0738)  SpO2: 97 % (01/11/23 0738) Vital Signs (24h  Range):  Temp:  [98.1 °F (36.7 °C)-99.1 °F (37.3 °C)] 98.4 °F (36.9 °C)  Pulse:  [78-94] 87  Resp:  [16-20] 20  SpO2:  [96 %-100 %] 97 %  BP: (116-142)/(62-77) 117/77     Weight: 102.3 kg (225 lb 6.7 oz) (01/11/23 0251)  Body mass index is 31.44 kg/m².  Body surface area is 2.26 meters squared.    I/O last 3 completed shifts:  In: 1110 [P.O.:760; Blood:350]  Out: 1575 [Urine:1575]    Physical Exam  Vitals and nursing note reviewed.   Constitutional:       General: He is not in acute distress.     Appearance: He is not toxic-appearing.   HENT:      Head: Normocephalic.      Mouth/Throat:      Pharynx: Oropharynx is clear.   Eyes:      Conjunctiva/sclera: Conjunctivae normal.   Cardiovascular:      Rate and Rhythm: Normal rate and regular rhythm.      Pulses: Normal pulses.   Pulmonary:      Effort: Pulmonary effort is normal.      Breath sounds: Normal breath sounds.   Abdominal:      Palpations: Abdomen is soft.   Musculoskeletal:      Cervical back: Neck supple.      Right lower leg: No edema.      Left lower leg: No edema.   Skin:     Comments: EDA Boogie    Neurological:      Mental Status: He is alert and oriented to person, place, and time.   Psychiatric:         Mood and Affect: Mood normal.         Behavior: Behavior normal.         Thought Content: Thought content normal.         Judgment: Judgment normal.       Significant Labs:  CBC:   Recent Labs   Lab 01/11/23  0305   WBC 0.07*   RBC 2.45*   HGB 8.0*   HCT 23.4*   PLT 12*   MCV 96   MCH 32.7*   MCHC 34.2     CMP:   Recent Labs   Lab 01/11/23  0305   GLU 96   CALCIUM 7.5*   ALBUMIN 3.5   PROT 5.9*      K 3.6   CO2 22*      BUN 38*   CREATININE 6.2*   ALKPHOS 74   ALT 5*   AST 7*   BILITOT 0.7     All labs within the past 24 hours have been reviewed.

## 2023-01-11 NOTE — PROGRESS NOTES
[facility] - Oncology ([facility])  Hematology  Bone Marrow Transplant  Progress Note    Patient Name: De Lakhani  Admission Date: 12/28/2022  Hospital Length of Stay: 14 days  Code Status: Full Code    Subjective:     Interval History: Day +12 from a Torie 140 auto SCT for MM. ANC up to 40. Will receive dialysis today. Feeling well. Afebrile. VSS. Main complaint is persistent mucositis which is making it difficult to eat. Still able to take in oral fluids. Replacing mag. Increased Ca+ frequency due to persistent hypocalcemia.    Objective:     Vital Signs (Most Recent):  Temp: 98.4 °F (36.9 °C) (01/11/23 0738)  Pulse: 87 (01/11/23 0738)  Resp: 20 (01/11/23 0738)  BP: 117/77 (01/11/23 0738)  SpO2: 97 % (01/11/23 0738) Vital Signs (24h Range):  Temp:  [98.1 °F (36.7 °C)-99.1 °F (37.3 °C)] 98.4 °F (36.9 °C)  Pulse:  [78-94] 87  Resp:  [16-20] 20  SpO2:  [96 %-100 %] 97 %  BP: (116-142)/(62-77) 117/77     Weight: 102.3 kg (225 lb 6.7 oz)  Body mass index is 31.44 kg/m².  Body surface area is 2.26 meters squared.    ECOG SCORE           [unfilled]    Intake/Output - Last 3 Shifts         01/09 0700  01/10 0659 01/10 0700  01/11 0659 01/11 0700  01/12 0659    P.O. 445 440     I.V. (mL/kg)       Blood 303 350     Other 250      IV Piggyback       Total Intake(mL/kg) 998 (9.7) 790 (7.7)     Urine (mL/kg/hr) 750 (0.3) 1125 (0.5)     Other 850      Stool 0 0     Total Output 1600 1125     Net -602 -335            Urine Occurrence 5 x      Stool Occurrence 5 x 2 x             Physical Exam  Constitutional:       Appearance: He is well-developed.   HENT:      Head: Normocephalic and atraumatic.      Mouth/Throat:      Pharynx: No oropharyngeal exudate.      Comments: Ulcers to tongue.   Eyes:      General:         Right eye: No discharge.         Left eye: No discharge.      Conjunctiva/sclera: Conjunctivae normal.      Pupils: Pupils are equal, round, and reactive to light.   Cardiovascular:      Rate and Rhythm: Normal rate and  regular rhythm.      Heart sounds: Normal heart sounds. No murmur heard.    No gallop.   Pulmonary:      Effort: Pulmonary effort is normal. No respiratory distress.      Breath sounds: Normal breath sounds. No wheezing or rales.   Abdominal:      General: Bowel sounds are normal. There is no distension.      Palpations: Abdomen is soft.      Tenderness: There is no abdominal tenderness.   Musculoskeletal:         General: No deformity. Normal range of motion.      Cervical back: Normal range of motion and neck supple.   Skin:     General: Skin is warm and dry.      Findings: No erythema or rash.      Comments: Right chest wall dialysis catheter. Dressing c/d/i. No sign of infection to site.   Neurological:      Mental Status: He is alert and oriented to person, place, and time.   Psychiatric:         Behavior: Behavior normal.         Thought Content: Thought content normal.         Judgment: Judgment normal.       Significant Labs:   CBC:   Recent Labs   Lab 01/10/23  0325 01/11/23  0305   WBC 0.02* 0.07*   HGB 8.2* 8.0*   HCT 23.7* 23.4*   PLT 9* 12*      and CMP:   Recent Labs   Lab 01/10/23  0325 01/11/23  0305    139   K 3.6 3.6    104   CO2 24 22*    96   BUN 31* 38*   CREATININE 5.8* 6.2*   CALCIUM 7.9* 7.5*   PROT 5.7* 5.9*   ALBUMIN 3.4* 3.5   BILITOT 0.8 0.7   ALKPHOS 72 74   AST 6* 7*   ALT 6* 5*   ANIONGAP 11 13         Diagnostic Results:  I have reviewed all pertinent imaging results/findings within the past 24 hours.    Assessment/Plan:     * History of autologous stem cell transplant  - Patient of Dr. King  - Diagnosed with MM in April 2022. Underwent 2 cycles CyBorD then transitioned to DVRd (now s/p C6)  - Admitted 12/28/22 for Torie 140 auto SCT   - Today is Day +12  - Completed chemotherapy 12/29 without issue  - Received 6 bags with a total CD34 dose of 3.13 x10^6/kg on 12/30/23.  - Awaiting engraftment. ANC up to 40 today.    Planned conditioning regimen:  Melphalan on  Day -1     Antimicrobial Prophylaxis:  Acyclovir starting on Day -1  Levofloxacin starting on Day -1  Fluconazole starting on Day -1     Growth Factor Support:  Neupogen starting on Day +7      Caregiver: Wife  Post-transplant discharge plans: Javed Garcia    Multiple myeloma not having achieved remission  - Patient of Dr. King. Per most recent clinic note:   - 4/20/22: renal biopsy: light chain cast nephropathy, kappa light chain  - 4/26/22: right subclavian vein thrombus   - 4/27/22: M spike 0.2 with free monoclonal kappa band. B2mg 19.57, IgG 496, IgA 10, IgM <5. Kappa 28813, Lambda 7.9, ratio >1000  - 5/12/22: BMBx: plasma cell myeloma, marrow cellularity 100%, plasma cell percentage 100%. Plasma cell phenotype +, kappa +, CD20- -, CD30-, EMA-, SADAF-, [place] red negative. Peripheral blood with pancytopenia and no circulating blasts. Decreased storage iron. FISH canceled due to quantity not sufficient  - 5/18/22: CyBorD C1D1  - 5/23/22: rasburicase  - 5/24/22: Xgeva  - 6/6/22: Kappa 74764, lambda 4.0, ratio >1000, M spike 0.1 with free monoclonal kappa band present  - 6/6/22: CyBorD C2D1  - 6/9/22: BMBx Plasma cell neoplasm compatible with plasma cell myeloma. Extent of involvement 80-90% of bone marrow elements. Cytology: Plasmablastic. FISH cytogenetics positive for 1q21/CKS1B gain. Karyotype failed. Myelofibrosis diffuse (MF-3)  - 6/20/22: CyBorD C2D8 delayed due to covid  - 6/23/22: CyBOrD C2D11  - 7/5/22: C1D1 DVRd  - 7/7/22 - 7/18/22: hospital admission for septic shock resulting in renal failure  - 7/25/22: started again on DVRd  - 8/8/2022: kappa 1402.8, lambda 7.5, ratio 167.04. M spike 0.1, two faint restricted bands in gamma globulin regions.   - 10/10/22: C5D1 DVRd. Revlimid on hold for upcoming stem   - Admitted for Torie 140 auto SCT on 12/28/22 as above    Pancytopenia due to antineoplastic chemotherapy  - transfuse for Hgb <7 or plt < 10K  - daily CBC while inpatient  - continue  antimicrobial ppx    Hypomagnesemia  - daily mag levels  - cautious replacement PRN with ESRD    Hypocalcemia  - Continue Ca+ supplement. Increased frequency to TID today (1/11) for persistent hypocalcemia.    Mucositis due to chemotherapy  - continue viscous lidocaine    Hyperphosphatemia  - Likely 2/2 renal disease  - continue Sevelamer 800mg TIDWM    Fall during current hospitalization  - See syncope  - Unclear if patient hit head. CT head neg 1/2 for bleed.  - Tele sitter and fall precautions in place  - continue working with PT/OT      Syncope  - Two falls since admission with reported loss of consciousness. Both following dialysis, so suspect orthostatic  - Metoprolol dose decreased from 25 mg daily to 12.5 mg daily  - Can consider midodrine  - EEG ordered; symptoms now improved so discontinued order for EEG  - continue working with PT/OT  - tele sitter in place    Chemotherapy induced diarrhea  - C-diff neg 12/31  - Improved. Continue PRN imodium and limotil.    CINV (chemotherapy-induced nausea and vomiting)  - Nausea much improved per patient  - changed zofran and compazine back to PRN  - stopped zyprexa  - home Scopalamine patch in place    Hyperlipidemia  - Will hold home statin while inpatient    Depression  - Continue home Zoloft    Deep vein thrombosis (DVT) of upper extremity  - Held home apixiban for thrombocytopenia. Will resume once plts are consistently > 50K.    Hypertension  - Continue home metoprolol. Decreased dose to 12.5 mg daily 1/3/23 due to soft BP and syncopal episodes x 2.    End stage renal disease  - developed during ICU stay. Believed to be 2/2 hypoprofusion due to hypotension  - nephrology consulted on admission  - on dialysis MWF at home. Continuing this schedule inpatient.  - right chest wall dialysis catheter in place    Dependence on hemodialysis  - see ESRD    Chronic infection of prosthetic knee  - Seen in ID clinic with Dr. Chatterjee prior to transplant   - ID rec'd continuing  doxycycline throughout transplant         VTE Risk Mitigation (From admission, onward)         Ordered     Place CRISTHIAN hose  Until discontinued         01/03/23 0911     heparin (porcine) injection 1,000 Units  As needed (PRN)         12/30/22 1243     heparin (porcine) injection 1,600 Units  As needed (PRN)         12/28/22 1825     IP VTE HIGH RISK PATIENT  Once         12/28/22 1659     Place sequential compression device  Until discontinued         12/28/22 1659                Disposition: Inpatient for SCT.    Amy Thomas, NP  Bone Marrow Transplant  [facility] - Oncology ([facility])

## 2023-01-11 NOTE — ASSESSMENT & PLAN NOTE
Recent Labs   Lab 01/09/23  0408 01/10/23  0325 01/11/23  0305   WBC 0.02* 0.02* 0.07*   HGB 6.9* 8.2* 8.0*   HCT 21.5* 23.7* 23.4*   PLT 10* 9* 12*       - Target Hgb 10-11 gm/dL. Hgb 8.0  - Will defer Epogen to primary team

## 2023-01-11 NOTE — PLAN OF CARE
Problem: Electrolyte Imbalance (Chronic Kidney Disease)  Goal: Electrolyte Balance  Outcome: Ongoing, Progressing   Discussed today's tx plan with pt for no fluid removal, just electrolyte balance. Pt verbalizes understanding.

## 2023-01-11 NOTE — SUBJECTIVE & OBJECTIVE
Subjective:     Interval History: Day +12 from a Torie 140 auto SCT for MM. ANC up to 40. Will receive dialysis today. Feeling well. Afebrile. VSS. Main complaint is persistent mucositis which is making it difficult to eat. Still able to take in oral fluids. Replacing mag. Increased Ca+ frequency due to persistent hypocalcemia.    Objective:     Vital Signs (Most Recent):  Temp: 98.4 °F (36.9 °C) (01/11/23 0738)  Pulse: 87 (01/11/23 0738)  Resp: 20 (01/11/23 0738)  BP: 117/77 (01/11/23 0738)  SpO2: 97 % (01/11/23 0738) Vital Signs (24h Range):  Temp:  [98.1 °F (36.7 °C)-99.1 °F (37.3 °C)] 98.4 °F (36.9 °C)  Pulse:  [78-94] 87  Resp:  [16-20] 20  SpO2:  [96 %-100 %] 97 %  BP: (116-142)/(62-77) 117/77     Weight: 102.3 kg (225 lb 6.7 oz)  Body mass index is 31.44 kg/m².  Body surface area is 2.26 meters squared.    ECOG SCORE           [unfilled]    Intake/Output - Last 3 Shifts         01/09 0700  01/10 0659 01/10 0700  01/11 0659 01/11 0700  01/12 0659    P.O. 445 440     I.V. (mL/kg)       Blood 303 350     Other 250      IV Piggyback       Total Intake(mL/kg) 998 (9.7) 790 (7.7)     Urine (mL/kg/hr) 750 (0.3) 1125 (0.5)     Other 850      Stool 0 0     Total Output 1600 1125     Net -602 -335            Urine Occurrence 5 x      Stool Occurrence 5 x 2 x             Physical Exam  Constitutional:       Appearance: He is well-developed.   HENT:      Head: Normocephalic and atraumatic.      Mouth/Throat:      Pharynx: No oropharyngeal exudate.      Comments: Ulcers to tongue.   Eyes:      General:         Right eye: No discharge.         Left eye: No discharge.      Conjunctiva/sclera: Conjunctivae normal.      Pupils: Pupils are equal, round, and reactive to light.   Cardiovascular:      Rate and Rhythm: Normal rate and regular rhythm.      Heart sounds: Normal heart sounds. No murmur heard.    No gallop.   Pulmonary:      Effort: Pulmonary effort is normal. No respiratory distress.      Breath sounds: Normal breath  sounds. No wheezing or rales.   Abdominal:      General: Bowel sounds are normal. There is no distension.      Palpations: Abdomen is soft.      Tenderness: There is no abdominal tenderness.   Musculoskeletal:         General: No deformity. Normal range of motion.      Cervical back: Normal range of motion and neck supple.   Skin:     General: Skin is warm and dry.      Findings: No erythema or rash.      Comments: Right chest wall dialysis catheter. Dressing c/d/i. No sign of infection to site.   Neurological:      Mental Status: He is alert and oriented to person, place, and time.   Psychiatric:         Behavior: Behavior normal.         Thought Content: Thought content normal.         Judgment: Judgment normal.       Significant Labs:   CBC:   Recent Labs   Lab 01/10/23  0325 01/11/23  0305   WBC 0.02* 0.07*   HGB 8.2* 8.0*   HCT 23.7* 23.4*   PLT 9* 12*      and CMP:   Recent Labs   Lab 01/10/23  0325 01/11/23  0305    139   K 3.6 3.6    104   CO2 24 22*    96   BUN 31* 38*   CREATININE 5.8* 6.2*   CALCIUM 7.9* 7.5*   PROT 5.7* 5.9*   ALBUMIN 3.4* 3.5   BILITOT 0.8 0.7   ALKPHOS 72 74   AST 6* 7*   ALT 6* 5*   ANIONGAP 11 13         Diagnostic Results:  I have reviewed all pertinent imaging results/findings within the past 24 hours.

## 2023-01-11 NOTE — ASSESSMENT & PLAN NOTE
- Patient of Dr. King  - Diagnosed with MM in April 2022. Underwent 2 cycles CyBorD then transitioned to DVRd (now s/p C6)  - Admitted 12/28/22 for Torie 140 auto SCT   - Today is Day +12  - Completed chemotherapy 12/29 without issue  - Received 6 bags with a total CD34 dose of 3.13 x10^6/kg on 12/30/23.  - Awaiting engraftment. ANC up to 40 today.    Planned conditioning regimen:  Melphalan on Day -1     Antimicrobial Prophylaxis:  Acyclovir starting on Day -1  Levofloxacin starting on Day -1  Fluconazole starting on Day -1     Growth Factor Support:  Neupogen starting on Day +7      Caregiver: Wife  Post-transplant discharge plans: [facility]

## 2023-01-11 NOTE — PT/OT/SLP PROGRESS
Occupational Therapy      Patient Name:  De Lakhani   MRN:  [id number]    Patient performing at baseline for ADLs/ Self care skills, No acute OT services required at this time. See previous OT note for Pt occupational performance.  One discipline (PT) following Pt.       1/11/2023

## 2023-01-11 NOTE — PLAN OF CARE
Plan of care reviewed with patient. Pt is day +12 of an Auto SCT. Afebrile. Free from falls or injury. No complaints of pain. Up with assistance to bathroom. Bed locked in lowest position, non skid socks on, call light within reach. Pt instructed to call if any assistance is needed. Vitals stable. Wife at bedside. Bed alarm on. [employer] camera in use. Will cont to toña pt.

## 2023-01-11 NOTE — ASSESSMENT & PLAN NOTE
Mr. Lakhani was diagnosed with multiple myeloma in April 2022 after presenting with ASHLEE. 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 He had bone marrow biopsy which showed 100% involvement by plasma cells. He was started on CyBorD on 5/18 and received two cycles of treatment. His bone marrow biopsy after the first cycle showed a decrease in his plasma cell involvement to 80-90%. Dr. Bunch then started DVRd on 7/5/22. However, he was admitted to the hospital on 7/7/22 with septic shock requiring ICU care. He improved with antibiotics but had an ASHLEE thought to be due to hypotension which resulted in renal failure. Dialysis dependent since 07/22.     Last HD prior to presentation 12/28. S/p chemo on 12/29 and SCT on 12/30.    Nephrology History  iHD Schedule: MWF   Unit/MD: [facility]   Duration: 4 hours   UF: ?  EDW: 107kg   Access: R permcath   Residual Renal Function: moderate     Plan/Reccomendations     HD today for clearance and volume management.   Caution in administering IVF in ESRD patient   Strict I&Os   Pre and post HD weight

## 2023-01-11 NOTE — PROGRESS NOTES
Received confirmation of chair at [facility] at [address] for 7 treatments MWF at 0645 starting 1/16.  Will alert team to adjust follow as needed.  Will follow.

## 2023-01-11 NOTE — PLAN OF CARE
Day +12 Auto SCT; no acute events this shift. Afebrile & VSS on room air. No PRNs needed. Electrolytes closely monitored, Calcium and Magnesium replaced per orders. Pt remains with poor appetite, tolerated popsicles today. Ambulates independently to bathroom; educated on importance of I/O recording. 1 loose bowel movement today. CHG wipes provided and encouraged use. Pt remains on bed alarm and [employer] camera, instructed to call before getting up and verbalized understanding. POC reviewed with patient and family at bedside; awaiting engraftment. All needs addressed. Will continue to monitor with frequent rounds and clustered care to promote rest.

## 2023-01-12 PROBLEM — E44.0 MODERATE MALNUTRITION: Status: ACTIVE | Noted: 2023-01-12

## 2023-01-12 LAB
ALBUMIN SERPL BCP-MCNC: 3.3 G/DL (ref 3.5–5.2)
ALP SERPL-CCNC: 70 U/L (ref 55–135)
ALT SERPL W/O P-5'-P-CCNC: 6 U/L (ref 10–44)
ANION GAP SERPL CALC-SCNC: 8 MMOL/L (ref 8–16)
ANISOCYTOSIS BLD QL SMEAR: SLIGHT
AST SERPL-CCNC: 7 U/L (ref 10–40)
BASOPHILS # BLD AUTO: 0.01 K/UL (ref 0–0.2)
BASOPHILS NFR BLD: 2.1 % (ref 0–1.9)
BILIRUB SERPL-MCNC: 0.7 MG/DL (ref 0.1–1)
BUN SERPL-MCNC: 20 MG/DL (ref 6–20)
CALCIUM SERPL-MCNC: 8.5 MG/DL (ref 8.7–10.5)
CHLORIDE SERPL-SCNC: 109 MMOL/L (ref 95–110)
CO2 SERPL-SCNC: 23 MMOL/L (ref 23–29)
CREAT SERPL-MCNC: 4.1 MG/DL (ref 0.5–1.4)
DIFFERENTIAL METHOD: ABNORMAL
EOSINOPHIL # BLD AUTO: 0 K/UL (ref 0–0.5)
EOSINOPHIL NFR BLD: 0 % (ref 0–8)
ERYTHROCYTE [DISTWIDTH] IN BLOOD BY AUTOMATED COUNT: 16.6 % (ref 11.5–14.5)
EST. GFR  (NO RACE VARIABLE): 16 ML/MIN/1.73 M^2
GLUCOSE SERPL-MCNC: 110 MG/DL (ref 70–110)
HCT VFR BLD AUTO: 23 % (ref 40–54)
HGB BLD-MCNC: 7.8 G/DL (ref 14–18)
HYPOCHROMIA BLD QL SMEAR: ABNORMAL
IMM GRANULOCYTES # BLD AUTO: 0.01 K/UL (ref 0–0.04)
IMM GRANULOCYTES NFR BLD AUTO: 2.1 % (ref 0–0.5)
LYMPHOCYTES # BLD AUTO: 0 K/UL (ref 1–4.8)
LYMPHOCYTES NFR BLD: 4.3 % (ref 18–48)
MAGNESIUM SERPL-MCNC: 1.7 MG/DL (ref 1.6–2.6)
MCH RBC QN AUTO: 32.6 PG (ref 27–31)
MCHC RBC AUTO-ENTMCNC: 33.9 G/DL (ref 32–36)
MCV RBC AUTO: 96 FL (ref 82–98)
MONOCYTES # BLD AUTO: 0.1 K/UL (ref 0.3–1)
MONOCYTES NFR BLD: 17 % (ref 4–15)
NEUTROPHILS # BLD AUTO: 0.4 K/UL (ref 1.8–7.7)
NEUTROPHILS NFR BLD: 74.5 % (ref 38–73)
NRBC BLD-RTO: 0 /100 WBC
PHOSPHATE SERPL-MCNC: 2.7 MG/DL (ref 2.7–4.5)
PLATELET # BLD AUTO: 11 K/UL (ref 150–450)
PLATELET BLD QL SMEAR: ABNORMAL
PMV BLD AUTO: ABNORMAL FL (ref 9.2–12.9)
POTASSIUM SERPL-SCNC: 3.8 MMOL/L (ref 3.5–5.1)
PROT SERPL-MCNC: 5.6 G/DL (ref 6–8.4)
RBC # BLD AUTO: 2.39 M/UL (ref 4.6–6.2)
SODIUM SERPL-SCNC: 140 MMOL/L (ref 136–145)
WBC # BLD AUTO: 0.47 K/UL (ref 3.9–12.7)

## 2023-01-12 PROCEDURE — 99233 SBSQ HOSP IP/OBS HIGH 50: CPT | Mod: ,,, | Performed by: NURSE PRACTITIONER

## 2023-01-12 PROCEDURE — 99233 PR SUBSEQUENT HOSPITAL CARE,LEVL III: ICD-10-PCS | Mod: ,,, | Performed by: NURSE PRACTITIONER

## 2023-01-12 PROCEDURE — 99232 SBSQ HOSP IP/OBS MODERATE 35: CPT | Mod: ,,, | Performed by: INTERNAL MEDICINE

## 2023-01-12 PROCEDURE — 25000003 PHARM REV CODE 250: Performed by: NURSE PRACTITIONER

## 2023-01-12 PROCEDURE — 99232 PR SUBSEQUENT HOSPITAL CARE,LEVL II: ICD-10-PCS | Mod: ,,, | Performed by: INTERNAL MEDICINE

## 2023-01-12 PROCEDURE — 84100 ASSAY OF PHOSPHORUS: CPT | Performed by: NURSE PRACTITIONER

## 2023-01-12 PROCEDURE — 80053 COMPREHEN METABOLIC PANEL: CPT | Performed by: NURSE PRACTITIONER

## 2023-01-12 PROCEDURE — 85025 COMPLETE CBC W/AUTO DIFF WBC: CPT | Performed by: NURSE PRACTITIONER

## 2023-01-12 PROCEDURE — 83735 ASSAY OF MAGNESIUM: CPT | Performed by: NURSE PRACTITIONER

## 2023-01-12 PROCEDURE — 63600175 PHARM REV CODE 636 W HCPCS: Mod: JG | Performed by: INTERNAL MEDICINE

## 2023-01-12 PROCEDURE — 25000003 PHARM REV CODE 250: Performed by: STUDENT IN AN ORGANIZED HEALTH CARE EDUCATION/TRAINING PROGRAM

## 2023-01-12 PROCEDURE — 63600175 PHARM REV CODE 636 W HCPCS: Performed by: NURSE PRACTITIONER

## 2023-01-12 PROCEDURE — 20600001 HC STEP DOWN PRIVATE ROOM

## 2023-01-12 PROCEDURE — 25000003 PHARM REV CODE 250: Performed by: INTERNAL MEDICINE

## 2023-01-12 RX ORDER — SODIUM CHLORIDE 9 MG/ML
INJECTION, SOLUTION INTRAVENOUS ONCE
Status: COMPLETED | OUTPATIENT
Start: 2023-01-13 | End: 2023-01-14

## 2023-01-12 RX ADMIN — CHOLECALCIFEROL TAB 25 MCG (1000 UNIT) 3000 UNITS: 25 TAB at 09:01

## 2023-01-12 RX ADMIN — SCOPALAMINE 1 PATCH: 1 PATCH, EXTENDED RELEASE TRANSDERMAL at 10:01

## 2023-01-12 RX ADMIN — ACYCLOVIR 400 MG: 200 CAPSULE ORAL at 09:01

## 2023-01-12 RX ADMIN — Medication 1 DOSE: at 09:01

## 2023-01-12 RX ADMIN — CALCIUM CARBONATE (ANTACID) CHEW TAB 500 MG 1000 MG: 500 CHEW TAB at 09:01

## 2023-01-12 RX ADMIN — DOXYCYCLINE HYCLATE 100 MG: 100 TABLET, COATED ORAL at 09:01

## 2023-01-12 RX ADMIN — SERTRALINE HYDROCHLORIDE 50 MG: 50 TABLET ORAL at 09:01

## 2023-01-12 RX ADMIN — LEVOFLOXACIN 250 MG: 250 TABLET, FILM COATED ORAL at 09:01

## 2023-01-12 RX ADMIN — DIPHENOXYLATE HYDROCHLORIDE AND ATROPINE SULFATE 1 TABLET: 2.5; .025 TABLET ORAL at 06:01

## 2023-01-12 RX ADMIN — DIPHENOXYLATE HYDROCHLORIDE AND ATROPINE SULFATE 1 TABLET: 2.5; .025 TABLET ORAL at 11:01

## 2023-01-12 RX ADMIN — Medication 1 DOSE: at 05:01

## 2023-01-12 RX ADMIN — Medication 1 DOSE: at 02:01

## 2023-01-12 RX ADMIN — FLUCONAZOLE 200 MG: 200 TABLET ORAL at 09:01

## 2023-01-12 RX ADMIN — METOPROLOL SUCCINATE 12.5 MG: 25 TABLET, EXTENDED RELEASE ORAL at 09:01

## 2023-01-12 RX ADMIN — FILGRASTIM 480 MCG: 480 INJECTION, SOLUTION INTRAVENOUS; SUBCUTANEOUS at 09:01

## 2023-01-12 RX ADMIN — PANTOPRAZOLE SODIUM 40 MG: 40 TABLET, DELAYED RELEASE ORAL at 09:01

## 2023-01-12 RX ADMIN — HEPARIN SODIUM 1800 UNITS: 1000 INJECTION, SOLUTION INTRAVENOUS; SUBCUTANEOUS at 07:01

## 2023-01-12 NOTE — ASSESSMENT & PLAN NOTE
Mr. Lakhani was diagnosed with multiple myeloma in April 2022 after presenting with ASHLEE. 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 He had bone marrow biopsy which showed 100% involvement by plasma cells. He was started on CyBorD on 5/18 and received two cycles of treatment. His bone marrow biopsy after the first cycle showed a decrease in his plasma cell involvement to 80-90%. Dr. Bunch then started DVRd on 7/5/22. However, he was admitted to the hospital on 7/7/22 with septic shock requiring ICU care. He improved with antibiotics but had an ASHLEE thought to be due to hypotension which resulted in renal failure. Dialysis dependent since 07/22.     Last HD prior to presentation 12/28. S/p chemo on 12/29 and SCT on 12/30.    Nephrology History  iHD Schedule: MWF   Unit/MD: [facility]   Duration: 4 hours   UF: ?  EDW: 107kg   Access: R permcath   Residual Renal Function: moderate     Plan/Reccomendations     HD tomorrow   Caution in administering IVF in ESRD patient   Strict I&Os   Pre and post HD weight

## 2023-01-12 NOTE — PROGRESS NOTES
[facility] - Oncology ([facility])  Adult Nutrition  Progress Note    SUMMARY       Recommendations    1. Continue Regular diet with texture per SLP    2. D/c ONS Novasource renal - pt refused to drink    3. If unable to tolerate PO intake and TF warranted: Novasource renal @ 45ml/hr to provide 2160 kcal, 98g protein, 774 ml fluid.    4. RD to monitor and follow-up.     Goals: Meet % een/epn by next rd f./u  Nutrition Goal Status: progressing towards goal  Communication of RD Recs:  (POC)    Assessment and Plan    Nutrition Problem:  Moderate Protein-Calorie Malnutrition  Malnutrition in the context of Acute Illness/Injury    Related to (etiology):  Inadequate energy intake    Signs and Symptoms (as evidenced by):  Muscle Mass Depletion: mild depletion of temples, clavicle region, interosseous muscle, and lower extremities   Weight Loss: 8% x 3 weeks    Interventions(treatment strategy):  Collaboration with other providers    Nutrition Diagnosis Status:  New     Reason for Assessment    Reason For Assessment: RD follow-up  Diagnosis: cancer diagnosis/related complications (Stem cell transplant candidate)  Relevant Medical History: high-risk kappa light chain multiple myeloma , HTN,  chronic infection of prosthetic knee , ESRD (hemodialysis), HLD  Interdisciplinary Rounds: did not attend    General Information Comments:   Pt reports decreased PO intake due to mouth sores, unable to tolerate solid food, only tolerate thin liquid at this time. Refused to take Novasource. Denies N/V.-240 lb. Noted wt loss of 20lb (8%) x 3 weeks (significant). NFPE completed today, noted mild muscle depletion. RD offer different option of ONS (Boost breeze, beneprotein), pt refused all at this time, will f/u for change of mind. Pt meet the criteria for moderate malnutrition in the context of acute illness.     Nutrition Discharge Planning: EDU: high calorie, high protein diet education and food safety education for bone marrow  "transplant 22    Nutrition Risk Screen    Nutrition Risk Screen: no indicators present    Nutrition/Diet History    Patient Reported Diet/Restrictions/Preferences: general  Typical Food/Fluid Intake: 3 small/medium meals/day  Food Preferences: cheeze its for snack  Spiritual, Cultural Beliefs, Buddhism Practices, Values that Affect Care: no  Vitamin/Mineral/Herbal Supplements: vitamin D  Factors Affecting Nutritional Intake: decreased appetite, nausea/vomiting    Anthropometrics    Temp: 98.4 °F (36.9 °C)  Height Method: Stated  Height: 5' 11" (180.3 cm)  Height (inches): 71 in  Weight Method: Standard Scale  Weight: 100.8 kg (222 lb 3.6 oz)  Weight (lb): 222.23 lb  Ideal Body Weight (IBW), Male: 172 lb  % Ideal Body Weight, Male (lb): 129.65 %  BMI (Calculated): 31  BMI Grade: 30 - 34.9- obesity - grade I  Usual Body Weight (UBW), k.81 kg  % Usual Body Weight: 94.9  % Weight Change From Usual Weight: -5.3 %       Lab/Procedures/Meds    Pertinent Labs Reviewed: reviewed  Pertinent Labs Comments: Cr 4.1, AST 7, ALT 6, eGFR 16.0  Pertinent Medications Reviewed: reviewed  Pertinent Medications Comments: pantoprazole, Ca carbonate, ergocalciferol, vitamin D, Na bicarb    Physical Findings/Assessment         Estimated/Assessed Needs    Weight Used For Calorie Calculations: 100.7 kg (222 lb)  Energy Calorie Requirements (kcal):  kcal  Energy Need Method: [place]-St Jeor (PAL 1.1)  Protein Requirements: 115g (1.5g/kg IBW)  Weight Used For Protein Calculations: 78 kg (172 lb) (IBW)        RDA Method (mL):          Nutrition Prescription Ordered    Current Diet Order: Regular  Oral Nutrition Supplement: Novasource Renal    Evaluation of Received Nutrient/Fluid Intake    I/O: +180 mL  Energy Calories Required: not meeting needs  Protein Required: not meeting needs  Fluid Required: not meeting needs  Comments: LBM   Tolerance: tolerating    Nutrition Risk    Level of Risk/Frequency of Follow-up:  " (1x/week)     Monitor and Evaluation    Food and Nutrient Intake: energy intake, food and beverage intake  Food and Nutrient Adminstration: diet order  Knowledge/Beliefs/Attitudes: food and nutrition knowledge/skill, beliefs and attitudes  Physical Activity and Function: nutrition-related ADLs and IADLs, factors affecting access to physical activity  Anthropometric Measurements: weight, body mass index, weight change  Biochemical Data, Medical Tests and Procedures: electrolyte and renal panel, gastrointestinal profile, glucose/endocrine profile, inflammatory profile, lipid profile  Nutrition-Focused Physical Findings: overall appearance, extremities, muscles and bones, head and eyes, skin     Nutrition Follow-Up    RD Follow-up?: Yes

## 2023-01-12 NOTE — PROGRESS NOTES
[facility] - Oncology ([facility])  Hematology  Bone Marrow Transplant  Progress Note    Patient Name: De Lakhani  Admission Date: 12/28/2022  Hospital Length of Stay: 15 days  Code Status: Full Code    Subjective:     Interval History: Day +13 from a Torie 140 auto SCT for MM. ANC up to 350 today. Remains afebrile. VSS. Oral food intake remains poor 2/2 mucositis. Patient states that tongue ulcer is improving. He understands that oral intake is a contingency for discharge. Expect that he will engraft and be ready for discharge on Saturday. Will plan for discharge teaching this afternoon.    Objective:     Vital Signs (Most Recent):  Temp: 98.7 °F (37.1 °C) (01/12/23 0728)  Pulse: 94 (01/12/23 0728)  Resp: 16 (01/12/23 0728)  BP: 115/77 (01/12/23 0728)  SpO2: 99 % (01/12/23 0728) Vital Signs (24h Range):  Temp:  [98.4 °F (36.9 °C)-99.7 °F (37.6 °C)] 98.7 °F (37.1 °C)  Pulse:  [] 94  Resp:  [16-18] 16  SpO2:  [96 %-100 %] 99 %  BP: (106-153)/(55-94) 115/77     Weight: 100.8 kg (222 lb 3.6 oz)  Body mass index is 30.99 kg/m².  Body surface area is 2.25 meters squared.    ECOG SCORE           [unfilled]    Intake/Output - Last 3 Shifts         01/10 0700 01/11 0659 01/11 0700 01/12 0659 01/12 0700 01/13 0659    P.O. 440 340     Blood 350      Other  600     Total Intake(mL/kg) 790 (7.7) 940 (9.3)     Urine (mL/kg/hr) 1125 (0.5) 925 (0.4)     Other  600     Stool 0 0     Total Output 1125 1525     Net -335 -585            Stool Occurrence 2 x 1 x             Physical Exam  Constitutional:       Appearance: He is well-developed.   HENT:      Head: Normocephalic and atraumatic.      Mouth/Throat:      Pharynx: No oropharyngeal exudate.      Comments: Ulcers to tongue.   Eyes:      General:         Right eye: No discharge.         Left eye: No discharge.      Conjunctiva/sclera: Conjunctivae normal.      Pupils: Pupils are equal, round, and reactive to light.   Cardiovascular:      Rate and Rhythm: Normal rate and  regular rhythm.      Heart sounds: Normal heart sounds. No murmur heard.    No gallop.   Pulmonary:      Effort: Pulmonary effort is normal. No respiratory distress.      Breath sounds: Normal breath sounds. No wheezing or rales.   Abdominal:      General: Bowel sounds are normal. There is no distension.      Palpations: Abdomen is soft.      Tenderness: There is no abdominal tenderness.   Musculoskeletal:         General: No deformity. Normal range of motion.      Cervical back: Normal range of motion and neck supple.   Skin:     General: Skin is warm and dry.      Findings: No erythema or rash.      Comments: Right chest wall dialysis catheter. Dressing c/d/i. No sign of infection to site.   Neurological:      Mental Status: He is alert and oriented to person, place, and time.   Psychiatric:         Behavior: Behavior normal.         Thought Content: Thought content normal.         Judgment: Judgment normal.       Significant Labs:   CBC:   Recent Labs   Lab 01/11/23  0305 01/12/23  0418   WBC 0.07* 0.47*   HGB 8.0* 7.8*   HCT 23.4* 23.0*   PLT 12* 11*      and CMP:   Recent Labs   Lab 01/11/23  0305 01/12/23  0418    140   K 3.6 3.8    109   CO2 22* 23   GLU 96 110   BUN 38* 20   CREATININE 6.2* 4.1*   CALCIUM 7.5* 8.5*   PROT 5.9* 5.6*   ALBUMIN 3.5 3.3*   BILITOT 0.7 0.7   ALKPHOS 74 70   AST 7* 7*   ALT 5* 6*   ANIONGAP 13 8         Diagnostic Results:  I have reviewed all pertinent imaging results/findings within the past 24 hours.    Assessment/Plan:     * History of autologous stem cell transplant  - Patient of Dr. King  - Diagnosed with MM in April 2022. Underwent 2 cycles CyBorD then transitioned to DVRd (now s/p C6)  - Admitted 12/28/22 for Torie 140 auto SCT   - Today is Day +13  - Completed chemotherapy 12/29 without issue  - Received 6 bags with a total CD34 dose of 3.13 x10^6/kg on 12/30/23.  - Awaiting engraftment. ANC up to 350 today.    Planned conditioning regimen:  Melphalan on  Day -1     Antimicrobial Prophylaxis:  Acyclovir starting on Day -1  Levofloxacin starting on Day -1  Fluconazole starting on Day -1     Growth Factor Support:  Neupogen starting on Day +7      Caregiver: Wife  Post-transplant discharge plans: Javed Garcia    Multiple myeloma not having achieved remission  - Patient of Dr. King. Per most recent clinic note:   - 4/20/22: renal biopsy: light chain cast nephropathy, kappa light chain  - 4/26/22: right subclavian vein thrombus   - 4/27/22: M spike 0.2 with free monoclonal kappa band. B2mg 19.57, IgG 496, IgA 10, IgM <5. Kappa 38500, Lambda 7.9, ratio >1000  - 5/12/22: BMBx: plasma cell myeloma, marrow cellularity 100%, plasma cell percentage 100%. Plasma cell phenotype +, kappa +, CD20- -, CD30-, EMA-, SADAF-, [place] red negative. Peripheral blood with pancytopenia and no circulating blasts. Decreased storage iron. FISH canceled due to quantity not sufficient  - 5/18/22: CyBorD C1D1  - 5/23/22: rasburicase  - 5/24/22: Xgeva  - 6/6/22: Kappa 25883, lambda 4.0, ratio >1000, M spike 0.1 with free monoclonal kappa band present  - 6/6/22: CyBorD C2D1  - 6/9/22: BMBx Plasma cell neoplasm compatible with plasma cell myeloma. Extent of involvement 80-90% of bone marrow elements. Cytology: Plasmablastic. FISH cytogenetics positive for 1q21/CKS1B gain. Karyotype failed. Myelofibrosis diffuse (MF-3)  - 6/20/22: CyBorD C2D8 delayed due to covid  - 6/23/22: CyBOrD C2D11  - 7/5/22: C1D1 DVRd  - 7/7/22 - 7/18/22: hospital admission for septic shock resulting in renal failure  - 7/25/22: started again on DVRd  - 8/8/2022: kappa 1402.8, lambda 7.5, ratio 167.04. M spike 0.1, two faint restricted bands in gamma globulin regions.   - 10/10/22: C5D1 DVRd. Revlimid on hold for upcoming stem   - Admitted for Torie 140 auto SCT on 12/28/22 as above    Pancytopenia due to antineoplastic chemotherapy  - transfuse for Hgb <7 or plt < 10K  - daily CBC while inpatient  - continue  antimicrobial ppx  - ANC up to 350 today    Hypomagnesemia  - daily mag levels  - cautious replacement PRN with ESRD    Hypocalcemia  - Continue Ca+ supplement. Increased frequency to TID today (1/11) for persistent hypocalcemia.    Mucositis due to chemotherapy  - continue viscous lidocaine    Hyperphosphatemia  - Likely 2/2 renal disease  - continue Sevelamer 800mg TIDWM    Fall during current hospitalization  - See syncope  - Unclear if patient hit head. CT head neg 1/2 for bleed.  - Tele sitter and fall precautions in place  - continue working with PT/OT      Syncope  - Two falls since admission with reported loss of consciousness. Both following dialysis, so suspect orthostatic  - Metoprolol dose decreased from 25 mg daily to 12.5 mg daily  - Can consider midodrine  - EEG ordered; symptoms now improved so discontinued order for EEG  - continue working with PT/OT  - tele sitter in place    Chemotherapy induced diarrhea  - C-diff neg 12/31  - Improved. Continue PRN imodium and limotil.    CINV (chemotherapy-induced nausea and vomiting)  - Nausea much improved per patient  - changed zofran and compazine back to PRN  - stopped zyprexa  - home Scopalamine patch in place    Hyperlipidemia  - Will hold home statin while inpatient    Depression  - Continue home Zoloft    Deep vein thrombosis (DVT) of upper extremity  - Held home apixiban for thrombocytopenia. Will resume once plts are consistently > 50K.    Hypertension  - Continue home metoprolol. Decreased dose to 12.5 mg daily 1/3/23 due to soft BP and syncopal episodes x 2.    End stage renal disease  - developed during ICU stay. Believed to be 2/2 hypoprofusion due to hypotension  - nephrology consulted on admission  - on dialysis MWF at home. Continuing this schedule inpatient.  - right chest wall dialysis catheter in place  - He will receive dialysis at [facility] in [place] through Day +30    Dependence on hemodialysis  - see ESRD    Chronic infection of  prosthetic knee  - Seen in ID clinic with Dr. Chatterjee prior to transplant   - ID rec'd continuing doxycycline throughout transplant. No sign of infection recurrence at this time.         VTE Risk Mitigation (From admission, onward)         Ordered     Place CRISTHIAN hose  Until discontinued         01/03/23 0911     heparin (porcine) injection 1,000 Units  As needed (PRN)         12/30/22 1243     heparin (porcine) injection 1,600 Units  As needed (PRN)         12/28/22 1825     IP VTE HIGH RISK PATIENT  Once         12/28/22 1659     Place sequential compression device  Until discontinued         12/28/22 1659                Disposition: Inpatient for SCT.    Amy Thomas, NP  Bone Marrow Transplant  [facility] - Oncology ([facility])

## 2023-01-12 NOTE — ASSESSMENT & PLAN NOTE
Recent Labs   Lab 01/10/23  0325 01/11/23  0305 01/12/23  0418   WBC 0.02* 0.07* 0.47*   HGB 8.2* 8.0* 7.8*   HCT 23.7* 23.4* 23.0*   PLT 9* 12* 11*       - Target Hgb 10-11 gm/dL. Hgb 8.0  - Will defer Epogen to primary team

## 2023-01-12 NOTE — ASSESSMENT & PLAN NOTE
- transfuse for Hgb <7 or plt < 10K  - daily CBC while inpatient  - continue antimicrobial ppx  - ANC up to 350 today

## 2023-01-12 NOTE — PROGRESS NOTES
Braden Cramer - Oncology ([facility])  Nephrology  Progress Note    Patient Name: De Lakhani  MRN: [id number]  Admission Date: 12/28/2022  Hospital Length of Stay: 15 days  Attending Provider: Matteo Tellez MD   Primary Care Physician: To Obtain Unable  Principal Problem:History of autologous stem cell transplant    Subjective:     Interval History:   HD performed yesterday. No issues. Day +13 from a Torie 140 auto SCT for MM. Plans for dc this weekend.   Electrolytes and volume status remain stable.     Review of patient's allergies indicates:  No Known Allergies  Current Facility-Administered Medications   Medication Frequency    0.9%  NaCl infusion (for blood administration) Q24H PRN    0.9%  NaCl infusion PRN    [START ON 1/13/2023] 0.9%  NaCl infusion Once    acyclovir capsule 400 mg BID    alteplase injection 2 mg PRN    aluminum-magnesium hydroxide-simethicone 200-200-20 mg/5 mL suspension 30 mL Q6H PRN    calcium carbonate 200 mg calcium (500 mg) chewable tablet 1,000 mg TID    diphenhydrAMINE capsule 25 mg PRN    diphenoxylate-atropine 2.5-0.025 mg per tablet 1 tablet QID PRN    doxycycline tablet 100 mg Q12H    duke's soln (benadryl 30 mL, mylanta 30 mL, LIDOcaine 30 mL, nystatin 30 mL) 120 mL QID PRN    ergocalciferol capsule 50,000 Units Q14 Days    filgrastim (NEUPOGEN) injection 480 mcg/1.6 ml Daily    fluconazole tablet 200 mg Daily    heparin (porcine) injection 1,000 Units PRN    heparin (porcine) injection 1,600 Units PRN    levoFLOXacin tablet 250 mg Every other day    LIDOcaine HCl 2% oral solution 15 mL Q6H PRN    loperamide capsule 2 mg QID PRN    LORazepam injection 1 mg Q6H PRN    metoprolol succinate (TOPROL-XL) 24 hr split tablet 12.5 mg Daily    ondansetron injection 4 mg Q6H PRN    oxyCODONE immediate release tablet 5 mg Q6H PRN    pantoprazole EC tablet 40 mg Daily    prochlorperazine injection Soln 10 mg Q6H PRN    scopolamine 1.3-1.5 mg (1 mg over 3 days) 1  patch Q3 Days    sertraline tablet 50 mg Daily    sevelamer carbonate tablet 800 mg TID WM    sodium bicarb-sodium chloride powder 1 Dose QID    sodium chloride 0.9% flush 10 mL PRN    vitamin D 1000 units tablet 3,000 Units Daily       Objective:     Vital Signs (Most Recent):  Temp: 98.7 °F (37.1 °C) (01/12/23 0728)  Pulse: 94 (01/12/23 0728)  Resp: 16 (01/12/23 0728)  BP: 115/77 (01/12/23 0728)  SpO2: 99 % (01/12/23 0728) Vital Signs (24h Range):  Temp:  [98.4 °F (36.9 °C)-99.7 °F (37.6 °C)] 98.7 °F (37.1 °C)  Pulse:  [] 94  Resp:  [16-18] 16  SpO2:  [97 %-100 %] 99 %  BP: (106-153)/(55-94) 115/77     Weight: 100.8 kg (222 lb 3.6 oz) (01/12/23 0419)  Body mass index is 30.99 kg/m².  Body surface area is 2.25 meters squared.    I/O last 3 completed shifts:  In: 1380 [P.O.:780; Other:600]  Out: 2125 [Urine:1525; Other:600]    Physical Exam  Vitals and nursing note reviewed.   Constitutional:       General: He is not in acute distress.     Appearance: He is not toxic-appearing.   HENT:      Head: Normocephalic.      Mouth/Throat:      Pharynx: Oropharynx is clear.   Eyes:      Conjunctiva/sclera: Conjunctivae normal.   Cardiovascular:      Rate and Rhythm: Normal rate and regular rhythm.      Pulses: Normal pulses.   Pulmonary:      Effort: Pulmonary effort is normal.      Breath sounds: Normal breath sounds.   Abdominal:      Palpations: Abdomen is soft.   Musculoskeletal:      Cervical back: Neck supple.      Right lower leg: No edema.      Left lower leg: No edema.   Skin:     Comments: EDA Boogie    Neurological:      Mental Status: He is alert and oriented to person, place, and time.   Psychiatric:         Mood and Affect: Mood normal.         Behavior: Behavior normal.         Thought Content: Thought content normal.         Judgment: Judgment normal.       Significant Labs:  CBC:   Recent Labs   Lab 01/12/23 0418   WBC 0.47*   RBC 2.39*   HGB 7.8*   HCT 23.0*   PLT 11*   MCV 96   MCH 32.6*   MCHC  33.9     CMP:   Recent Labs   Lab 01/12/23  0418      CALCIUM 8.5*   ALBUMIN 3.3*   PROT 5.6*      K 3.8   CO2 23      BUN 20   CREATININE 4.1*   ALKPHOS 70   ALT 6*   AST 7*   BILITOT 0.7     All labs within the past 24 hours have been reviewed.     Assessment/Plan:     * History of autologous stem cell transplant  -management per primary team     Anemia in ESRD (end-stage renal disease)  Recent Labs   Lab 01/10/23  0325 01/11/23  0305 01/12/23  0418   WBC 0.02* 0.07* 0.47*   HGB 8.2* 8.0* 7.8*   HCT 23.7* 23.4* 23.0*   PLT 9* 12* 11*       - Target Hgb 10-11 gm/dL. Hgb 8.0  - Will defer Epogen to primary team         Chronic kidney disease-mineral and bone disorder  Renal diet, if not NPO    Continue binders    End stage renal disease  Mr. Lakhani was diagnosed with multiple myeloma in April 2022 after presenting with ASHLEE. 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 He had bone marrow biopsy which showed 100% involvement by plasma cells. He was started on CyBorD on 5/18 and received two cycles of treatment. His bone marrow biopsy after the first cycle showed a decrease in his plasma cell involvement to 80-90%. Dr. Bunch then started DVRd on 7/5/22. However, he was admitted to the hospital on 7/7/22 with septic shock requiring ICU care. He improved with antibiotics but had an ASHLEE thought to be due to hypotension which resulted in renal failure. Dialysis dependent since 07/22.     Last HD prior to presentation 12/28. S/p chemo on 12/29 and SCT on 12/30.    Nephrology History  iHD Schedule: MWF   Unit/MD: Theresa   Duration: 4 hours   UF: ?  EDW: 107kg   Access: R permcath   Residual Renal Function: moderate     Plan/Reccomendations     HD tomorrow   Caution in administering IVF in ESRD patient   Strict I&Os   Pre and post HD weight     Multiple myeloma not having achieved remission  -management per primary         Thank you for your consult. I will follow-up with patient.  Please contact us if you have any additional questions.    Kate Pepe DNP, FNP-C  Nephrology  Braden Bela - Oncology ([facility])

## 2023-01-12 NOTE — PROGRESS NOTES
Pt and spouse seen for follow up. Privded details on MWF 0645 dialysis at [facility] at [address] starting 1/16; they were a bit anxious as they were familiar with the [place] location, but pleased by proximity to [facility], where spouse will check in tonight.  No other needs identified at this time. Will continue to follow and assist as needed.

## 2023-01-12 NOTE — NURSING
Pt completed HD. Tolerated well. VSS. NAD. Net zero mL fluid removed. Blood returned. Lines disconnected, flushed with NS, heparin locked, clamped, and capped.    1906- report given to NIK Jimenez.

## 2023-01-12 NOTE — PLAN OF CARE
Day +13 Auto SCT; no acute events this shift. Afebrile & VSS on room air. No PRNs needed. Electrolytes closely monitored, replaced per orders. Pt remains with poor appetite. Ambulates independently to bathroom; educated on importance of I/O recording. 1 loose bowel movement today, PRN lomotil given x1. CHG wipes provided and encouraged use. Pt remains on bed alarm and [employer] camera, instructed to call before getting up and verbalized understanding. POC reviewed with patient and family at bedside; awaiting engraftment. All needs addressed. Will continue to monitor with frequent rounds and clustered care to promote rest.

## 2023-01-12 NOTE — PLAN OF CARE
Recommendations    1. Continue Regular diet with texture per SLP    2. D/c ONS Novasource renal - pt refused to drink    3. If unable to tolerate PO intake and TF warranted: Novasource renal @ 45ml/hr to provide 2160 kcal, 98g protein, 774 ml fluid.    4. RD to monitor and follow-up.     Goals: Meet % een/epn by next rd f./u  Nutrition Goal Status: progressing towards goal  Communication of RD Recs:  (POC)

## 2023-01-12 NOTE — ASSESSMENT & PLAN NOTE
- Patient of Dr. King  - Diagnosed with MM in April 2022. Underwent 2 cycles CyBorD then transitioned to DVRd (now s/p C6)  - Admitted 12/28/22 for Torie 140 auto SCT   - Today is Day +13  - Completed chemotherapy 12/29 without issue  - Received 6 bags with a total CD34 dose of 3.13 x10^6/kg on 12/30/23.  - Awaiting engraftment. ANC up to 350 today.    Planned conditioning regimen:  Melphalan on Day -1     Antimicrobial Prophylaxis:  Acyclovir starting on Day -1  Levofloxacin starting on Day -1  Fluconazole starting on Day -1     Growth Factor Support:  Neupogen starting on Day +7      Caregiver: Wife  Post-transplant discharge plans: [facility]

## 2023-01-12 NOTE — ASSESSMENT & PLAN NOTE
- Seen in ID clinic with Dr. Chatterjee prior to transplant   - ID rec'd continuing doxycycline throughout transplant. No sign of infection recurrence at this time.

## 2023-01-12 NOTE — ASSESSMENT & PLAN NOTE
- developed during ICU stay. Believed to be 2/2 hypoprofusion due to hypotension  - nephrology consulted on admission  - on dialysis MWF at home. Continuing this schedule inpatient.  - right chest wall dialysis catheter in place  - He will receive dialysis at [facility] through Day +30

## 2023-01-12 NOTE — ASSESSMENT & PLAN NOTE
- Patient of Dr. King. Per most recent clinic note:   - 4/20/22: renal biopsy: light chain cast nephropathy, kappa light chain  - 4/26/22: right subclavian vein thrombus   - 4/27/22: M spike 0.2 with free monoclonal kappa band. B2mg 19.57, IgG 496, IgA 10, IgM <5. Kappa 85870, Lambda 7.9, ratio >1000  - 5/12/22: BMBx: plasma cell myeloma, marrow cellularity 100%, plasma cell percentage 100%. Plasma cell phenotype +, kappa +, CD20- -, CD30-, EMA-, SADAF-, Congo red negative. Peripheral blood with pancytopenia and no circulating blasts. Decreased storage iron. FISH canceled due to quantity not sufficient  - 5/18/22: CyBorD C1D1  - 5/23/22: rasburicase  - 5/24/22: Xgeva  - 6/6/22: Kappa 04405, lambda 4.0, ratio >1000, M spike 0.1 with free monoclonal kappa band present  - 6/6/22: CyBorD C2D1  - 6/9/22: BMBx Plasma cell neoplasm compatible with plasma cell myeloma. Extent of involvement 80-90% of bone marrow elements. Cytology: Plasmablastic. FISH cytogenetics positive for 1q21/CKS1B gain. Karyotype failed. Myelofibrosis diffuse (MF-3)  - 6/20/22: CyBorD C2D8 delayed due to covid  - 6/23/22: CyBOrD C2D11  - 7/5/22: C1D1 DVRd  - 7/7/22 - 7/18/22: hospital admission for septic shock resulting in renal failure  - 7/25/22: started again on DVRd  - 8/8/2022: kappa 1402.8, lambda 7.5, ratio 167.04. M spike 0.1, two faint restricted bands in gamma globulin regions.   - 10/10/22: C5D1 DVRd. Revlimid on hold for upcoming stem   - Admitted for Torie 140 auto SCT on 12/28/22 as above

## 2023-01-12 NOTE — ASSESSMENT & PLAN NOTE
Nutrition Problem:  Moderate Protein-Calorie Malnutrition  Malnutrition in the context of Acute Illness/Injury    Related to (etiology):  Inadequate energy intake    Signs and Symptoms (as evidenced by):  Muscle Mass Depletion: mild depletion of temples, clavicle region, interosseous muscle, and lower extremities   Weight Loss: 8% x 3 weeks    Interventions(treatment strategy):  Collaboration with other providers    Nutrition Diagnosis Status:  New

## 2023-01-12 NOTE — SUBJECTIVE & OBJECTIVE
Subjective:     Interval History: Day +13 from a Torie 140 auto SCT for MM. ANC up to 350 today. Remains afebrile. VSS. Oral food intake remains poor 2/2 mucositis. Patient states that tongue ulcer is improving. He understands that oral intake is a contingency for discharge. Expect that he will engraft and be ready for discharge on Saturday. Will plan for discharge teaching this afternoon.    Objective:     Vital Signs (Most Recent):  Temp: 98.7 °F (37.1 °C) (01/12/23 0728)  Pulse: 94 (01/12/23 0728)  Resp: 16 (01/12/23 0728)  BP: 115/77 (01/12/23 0728)  SpO2: 99 % (01/12/23 0728) Vital Signs (24h Range):  Temp:  [98.4 °F (36.9 °C)-99.7 °F (37.6 °C)] 98.7 °F (37.1 °C)  Pulse:  [] 94  Resp:  [16-18] 16  SpO2:  [96 %-100 %] 99 %  BP: (106-153)/(55-94) 115/77     Weight: 100.8 kg (222 lb 3.6 oz)  Body mass index is 30.99 kg/m².  Body surface area is 2.25 meters squared.    ECOG SCORE           [unfilled]    Intake/Output - Last 3 Shifts         01/10 0700  01/11 0659 01/11 0700  01/12 0659 01/12 0700  01/13 0659    P.O. 440 340     Blood 350      Other  600     Total Intake(mL/kg) 790 (7.7) 940 (9.3)     Urine (mL/kg/hr) 1125 (0.5) 925 (0.4)     Other  600     Stool 0 0     Total Output 1125 1525     Net -335 -585            Stool Occurrence 2 x 1 x             Physical Exam  Constitutional:       Appearance: He is well-developed.   HENT:      Head: Normocephalic and atraumatic.      Mouth/Throat:      Pharynx: No oropharyngeal exudate.      Comments: Ulcers to tongue.   Eyes:      General:         Right eye: No discharge.         Left eye: No discharge.      Conjunctiva/sclera: Conjunctivae normal.      Pupils: Pupils are equal, round, and reactive to light.   Cardiovascular:      Rate and Rhythm: Normal rate and regular rhythm.      Heart sounds: Normal heart sounds. No murmur heard.    No gallop.   Pulmonary:      Effort: Pulmonary effort is normal. No respiratory distress.      Breath sounds: Normal breath  sounds. No wheezing or rales.   Abdominal:      General: Bowel sounds are normal. There is no distension.      Palpations: Abdomen is soft.      Tenderness: There is no abdominal tenderness.   Musculoskeletal:         General: No deformity. Normal range of motion.      Cervical back: Normal range of motion and neck supple.   Skin:     General: Skin is warm and dry.      Findings: No erythema or rash.      Comments: Right chest wall dialysis catheter. Dressing c/d/i. No sign of infection to site.   Neurological:      Mental Status: He is alert and oriented to person, place, and time.   Psychiatric:         Behavior: Behavior normal.         Thought Content: Thought content normal.         Judgment: Judgment normal.       Significant Labs:   CBC:   Recent Labs   Lab 01/11/23  0305 01/12/23  0418   WBC 0.07* 0.47*   HGB 8.0* 7.8*   HCT 23.4* 23.0*   PLT 12* 11*      and CMP:   Recent Labs   Lab 01/11/23  0305 01/12/23  0418    140   K 3.6 3.8    109   CO2 22* 23   GLU 96 110   BUN 38* 20   CREATININE 6.2* 4.1*   CALCIUM 7.5* 8.5*   PROT 5.9* 5.6*   ALBUMIN 3.5 3.3*   BILITOT 0.7 0.7   ALKPHOS 74 70   AST 7* 7*   ALT 5* 6*   ANIONGAP 13 8         Diagnostic Results:  I have reviewed all pertinent imaging results/findings within the past 24 hours.

## 2023-01-12 NOTE — PLAN OF CARE
Plan of care reviewed with patient. Pt is day +13 of an Auto SCT. Afebrile. Free from falls or injury. No complaints of pain. Up with assistance to bathroom. Bed locked in lowest position, non skid socks on, call light within reach. Pt instructed to call if any assistance is needed. Vitals stable. Wife at bedside. Bed alarm on. [employer] camera in use. Will cont to toña pt.

## 2023-01-12 NOTE — PROGRESS NOTES
Discharge teaching done with patient and family on BMT unit.  Approximately 30 minutes spent on discussion of post-transplant guidelines including: safe food handling, dining out, home sanitation, outpatient plan of care, progression of recovery of cells and immune system, ways to prevent infection, fatigue, ways to avoid bleeding, pet and plant exposure and care, returning to work, smoking and alcohol consumption, sexual activity, sexual desire and response, immunizations, traveling, nutrition, personal hygiene, shingles, hand washing, oral care, eye care, signs and symptoms to report immediately,  and emotional changes post transplant.  Also discussed who to contact during business hours, after hours, and holidays.  Written materials supplied.  Patient and family given the opportunity to ask questions.  Verbalizes understanding.  All questions answered to their satisfaction.      Amy Thomas, ERNAP  Hematology/Oncology/Bone Marrow Transplant

## 2023-01-13 LAB
ABO + RH BLD: ABNORMAL
ALBUMIN SERPL BCP-MCNC: 3.4 G/DL (ref 3.5–5.2)
ALP SERPL-CCNC: 71 U/L (ref 55–135)
ALT SERPL W/O P-5'-P-CCNC: 6 U/L (ref 10–44)
ANION GAP SERPL CALC-SCNC: 10 MMOL/L (ref 8–16)
ANISOCYTOSIS BLD QL SMEAR: SLIGHT
AST SERPL-CCNC: 6 U/L (ref 10–40)
BASOPHILS # BLD AUTO: 0.01 K/UL (ref 0–0.2)
BASOPHILS NFR BLD: 0.9 % (ref 0–1.9)
BILIRUB SERPL-MCNC: 0.6 MG/DL (ref 0.1–1)
BLD GP AB SCN CELLS X3 SERPL QL: ABNORMAL
BLD PROD TYP BPU: NORMAL
BLOOD UNIT EXPIRATION DATE: NORMAL
BLOOD UNIT TYPE CODE: 6200
BLOOD UNIT TYPE: NORMAL
BUN SERPL-MCNC: 27 MG/DL (ref 6–20)
CALCIUM SERPL-MCNC: 8 MG/DL (ref 8.7–10.5)
CHLORIDE SERPL-SCNC: 107 MMOL/L (ref 95–110)
CO2 SERPL-SCNC: 23 MMOL/L (ref 23–29)
CODING SYSTEM: NORMAL
CREAT SERPL-MCNC: 5.3 MG/DL (ref 0.5–1.4)
DACRYOCYTES BLD QL SMEAR: ABNORMAL
DIFFERENTIAL METHOD: ABNORMAL
DISPENSE STATUS: NORMAL
EOSINOPHIL # BLD AUTO: 0 K/UL (ref 0–0.5)
EOSINOPHIL NFR BLD: 0 % (ref 0–8)
ERYTHROCYTE [DISTWIDTH] IN BLOOD BY AUTOMATED COUNT: 17 % (ref 11.5–14.5)
EST. GFR  (NO RACE VARIABLE): 11.8 ML/MIN/1.73 M^2
GLUCOSE SERPL-MCNC: 86 MG/DL (ref 70–110)
HCT VFR BLD AUTO: 23.4 % (ref 40–54)
HGB BLD-MCNC: 7.7 G/DL (ref 14–18)
IMM GRANULOCYTES # BLD AUTO: 0.02 K/UL (ref 0–0.04)
IMM GRANULOCYTES NFR BLD AUTO: 1.9 % (ref 0–0.5)
LYMPHOCYTES # BLD AUTO: 0 K/UL (ref 1–4.8)
LYMPHOCYTES NFR BLD: 3.7 % (ref 18–48)
MAGNESIUM SERPL-MCNC: 1.6 MG/DL (ref 1.6–2.6)
MCH RBC QN AUTO: 32.1 PG (ref 27–31)
MCHC RBC AUTO-ENTMCNC: 32.9 G/DL (ref 32–36)
MCV RBC AUTO: 98 FL (ref 82–98)
MONOCYTES # BLD AUTO: 0.1 K/UL (ref 0.3–1)
MONOCYTES NFR BLD: 13 % (ref 4–15)
NEUTROPHILS # BLD AUTO: 0.9 K/UL (ref 1.8–7.7)
NEUTROPHILS NFR BLD: 80.5 % (ref 38–73)
NRBC BLD-RTO: 0 /100 WBC
OVALOCYTES BLD QL SMEAR: ABNORMAL
PHOSPHATE SERPL-MCNC: 2.6 MG/DL (ref 2.7–4.5)
PLATELET # BLD AUTO: 8 K/UL (ref 150–450)
PLATELET BLD QL SMEAR: ABNORMAL
PMV BLD AUTO: 10.6 FL (ref 9.2–12.9)
POIKILOCYTOSIS BLD QL SMEAR: SLIGHT
POLYCHROMASIA BLD QL SMEAR: ABNORMAL
POTASSIUM SERPL-SCNC: 3.5 MMOL/L (ref 3.5–5.1)
PROT SERPL-MCNC: 5.5 G/DL (ref 6–8.4)
RBC # BLD AUTO: 2.4 M/UL (ref 4.6–6.2)
SODIUM SERPL-SCNC: 140 MMOL/L (ref 136–145)
UNIT NUMBER: NORMAL
WBC # BLD AUTO: 1.08 K/UL (ref 3.9–12.7)

## 2023-01-13 PROCEDURE — 20600001 HC STEP DOWN PRIVATE ROOM

## 2023-01-13 PROCEDURE — 83735 ASSAY OF MAGNESIUM: CPT | Performed by: NURSE PRACTITIONER

## 2023-01-13 PROCEDURE — 99233 PR SUBSEQUENT HOSPITAL CARE,LEVL III: ICD-10-PCS | Mod: ,,, | Performed by: INTERNAL MEDICINE

## 2023-01-13 PROCEDURE — 85025 COMPLETE CBC W/AUTO DIFF WBC: CPT | Performed by: NURSE PRACTITIONER

## 2023-01-13 PROCEDURE — 99233 SBSQ HOSP IP/OBS HIGH 50: CPT | Mod: ,,, | Performed by: NURSE PRACTITIONER

## 2023-01-13 PROCEDURE — 63600175 PHARM REV CODE 636 W HCPCS: Mod: JG | Performed by: INTERNAL MEDICINE

## 2023-01-13 PROCEDURE — 99233 SBSQ HOSP IP/OBS HIGH 50: CPT | Mod: ,,, | Performed by: INTERNAL MEDICINE

## 2023-01-13 PROCEDURE — 27201247 HC HEMODIALYSIS, SET-UP & CANCEL

## 2023-01-13 PROCEDURE — 25000003 PHARM REV CODE 250: Performed by: NURSE PRACTITIONER

## 2023-01-13 PROCEDURE — 84100 ASSAY OF PHOSPHORUS: CPT | Performed by: NURSE PRACTITIONER

## 2023-01-13 PROCEDURE — P9037 PLATE PHERES LEUKOREDU IRRAD: HCPCS | Performed by: NURSE PRACTITIONER

## 2023-01-13 PROCEDURE — 86900 BLOOD TYPING SEROLOGIC ABO: CPT | Performed by: INTERNAL MEDICINE

## 2023-01-13 PROCEDURE — 25000003 PHARM REV CODE 250: Performed by: INTERNAL MEDICINE

## 2023-01-13 PROCEDURE — 63600175 PHARM REV CODE 636 W HCPCS: Mod: JG | Performed by: NURSE PRACTITIONER

## 2023-01-13 PROCEDURE — 99233 PR SUBSEQUENT HOSPITAL CARE,LEVL III: ICD-10-PCS | Mod: ,,, | Performed by: NURSE PRACTITIONER

## 2023-01-13 PROCEDURE — 36593 DECLOT VASCULAR DEVICE: CPT

## 2023-01-13 PROCEDURE — 80053 COMPREHEN METABOLIC PANEL: CPT | Performed by: NURSE PRACTITIONER

## 2023-01-13 PROCEDURE — 36430 TRANSFUSION BLD/BLD COMPNT: CPT

## 2023-01-13 RX ORDER — HYDROCODONE BITARTRATE AND ACETAMINOPHEN 500; 5 MG/1; MG/1
TABLET ORAL
Status: DISCONTINUED | OUTPATIENT
Start: 2023-01-13 | End: 2023-01-14 | Stop reason: HOSPADM

## 2023-01-13 RX ORDER — LOPERAMIDE HYDROCHLORIDE 2 MG/1
2 CAPSULE ORAL 4 TIMES DAILY PRN
Qty: 30 CAPSULE | Refills: 0 | Status: SHIPPED | OUTPATIENT
Start: 2023-01-13 | End: 2023-01-23

## 2023-01-13 RX ORDER — ACETAMINOPHEN 325 MG/1
650 TABLET ORAL ONCE AS NEEDED
Status: COMPLETED | OUTPATIENT
Start: 2023-01-13 | End: 2023-01-13

## 2023-01-13 RX ORDER — ACYCLOVIR 200 MG/1
400 CAPSULE ORAL 2 TIMES DAILY
Qty: 120 CAPSULE | Refills: 11 | Status: SHIPPED | OUTPATIENT
Start: 2023-01-13 | End: 2024-01-13

## 2023-01-13 RX ORDER — CALCIUM CARBONATE 200(500)MG
1000 TABLET,CHEWABLE ORAL 3 TIMES DAILY
Qty: 180 TABLET | Refills: 11 | Status: SHIPPED | OUTPATIENT
Start: 2023-01-13 | End: 2024-01-13

## 2023-01-13 RX ORDER — SEVELAMER CARBONATE 800 MG/1
800 TABLET, FILM COATED ORAL
Qty: 90 TABLET | Refills: 11 | Status: SHIPPED | OUTPATIENT
Start: 2023-01-13 | End: 2024-01-13

## 2023-01-13 RX ORDER — LIDOCAINE HYDROCHLORIDE 20 MG/ML
15 SOLUTION OROPHARYNGEAL EVERY 6 HOURS PRN
Qty: 100 ML | Refills: 0 | Status: SHIPPED | OUTPATIENT
Start: 2023-01-13

## 2023-01-13 RX ORDER — METOPROLOL SUCCINATE 25 MG/1
12.5 TABLET, EXTENDED RELEASE ORAL DAILY
Qty: 15 TABLET | Refills: 11 | Status: SHIPPED | OUTPATIENT
Start: 2023-01-13 | End: 2024-01-13

## 2023-01-13 RX ADMIN — DOXYCYCLINE HYCLATE 100 MG: 100 TABLET, COATED ORAL at 08:01

## 2023-01-13 RX ADMIN — Medication 1 DOSE: at 05:01

## 2023-01-13 RX ADMIN — Medication 1 DOSE: at 09:01

## 2023-01-13 RX ADMIN — Medication 1 DOSE: at 08:01

## 2023-01-13 RX ADMIN — DOXYCYCLINE HYCLATE 100 MG: 100 TABLET, COATED ORAL at 09:01

## 2023-01-13 RX ADMIN — Medication 1 DOSE: at 01:01

## 2023-01-13 RX ADMIN — CALCIUM CARBONATE (ANTACID) CHEW TAB 500 MG 1000 MG: 500 CHEW TAB at 08:01

## 2023-01-13 RX ADMIN — HEPARIN SODIUM 1600 UNITS: 1000 INJECTION, SOLUTION INTRAVENOUS; SUBCUTANEOUS at 03:01

## 2023-01-13 RX ADMIN — CALCIUM CARBONATE (ANTACID) CHEW TAB 500 MG 1000 MG: 500 CHEW TAB at 09:01

## 2023-01-13 RX ADMIN — CHOLECALCIFEROL TAB 25 MCG (1000 UNIT) 3000 UNITS: 25 TAB at 09:01

## 2023-01-13 RX ADMIN — ALTEPLASE 4 MG: 2.2 INJECTION, POWDER, LYOPHILIZED, FOR SOLUTION INTRAVENOUS at 10:01

## 2023-01-13 RX ADMIN — ACETAMINOPHEN 650 MG: 325 TABLET ORAL at 12:01

## 2023-01-13 RX ADMIN — CALCIUM CARBONATE (ANTACID) CHEW TAB 500 MG 1000 MG: 500 CHEW TAB at 05:01

## 2023-01-13 RX ADMIN — FILGRASTIM 480 MCG: 480 INJECTION, SOLUTION INTRAVENOUS; SUBCUTANEOUS at 09:01

## 2023-01-13 RX ADMIN — LOPERAMIDE HYDROCHLORIDE 2 MG: 2 CAPSULE ORAL at 06:01

## 2023-01-13 RX ADMIN — HEPARIN SODIUM 1600 UNITS: 1000 INJECTION, SOLUTION INTRAVENOUS; SUBCUTANEOUS at 12:01

## 2023-01-13 RX ADMIN — SERTRALINE HYDROCHLORIDE 50 MG: 50 TABLET ORAL at 09:01

## 2023-01-13 RX ADMIN — ACYCLOVIR 400 MG: 200 CAPSULE ORAL at 08:01

## 2023-01-13 NOTE — ASSESSMENT & PLAN NOTE
- Patient of Dr. King  - Diagnosed with MM in April 2022. Underwent 2 cycles CyBorD then transitioned to DVRd (now s/p C6)  - Admitted 12/28/22 for Torie 140 auto SCT   - Today is Day +14  - Completed chemotherapy 12/29 without issue  - Received 6 bags with a total CD34 dose of 3.13 x10^6/kg on 12/30/23.  - Engrafted today with an ANC of 869.    Planned conditioning regimen:  Melphalan on Day -1     Antimicrobial Prophylaxis:  Acyclovir starting on Day -1  Levofloxacin starting on Day -1  Fluconazole starting on Day -1     Growth Factor Support:  Neupogen starting on Day +7      Caregiver: Wife  Post-transplant discharge plans: [facility]

## 2023-01-13 NOTE — PLAN OF CARE
Day +14 of auto SCT. Patient AAOx4. Afebrile and VSS. No complaints of pain or nausea. Wife remains at bedside. Bed in lowest position. Side rails up x2. All possessions and call light within reach. Non-skid socks worn when out of bed. Bed alarm on and [employer] in room. Instructed to call for assistance before ambulating. All needs of patient currently met. Will continue to monitor with frequent rounding.

## 2023-01-13 NOTE — PROGRESS NOTES
BMT Pharmacist Discharge Note     All discharge medications were reviewed with the patient and patient's wife. See below:    Medication Indication Morning Afternoon Evening/Night   **Acyclovir 400mg  Viral infection prevention  2 capsules  2 capsules   Doxycycline 100mg Chronic Prosthetic Infection 1 tablet  1 tablet   Famotidine 20mg  Heart burn 1 tablet     Cholecalciferol 75mcg Vitamin D3 Supplement 1 tablet      Ergocalciferol 50,000units Vitamin D2 Supplement 1 capsule once every 2 weeks   **Sevelamer 800mg Phosphorus binder 1 tablet w/ food 1 tablet w/ food 1 tablet w/ food   Calcium Carbonate 200mg (500mg) Calcium Supplement 2 tablets 2 tablets 2 tablets   Metoprolol Succinate 25mg 24hr  Heart Rate  0.5 tablet     Rosuvastatin 20mg  Cholesterol  1 tablet      Sertraline 50mg  Mood  1 tablet     **new medication or change in medication at discharge    AS NEEDED MEDICATIONS:  **Loperamide (Imodium) 2mg four times a day as needed for diarrhea  **Lidocaine 2% solution 15mLs every 6 hours as needed for mouth sores (swish and spit)  **Ondansetron (Zofran) 4mg every 8 hours as needed for nausea   Scopolamine 1.3-1.5 mg every 3 days for nausea     STOP UNTIL TOLD TO RESTART:   Apixaban    NO LONGER TAKE:   Calcitriol  Dapsone  Promethazine  Dexamethasone    Notes:   I discussed the importance of taking acyclovir up until day +365 to prevent viral infections. Patient is aware to hold metoprolol before dialysis sessions for now. Also, patient is aware to hold eliquis until instructed to resume. I educated patient to avoid any tylenol or ibuprofen products for at least one month after transplant. Patient has tramadol at home to use for headache.     All questions were answered.    Ryanne Garay, PharmD, [facility]  Hematology/Oncology - BMT   Ext. [phone]

## 2023-01-13 NOTE — ASSESSMENT & PLAN NOTE
- developed during ICU stay. Believed to be 2/2 hypoprofusion due to hypotension  - nephrology consulted on admission  - on dialysis MWF at home. Continuing this schedule inpatient.  - right chest wall dialysis catheter in place  - He will receive dialysis at [facility] through Day +30

## 2023-01-13 NOTE — PROGRESS NOTES
Braden Cramer - Oncology ([facility])  Hematology  Bone Marrow Transplant  Progress Note    Patient Name: De Lakhani  Admission Date: 12/28/2022  Hospital Length of Stay: 16 days  Code Status: Full Code    Subjective:     Interval History: Day +14 from a Torie 140 auto SCT for MM. Engrafted today with an ANC of 869. Remains afebrile. VSS. Tongue ulcer pain improving per patient. Will receive dialysis today. Transplant discharge teaching completed yesterday in anticipation of discharge tomorrow. Pharmacy teaching to be completed today.    Objective:     Vital Signs (Most Recent):  Temp: 98.3 °F (36.8 °C) (01/13/23 0742)  Pulse: 98 (01/13/23 0742)  Resp: 18 (01/13/23 0742)  BP: 114/77 (01/13/23 0742)  SpO2: 100 % (01/13/23 0742) Vital Signs (24h Range):  Temp:  [98.1 °F (36.7 °C)-98.6 °F (37 °C)] 98.3 °F (36.8 °C)  Pulse:  [] 98  Resp:  [16-18] 18  SpO2:  [95 %-100 %] 100 %  BP: (109-127)/(68-77) 114/77     Weight: 99.9 kg (220 lb 3.8 oz)  Body mass index is 30.72 kg/m².  Body surface area is 2.24 meters squared.    ECOG SCORE           [unfilled]    Intake/Output - Last 3 Shifts         01/11 0700 01/12 0659 01/12 0700 01/13 0659 01/13 0700 01/14 0659    P.O. 340 240 200    Blood       Other 600      Total Intake(mL/kg) 940 (9.3) 240 (2.4) 200 (2)    Urine (mL/kg/hr) 925 (0.4) 350 (0.1)     Other 600      Stool 0      Total Output 1525 350     Net -585 -110 +200           Stool Occurrence 1 x 2 x             Physical Exam  Constitutional:       Appearance: He is well-developed.   HENT:      Head: Normocephalic and atraumatic.      Mouth/Throat:      Pharynx: No oropharyngeal exudate.      Comments: Ulcers to tongue.   Eyes:      General:         Right eye: No discharge.         Left eye: No discharge.      Conjunctiva/sclera: Conjunctivae normal.      Pupils: Pupils are equal, round, and reactive to light.   Cardiovascular:      Rate and Rhythm: Normal rate and regular rhythm.      Heart sounds: Normal heart sounds.  No murmur heard.    No gallop.   Pulmonary:      Effort: Pulmonary effort is normal. No respiratory distress.      Breath sounds: Normal breath sounds. No wheezing or rales.   Abdominal:      General: Bowel sounds are normal. There is no distension.      Palpations: Abdomen is soft.      Tenderness: There is no abdominal tenderness.   Musculoskeletal:         General: No deformity. Normal range of motion.      Cervical back: Normal range of motion and neck supple.   Skin:     General: Skin is warm and dry.      Findings: No erythema or rash.      Comments: Right chest wall dialysis catheter. Dressing c/d/i. No sign of infection to site.   Neurological:      Mental Status: He is alert and oriented to person, place, and time.   Psychiatric:         Behavior: Behavior normal.         Thought Content: Thought content normal.         Judgment: Judgment normal.       Significant Labs:   CBC:   Recent Labs   Lab 01/12/23 0418 01/13/23 0337   WBC 0.47* 1.08*   HGB 7.8* 7.7*   HCT 23.0* 23.4*   PLT 11*  --       and CMP:   Recent Labs   Lab 01/12/23 0418 01/13/23  0337    140   K 3.8 3.5    107   CO2 23 23    86   BUN 20 27*   CREATININE 4.1* 5.3*   CALCIUM 8.5* 8.0*   PROT 5.6* 5.5*   ALBUMIN 3.3* 3.4*   BILITOT 0.7 0.6   ALKPHOS 70 71   AST 7* 6*   ALT 6* 6*   ANIONGAP 8 10         Diagnostic Results:  I have reviewed all pertinent imaging results/findings within the past 24 hours.    Assessment/Plan:     * History of autologous stem cell transplant  - Patient of Dr. King  - Diagnosed with MM in April 2022. Underwent 2 cycles CyBorD then transitioned to DVRd (now s/p C6)  - Admitted 12/28/22 for Torie 140 auto SCT   - Today is Day +14  - Completed chemotherapy 12/29 without issue  - Received 6 bags with a total CD34 dose of 3.13 x10^6/kg on 12/30/23.  - Engrafted today with an ANC of 869.    Planned conditioning regimen:  Melphalan on Day -1     Antimicrobial Prophylaxis:  Acyclovir starting on Day  -1  Levofloxacin starting on Day -1  Fluconazole starting on Day -1     Growth Factor Support:  Neupogen starting on Day +7      Caregiver: Wife  Post-transplant discharge plans: Javed Garcia    Multiple myeloma not having achieved remission  - Patient of Dr. King. Per most recent clinic note:   - 4/20/22: renal biopsy: light chain cast nephropathy, kappa light chain  - 4/26/22: right subclavian vein thrombus   - 4/27/22: M spike 0.2 with free monoclonal kappa band. B2mg 19.57, IgG 496, IgA 10, IgM <5. Kappa 36529, Lambda 7.9, ratio >1000  - 5/12/22: BMBx: plasma cell myeloma, marrow cellularity 100%, plasma cell percentage 100%. Plasma cell phenotype +, kappa +, CD20- -, CD30-, EMA-, SADAF-, Congo red negative. Peripheral blood with pancytopenia and no circulating blasts. Decreased storage iron. FISH canceled due to quantity not sufficient  - 5/18/22: CyBorD C1D1  - 5/23/22: rasburicase  - 5/24/22: Xgeva  - 6/6/22: Kappa 52700, lambda 4.0, ratio >1000, M spike 0.1 with free monoclonal kappa band present  - 6/6/22: CyBorD C2D1  - 6/9/22: BMBx Plasma cell neoplasm compatible with plasma cell myeloma. Extent of involvement 80-90% of bone marrow elements. Cytology: Plasmablastic. FISH cytogenetics positive for 1q21/CKS1B gain. Karyotype failed. Myelofibrosis diffuse (MF-3)  - 6/20/22: CyBorD C2D8 delayed due to covid  - 6/23/22: CyBOrD C2D11  - 7/5/22: C1D1 DVRd  - 7/7/22 - 7/18/22: hospital admission for septic shock resulting in renal failure  - 7/25/22: started again on DVRd  - 8/8/2022: kappa 1402.8, lambda 7.5, ratio 167.04. M spike 0.1, two faint restricted bands in gamma globulin regions.   - 10/10/22: C5D1 DVRd. Revlimid on hold for upcoming stem   - Admitted for Torie 140 auto SCT on 12/28/22 as above    Pancytopenia due to antineoplastic chemotherapy  - transfuse for Hgb <7 or plt < 10K  - daily CBC while inpatient  - continue antimicrobial ppx      Moderate malnutrition  Nutrition consulted. Most  recent weight and BMI monitored-                         Measurements:  Wt Readings from Last 1 Encounters:   01/13/23 99.9 kg (220 lb 3.8 oz)   Body mass index is 30.72 kg/m².    Recommendations: Recommendation/Intervention: 1. Continue Regular diet with texture per SLP-- add renal restrictions, if warranted.   2. D/c ONS Novasource renal - pt refused to drink  3. If unable to tolerate PO intake and TF warranted: Novasource renal @ 45ml/hr to provide 2160 kcal, 98g protein, 774 ml fluid.  4. RD to monitor and follow-up.  Goals: Meet % een/epn by next rd f./u    Patient has been screened and assessed by RD. RD will follow patient.      Hypomagnesemia  - daily mag levels  - cautious replacement PRN with ESRD    Hypocalcemia  - Continue Ca+ supplement. Increased frequency to TID today (1/11) for persistent hypocalcemia.    Mucositis due to chemotherapy  - continue viscous lidocaine    Hyperphosphatemia  - Likely 2/2 renal disease  - continue Sevelamer 800mg TIDWM    Fall during current hospitalization  - See syncope  - Unclear if patient hit head. CT head neg 1/2 for bleed.  - Tele sitter and fall precautions in place  - continue working with PT/OT      Syncope  - Two falls since admission with reported loss of consciousness. Both following dialysis, so suspect orthostatic  - Metoprolol dose decreased from 25 mg daily to 12.5 mg daily  - Can consider midodrine  - EEG ordered; symptoms now improved so discontinued order for EEG  - continue working with PT/OT  - tele sitter in place    Chemotherapy induced diarrhea  - C-diff neg 12/31  - Improved. Continue PRN imodium and limotil.    CINV (chemotherapy-induced nausea and vomiting)  - Nausea much improved per patient  - changed zofran and compazine back to PRN  - stopped zyprexa  - home Scopalamine patch in place    Hyperlipidemia  - Will hold home statin while inpatient    Depression  - Continue home Zoloft    Deep vein thrombosis (DVT) of upper extremity  - Held  home apixiban for thrombocytopenia. Will resume once plts are consistently > 50K.    Hypertension  - Continue home metoprolol. Decreased dose to 12.5 mg daily 1/3/23 due to soft BP and syncopal episodes x 2.    End stage renal disease  - developed during ICU stay. Believed to be 2/2 hypoprofusion due to hypotension  - nephrology consulted on admission  - on dialysis MWF at home. Continuing this schedule inpatient.  - right chest wall dialysis catheter in place  - He will receive dialysis at [facility] through Day +30    Dependence on hemodialysis  - see ESRD    Chronic infection of prosthetic knee  - Seen in ID clinic with Dr. Chatterjee prior to transplant   - ID rec'd continuing doxycycline throughout transplant. No sign of infection recurrence at this time.         VTE Risk Mitigation (From admission, onward)         Ordered     Place CRISTHIAN hose  Until discontinued         01/03/23 0911     heparin (porcine) injection 1,000 Units  As needed (PRN)         12/30/22 1243     heparin (porcine) injection 1,600 Units  As needed (PRN)         12/28/22 1825     IP VTE HIGH RISK PATIENT  Once         12/28/22 1659     Place sequential compression device  Until discontinued         12/28/22 1659                Disposition: Inpatient for SCT.    Amy Thomas, NP  Bone Marrow Transplant  Braden Cramer - Oncology ([facility])

## 2023-01-13 NOTE — PHYSICIAN QUERY
PT Name: De Lakhani  MR #: [id number]    DOCUMENTATION CLARIFICATION     CDS: Kayla Peacock RN               Contact information: [email or web address] or (cell) [phone]     This form is a permanent document in the medical record.     Query Date: 2023    By submitting this query, we are merely seeking further clarification of documentation.. Please utilize your independent clinical judgment when addressing the question(s) below.    The medical record contains the following:   Indicators  Supporting Clinical Findings Location in Medical Record   x Energy Intake Energy Calories Required: not meeting needs  Inadequate energy intake   Nutrition PN 1   x Weight Loss Weight Loss: 8% x 3 weeks   Nutrition     Fat Loss     x Muscle Loss Muscle Mass Depletion: mild depletion of temples, clavicle region, interosseous muscle, and lower extremities    Nutrition PN 1    Edema/Fluid Accumulation      Reduced  Strength (by dynamometer)     x Weight, BMI, Usual Body Weight Weight (lb): 222.23 lb  Ideal Body Weight (IBW), Male: 172 lb  % Ideal Body Weight, Male (lb): 129.65 %  BMI (Calculated): 31  BMI Grade: 30 - 34.9- obesity - grade I  Usual Body Weight (UBW), k.81 kg  % Usual Body Weight: 94.9  % Weight Change From Usual Weight: -5.3 %   Nutrition PN 1    Delayed Wound Healing     x Registered Dietician Diagnosis Moderate Protein-Calorie Malnutrition  Malnutrition in the context of Acute Illness/Injury   Nutrition    x Acute or Chronic Illness high-risk kappa light chain multiple myeloma , HTN,  chronic infection of prosthetic knee , ESRD (hemodialysis), HLD   Nutrition PN     Social or Environmental Circumstances     x Treatment 1. Continue Regular diet with texture per SLP     2. D/c ONS Novasource renal - pt refused to drink     3. If unable to tolerate PO intake and TF warranted: Novasource renal @ 45ml/hr to provide 2160 kcal, 98g protein, 774 ml fluid.   Nutrition  PN 1/12   x Other Pt reports decreased PO intake due to mouth sores, unable to tolerate solid food, only tolerate thin liquid at this time. Refused to take Novasource. Denies N/V.-240 lb. Noted wt loss of 20lb (8%) x 3 weeks (significant). NFPE completed today, noted mild muscle depletion. RD offer different option of ONS (Boost breeze, beneprotein), pt refused all at this time, will f/u for change of mind. Pt meet the criteria for moderate malnutrition in the context of acute illness.         Academy of Nutrition and Dietetics (Academy) and the American Society for Parenteral and Enteral Nutrition (A.S.P.E.N.) Clinical Characteristics to support Malnutrition   Malnutrition in the Context of Acute Illness or Injury Malnutrition in the Context of Chronic Illness or Injury Malnutrition in the Context of Social or Environmental Circumstances   Malnutrition Level Moderate Severe Moderate Severe   Moderate   Severe   Energy Intake <75%                   >7 days <50%                 >5 days <75%           >1 month <75%                      >1 month   <75% for >3 months   <50% for >1 month   Weight Loss   1-2% in 1 week >2% in 1 week 5% in 1 month >5% in 1 month 5% in 1 month >5% in 1 month    5% in 1 month >5% in 1 month 7.5% in 3 months >7.5% in 3 months 7.5% in 3 months >7.5% in 3 months    7.5% in 3 months >7.5% in 3 months 10% in 6 months >10% in 6 months 10% in 6 months >10% in 6 months        20% in 1 year                    >20% in 1 year                                                                  20% in 1 year                            >20% in 1 year                                                  Subcutaneous Fat Loss Mild  Moderate  Mild  Severe    Mild   Severe   Muscle Loss Mild  Moderate  Mild  Severe    Mild   Severe   Edema/Fluid Accumulation Mild Moderate to severe  Mild  Severe   Mild   Severe   Reduced  Strength         (based on standards supplied by  of dynamometer) N/A  Measurably reduced N/A Measurably reduced N/A Measurably reduced     Criteria for mild malnutrition is defined as 1 characteristic outlined above within the established moderate or severe parameters.  A minimum of 2 out of the 6 characteristics noted above are recommended for a diagnosis of moderate or severe malnutrition.  Chronic illness/injury is a disease/condition lasting 3 months or longer.    The noted clinical guidelines are only system guidelines and do not replace the providers clinical judgment.    Provider, please specify diagnosis or diagnoses associated with above clinical findings.    [  ] Mild Malnutrition - 1 characteristic outlined above within the established moderate or severe parameters     [ X ] Moderate Malnutrition - a minimum of 2 of the 6 moderate malnutrition characteristics noted above      [  ] Malnutrition, Unspecified degree     [  ] Other Nutritional Diagnosis (please specify): _______     [  ] Malnutrition ruled out     [  ] Clinically Undetermined     Please document in your progress notes daily for the duration of treatment until resolved and  include in your discharge summary.      References:    MIO Vásquez, & EDA Moura (2022, April). Assessment and management of anorexia and cachexia in palliative care. Retrieved May 23, 2022, from [email or web address]-and-management-[email or web address]-[email or web address]-cachexia-in-[email or web address]?[email or web address]     CODY Riley, PhD, RD, Alanis STRICKLAND P., PhD, RN, MARISSA Canas MD, PhD, Karol RANGEL A., MS, RD, [facility], YISSEL Washburn, MS, RD, The Academy Malnutrition Work Group, The A.S.P.E.N. Board of Directors. (2012). Consensus Statement: Academy of Nutrition and Dietetics and American Society for Parenteral and Enteral Nutrition: Characteristics Recommended for the Identification and Documentation of Adult Malnutrition (Undernutrition). Journal of Parenteral and Enteral Nutrition, 36(3), 275-283. doi:10.1177/3217536947023807      Form No. 50318

## 2023-01-13 NOTE — SUBJECTIVE & OBJECTIVE
Subjective:     Interval History: Day +14 from a Torie 140 auto SCT for MM. Engrafted today with an ANC of 869. Remains afebrile. VSS. Tongue ulcer pain improving per patient. Will receive dialysis today. Transplant discharge teaching completed yesterday in anticipation of discharge tomorrow. Pharmacy teaching to be completed today.    Objective:     Vital Signs (Most Recent):  Temp: 98.3 °F (36.8 °C) (01/13/23 0742)  Pulse: 98 (01/13/23 0742)  Resp: 18 (01/13/23 0742)  BP: 114/77 (01/13/23 0742)  SpO2: 100 % (01/13/23 0742) Vital Signs (24h Range):  Temp:  [98.1 °F (36.7 °C)-98.6 °F (37 °C)] 98.3 °F (36.8 °C)  Pulse:  [] 98  Resp:  [16-18] 18  SpO2:  [95 %-100 %] 100 %  BP: (109-127)/(68-77) 114/77     Weight: 99.9 kg (220 lb 3.8 oz)  Body mass index is 30.72 kg/m².  Body surface area is 2.24 meters squared.    ECOG SCORE           [unfilled]    Intake/Output - Last 3 Shifts         01/11 0700 01/12 0659 01/12 0700 01/13 0659 01/13 0700 01/14 0659    P.O. 340 240 200    Blood       Other 600      Total Intake(mL/kg) 940 (9.3) 240 (2.4) 200 (2)    Urine (mL/kg/hr) 925 (0.4) 350 (0.1)     Other 600      Stool 0      Total Output 1525 350     Net -585 -110 +200           Stool Occurrence 1 x 2 x             Physical Exam  Constitutional:       Appearance: He is well-developed.   HENT:      Head: Normocephalic and atraumatic.      Mouth/Throat:      Pharynx: No oropharyngeal exudate.      Comments: Ulcers to tongue.   Eyes:      General:         Right eye: No discharge.         Left eye: No discharge.      Conjunctiva/sclera: Conjunctivae normal.      Pupils: Pupils are equal, round, and reactive to light.   Cardiovascular:      Rate and Rhythm: Normal rate and regular rhythm.      Heart sounds: Normal heart sounds. No murmur heard.    No gallop.   Pulmonary:      Effort: Pulmonary effort is normal. No respiratory distress.      Breath sounds: Normal breath sounds. No wheezing or rales.   Abdominal:       General: Bowel sounds are normal. There is no distension.      Palpations: Abdomen is soft.      Tenderness: There is no abdominal tenderness.   Musculoskeletal:         General: No deformity. Normal range of motion.      Cervical back: Normal range of motion and neck supple.   Skin:     General: Skin is warm and dry.      Findings: No erythema or rash.      Comments: Right chest wall dialysis catheter. Dressing c/d/i. No sign of infection to site.   Neurological:      Mental Status: He is alert and oriented to person, place, and time.   Psychiatric:         Behavior: Behavior normal.         Thought Content: Thought content normal.         Judgment: Judgment normal.       Significant Labs:   CBC:   Recent Labs   Lab 01/12/23 0418 01/13/23  0337   WBC 0.47* 1.08*   HGB 7.8* 7.7*   HCT 23.0* 23.4*   PLT 11*  --       and CMP:   Recent Labs   Lab 01/12/23 0418 01/13/23 0337    140   K 3.8 3.5    107   CO2 23 23    86   BUN 20 27*   CREATININE 4.1* 5.3*   CALCIUM 8.5* 8.0*   PROT 5.6* 5.5*   ALBUMIN 3.3* 3.4*   BILITOT 0.7 0.6   ALKPHOS 70 71   AST 7* 6*   ALT 6* 6*   ANIONGAP 8 10         Diagnostic Results:  I have reviewed all pertinent imaging results/findings within the past 24 hours.

## 2023-01-13 NOTE — PT/OT/SLP PROGRESS
Physical Therapy      Patient Name:  De Lakhani   MRN:  [id number]    Attempted PT session at 15:15. Patient not seen today secondary to Patient fatigue, patient anticipates DC over the weekend and denies therapy concerns on DC. Will follow-up as appropriate as patient remains in house.

## 2023-01-13 NOTE — ASSESSMENT & PLAN NOTE
Recent Labs   Lab 01/10/23  0325 01/11/23  0305 01/12/23  0418 01/13/23  0337   WBC 0.02* 0.07* 0.47* 1.08*   HGB 8.2* 8.0* 7.8* 7.7*   HCT 23.7* 23.4* 23.0* 23.4*   PLT 9* 12* 11*  --        - Target Hgb 10-11 gm/dL. Hgb 8.0  - Will defer Epogen to primary team

## 2023-01-13 NOTE — PROGRESS NOTES
Day +14 Auto SCT. No acute events this shift. VSS on room air; afebrile. No new complaints.  1 unit platelets transfused this shift. Per pharmacy, certain meds held before dialysis; see MAR. Pt awaiting dialysis.  Pt reported 2 liquid stools; PRN imodium given. Bed alarm set, avasys camera in place. All questions and concerns addressed.

## 2023-01-13 NOTE — ASSESSMENT & PLAN NOTE
- Patient of Dr. King. Per most recent clinic note:   - 4/20/22: renal biopsy: light chain cast nephropathy, kappa light chain  - 4/26/22: right subclavian vein thrombus   - 4/27/22: M spike 0.2 with free monoclonal kappa band. B2mg 19.57, IgG 496, IgA 10, IgM <5. Kappa 61410, Lambda 7.9, ratio >1000  - 5/12/22: BMBx: plasma cell myeloma, marrow cellularity 100%, plasma cell percentage 100%. Plasma cell phenotype +, kappa +, CD20- -, CD30-, EMA-, SADAF-, Congo red negative. Peripheral blood with pancytopenia and no circulating blasts. Decreased storage iron. FISH canceled due to quantity not sufficient  - 5/18/22: CyBorD C1D1  - 5/23/22: rasburicase  - 5/24/22: Xgeva  - 6/6/22: Kappa 07226, lambda 4.0, ratio >1000, M spike 0.1 with free monoclonal kappa band present  - 6/6/22: CyBorD C2D1  - 6/9/22: BMBx Plasma cell neoplasm compatible with plasma cell myeloma. Extent of involvement 80-90% of bone marrow elements. Cytology: Plasmablastic. FISH cytogenetics positive for 1q21/CKS1B gain. Karyotype failed. Myelofibrosis diffuse (MF-3)  - 6/20/22: CyBorD C2D8 delayed due to covid  - 6/23/22: CyBOrD C2D11  - 7/5/22: C1D1 DVRd  - 7/7/22 - 7/18/22: hospital admission for septic shock resulting in renal failure  - 7/25/22: started again on DVRd  - 8/8/2022: kappa 1402.8, lambda 7.5, ratio 167.04. M spike 0.1, two faint restricted bands in gamma globulin regions.   - 10/10/22: C5D1 DVRd. Revlimid on hold for upcoming stem   - Admitted for Torie 140 auto SCT on 12/28/22 as above

## 2023-01-13 NOTE — ASSESSMENT & PLAN NOTE
Nutrition consulted. Most recent weight and BMI monitored-                         Measurements:  Wt Readings from Last 1 Encounters:   01/13/23 99.9 kg (220 lb 3.8 oz)   Body mass index is 30.72 kg/m².    Recommendations: Recommendation/Intervention: 1. Continue Regular diet with texture per SLP-- add renal restrictions, if warranted.   2. D/c ONS Novasource renal - pt refused to drink  3. If unable to tolerate PO intake and TF warranted: Novasource renal @ 45ml/hr to provide 2160 kcal, 98g protein, 774 ml fluid.  4. RD to monitor and follow-up.  Goals: Meet % een/epn by next rd f./u    Patient has been screened and assessed by RD. RD will follow patient.

## 2023-01-13 NOTE — PROGRESS NOTES
[facility] - Oncology ([facility])  Nephrology  Progress Note    Patient Name: De Lakhani  MRN: [id number]  Admission Date: 12/28/2022  Hospital Length of Stay: 16 days  Attending Provider: Renuka Call MD   Primary Care Physician: To Obtain Unable  Principal Problem:History of autologous stem cell transplant    Subjective:     Interval History: Day +14 of auto SCT. No issues on exam. Plans for HD today. Appears comfortable on RA.   Plans for dc tomorrow with HD on Monday OP.       Review of patient's allergies indicates:  No Known Allergies  Current Facility-Administered Medications   Medication Frequency    0.9%  NaCl infusion (for blood administration) Q24H PRN    0.9%  NaCl infusion PRN    0.9%  NaCl infusion Once    acyclovir capsule 400 mg BID    alteplase injection 2 mg PRN    alteplase injection 4 mg Once    aluminum-magnesium hydroxide-simethicone 200-200-20 mg/5 mL suspension 30 mL Q6H PRN    calcium carbonate 200 mg calcium (500 mg) chewable tablet 1,000 mg TID    diphenhydrAMINE capsule 25 mg PRN    diphenoxylate-atropine 2.5-0.025 mg per tablet 1 tablet QID PRN    doxycycline tablet 100 mg Q12H    duke's soln (benadryl 30 mL, mylanta 30 mL, LIDOcaine 30 mL, nystatin 30 mL) 120 mL QID PRN    ergocalciferol capsule 50,000 Units Q14 Days    filgrastim (NEUPOGEN) injection 480 mcg/1.6 ml Daily    fluconazole tablet 200 mg Daily    heparin (porcine) injection 1,000 Units PRN    heparin (porcine) injection 1,600 Units PRN    levoFLOXacin tablet 250 mg Every other day    LIDOcaine HCl 2% oral solution 15 mL Q6H PRN    loperamide capsule 2 mg QID PRN    LORazepam injection 1 mg Q6H PRN    metoprolol succinate (TOPROL-XL) 24 hr split tablet 12.5 mg Daily    ondansetron injection 4 mg Q6H PRN    oxyCODONE immediate release tablet 5 mg Q6H PRN    pantoprazole EC tablet 40 mg Daily    prochlorperazine injection Soln 10 mg Q6H PRN    scopolamine 1.3-1.5 mg (1 mg over 3 days) 1 patch Q3 Days     sertraline tablet 50 mg Daily    sevelamer carbonate tablet 800 mg TID WM    sodium bicarb-sodium chloride powder 1 Dose QID    sodium chloride 0.9% flush 10 mL PRN    vitamin D 1000 units tablet 3,000 Units Daily       Objective:     Vital Signs (Most Recent):  Temp: 98.3 °F (36.8 °C) (01/13/23 0742)  Pulse: 98 (01/13/23 0742)  Resp: 18 (01/13/23 0742)  BP: 114/77 (01/13/23 0742)  SpO2: 100 % (01/13/23 0742) Vital Signs (24h Range):  Temp:  [98.1 °F (36.7 °C)-98.6 °F (37 °C)] 98.3 °F (36.8 °C)  Pulse:  [] 98  Resp:  [16-18] 18  SpO2:  [95 %-100 %] 100 %  BP: (109-127)/(68-77) 114/77     Weight: 99.9 kg (220 lb 3.8 oz) (01/13/23 0319)  Body mass index is 30.72 kg/m².  Body surface area is 2.24 meters squared.    I/O last 3 completed shifts:  In: 580 [P.O.:580]  Out: 750 [Urine:750]    Physical Exam  Vitals and nursing note reviewed.   Constitutional:       General: He is not in acute distress.     Appearance: He is not toxic-appearing.   HENT:      Head: Normocephalic.      Mouth/Throat:      Pharynx: Oropharynx is clear.   Eyes:      Conjunctiva/sclera: Conjunctivae normal.   Cardiovascular:      Rate and Rhythm: Normal rate and regular rhythm.      Pulses: Normal pulses.   Pulmonary:      Effort: Pulmonary effort is normal.      Breath sounds: Normal breath sounds.   Abdominal:      Palpations: Abdomen is soft.   Musculoskeletal:      Cervical back: Neck supple.      Right lower leg: No edema.      Left lower leg: No edema.   Skin:     Comments: EDA Boogie    Neurological:      Mental Status: He is alert and oriented to person, place, and time.   Psychiatric:         Mood and Affect: Mood normal.         Behavior: Behavior normal.         Thought Content: Thought content normal.         Judgment: Judgment normal.       Significant Labs:  CBC:   Recent Labs   Lab 01/12/23  0418 01/13/23  0337   WBC 0.47* 1.08*   RBC 2.39* 2.40*   HGB 7.8* 7.7*   HCT 23.0* 23.4*   PLT 11*  --    MCV 96 98   MCH 32.6*  32.1*   MCHC 33.9 32.9     CMP:   Recent Labs   Lab 01/13/23  0337   GLU 86   CALCIUM 8.0*   ALBUMIN 3.4*   PROT 5.5*      K 3.5   CO2 23      BUN 27*   CREATININE 5.3*   ALKPHOS 71   ALT 6*   AST 6*   BILITOT 0.6     All labs within the past 24 hours have been reviewed.       Assessment/Plan:     * History of autologous stem cell transplant  -management per primary team     Anemia in ESRD (end-stage renal disease)  Recent Labs   Lab 01/10/23  0325 01/11/23  0305 01/12/23  0418 01/13/23  0337   WBC 0.02* 0.07* 0.47* 1.08*   HGB 8.2* 8.0* 7.8* 7.7*   HCT 23.7* 23.4* 23.0* 23.4*   PLT 9* 12* 11*  --        - Target Hgb 10-11 gm/dL. Hgb 8.0  - Will defer Epogen to primary team         Chronic kidney disease-mineral and bone disorder  Renal diet, if not NPO    Continue binders    End stage renal disease  Mr. Lakhani was diagnosed with multiple myeloma in April 2022 after presenting with ASHLEE. 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 He had bone marrow biopsy which showed 100% involvement by plasma cells. He was started on CyBorD on 5/18 and received two cycles of treatment. His bone marrow biopsy after the first cycle showed a decrease in his plasma cell involvement to 80-90%. Dr. Bunch then started DVRd on 7/5/22. However, he was admitted to the hospital on 7/7/22 with septic shock requiring ICU care. He improved with antibiotics but had an ASHLEE thought to be due to hypotension which resulted in renal failure. Dialysis dependent since 07/22.     Last HD prior to presentation 12/28. S/p chemo on 12/29 and SCT on 12/30.    Nephrology History  iHD Schedule: MWF   Unit/MD: [facility]   Duration: 4 hours   UF: ?  EDW: 107kg   Access: R permcath   Residual Renal Function: moderate     Plan/Reccomendations     HD today for clearance and volume management.   Caution in administering IVF in ESRD patient   Strict I&Os   Pre and post HD weight     Multiple myeloma not having achieved  remission  -management per primary         Thank you for your consult. I will follow-up with patient. Please contact us if you have any additional questions.    Kate Pepe DNP, FNP-C  Nephrology  [facility] - Oncology ([facility])

## 2023-01-13 NOTE — SUBJECTIVE & OBJECTIVE
Interval History: Day +14 of auto SCT. No issues on exam. Plans for HD today. Appears comfortable on RA.   Plans for dc tomorrow with HD on Monday OP.       Review of patient's allergies indicates:  No Known Allergies  Current Facility-Administered Medications   Medication Frequency    0.9%  NaCl infusion (for blood administration) Q24H PRN    0.9%  NaCl infusion PRN    0.9%  NaCl infusion Once    acyclovir capsule 400 mg BID    alteplase injection 2 mg PRN    alteplase injection 4 mg Once    aluminum-magnesium hydroxide-simethicone 200-200-20 mg/5 mL suspension 30 mL Q6H PRN    calcium carbonate 200 mg calcium (500 mg) chewable tablet 1,000 mg TID    diphenhydrAMINE capsule 25 mg PRN    diphenoxylate-atropine 2.5-0.025 mg per tablet 1 tablet QID PRN    doxycycline tablet 100 mg Q12H    duke's soln (benadryl 30 mL, mylanta 30 mL, LIDOcaine 30 mL, nystatin 30 mL) 120 mL QID PRN    ergocalciferol capsule 50,000 Units Q14 Days    filgrastim (NEUPOGEN) injection 480 mcg/1.6 ml Daily    fluconazole tablet 200 mg Daily    heparin (porcine) injection 1,000 Units PRN    heparin (porcine) injection 1,600 Units PRN    levoFLOXacin tablet 250 mg Every other day    LIDOcaine HCl 2% oral solution 15 mL Q6H PRN    loperamide capsule 2 mg QID PRN    LORazepam injection 1 mg Q6H PRN    metoprolol succinate (TOPROL-XL) 24 hr split tablet 12.5 mg Daily    ondansetron injection 4 mg Q6H PRN    oxyCODONE immediate release tablet 5 mg Q6H PRN    pantoprazole EC tablet 40 mg Daily    prochlorperazine injection Soln 10 mg Q6H PRN    scopolamine 1.3-1.5 mg (1 mg over 3 days) 1 patch Q3 Days    sertraline tablet 50 mg Daily    sevelamer carbonate tablet 800 mg TID WM    sodium bicarb-sodium chloride powder 1 Dose QID    sodium chloride 0.9% flush 10 mL PRN    vitamin D 1000 units tablet 3,000 Units Daily       Objective:     Vital Signs (Most Recent):  Temp: 98.3 °F (36.8 °C) (01/13/23 0742)  Pulse: 98 (01/13/23 0742)  Resp: 18 (01/13/23  0742)  BP: 114/77 (01/13/23 0742)  SpO2: 100 % (01/13/23 0742) Vital Signs (24h Range):  Temp:  [98.1 °F (36.7 °C)-98.6 °F (37 °C)] 98.3 °F (36.8 °C)  Pulse:  [] 98  Resp:  [16-18] 18  SpO2:  [95 %-100 %] 100 %  BP: (109-127)/(68-77) 114/77     Weight: 99.9 kg (220 lb 3.8 oz) (01/13/23 0319)  Body mass index is 30.72 kg/m².  Body surface area is 2.24 meters squared.    I/O last 3 completed shifts:  In: 580 [P.O.:580]  Out: 750 [Urine:750]    Physical Exam  Vitals and nursing note reviewed.   Constitutional:       General: He is not in acute distress.     Appearance: He is not toxic-appearing.   HENT:      Head: Normocephalic.      Mouth/Throat:      Pharynx: Oropharynx is clear.   Eyes:      Conjunctiva/sclera: Conjunctivae normal.   Cardiovascular:      Rate and Rhythm: Normal rate and regular rhythm.      Pulses: Normal pulses.   Pulmonary:      Effort: Pulmonary effort is normal.      Breath sounds: Normal breath sounds.   Abdominal:      Palpations: Abdomen is soft.   Musculoskeletal:      Cervical back: Neck supple.      Right lower leg: No edema.      Left lower leg: No edema.   Skin:     Comments: EDA Boogie    Neurological:      Mental Status: He is alert and oriented to person, place, and time.   Psychiatric:         Mood and Affect: Mood normal.         Behavior: Behavior normal.         Thought Content: Thought content normal.         Judgment: Judgment normal.       Significant Labs:  CBC:   Recent Labs   Lab 01/12/23  0418 01/13/23 0337   WBC 0.47* 1.08*   RBC 2.39* 2.40*   HGB 7.8* 7.7*   HCT 23.0* 23.4*   PLT 11*  --    MCV 96 98   MCH 32.6* 32.1*   MCHC 33.9 32.9     CMP:   Recent Labs   Lab 01/13/23 0337   GLU 86   CALCIUM 8.0*   ALBUMIN 3.4*   PROT 5.5*      K 3.5   CO2 23      BUN 27*   CREATININE 5.3*   ALKPHOS 71   ALT 6*   AST 6*   BILITOT 0.6     All labs within the past 24 hours have been reviewed.

## 2023-01-14 VITALS
HEIGHT: 71 IN | WEIGHT: 219.81 LBS | TEMPERATURE: 98 F | BODY MASS INDEX: 30.77 KG/M2 | OXYGEN SATURATION: 98 % | DIASTOLIC BLOOD PRESSURE: 60 MMHG | RESPIRATION RATE: 18 BRPM | HEART RATE: 90 BPM | SYSTOLIC BLOOD PRESSURE: 122 MMHG

## 2023-01-14 LAB
ALBUMIN SERPL BCP-MCNC: 3.4 G/DL (ref 3.5–5.2)
ALBUMIN SERPL BCP-MCNC: 3.4 G/DL (ref 3.5–5.2)
ALP SERPL-CCNC: 73 U/L (ref 55–135)
ALP SERPL-CCNC: 77 U/L (ref 55–135)
ALT SERPL W/O P-5'-P-CCNC: 5 U/L (ref 10–44)
ALT SERPL W/O P-5'-P-CCNC: <5 U/L (ref 10–44)
ANION GAP SERPL CALC-SCNC: 10 MMOL/L (ref 8–16)
ANION GAP SERPL CALC-SCNC: 12 MMOL/L (ref 8–16)
AST SERPL-CCNC: 6 U/L (ref 10–40)
AST SERPL-CCNC: 7 U/L (ref 10–40)
BASOPHILS # BLD AUTO: ABNORMAL K/UL (ref 0–0.2)
BASOPHILS NFR BLD: 0 % (ref 0–1.9)
BILIRUB SERPL-MCNC: 0.5 MG/DL (ref 0.1–1)
BILIRUB SERPL-MCNC: 0.5 MG/DL (ref 0.1–1)
BLD PROD TYP BPU: NORMAL
BLOOD UNIT EXPIRATION DATE: NORMAL
BLOOD UNIT TYPE CODE: 5100
BLOOD UNIT TYPE: NORMAL
BUN SERPL-MCNC: 33 MG/DL (ref 6–20)
BUN SERPL-MCNC: 33 MG/DL (ref 6–20)
CALCIUM SERPL-MCNC: 7.8 MG/DL (ref 8.7–10.5)
CALCIUM SERPL-MCNC: 7.8 MG/DL (ref 8.7–10.5)
CHLORIDE SERPL-SCNC: 106 MMOL/L (ref 95–110)
CHLORIDE SERPL-SCNC: 107 MMOL/L (ref 95–110)
CO2 SERPL-SCNC: 22 MMOL/L (ref 23–29)
CO2 SERPL-SCNC: 22 MMOL/L (ref 23–29)
CODING SYSTEM: NORMAL
CREAT SERPL-MCNC: 5.9 MG/DL (ref 0.5–1.4)
CREAT SERPL-MCNC: 6 MG/DL (ref 0.5–1.4)
DIFFERENTIAL METHOD: ABNORMAL
DISPENSE STATUS: NORMAL
DOHLE BOD BLD QL SMEAR: PRESENT
EOSINOPHIL # BLD AUTO: ABNORMAL K/UL (ref 0–0.5)
EOSINOPHIL NFR BLD: 0 % (ref 0–8)
ERYTHROCYTE [DISTWIDTH] IN BLOOD BY AUTOMATED COUNT: 16.9 % (ref 11.5–14.5)
EST. GFR  (NO RACE VARIABLE): 10.2 ML/MIN/1.73 M^2
EST. GFR  (NO RACE VARIABLE): 10.4 ML/MIN/1.73 M^2
GLUCOSE SERPL-MCNC: 86 MG/DL (ref 70–110)
GLUCOSE SERPL-MCNC: 89 MG/DL (ref 70–110)
HCT VFR BLD AUTO: 24 % (ref 40–54)
HGB BLD-MCNC: 7.8 G/DL (ref 14–18)
IMM GRANULOCYTES # BLD AUTO: ABNORMAL K/UL (ref 0–0.04)
IMM GRANULOCYTES NFR BLD AUTO: ABNORMAL % (ref 0–0.5)
LYMPHOCYTES # BLD AUTO: ABNORMAL K/UL (ref 1–4.8)
LYMPHOCYTES NFR BLD: 4 % (ref 18–48)
MAGNESIUM SERPL-MCNC: 1.7 MG/DL (ref 1.6–2.6)
MCH RBC QN AUTO: 31.1 PG (ref 27–31)
MCHC RBC AUTO-ENTMCNC: 32.5 G/DL (ref 32–36)
MCV RBC AUTO: 96 FL (ref 82–98)
MONOCYTES # BLD AUTO: ABNORMAL K/UL (ref 0.3–1)
MONOCYTES NFR BLD: 12 % (ref 4–15)
NEUTROPHILS NFR BLD: 75 % (ref 38–73)
NEUTS BAND NFR BLD MANUAL: 9 %
NRBC BLD-RTO: 0 /100 WBC
PHOSPHATE SERPL-MCNC: 2.4 MG/DL (ref 2.7–4.5)
PLATELET # BLD AUTO: 18 K/UL (ref 150–450)
PLATELET BLD QL SMEAR: ABNORMAL
PMV BLD AUTO: 12 FL (ref 9.2–12.9)
POTASSIUM SERPL-SCNC: 3.5 MMOL/L (ref 3.5–5.1)
POTASSIUM SERPL-SCNC: 3.6 MMOL/L (ref 3.5–5.1)
PROT SERPL-MCNC: 5.5 G/DL (ref 6–8.4)
PROT SERPL-MCNC: 5.6 G/DL (ref 6–8.4)
RBC # BLD AUTO: 2.51 M/UL (ref 4.6–6.2)
SODIUM SERPL-SCNC: 139 MMOL/L (ref 136–145)
SODIUM SERPL-SCNC: 140 MMOL/L (ref 136–145)
TOXIC GRANULES BLD QL SMEAR: PRESENT
UNIT NUMBER: NORMAL
WBC # BLD AUTO: 2.08 K/UL (ref 3.9–12.7)

## 2023-01-14 PROCEDURE — 25000003 PHARM REV CODE 250: Performed by: NURSE PRACTITIONER

## 2023-01-14 PROCEDURE — 99238 HOSP IP/OBS DSCHRG MGMT 30/<: CPT | Mod: ,,, | Performed by: INTERNAL MEDICINE

## 2023-01-14 PROCEDURE — 85027 COMPLETE CBC AUTOMATED: CPT | Performed by: STUDENT IN AN ORGANIZED HEALTH CARE EDUCATION/TRAINING PROGRAM

## 2023-01-14 PROCEDURE — 63600175 PHARM REV CODE 636 W HCPCS: Performed by: NURSE PRACTITIONER

## 2023-01-14 PROCEDURE — 1111F PR DISCHARGE MEDS RECONCILED W/ CURRENT OUTPATIENT MED LIST: ICD-10-PCS | Mod: CPTII,,, | Performed by: INTERNAL MEDICINE

## 2023-01-14 PROCEDURE — 85007 BL SMEAR W/DIFF WBC COUNT: CPT | Performed by: STUDENT IN AN ORGANIZED HEALTH CARE EDUCATION/TRAINING PROGRAM

## 2023-01-14 PROCEDURE — 84100 ASSAY OF PHOSPHORUS: CPT | Performed by: NURSE PRACTITIONER

## 2023-01-14 PROCEDURE — 80100014 HC HEMODIALYSIS 1:1

## 2023-01-14 PROCEDURE — 25000003 PHARM REV CODE 250: Performed by: INTERNAL MEDICINE

## 2023-01-14 PROCEDURE — P9037 PLATE PHERES LEUKOREDU IRRAD: HCPCS | Performed by: INTERNAL MEDICINE

## 2023-01-14 PROCEDURE — 80053 COMPREHEN METABOLIC PANEL: CPT | Performed by: STUDENT IN AN ORGANIZED HEALTH CARE EDUCATION/TRAINING PROGRAM

## 2023-01-14 PROCEDURE — 90935 HEMODIALYSIS ONE EVALUATION: CPT | Mod: ,,, | Performed by: NURSE PRACTITIONER

## 2023-01-14 PROCEDURE — 83735 ASSAY OF MAGNESIUM: CPT | Performed by: NURSE PRACTITIONER

## 2023-01-14 PROCEDURE — 63600175 PHARM REV CODE 636 W HCPCS: Mod: JG | Performed by: INTERNAL MEDICINE

## 2023-01-14 PROCEDURE — 99238 PR HOSPITAL DISCHARGE DAY,<30 MIN: ICD-10-PCS | Mod: ,,, | Performed by: INTERNAL MEDICINE

## 2023-01-14 PROCEDURE — 1111F DSCHRG MED/CURRENT MED MERGE: CPT | Mod: CPTII,,, | Performed by: INTERNAL MEDICINE

## 2023-01-14 PROCEDURE — 90935 PR HEMODIALYSIS, ONE EVALUATION: ICD-10-PCS | Mod: ,,, | Performed by: NURSE PRACTITIONER

## 2023-01-14 PROCEDURE — 80053 COMPREHEN METABOLIC PANEL: CPT | Mod: 91 | Performed by: NURSE PRACTITIONER

## 2023-01-14 RX ORDER — HYDROCODONE BITARTRATE AND ACETAMINOPHEN 500; 5 MG/1; MG/1
TABLET ORAL
Status: DISCONTINUED | OUTPATIENT
Start: 2023-01-14 | End: 2023-01-14 | Stop reason: HOSPADM

## 2023-01-14 RX ADMIN — FLUCONAZOLE 200 MG: 200 TABLET ORAL at 01:01

## 2023-01-14 RX ADMIN — CALCIUM CARBONATE (ANTACID) CHEW TAB 500 MG 1000 MG: 500 CHEW TAB at 09:01

## 2023-01-14 RX ADMIN — CHOLECALCIFEROL TAB 25 MCG (1000 UNIT) 3000 UNITS: 25 TAB at 09:01

## 2023-01-14 RX ADMIN — SODIUM CHLORIDE: 9 INJECTION, SOLUTION INTRAVENOUS at 09:01

## 2023-01-14 RX ADMIN — DOXYCYCLINE HYCLATE 100 MG: 100 TABLET, COATED ORAL at 09:01

## 2023-01-14 RX ADMIN — METOPROLOL SUCCINATE 12.5 MG: 25 TABLET, EXTENDED RELEASE ORAL at 01:01

## 2023-01-14 RX ADMIN — HEPARIN SODIUM 1600 UNITS: 1000 INJECTION, SOLUTION INTRAVENOUS; SUBCUTANEOUS at 04:01

## 2023-01-14 RX ADMIN — PANTOPRAZOLE SODIUM 40 MG: 40 TABLET, DELAYED RELEASE ORAL at 01:01

## 2023-01-14 RX ADMIN — FILGRASTIM 480 MCG: 480 INJECTION, SOLUTION INTRAVENOUS; SUBCUTANEOUS at 09:01

## 2023-01-14 RX ADMIN — SERTRALINE HYDROCHLORIDE 50 MG: 50 TABLET ORAL at 09:01

## 2023-01-14 RX ADMIN — Medication 1 DOSE: at 09:01

## 2023-01-14 RX ADMIN — LEVOFLOXACIN 250 MG: 250 TABLET, FILM COATED ORAL at 09:01

## 2023-01-14 RX ADMIN — ACYCLOVIR 400 MG: 200 CAPSULE ORAL at 01:01

## 2023-01-14 NOTE — PLAN OF CARE
No acute events this shift. Patient AAOx4. Afebrile and VSS. No complaint of pain or nausea.  Patient refused dialysis this shift due to being so late in the evening. MD aware. Dialysis nurse aware. Patient's wife expressed discontent with late dialysis due to patient getting no sleep. Patient rested peacefully this shift. Bed in lowest position. Side rails up x2. All possessions and call light within reach. Bed alarm on. AvaSys in room. Instructed to call for assistance and voiced understanding. All needs of patient currently met. Will continue to monitor with frequent rounding.

## 2023-01-14 NOTE — PROGRESS NOTES
[facility] H[facility]OD[facility] NOTE     Patient currently on hemodialysis for removal of uremic toxins .     Patient seen and evaluated on hemodialysis, tolerating treatment, see HD flowsheet for vitals and assessments.      No Hypotension, chest pain, shortness of breath, cramping, nausea or vomiting.      Target UF 0. UOP remain adequate per patient. I/Os not accurate overnight.     Plans for dc with HD on Monday in OP setting.       DONG Pepe DNP, APRN, FNP-C  Department of Nephrology  [facility]  Pager: [phone]

## 2023-01-14 NOTE — NURSING
-Per jasmin Mahoney to use orders from previous day    Bedside  dialysis tx started to the right chest perm cath per orders placed by ZURI Pepe:    Question Answer   Antibiotics on HD? No   Duration of Treatment 3 hours   Dialyzer F160   Dialysate Temperature (C) 36.5   BFR-As tolerated to a maximum of: 400 mL/min    mL/min   K+ Potassium per Protocol   Ca++ Calcium per Protocol   Na+ Sodium per Protocol   Bicarb Bicarbonate per Protocol   Access CVC   Fluid Removal (L): 0 UF   Dialysate Bath Solution Protocol (DO NOT MODIFY ANSWER) [email or web address] Dialysate Bath Solution Algorithm (formatted with date).pdf

## 2023-01-14 NOTE — PLAN OF CARE
Problem: Hemodynamic Instability (Hemodialysis)  Goal: Effective Tissue Perfusion  Outcome: Ongoing, Progressing    Dialysis tx ended to the right chest perm cath, saline locked, capped.    Net fluid removed: 0    Report given to bedside nurse michael

## 2023-01-14 NOTE — PROGRESS NOTES
Road Test  Oxygen-Patient tolerating room air, no distress.  Ambulation-Patient up with no assistance.  Devices-Patient going to [facility] with no devices  Tolerating-Regular diet, but with poor appetite.   Elimination-Patient voiding without difficulty.  Self Care-Performs self care without assistance.  Teaching-Verbal and written discharge teaching given by NP prior to discharge.     R Central line left in place for dialysis; sterile dressing change completed. Patient going to [facility].  Discharge paperwork discussed. Medications reviewed. Patient has all belongings and has no questions at this time.

## 2023-01-14 NOTE — DISCHARGE SUMMARY
[facility] C[facility] - Oncology ([facility])  Hematology  Bone Marrow Transplant  Discharge Summary      Patient Name: De Lakhani  MRN: [id number]  Admission Date: 12/28/2022  Hospital Length of Stay: 17 days  Discharge Date and Time:  01/14/2023 9:02 AM  Attending Physician: Renuka Call MD   Discharging Provider: Anna Parry MD  Primary Care Provider: To Obtain Unable    HPI: Mr. Lakhani is a 57-y-o patient of Dr. King with high-risk kappa light chain multiple myeloma with q1 gain. Other medical history includes hypertension, chronic infection of prosthetic knee (on ppx doxycycline and dapsone), ESRD (on hemodialysis), upper extremity DVT, HLD, and depression. He is admitting today for a Torie 140 auto SCT. He is feeling well today. Denies fevers, chills, cough, SOB, chest pain, bleeding and bruising, and n/v/d/c. He is COVID neg.      Hospital Course: Patient presented for Torie 140 auto stem cell transplant for multiple myeloma.  Patient had expected chemo induced nausea and vomiting which was managed with antiemetics.  Patient did also have diarrhea managed with Imodium.  Patient tolerated dialysis well.  He is day 2 engraftment.  CBC with differential pending although WBC 2.08.  Platelets 18 K, will give 1 unit platelets today.  He has follow-up next week.    Goals of Care Treatment Preferences:  Code Status: Full Code      Consults (From admission, onward)          Status Ordering Provider     Inpatient consult to Nephrology  Once        Provider:  (Not yet assigned)    Completed GINNY SCHOFIELD     Inpatient consult to Registered Dietitian/Nutritionist  Once        Provider:  (Not yet assigned)    Completed ISAURA WEST            Significant Diagnostic Studies: Labs:   CMP   Recent Labs   Lab 01/13/23  0337 01/14/23  0410    139   K 3.5 3.5    107   CO2 23 22*   GLU 86 86   BUN 27* 33*   CREATININE 5.3* 5.9*   CALCIUM 8.0* 7.8*   PROT 5.5* 5.5*   ALBUMIN 3.4* 3.4*   BILITOT 0.6 0.5   ALKPHOS 71 77   AST  6* 6*   ALT 6* <5*   ANIONGAP 10 10    and CBC   Recent Labs   Lab 01/13/23  0337 01/14/23 0410   WBC 1.08* 2.08*   HGB 7.7* 7.8*   HCT 23.4* 24.0*   PLT 8*  --        Pending Diagnostic Studies:       Procedure Component Value Units Date/Time    Comprehensive metabolic panel [[id number]] Collected: 01/14/23 0410    Order Status: Sent Lab Status: In process Updated: 01/14/23 0420    Specimen: Blood           Final Active Diagnoses:    Diagnosis Date Noted POA    PRINCIPAL PROBLEM:  History of autologous stem cell transplant [Z94.84] 11/22/2022 Not Applicable    Moderate malnutrition [E44.0] 01/12/2023 No    Anemia in ESRD (end-stage renal disease) [N18.6, D63.1] 01/11/2023 Yes    Hypomagnesemia [E83.42] 01/10/2023 Yes    Hypocalcemia [E83.51] 01/09/2023 Yes    Chronic kidney disease-mineral and bone disorder [N18.9, E83.9, M89.9] 01/09/2023 Yes    Mucositis due to chemotherapy [K12.31] 01/05/2023 Yes    Syncope [R55] 01/03/2023 No    Fall during current hospitalization [W19.XXXA, Y92.239] 01/03/2023 No    Hyperphosphatemia [E83.39] 01/03/2023 Yes    CINV (chemotherapy-induced nausea and vomiting) [R11.2, T45.1X5A] 01/02/2023 No    Chemotherapy induced diarrhea [K52.1, T45.1X5A] 01/02/2023 No    Pancytopenia due to antineoplastic chemotherapy [D61.810, T45.1X5A] 12/30/2022 Yes    Hypertension [I10] 12/28/2022 Yes    Deep vein thrombosis (DVT) of upper extremity [I82.629] 12/28/2022 Yes    Depression [F32.A] 12/28/2022 Yes    Hyperlipidemia [E78.5] 12/28/2022 Yes    Dependence on hemodialysis [Z99.2] 11/14/2022 Not Applicable    End stage renal disease [N18.6] 08/26/2022 Yes    Multiple myeloma not having achieved remission [C90.00] 07/28/2022 Yes    Chronic infection of prosthetic knee [T84.59XA, Z96.659] 07/28/2022 Not Applicable      Problems Resolved During this Admission:    Diagnosis Date Noted Date Resolved POA    Anemia in ESRD (end-stage renal disease) [N18.6, D63.1] 01/09/2023 01/10/2023 Unknown     Immunosuppression [D84.9] 01/09/2023 01/10/2023 Yes    Pancytopenia [D61.818] 01/09/2023 01/10/2023 No    Fall [W19.XXXA] 01/03/2023 01/03/2023 Yes    Thrombocytopenia [D69.6] 12/30/2022 01/05/2023 Yes    Other pancytopenia [D61.818] 12/28/2022 12/30/2022 Yes      Discharged Condition: good    Disposition: home    Follow Up:   Future Appointments   Date Time Provider Department Center   1/17/2023  2:00 PM [facility] LAB[facility]   1/17/2023  3:00 PM Anna Tovar NP [facility] BMT [facility]   1/23/2023  8:30 AM [facility]   1/23/2023  9:30 AM Anna Tovar NP [facility] BMT [facility]   1/30/2023  8:30 AM [facility]   1/30/2023  9:30 AM Peter King MD [facility] BMT [facility]   3/16/2023 10:30 AM Peter Ogden MD [facility] CARONC [facility]         Patient Instructions:   No discharge procedures on file.  Medications:  Reconciled Home Medications:      Medication List        START taking these medications      acyclovir 200 MG capsule  Commonly known as: ZOVIRAX  Take 2 capsules (400 mg total) by mouth 2 (two) times daily.  Replaces: acyclovir 400 MG tablet     LIDOcaine HCl 2% 2 % Soln  Commonly known as: XYLOCAINE  15 mLs by Mucous Membrane route every 6 (six) hours as needed (mouth sores).     loperamide 2 mg capsule  Commonly known as: IMODIUM  Take 1 capsule (2 mg total) by mouth 4 (four) times daily as needed for Diarrhea.     sevelamer carbonate 800 mg Tab  Commonly known as: RENVELA  Take 1 tablet (800 mg total) by mouth 3 (three) times daily with meals.            CHANGE how you take these medications      apixaban 5 mg Tab  Commonly known as: ELIQUIS  Take 1 tablet (5 mg total) by mouth 2 (two) times daily. Hold until instructed to resume by BMT provider.  What changed: additional instructions     calcium carbonate 200 mg calcium (500 mg) chewable tablet  Commonly known as: TUMS  Chew and swallow 2 tablets (1,000 mg total) by  mouth 3 (three) times daily.  What changed: when to take this     metoprolol succinate 25 MG 24 hr tablet  Commonly known as: TOPROL-XL  Take 0.5 tablets (12.5 mg total) by mouth once daily.  What changed: how much to take            CONTINUE taking these medications      cholecalciferol (vitamin D3) 75 mcg (3,000 unit) Tab  Take 3,000 Units by mouth once daily.     doxycycline 100 MG Cap  Commonly known as: VIBRAMYCIN  Take 100 mg by mouth every 12 (twelve) hours. Preventative for past knee infection     ergocalciferol 50,000 unit Cap  Commonly known as: ERGOCALCIFEROL  Take 50,000 Units by mouth every 14 (fourteen) days. Given at dialysis center     famotidine 20 MG tablet  Commonly known as: PEPCID  Take 20 mg by mouth once daily.     ondansetron 4 MG tablet  Commonly known as: ZOFRAN  TAKE ONE TABLET BY MOUTH EVERY EIGHT HOURS IF NEEDED FOR NAUSEA.     rosuvastatin 20 MG tablet  Commonly known as: CRESTOR  Take 20 mg by mouth once daily.     scopolamine 1.3-1.5 mg (1 mg over 3 days)  Commonly known as: TRANSDERM-SCOP  APPLY ONE PATCH TO SKIN AS DIRECTED AND REPLACE EVERY 3 DAYS.     sertraline 50 MG tablet  Commonly known as: ZOLOFT  Take 50 mg by mouth once daily.            STOP taking these medications      acyclovir 400 MG tablet  Commonly known as: ZOVIRAX  Replaced by: acyclovir 200 MG capsule     calcitRIOL 0.5 MCG Cap  Commonly known as: ROCALTROL     dapsone 100 MG Tab     promethazine 12.5 MG Tab  Commonly known as: PHENERGAN              Anna Parry MD  Bone Marrow Transplant  [facility] - Oncology ([facility])

## 2023-01-15 ENCOUNTER — NURSE TRIAGE (OUTPATIENT)
Dept: ADMINISTRATIVE | Facility: CLINIC | Age: 58
End: 2023-01-15
Payer: MEDICARE

## 2023-01-15 NOTE — TELEPHONE ENCOUNTER
Post discharge day 1 tracker call.     Pt's spouse c/o Pt being very weak, can't keep food down, vomiting, probably dehydrated, can't stand and keeps falling, Pt and spouse are staying in the [facility] at this time attached to [facility]. Vomiting protocol followed and pt's spouse advised to call 911 now. Spouse refuses to call 911 now and wants to put him in a wheelchair and roll him over to the ER now instead. Encounter routed to onco Renuka Call MD as high priority.       Reason for Disposition   Shock suspected (e.g., cold/pale/clammy skin, too weak to stand, low BP, rapid pulse)    Protocols used: Vomiting-A-AH

## 2023-01-17 ENCOUNTER — HOSPITAL ENCOUNTER (INPATIENT)
Facility: HOSPITAL | Age: 58
LOS: 6 days | Discharge: HOME | DRG: 808 | End: 2023-01-23
Attending: STUDENT IN AN ORGANIZED HEALTH CARE EDUCATION/TRAINING PROGRAM | Admitting: INTERNAL MEDICINE
Payer: MEDICARE

## 2023-01-17 ENCOUNTER — TELEPHONE (OUTPATIENT)
Dept: HEMATOLOGY/ONCOLOGY | Facility: CLINIC | Age: 58
End: 2023-01-17
Payer: MEDICARE

## 2023-01-17 DIAGNOSIS — D70.9 NEUTROPENIC FEVER: Primary | ICD-10-CM

## 2023-01-17 DIAGNOSIS — B99.9 INFECTION: ICD-10-CM

## 2023-01-17 DIAGNOSIS — R50.81 NEUTROPENIC FEVER: Primary | ICD-10-CM

## 2023-01-17 LAB
ALBUMIN SERPL BCP-MCNC: 3.7 G/DL (ref 3.5–5.2)
ALP SERPL-CCNC: 81 U/L (ref 55–135)
ALT SERPL W/O P-5'-P-CCNC: 6 U/L (ref 10–44)
ANION GAP SERPL CALC-SCNC: 9 MMOL/L (ref 8–16)
ANISOCYTOSIS BLD QL SMEAR: SLIGHT
AST SERPL-CCNC: 11 U/L (ref 10–40)
BACTERIA #/AREA URNS AUTO: NORMAL /HPF
BASOPHILS NFR BLD: 0 % (ref 0–1.9)
BILIRUB SERPL-MCNC: 0.4 MG/DL (ref 0.1–1)
BILIRUB UR QL STRIP: NEGATIVE
BUN SERPL-MCNC: 14 MG/DL (ref 6–20)
CALCIUM SERPL-MCNC: 8.4 MG/DL (ref 8.7–10.5)
CHLORIDE SERPL-SCNC: 106 MMOL/L (ref 95–110)
CLARITY UR REFRACT.AUTO: CLEAR
CO2 SERPL-SCNC: 27 MMOL/L (ref 23–29)
COLOR UR AUTO: YELLOW
CREAT SERPL-MCNC: 4.4 MG/DL (ref 0.5–1.4)
DIFFERENTIAL METHOD: ABNORMAL
EOSINOPHIL NFR BLD: 2 % (ref 0–8)
ERYTHROCYTE [DISTWIDTH] IN BLOOD BY AUTOMATED COUNT: 17.2 % (ref 11.5–14.5)
EST. GFR  (NO RACE VARIABLE): 14.7 ML/MIN/1.73 M^2
GLUCOSE SERPL-MCNC: 110 MG/DL (ref 70–110)
GLUCOSE UR QL STRIP: NEGATIVE
HCT VFR BLD AUTO: 26 % (ref 40–54)
HGB BLD-MCNC: 8.3 G/DL (ref 14–18)
HGB UR QL STRIP: NEGATIVE
HYALINE CASTS UR QL AUTO: 0 /LPF
IMM GRANULOCYTES # BLD AUTO: ABNORMAL K/UL (ref 0–0.04)
IMM GRANULOCYTES NFR BLD AUTO: ABNORMAL % (ref 0–0.5)
INFLUENZA A, MOLECULAR: NOT DETECTED
INFLUENZA B, MOLECULAR: NOT DETECTED
KETONES UR QL STRIP: NEGATIVE
LACTATE SERPL-SCNC: 1.7 MMOL/L (ref 0.5–2.2)
LACTATE SERPL-SCNC: 1.9 MMOL/L (ref 0.5–2.2)
LEUKOCYTE ESTERASE UR QL STRIP: NEGATIVE
LYMPHOCYTES NFR BLD: 3 % (ref 18–48)
MAGNESIUM SERPL-MCNC: 1.9 MG/DL (ref 1.6–2.6)
MCH RBC QN AUTO: 31.2 PG (ref 27–31)
MCHC RBC AUTO-ENTMCNC: 31.9 G/DL (ref 32–36)
MCV RBC AUTO: 98 FL (ref 82–98)
METAMYELOCYTES NFR BLD MANUAL: 1 %
MICROSCOPIC COMMENT: NORMAL
MONOCYTES NFR BLD: 16 % (ref 4–15)
MYELOCYTES NFR BLD MANUAL: 2 %
NEUTROPHILS NFR BLD: 73 % (ref 38–73)
NEUTS BAND NFR BLD MANUAL: 3 %
NITRITE UR QL STRIP: NEGATIVE
NRBC BLD-RTO: 0 /100 WBC
PH UR STRIP: 8 [PH] (ref 5–8)
PHOSPHATE SERPL-MCNC: 2.3 MG/DL (ref 2.7–4.5)
PLATELET # BLD AUTO: 31 K/UL (ref 150–450)
PLATELET BLD QL SMEAR: ABNORMAL
PMV BLD AUTO: 12.8 FL (ref 9.2–12.9)
POTASSIUM SERPL-SCNC: 4.2 MMOL/L (ref 3.5–5.1)
PROCALCITONIN SERPL IA-MCNC: 0.62 NG/ML
PROT SERPL-MCNC: 5.7 G/DL (ref 6–8.4)
PROT UR QL STRIP: ABNORMAL
RBC # BLD AUTO: 2.66 M/UL (ref 4.6–6.2)
RBC #/AREA URNS AUTO: 0 /HPF (ref 0–4)
RSV AG BY MOLECULAR METHOD: NOT DETECTED
SARS-COV-2 RNA RESP QL NAA+PROBE: NOT DETECTED
SODIUM SERPL-SCNC: 142 MMOL/L (ref 136–145)
SP GR UR STRIP: 1.01 (ref 1–1.03)
URN SPEC COLLECT METH UR: ABNORMAL
WBC # BLD AUTO: 2.02 K/UL (ref 3.9–12.7)
WBC #/AREA URNS AUTO: 4 /HPF (ref 0–5)

## 2023-01-17 PROCEDURE — 85007 BL SMEAR W/DIFF WBC COUNT: CPT

## 2023-01-17 PROCEDURE — 63600175 PHARM REV CODE 636 W HCPCS

## 2023-01-17 PROCEDURE — 63600175 PHARM REV CODE 636 W HCPCS: Performed by: NURSE PRACTITIONER

## 2023-01-17 PROCEDURE — 0241U SARS-COV2 (COVID) WITH FLU/RSV BY PCR: CPT

## 2023-01-17 PROCEDURE — 25000003 PHARM REV CODE 250: Performed by: NURSE PRACTITIONER

## 2023-01-17 PROCEDURE — 83605 ASSAY OF LACTIC ACID: CPT | Mod: 91

## 2023-01-17 PROCEDURE — 96361 HYDRATE IV INFUSION ADD-ON: CPT

## 2023-01-17 PROCEDURE — 83735 ASSAY OF MAGNESIUM: CPT

## 2023-01-17 PROCEDURE — 99285 PR EMERGENCY DEPT VISIT,LEVEL V: ICD-10-PCS | Mod: ,,, | Performed by: STUDENT IN AN ORGANIZED HEALTH CARE EDUCATION/TRAINING PROGRAM

## 2023-01-17 PROCEDURE — 93010 EKG 12-LEAD: ICD-10-PCS | Mod: ,,, | Performed by: INTERNAL MEDICINE

## 2023-01-17 PROCEDURE — 87040 BLOOD CULTURE FOR BACTERIA: CPT | Mod: 59

## 2023-01-17 PROCEDURE — 80053 COMPREHEN METABOLIC PANEL: CPT

## 2023-01-17 PROCEDURE — 84100 ASSAY OF PHOSPHORUS: CPT

## 2023-01-17 PROCEDURE — 99223 1ST HOSP IP/OBS HIGH 75: CPT | Mod: ,,, | Performed by: INTERNAL MEDICINE

## 2023-01-17 PROCEDURE — 81001 URINALYSIS AUTO W/SCOPE: CPT

## 2023-01-17 PROCEDURE — 99285 EMERGENCY DEPT VISIT HI MDM: CPT | Mod: ,,, | Performed by: STUDENT IN AN ORGANIZED HEALTH CARE EDUCATION/TRAINING PROGRAM

## 2023-01-17 PROCEDURE — 84145 PROCALCITONIN (PCT): CPT

## 2023-01-17 PROCEDURE — 96360 HYDRATION IV INFUSION INIT: CPT

## 2023-01-17 PROCEDURE — 99285 EMERGENCY DEPT VISIT HI MDM: CPT | Mod: 25

## 2023-01-17 PROCEDURE — 85027 COMPLETE CBC AUTOMATED: CPT

## 2023-01-17 PROCEDURE — 12000002 HC ACUTE/MED SURGE SEMI-PRIVATE ROOM

## 2023-01-17 PROCEDURE — 93010 ELECTROCARDIOGRAM REPORT: CPT | Mod: ,,, | Performed by: INTERNAL MEDICINE

## 2023-01-17 PROCEDURE — 99223 PR INITIAL HOSPITAL CARE,LEVL III: ICD-10-PCS | Mod: ,,, | Performed by: INTERNAL MEDICINE

## 2023-01-17 PROCEDURE — 93005 ELECTROCARDIOGRAM TRACING: CPT

## 2023-01-17 RX ORDER — FAMOTIDINE 20 MG/1
20 TABLET, FILM COATED ORAL DAILY
Status: DISCONTINUED | OUTPATIENT
Start: 2023-01-17 | End: 2023-01-23 | Stop reason: HOSPADM

## 2023-01-17 RX ORDER — SODIUM CHLORIDE 0.9 % (FLUSH) 0.9 %
10 SYRINGE (ML) INJECTION EVERY 12 HOURS PRN
Status: DISCONTINUED | OUTPATIENT
Start: 2023-01-17 | End: 2023-01-23 | Stop reason: HOSPADM

## 2023-01-17 RX ORDER — LIDOCAINE HYDROCHLORIDE 20 MG/ML
15 SOLUTION OROPHARYNGEAL EVERY 6 HOURS PRN
Status: DISCONTINUED | OUTPATIENT
Start: 2023-01-17 | End: 2023-01-23 | Stop reason: HOSPADM

## 2023-01-17 RX ORDER — IBUPROFEN 200 MG
24 TABLET ORAL
Status: DISCONTINUED | OUTPATIENT
Start: 2023-01-17 | End: 2023-01-23 | Stop reason: HOSPADM

## 2023-01-17 RX ORDER — SCOLOPAMINE TRANSDERMAL SYSTEM 1 MG/1
1 PATCH, EXTENDED RELEASE TRANSDERMAL
Status: DISCONTINUED | OUTPATIENT
Start: 2023-01-18 | End: 2023-01-23 | Stop reason: HOSPADM

## 2023-01-17 RX ORDER — LOPERAMIDE HYDROCHLORIDE 2 MG/1
2 CAPSULE ORAL 4 TIMES DAILY PRN
Status: DISCONTINUED | OUTPATIENT
Start: 2023-01-17 | End: 2023-01-23 | Stop reason: HOSPADM

## 2023-01-17 RX ORDER — TRAMADOL HYDROCHLORIDE 50 MG/1
50 TABLET ORAL
COMMUNITY

## 2023-01-17 RX ORDER — IBUPROFEN 200 MG
16 TABLET ORAL
Status: DISCONTINUED | OUTPATIENT
Start: 2023-01-17 | End: 2023-01-23 | Stop reason: HOSPADM

## 2023-01-17 RX ORDER — ERGOCALCIFEROL 1.25 MG/1
50000 CAPSULE ORAL
Status: DISCONTINUED | OUTPATIENT
Start: 2023-01-30 | End: 2023-01-23 | Stop reason: HOSPADM

## 2023-01-17 RX ORDER — GLUCAGON 1 MG
1 KIT INJECTION
Status: DISCONTINUED | OUTPATIENT
Start: 2023-01-17 | End: 2023-01-23 | Stop reason: HOSPADM

## 2023-01-17 RX ORDER — NALOXONE HCL 0.4 MG/ML
0.02 VIAL (ML) INJECTION
Status: DISCONTINUED | OUTPATIENT
Start: 2023-01-17 | End: 2023-01-23 | Stop reason: HOSPADM

## 2023-01-17 RX ORDER — DOXYCYCLINE HYCLATE 100 MG
100 TABLET ORAL EVERY 12 HOURS
Status: DISCONTINUED | OUTPATIENT
Start: 2023-01-17 | End: 2023-01-23 | Stop reason: HOSPADM

## 2023-01-17 RX ORDER — ONDANSETRON 2 MG/ML
8 INJECTION INTRAMUSCULAR; INTRAVENOUS EVERY 8 HOURS PRN
Status: DISCONTINUED | OUTPATIENT
Start: 2023-01-17 | End: 2023-01-18

## 2023-01-17 RX ORDER — PROCHLORPERAZINE MALEATE 10 MG
10 TABLET ORAL 3 TIMES DAILY PRN
Status: DISCONTINUED | OUTPATIENT
Start: 2023-01-17 | End: 2023-01-23 | Stop reason: HOSPADM

## 2023-01-17 RX ORDER — CHOLECALCIFEROL (VITAMIN D3) 25 MCG
3000 TABLET ORAL DAILY
Status: DISCONTINUED | OUTPATIENT
Start: 2023-01-17 | End: 2023-01-23 | Stop reason: HOSPADM

## 2023-01-17 RX ORDER — OXYCODONE AND ACETAMINOPHEN 10; 325 MG/1; MG/1
1 TABLET ORAL EVERY 12 HOURS PRN
COMMUNITY

## 2023-01-17 RX ORDER — SERTRALINE HYDROCHLORIDE 50 MG/1
50 TABLET, FILM COATED ORAL DAILY
Status: DISCONTINUED | OUTPATIENT
Start: 2023-01-17 | End: 2023-01-23 | Stop reason: HOSPADM

## 2023-01-17 RX ORDER — ACYCLOVIR 200 MG/1
400 CAPSULE ORAL 2 TIMES DAILY
Status: DISCONTINUED | OUTPATIENT
Start: 2023-01-17 | End: 2023-01-23 | Stop reason: HOSPADM

## 2023-01-17 RX ORDER — CALCIUM CARBONATE 200(500)MG
1000 TABLET,CHEWABLE ORAL 3 TIMES DAILY
Status: DISCONTINUED | OUTPATIENT
Start: 2023-01-17 | End: 2023-01-23 | Stop reason: HOSPADM

## 2023-01-17 RX ORDER — SEVELAMER CARBONATE 800 MG/1
800 TABLET, FILM COATED ORAL
Status: DISCONTINUED | OUTPATIENT
Start: 2023-01-17 | End: 2023-01-18

## 2023-01-17 RX ADMIN — SERTRALINE HYDROCHLORIDE 50 MG: 50 TABLET ORAL at 03:01

## 2023-01-17 RX ADMIN — SCOPALAMINE 1 PATCH: 1 PATCH, EXTENDED RELEASE TRANSDERMAL at 11:01

## 2023-01-17 RX ADMIN — CHOLECALCIFEROL TAB 25 MCG (1000 UNIT) 3000 UNITS: 25 TAB at 03:01

## 2023-01-17 RX ADMIN — ACYCLOVIR 400 MG: 200 CAPSULE ORAL at 09:01

## 2023-01-17 RX ADMIN — DOXYCYCLINE HYCLATE 100 MG: 100 TABLET, COATED ORAL at 09:01

## 2023-01-17 RX ADMIN — CALCIUM CARBONATE (ANTACID) CHEW TAB 500 MG 1000 MG: 500 CHEW TAB at 09:01

## 2023-01-17 RX ADMIN — ONDANSETRON 8 MG: 2 INJECTION INTRAMUSCULAR; INTRAVENOUS at 03:01

## 2023-01-17 RX ADMIN — FAMOTIDINE 20 MG: 20 TABLET ORAL at 03:01

## 2023-01-17 RX ADMIN — METOPROLOL SUCCINATE 12.5 MG: 25 TABLET, EXTENDED RELEASE ORAL at 03:01

## 2023-01-17 RX ADMIN — CALCIUM CARBONATE (ANTACID) CHEW TAB 500 MG 1000 MG: 500 CHEW TAB at 03:01

## 2023-01-17 RX ADMIN — SODIUM CHLORIDE, SODIUM LACTATE, POTASSIUM CHLORIDE, AND CALCIUM CHLORIDE 1000 ML: .6; .31; .03; .02 INJECTION, SOLUTION INTRAVENOUS at 12:01

## 2023-01-17 RX ADMIN — CEFEPIME 2 G: 2 INJECTION, POWDER, FOR SOLUTION INTRAVENOUS at 02:01

## 2023-01-17 RX ADMIN — PROCHLORPERAZINE MALEATE 10 MG: 10 TABLET ORAL at 10:01

## 2023-01-17 NOTE — H&P
[facility] - Emergency Dept  Hematology  Bone Marrow Transplant  H&P    Subjective:     Principal Problem: Neutropenic fever    HPI: Mr. Lakhani is a 57-y-o patient of Dr. King with high risk kappa light chain multiple myeloma. He is Day +18 from a Torie auto SCT. Other medical history includes hypertension, chronic infection of prosthetic knee (on ppx doxycycline), ESRD (on hemodialysis), upper extremity DVT, HLD, and depression. He presented to the ED today with c/o fever. His wife reports a temp of 101.6 yesterday evening and a temp of 100.5 this morning. He was scheduled for his first post transplant f/u in the BMT clinic today, but was instructed to go to the ED instead. Of note, he presented to the ED over the weekend with c/o n/v and weakness and was discharged back to the [facility] from the ED without hospital admission. Also of note, he had two syncopal episodes with falls during his transplant admission. Today, he endorses ongoing n/v/d, poor oral intake, generalized weakness, and fatigue. He denies SOB, cough, chest pain, urinary symptoms, and bleeding or bruising. He denies pain, redness, or warmth to his left knee, and the knee does not appear infected. He also denies pain, redness, warmth, or drainage from his dialysis catheter, and it does not appear infected. He is admitting inpatient for infection w/u and IV abx for neutropenic fever.       Patient information was obtained from patient, past medical records, and ER records.     Oncology History:   From Dr. King's most recent clinic note:  Mr. De Lakhani is a 57 year old male with hypertension, history of prosthetic joint infection who was referred by Dr. Jonh Bunch for transplant evaluation for high-risk kappa light chain multiple myeloma with 1q gain.      Mr. Lakhani was diagnosed with multiple myeloma in April 2022 after presenting with ASHLEE. 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 He had bone marrow biopsy which  showed 100% involvement by plasma cells. He was started on CyBorD on 5/18 and received two cycles of treatment. His bone marrow biopsy after the first cycle showed a decrease in his plasma cell involvement to 80-90%. Dr. Bunch then started DVRd on 7/5/22. However, he was admitted to the hospital on 7/7/22 with septic shock requiring ICU care. He improved with antibiotics but had an ASHLEE thought to be due to hypotension which resulted in renal failure. He is now dialysis dependent. He saw Dr. Bunch after he was discharged from the hospital and started back on DVRd with dose reduced lenalidomide on 7/25/22.      Oncology history:  4/20/22: renal biopsy: light chain cast nephropathy, kappa light chain  4/26/22: right subclavian vein thrombus   4/27/22: M spike 0.2 with free monoclonal kappa band. B2mg 19.57, IgG 496, IgA 10, IgM <5. Kappa 22074, Lambda 7.9, ratio >1000  5/12/22: BMBx: plasma cell myeloma, marrow cellularity 100%, plasma cell percentage 100%. Plasma cell phenotype +, kappa +, CD20- -, CD30-, EMA-, SADAF-, Congo red negative. Peripheral blood with pancytopenia and no circulating blasts. Decreased storage iron. FISH canceled due to quantity not sufficient  5/18/22: CyBorD C1D1  5/23/22: rasburicase  5/24/22: Xgeva  6/6/22: Kappa 19140, lambda 4.0, ratio >1000, M spike 0.1 with free monoclonal kappa band present  6/6/22: CyBorD C2D1  6/9/22: BMBx Plasma cell neoplasm compatible with plasma cell myeloma. Extent of involvement 80-90% of bone marrow elements. Cytology: Plasmablastic. FISH cytogenetics positive for 1q21/CKS1B gain. Karyotype failed. Myelofibrosis diffuse (MF-3)  6/20/22: CyBorD C2D8 delayed due to covid  6/23/22: CyBOrD C2D11  7/5/22: C1D1 DVRd  7/7/22 - 7/18/22: hospital admission for septic shock resulting in renal failure  7/25/22: started again on DVRd  8/8/2022: kappa 1402.8, lambda 7.5, ratio 167.04. M spike 0.1, two faint restricted bands in gamma globulin regions.    10/10/22: C5D1 DVRd. Revlimid on hold for upcoming stem cell collection  10/31/22: C6D1 DVRd. Revlimid on hold  He is currently Day +18 from a Torie 140 auto SCT.    (Not in a hospital admission)      Patient has no known allergies.     Past Medical History:   Diagnosis Date    Chronic infection of prosthetic knee, subsequent encounter     Encounter for blood transfusion     Essential (primary) hypertension     Multiple myeloma      Past Surgical History:   Procedure Laterality Date    KNEE SURGERY      neck infusion       Family History       Problem Relation (Age of Onset)    Heart attack Father          Tobacco Use    Smoking status: Former    Smokeless tobacco: Never    Tobacco comments:     quit smoking in the 90s   Substance and Sexual Activity    Alcohol use: Not Currently    Drug use: Not on file    Sexual activity: Not on file       Review of Systems   Constitutional:  Positive for appetite change, fatigue and fever. Negative for activity change and chills.   HENT:  Negative for congestion, mouth sores, nosebleeds, rhinorrhea, sinus pressure, sinus pain, sneezing and sore throat.    Eyes:  Negative for photophobia, pain, discharge, redness, itching and visual disturbance.   Respiratory:  Negative for cough, chest tightness, shortness of breath and wheezing.    Cardiovascular:  Negative for chest pain, palpitations and leg swelling.   Gastrointestinal:  Positive for diarrhea, nausea and vomiting. Negative for abdominal distention, abdominal pain, blood in stool and constipation.   Endocrine: Negative for cold intolerance, heat intolerance, polydipsia, polyphagia and polyuria.   Genitourinary:  Negative for difficulty urinating, frequency, hematuria and urgency.   Musculoskeletal:  Negative for arthralgias, back pain, myalgias, neck pain and neck stiffness.   Skin:  Negative for pallor, rash and wound.   Allergic/Immunologic: Negative for environmental allergies, food allergies and  immunocompromised state.   Neurological:  Positive for weakness. Negative for dizziness, tremors, seizures, syncope, speech difficulty, light-headedness, numbness and headaches.   Hematological:  Negative for adenopathy. Does not bruise/bleed easily.   Psychiatric/Behavioral:  Negative for agitation, confusion, hallucinations, sleep disturbance and suicidal ideas. The patient is not nervous/anxious.    Objective:     Vital Signs (Most Recent):  Temp: 99.2 °F (37.3 °C) (01/17/23 0916)  Pulse: 68 (01/17/23 1318)  Resp: 18 (01/17/23 0916)  BP: (!) 157/72 (01/17/23 1318)  SpO2: 100 % (01/17/23 1318)   Vital Signs (24h Range):  Temp:  [99.2 °F (37.3 °C)] 99.2 °F (37.3 °C)  Pulse:  [68-98] 68  Resp:  [18] 18  SpO2:  [97 %-100 %] 100 %  BP: (119-157)/(67-84) 157/72     Weight: 99.3 kg (219 lb)  Body mass index is 30.54 kg/m².  Body surface area is 2.23 meters squared.    ECOG SCORE           [unfilled]    Lines/Drains/Airways       Central Venous Catheter Line  Duration                  Hemodialysis Catheter right subclavian -- days    Percutaneous Central Line Insertion/Assessment - Double Lumen  -- days              Peripheral Intravenous Line  Duration                  Peripheral IV - Single Lumen 01/17/23 1030 27 G Right Antecubital <1 day                    Physical Exam    Significant Labs:   CBC:   Recent Labs   Lab 01/15/23  1525 01/15/23  1604 01/17/23  1039   WBC 4.06  --  2.02*   HGB 8.8*  --  8.3*   HCT 26.9* 25* 26.0*   PLT 31*  --  31*    and CMP:   Recent Labs   Lab 01/15/23  1525 01/17/23  1039    142   K 4.2 4.2    106   CO2 23 27    110   BUN 22* 14   CREATININE 4.8* 4.4*   CALCIUM 8.9 8.4*   PROT 6.0 5.7*   ALBUMIN 3.7 3.7   BILITOT 0.5 0.4   ALKPHOS 83 81   AST 7* 11   ALT 5* 6*   ANIONGAP 10 9       Diagnostic Results:  I have reviewed all pertinent imaging results/findings within the past 24 hours.    Assessment/Plan:     * Neutropenic fever  - Wife reports a temp of 101.6 yesterday  evening and a temp of 100.5 this morning.   - He was scheduled for his first post transplant f/u in the BMT clinic today, but was instructed to go to the ED instead.   - Of note, he presented to the ED over the weekend with c/o n/v and weakness and was discharged back to the [facility] from the ED without hospital admission.  - Lactic acid wnl. Second level pending. Procal elevated to 0.62  - COVID neg  - Blood cx in process  - CXR neg  - U/a pending collection  - No sign of infection to left knee or dialysis catheter site  - In ED afebrile, VSS  - Will admit and cover with broad spectum abx with Cefepime pending infection w/u    History of autologous stem cell transplant  - Patient of Dr. King  - Diagnosed with MM in April 2022. Underwent 2 cycles CyBorD then transitioned to DVRd (now s/p C6)  - Admitted 12/28/22 for Torie 140 auto SCT   - Today is Day +18  - Completed chemotherapy 12/29 without issue  - Received 6 bags with a total CD34 dose of 3.13 x10^6/kg on 12/30/23.  - Engrafted Day +14 with an ANC of 869.    Multiple myeloma not having achieved remission  - Patient of Dr. King. Per most recent clinic note:   - 4/20/22: renal biopsy: light chain cast nephropathy, kappa light chain  - 4/26/22: right subclavian vein thrombus   - 4/27/22: M spike 0.2 with free monoclonal kappa band. B2mg 19.57, IgG 496, IgA 10, IgM <5. Kappa 73819, Lambda 7.9, ratio >1000  - 5/12/22: BMBx: plasma cell myeloma, marrow cellularity 100%, plasma cell percentage 100%. Plasma cell phenotype +, kappa +, CD20- -, CD30-, EMA-, SADAF-, Congo red negative. Peripheral blood with pancytopenia and no circulating blasts. Decreased storage iron. FISH canceled due to quantity not sufficient  - 5/18/22: CyBorD C1D1  - 5/23/22: rasburicase  - 5/24/22: Xgeva  - 6/6/22: Kappa 78846, lambda 4.0, ratio >1000, M spike 0.1 with free monoclonal kappa band present  - 6/6/22: CyBorD C2D1  - 6/9/22: BMBx Plasma cell neoplasm compatible  with plasma cell myeloma. Extent of involvement 80-90% of bone marrow elements. Cytology: Plasmablastic. FISH cytogenetics positive for 1q21/CKS1B gain. Karyotype failed. Myelofibrosis diffuse (MF-3)  - 6/20/22: CyBorD C2D8 delayed due to covid  - 6/23/22: CyBOrD C2D11  - 7/5/22: C1D1 DVRd  - 7/7/22 - 7/18/22: hospital admission for septic shock resulting in renal failure  - 7/25/22: started again on DVRd  - 8/8/2022: kappa 1402.8, lambda 7.5, ratio 167.04. M spike 0.1, two faint restricted bands in gamma globulin regions.   - 10/10/22: C5D1 DVRd. Revlimid on hold for upcoming stem   - Admitted for Torie 140 auto SCT on 12/28/22    Pancytopenia due to antineoplastic chemotherapy  - transfuse for Hgb <7 or plt < 10K  - daily CBC while inpatient    Hypocalcemia  - Continue home calcium supplement    Hyperphosphatemia  - Continue home sevelamer with meals    Fall during current hospitalization  - See syncope  - Unclear if patient hit head. CT head neg 1/2 for bleed.  - Fall precautions ordered    Syncope  - Two falls during transplant admission with reported loss of consciousness. Both following dialysis, so suspect orthostatic  - Metoprolol dose decreased from 25 mg daily to 12.5 mg daily  - Can consider midodrine  - EEG ordered; symptoms now improved so discontinued order for EEG  - continue working with PT/OT  - tele sitter in place    Chemotherapy induced diarrhea  - C-diff neg 12/31  - Continue PRN imodium.    CINV (chemotherapy-induced nausea and vomiting)  - home Scopalamine patch in place  - reports unrelieved at home with prn zofran. will add prn compazine while inpatient.    Hyperlipidemia  - Will hold home statin while inpatient    Depression  - Continue home Zoloft    Deep vein thrombosis (DVT) of upper extremity  - Holding home apixiban for thrombocytopenia. Will resume once plts are consistently > 50K.      Hypertension  - Continue home metoprolol. Decreased dose during transplant admission to 12.5 mg daily  1/3/23 due to soft BP and syncopal episodes x 2.    End stage renal disease  - developed during ICU stay. Believed to be 2/2 hypoprofusion due to hypotension  - nephrology consulted on admission  - on dialysis MWF at home. Continuing this schedule inpatient.  - right chest wall dialysis catheter in place  - He will receive dialysis at [facility] through Day +30    Dependence on hemodialysis  - see ESRD    Chronic infection of prosthetic knee  - Seen in ID clinic with Dr. Chatterjee prior to transplant   - ID rec'd continuing doxycycline throughout transplant. No sign of infection recurrence at this time.         VTE Risk Mitigation (From admission, onward)         Ordered     IP VTE HIGH RISK PATIENT  Once         01/17/23 1329     Place sequential compression device  Until discontinued         01/17/23 1329                Disposition: Inpatient.    Amy Thomas NP  Bone Marrow Transplant  Hematology  [facility] - Emergency Dept

## 2023-01-17 NOTE — ASSESSMENT & PLAN NOTE
- Patient of Dr. King  - Diagnosed with MM in April 2022. Underwent 2 cycles CyBorD then transitioned to DVRd (now s/p C6)  - Admitted 12/28/22 for Torie 140 auto SCT   - Today is Day +18  - Completed chemotherapy 12/29 without issue  - Received 6 bags with a total CD34 dose of 3.13 x10^6/kg on 12/30/23.  - Engrafted Day +14 with an ANC of 869.

## 2023-01-17 NOTE — ASSESSMENT & PLAN NOTE
- home Scopalamine patch in place  - reports unrelieved at home with prn zofran. will add prn compazine while inpatient.

## 2023-01-17 NOTE — ASSESSMENT & PLAN NOTE
- See syncope  - Unclear if patient hit head. CT head neg 1/2 for bleed.  - Fall precautions ordered

## 2023-01-17 NOTE — TELEPHONE ENCOUNTER
----- Message from Sushila Melissa sent at 1/17/2023  8:08 AM CST -----  Regarding: Pt Advice  Pt's wife called stating that pt has an appt at 3pm. She stated that last night he was running a fever of 101.6 and this morning it was 100.5. She would like to know if he should be seen sooner than his 3pm appt.      Contact Darlyn [phone]

## 2023-01-17 NOTE — ASSESSMENT & PLAN NOTE
- Wife reports a temp of 101.6 yesterday evening and a temp of 100.5 this morning.   - He was scheduled for his first post transplant f/u in the BMT clinic today, but was instructed to go to the ED instead.   - Of note, he presented to the ED over the weekend with c/o n/v and weakness and was discharged back to the [facility] from the ED without hospital admission.  - Lactic acid wnl. Second level pending. Procal elevated to 0.62  - COVID neg  - Blood cx in process  - CXR neg  - U/a pending collection  - No sign of infection to left knee or dialysis catheter site  - In ED afebrile, VSS  - Will admit and cover with broad spectum abx with Cefepime pending infection w/u

## 2023-01-17 NOTE — ED TRIAGE NOTES
Pt reports to the ER with complaints of fever. Reports temp of 100.5 this AM. Also reports 2 episodes of diarrhea and nausea yesterday. Pt had a stem cell transplant in December.

## 2023-01-17 NOTE — HPI
Mr. Lakhani is a 57-y-o patient of Dr. King with high risk kappa light chain multiple myeloma. He is Day +18 from a Torie auto SCT. Other medical history includes hypertension, chronic infection of prosthetic knee (on ppx doxycycline), ESRD (on hemodialysis), upper extremity DVT, HLD, and depression. He presented to the ED today with c/o fever. His wife reports a temp of 101.6 yesterday evening and a temp of 100.5 this morning. He was scheduled for his first post transplant f/u in the BMT clinic today, but was instructed to go to the ED instead. Of note, he presented to the ED over the weekend with c/o n/v and weakness and was discharged back to the [facility] from the ED without hospital admission. Also of note, he had two syncopal episodes with falls during his transplant admission. Today, he endorses ongoing n/v/d, poor oral intake, generalized weakness, and fatigue. He denies SOB, cough, chest pain, urinary symptoms, and bleeding or bruising. He denies pain, redness, or warmth to his left knee, and the knee does not appear infected. He also denies pain, redness, warmth, or drainage from his dialysis catheter, and it does not appear infected. He is admitting inpatient for infection w/u and IV abx for neutropenic fever.

## 2023-01-17 NOTE — ASSESSMENT & PLAN NOTE
- developed during ICU stay. Believed to be 2/2 hypoprofusion due to hypotension  - nephrology consulted on admission  - on dialysis MWF at home. Continuing this schedule inpatient.  - right chest wall dialysis catheter in place  - He will receive dialysis at [facility] through Day +30

## 2023-01-17 NOTE — PHARMACY MED REC
"Admission Medication History     The home medication history was taken by Xiao Arshad.    You may go to "Admission" then "Reconcile Home Medications" tabs to review and/or act upon these items.     The home medication list has been updated by the Pharmacy department.   Please read ALL comments highlighted in yellow.   Please address this information as you see fit.    Feel free to contact us if you have any questions or require assistance.          Medications listed below were obtained from: Patient/family and Patient's pharmacy      Current Outpatient Medications on File Prior to Encounter   Medication Sig    acyclovir (ZOVIRAX) 200 MG capsule   Take 2 capsules (400 mg total) by mouth 2 (two) times daily.    calcium carbonate (TUMS) 200 mg calcium (500 mg) chewable tablet   Chew and swallow 2 tablets (1,000 mg total) by mouth 3 (three) times daily.    cholecalciferol, vitamin D3, 75 mcg (3,000 unit) Tab   Take 1 tablet by mouth once daily.    doxycycline (VIBRAMYCIN) 100 MG Cap   Take 100 mg by mouth every 12 (twelve) hours. Preventative for past knee infection    ergocalciferol (ERGOCALCIFEROL) 50,000 unit Cap   Take 50,000 Units by mouth every 14 (fourteen) days. Given at dialysis center    famotidine (PEPCID) 20 MG tablet   Take 20 mg by mouth once daily.    LIDOcaine HCl 2% (XYLOCAINE) 2 % Soln   15 mLs by Mucous Membrane route every 6 (six) hours as needed (mouth sores).    loperamide (IMODIUM) 2 mg capsule   Take 1 capsule (2 mg total) by mouth 4 (four) times daily as needed for Diarrhea.    metoprolol succinate (TOPROL-XL) 25 MG 24 hr tablet   Take 0.5 tablets (12.5 mg total) by mouth once daily.    ondansetron (ZOFRAN) 4 MG tablet   TAKE ONE TABLET BY MOUTH EVERY EIGHT HOURS IF NEEDED FOR NAUSEA.    oxyCODONE-acetaminophen (PERCOCET)  mg per tablet   Take 1 tablet by mouth every 12 (twelve) hours as needed for Pain.    rosuvastatin (CRESTOR) 20 MG tablet   Take 20 mg by mouth once daily.    " scopolamine (TRANSDERM-SCOP) 1.3-1.5 mg (1 mg over 3 days)   Place 1 patch onto the skin Every 3 (three) days. Place 1 patch onto the skin every 3 days as needed for nausea    sertraline (ZOLOFT) 50 MG tablet   Take 50 mg by mouth once daily.    traMADoL (ULTRAM) 50 mg tablet   Take 50 mg by mouth every 6 to 8 hours as needed for Pain.    apixaban (ELIQUIS) 5 mg Tab         Take 1 tablet (5 mg total) by mouth 2 (two) times daily. Hold until instructed to resume by BMT provider. (Patient not taking: Reported on 1/17/2023)    sevelamer carbonate (RENVELA) 800 mg Tab Take 1 tablet (800 mg total) by mouth 3 (three) times daily with meals. (Patient not taking: Reported on 1/17/2023)         Xiao Arshad  EXT [id number]                  .

## 2023-01-17 NOTE — SUBJECTIVE & OBJECTIVE
Patient information was obtained from patient, past medical records, and ER records.     Oncology History:   From Dr. King's most recent clinic note:  Mr. De Lakhani is a 57 year old male with hypertension, history of prosthetic joint infection who was referred by Dr. Jonh Bunch for transplant evaluation for high-risk kappa light chain multiple myeloma with 1q gain.      Mr. Lakhani was diagnosed with multiple myeloma in April 2022 after presenting with ASHLEE. 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 He had bone marrow biopsy which showed 100% involvement by plasma cells. He was started on CyBorD on 5/18 and received two cycles of treatment. His bone marrow biopsy after the first cycle showed a decrease in his plasma cell involvement to 80-90%. Dr. Bunch then started DVRd on 7/5/22. However, he was admitted to the hospital on 7/7/22 with septic shock requiring ICU care. He improved with antibiotics but had an ASHLEE thought to be due to hypotension which resulted in renal failure. He is now dialysis dependent. He saw Dr. Bunch after he was discharged from the hospital and started back on DVRd with dose reduced lenalidomide on 7/25/22.      Oncology history:  4/20/22: renal biopsy: light chain cast nephropathy, kappa light chain  4/26/22: right subclavian vein thrombus   4/27/22: M spike 0.2 with free monoclonal kappa band. B2mg 19.57, IgG 496, IgA 10, IgM <5. Kappa 19431, Lambda 7.9, ratio >1000  5/12/22: BMBx: plasma cell myeloma, marrow cellularity 100%, plasma cell percentage 100%. Plasma cell phenotype +, kappa +, CD20- -, CD30-, EMA-, SDAAF-, Congo red negative. Peripheral blood with pancytopenia and no circulating blasts. Decreased storage iron. FISH canceled due to quantity not sufficient  5/18/22: CyBorD C1D1  5/23/22: rasburicase  5/24/22: Xgeva  6/6/22: Kappa 38672, lambda 4.0, ratio >1000, M spike 0.1 with free monoclonal kappa band present  6/6/22: CyBorD C2D1  6/9/22:  BMBx Plasma cell neoplasm compatible with plasma cell myeloma. Extent of involvement 80-90% of bone marrow elements. Cytology: Plasmablastic. FISH cytogenetics positive for 1q21/CKS1B gain. Karyotype failed. Myelofibrosis diffuse (MF-3)  6/20/22: CyBorD C2D8 delayed due to covid  6/23/22: CyBOrD C2D11  7/5/22: C1D1 DVRd  7/7/22 - 7/18/22: hospital admission for septic shock resulting in renal failure  7/25/22: started again on DVRd  8/8/2022: kappa 1402.8, lambda 7.5, ratio 167.04. M spike 0.1, two faint restricted bands in gamma globulin regions.   10/10/22: C5D1 DVRd. Revlimid on hold for upcoming stem cell collection  10/31/22: C6D1 DVRd. Revlimid on hold  He is currently Day +18 from a [employer]ie 140 auto SCT.    (Not in a hospital admission)      Patient has no known allergies.     Past Medical History:   Diagnosis Date    Chronic infection of prosthetic knee, subsequent encounter     Encounter for blood transfusion     Essential (primary) hypertension     Multiple myeloma      Past Surgical History:   Procedure Laterality Date    KNEE SURGERY      neck infusion       Family History       Problem Relation (Age of Onset)    Heart attack Father          Tobacco Use    Smoking status: Former    Smokeless tobacco: Never    Tobacco comments:     quit smoking in the 90s   Substance and Sexual Activity    Alcohol use: Not Currently    Drug use: Not on file    Sexual activity: Not on file       Review of Systems   Constitutional:  Positive for appetite change, fatigue and fever. Negative for activity change and chills.   HENT:  Negative for congestion, mouth sores, nosebleeds, rhinorrhea, sinus pressure, sinus pain, sneezing and sore throat.    Eyes:  Negative for photophobia, pain, discharge, redness, itching and visual disturbance.   Respiratory:  Negative for cough, chest tightness, shortness of breath and wheezing.    Cardiovascular:  Negative for chest pain, palpitations and leg swelling.   Gastrointestinal:  Positive  for diarrhea, nausea and vomiting. Negative for abdominal distention, abdominal pain, blood in stool and constipation.   Endocrine: Negative for cold intolerance, heat intolerance, polydipsia, polyphagia and polyuria.   Genitourinary:  Negative for difficulty urinating, frequency, hematuria and urgency.   Musculoskeletal:  Negative for arthralgias, back pain, myalgias, neck pain and neck stiffness.   Skin:  Negative for pallor, rash and wound.   Allergic/Immunologic: Negative for environmental allergies, food allergies and immunocompromised state.   Neurological:  Positive for weakness. Negative for dizziness, tremors, seizures, syncope, speech difficulty, light-headedness, numbness and headaches.   Hematological:  Negative for adenopathy. Does not bruise/bleed easily.   Psychiatric/Behavioral:  Negative for agitation, confusion, hallucinations, sleep disturbance and suicidal ideas. The patient is not nervous/anxious.    Objective:     Vital Signs (Most Recent):  Temp: 99.2 °F (37.3 °C) (01/17/23 0916)  Pulse: 68 (01/17/23 1318)  Resp: 18 (01/17/23 0916)  BP: (!) 157/72 (01/17/23 1318)  SpO2: 100 % (01/17/23 1318)   Vital Signs (24h Range):  Temp:  [99.2 °F (37.3 °C)] 99.2 °F (37.3 °C)  Pulse:  [68-98] 68  Resp:  [18] 18  SpO2:  [97 %-100 %] 100 %  BP: (119-157)/(67-84) 157/72     Weight: 99.3 kg (219 lb)  Body mass index is 30.54 kg/m².  Body surface area is 2.23 meters squared.    ECOG SCORE           [unfilled]    Lines/Drains/Airways       Central Venous Catheter Line  Duration                  Hemodialysis Catheter right subclavian -- days    Percutaneous Central Line Insertion/Assessment - Double Lumen  -- days              Peripheral Intravenous Line  Duration                  Peripheral IV - Single Lumen 01/17/23 1030 27 G Right Antecubital <1 day                    Physical Exam  Constitutional:       Appearance: He is well-developed.   HENT:      Head: Normocephalic and atraumatic.      Mouth/Throat:       Pharynx: No oropharyngeal exudate.   Eyes:      General:         Right eye: No discharge.         Left eye: No discharge.      Conjunctiva/sclera: Conjunctivae normal.      Pupils: Pupils are equal, round, and reactive to light.   Cardiovascular:      Rate and Rhythm: Normal rate and regular rhythm.      Heart sounds: Normal heart sounds. No murmur heard.  Pulmonary:      Effort: Pulmonary effort is normal. No respiratory distress.      Breath sounds: Normal breath sounds. No wheezing or rales.   Abdominal:      General: Bowel sounds are normal. There is no distension.      Palpations: Abdomen is soft.      Tenderness: There is no abdominal tenderness.   Musculoskeletal:         General: No deformity. Normal range of motion.      Cervical back: Normal range of motion and neck supple.   Skin:     General: Skin is warm and dry.      Findings: No erythema or rash.      Comments: Right chest wall dialysis catheter. Dressing c/d/i. No sign of infection to site.   Neurological:      Mental Status: He is alert and oriented to person, place, and time.   Psychiatric:         Behavior: Behavior normal.         Thought Content: Thought content normal.         Judgment: Judgment normal.       Significant Labs:   CBC:   Recent Labs   Lab 01/15/23  1525 01/15/23  1604 01/17/23  1039   WBC 4.06  --  2.02*   HGB 8.8*  --  8.3*   HCT 26.9* 25* 26.0*   PLT 31*  --  31*    and CMP:   Recent Labs   Lab 01/15/23  1525 01/17/23  1039    142   K 4.2 4.2    106   CO2 23 27    110   BUN 22* 14   CREATININE 4.8* 4.4*   CALCIUM 8.9 8.4*   PROT 6.0 5.7*   ALBUMIN 3.7 3.7   BILITOT 0.5 0.4   ALKPHOS 83 81   AST 7* 11   ALT 5* 6*   ANIONGAP 10 9       Diagnostic Results:  I have reviewed all pertinent imaging results/findings within the past 24 hours.

## 2023-01-17 NOTE — ED PROVIDER NOTES
Encounter Date: 1/17/2023       History     Chief Complaint   Patient presents with    Fever     Stem cell transplant dec 30th     Mr. Lakhani is a 56 yo M w/ PMHx high risk kappa light chain multiple myeloma w/ q1 gain (s/p stem cell transplant 12/22), ESRD (HD MWF), chronic infection of prosthetic knee (on doxy and dapsone ppx) who presents to the ED after instructed by heme/onc clinic. Patient had temp 101.6 7:30pm the evening prior to arrival. Temp 1 hour later decreased to 100.3. Temp was taken again around 4:30am and w/ T100.5. Patient reports slight worsening of ongoing nausea (2/2 chemo) since fever onset. Patient reports no other unusual symptoms. Patient has history of prolonged fatigue, generalized weakness and intermittent diarrhea 2/2 treatment course for multiple myeloma. Patient denies CP, SOB, abd pain, increased fatigue or weakness than usual. Patient also denies recent illness or current URI symptoms.     Patient had recent ED visit for generalized weakness and nausea. Patient was discharged without complication; AF at that time. On arrival to the ED today, AF VSS.     The history is provided by the patient and a relative.   Review of patient's allergies indicates:  No Known Allergies  Past Medical History:   Diagnosis Date    Chronic infection of prosthetic knee, subsequent encounter     Encounter for blood transfusion     Essential (primary) hypertension     Multiple myeloma      Past Surgical History:   Procedure Laterality Date    KNEE SURGERY      neck infusion       Family History   Problem Relation Age of Onset    Heart attack Father      Social History     Tobacco Use    Smoking status: Former    Smokeless tobacco: Never    Tobacco comments:     quit smoking in the 90s   Substance Use Topics    Alcohol use: Not Currently     Review of Systems   Constitutional:  Positive for appetite change, fatigue and fever. Negative for chills.   HENT:  Negative for congestion.    Respiratory:  Negative  for cough and shortness of breath.    Cardiovascular:  Negative for chest pain and leg swelling.   Gastrointestinal:  Positive for diarrhea, nausea and vomiting. Negative for abdominal pain.   Skin:  Negative for color change and pallor.   Allergic/Immunologic: Positive for immunocompromised state.   Neurological:  Positive for weakness (generalized). Negative for dizziness, syncope and light-headedness.   Psychiatric/Behavioral:  Negative for agitation and behavioral problems.      Physical Exam     Initial Vitals [01/17/23 0916]   BP Pulse Resp Temp SpO2   119/67 98 18 99.2 °F (37.3 °C) 100 %      MAP       --         Physical Exam    Constitutional: He appears well-developed and well-nourished. No distress.   HENT:   Head: Normocephalic and atraumatic.   Right Ear: External ear normal.   Left Ear: External ear normal.   Nose: Nose normal.   Mouth/Throat: Oropharynx is clear and moist.   Eyes: Conjunctivae are normal.   Neck: Neck supple.   Cardiovascular:  Normal rate, regular rhythm and normal heart sounds.           Pulmonary/Chest: No respiratory distress. He has no wheezes. He has no rhonchi.   Slight crackles heard in L lung base; normal on R   Abdominal: Abdomen is soft. Bowel sounds are normal. He exhibits no distension. There is no abdominal tenderness.   Musculoskeletal:      Cervical back: Neck supple.     Neurological: He is alert and oriented to person, place, and time.   Skin: Skin is warm and dry. Capillary refill takes less than 2 seconds.   Psychiatric: He has a normal mood and affect. His behavior is normal.       ED Course   Procedures  Labs Reviewed   CBC W/ AUTO DIFFERENTIAL - Abnormal; Notable for the following components:       Result Value    WBC 2.02 (*)     RBC 2.66 (*)     Hemoglobin 8.3 (*)     Hematocrit 26.0 (*)     MCH 31.2 (*)     MCHC 31.9 (*)     RDW 17.2 (*)     Platelets 31 (*)     Lymph % 3.0 (*)     Mono % 16.0 (*)     Platelet Estimate Decreased (*)     All other components  within normal limits    Narrative:     PLT   critical result(s) called and verbal readback obtained from   Lakisha Rider RN. by WADE 01/17/2023 11:36   COMPREHENSIVE METABOLIC PANEL - Abnormal; Notable for the following components:    Creatinine 4.4 (*)     Calcium 8.4 (*)     Total Protein 5.7 (*)     ALT 6 (*)     eGFR 14.7 (*)     All other components within normal limits   PHOSPHORUS - Abnormal; Notable for the following components:    Phosphorus 2.3 (*)     All other components within normal limits   PROCALCITONIN - Abnormal; Notable for the following components:    Procalcitonin 0.62 (*)     All other components within normal limits   CULTURE, BLOOD   CULTURE, BLOOD   LACTIC ACID, PLASMA   MAGNESIUM   SARS-COV2 (COVID) WITH FLU/RSV BY PCR   LACTIC ACID, PLASMA   URINALYSIS, REFLEX TO URINE CULTURE     EKG Readings: (Independently Interpreted)   Initial Reading: No STEMI. Rhythm: Normal Sinus Rhythm. Ectopy: No Ectopy. Conduction: Normal. ST Segments: Normal ST Segments. T Waves: Normal. Clinical Impression: Normal Sinus Rhythm   ECG Results              EKG 12-lead (Final result)  Result time 01/17/23 13:40:09      Final result by Interface, Lab In [facility] (01/17/23 13:40:09)                   Narrative:    Test Reason : B99.9,    Vent. Rate : 074 BPM     Atrial Rate : 074 BPM     P-R Int : 170 ms          QRS Dur : 096 ms      QT Int : 420 ms       P-R-T Axes : 061 -02 056 degrees     QTc Int : 466 ms    Normal sinus rhythm  Low voltage, precordial leads  Otherwise normal ECG  When compared with ECG of 15-KUN-2023 15:22,  No significant change was found  Confirmed by DYLAN CARROLL MD ([id number]) on 1/17/2023 1:39:58 PM    Referred By: [email or web address]   SELF           Confirmed By:DYLAN CARROLL MD                                  Imaging Results              X-Ray Chest PA And Lateral (Final result)  Result time 01/17/23 12:08:29      Final result by Joe Plummer MD (01/17/23 12:08:29)                    "Impression:      No acute finding or detrimental change when compared with 11/22/2022.      Electronically signed by: Joe Plummer MD  Date:    01/17/2023  Time:    12:08               Narrative:    EXAMINATION:  XR CHEST PA AND LATERAL    CLINICAL HISTORY:  Provided history is "  Unspecified infectious disease".    TECHNIQUE:  Frontal and lateral views of the chest were performed.    COMPARISON:  Chest radiograph, 11/22/2022.    FINDINGS:  Right-sided central venous catheter is present with the tip overlying the SVC.  Cardiac silhouette is not enlarged. No focal consolidation.  No sizable pleural effusion.  No pneumothorax.  Stable increased density overlying the anterior aspect of the right 7th rib.  Postoperative changes of ACDF.  Degenerative changes in the visualized spine.                                    X-Rays:   Independently Interpreted Readings:   Chest X-Ray: Normal heart size. Normal vessels. No acute findings. Similar to prior studies   Medications   ceFEPIme (MAXIPIME) 2 g in dextrose 5 % in water (D5W) 5 % 50 mL IVPB (MB+) (has no administration in time range)   sodium chloride 0.9% flush 10 mL (has no administration in time range)   naloxone 0.4 mg/mL injection 0.02 mg (has no administration in time range)   glucose chewable tablet 16 g (has no administration in time range)   glucose chewable tablet 24 g (has no administration in time range)   glucagon (human recombinant) injection 1 mg (has no administration in time range)   dextrose 10% bolus 125 mL 125 mL (has no administration in time range)   dextrose 10% bolus 250 mL 250 mL (has no administration in time range)   acyclovir capsule 400 mg (has no administration in time range)   calcium carbonate 200 mg calcium (500 mg) chewable tablet 1,000 mg (has no administration in time range)   vitamin D 1000 units tablet 3,000 Units (has no administration in time range)   doxycycline tablet 100 mg (has no administration in time range) "   ergocalciferol capsule 50,000 Units (has no administration in time range)   famotidine tablet 20 mg (has no administration in time range)   LIDOcaine HCl 2% oral solution 15 mL (has no administration in time range)   loperamide capsule 2 mg (has no administration in time range)   metoprolol succinate (TOPROL-XL) 24 hr split tablet 12.5 mg (has no administration in time range)   scopolamine 1.3-1.5 mg (1 mg over 3 days) 1 patch (has no administration in time range)   sertraline tablet 50 mg (has no administration in time range)   sevelamer carbonate tablet 800 mg (has no administration in time range)   prochlorperazine tablet 10 mg (has no administration in time range)   ondansetron injection 8 mg (has no administration in time range)   lactated ringers bolus 1,000 mL (0 mLs Intravenous Stopped 1/17/23 2636)     Medical Decision Making:   History:   Old Records Summarized: records from clinic visits and records from previous admission(s).       <> Summary of Records: Patient seen in [facility] heme/onc clinic for multiple myeloma. Received stem cell transplant 12/2022. Patient noted w/ previous ED presentation for generalized weakness and nausea but was AF at that time; discharged likely 2/2 side effects of chemo.   Initial Assessment:   Mr. Lakhani 58 yo M w/ hx multiple myeloma s/p recent stem cell transplant presents to ED with fever at home. Currently AF VSS. Will perform infectious work-up to rule out sepsis/systemic infection as well as imaging to rule out infectious source since high risk 2/2 immunocompromise.   Differential Diagnosis:   1. Systemic infection in immunocompromised patient  2. Self-limiting viral syndrome     Independently Interpreted Test(s):   I have ordered and independently interpreted X-rays - see prior notes.  I have ordered and independently interpreted EKG Reading(s) - see prior notes  Clinical Tests:   Lab Tests: Ordered and Reviewed  The following lab test(s) were unremarkable: CBC,  CMP, Lactate and Urinalysis  Radiological Study: Ordered and Reviewed  Medical Tests: Ordered and Reviewed  ED Management:  Patient currently AF VSS. Workup ordered including CBC, CMP, CXR, viral swabs, UA w/ reflex, Bcx2, lactate. Patient also received 1L bolus LR in setting of ongoing, worsening nausea. CBC w/ low WBC and thrombocytopenia at 33; no active bleeding on exam. CMP relatively unremarkable. Lactate WNL. CXR WNL. EKG WNL. Elevated procalcitonin. BMT paged - admitted.                         Clinical Impression:   Final diagnoses:  [B99.9] Infection  [D70.9, R50.81] Neutropenic fever (Primary)        ED Disposition Condition    Admit Stable                Kayla Leslie MD  Resident  01/17/23 9947

## 2023-01-17 NOTE — TELEPHONE ENCOUNTER
Wife states fever started last night, Tmax 101.6F. temperature this morning is 100.5F. wife reports only other symptom is vomiting which has been ongoing and is not relieved with zofran.    Discussed all of the above with ZURI Tovar who is advising ED now. Wife notified and pt agreeable to plan. Will proceed to ED now. ZURI Tovar to notify inpatient team.

## 2023-01-17 NOTE — ASSESSMENT & PLAN NOTE
- Two falls during transplant admission with reported loss of consciousness. Both following dialysis, so suspect orthostatic  - Metoprolol dose decreased from 25 mg daily to 12.5 mg daily  - Can consider midodrine  - EEG ordered; symptoms now improved so discontinued order for EEG  - continue working with PT/OT  - tele sitter in place

## 2023-01-17 NOTE — H&P
[facility] - Emergency Dept  Hematology  Bone Marrow Transplant  H&P    Subjective:     Principal Problem: Neutropenic fever    HPI: Mr. Lakhani is a 57-y-o patient of Dr. King with high risk kappa light chain multiple myeloma. He is Day +18 from a Torie auto SCT. Other medical history includes hypertension, chronic infection of prosthetic knee (on ppx doxycycline), ESRD (on hemodialysis), upper extremity DVT, HLD, and depression. He presented to the ED today with c/o fever. His wife reports a temp of 101.6 yesterday evening and a temp of 100.5 this morning. He was scheduled for his first post transplant f/u in the BMT clinic today, but was instructed to go to the ED instead. Of note, he presented to the ED over the weekend with c/o n/v and weakness and was discharged back to the [facility] from the ED without hospital admission. Also of note, he had two syncopal episodes with falls during his transplant admission. Today, he endorses ongoing n/v/d, poor oral intake, generalized weakness, and fatigue. He denies SOB, cough, chest pain, urinary symptoms, and bleeding or bruising. He denies pain, redness, or warmth to his left knee, and the knee does not appear infected. He also denies pain, redness, warmth, or drainage from his dialysis catheter, and it does not appear infected. He is admitting inpatient for infection w/u and IV abx for neutropenic fever.       Patient information was obtained from patient, past medical records, and ER records.     Oncology History:   From Dr. King's most recent clinic note:  Mr. De Lakhani is a 57 year old male with hypertension, history of prosthetic joint infection who was referred by Dr. Jonh Bunch for transplant evaluation for high-risk kappa light chain multiple myeloma with 1q gain.      Mr. Lakhani was diagnosed with multiple myeloma in April 2022 after presenting with ASHLEE. 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 He had bone marrow biopsy which  showed 100% involvement by plasma cells. He was started on CyBorD on 5/18 and received two cycles of treatment. His bone marrow biopsy after the first cycle showed a decrease in his plasma cell involvement to 80-90%. Dr. Bunch then started DVRd on 7/5/22. However, he was admitted to the hospital on 7/7/22 with septic shock requiring ICU care. He improved with antibiotics but had an ASHLEE thought to be due to hypotension which resulted in renal failure. He is now dialysis dependent. He saw Dr. Bunch after he was discharged from the hospital and started back on DVRd with dose reduced lenalidomide on 7/25/22.      Oncology history:  4/20/22: renal biopsy: light chain cast nephropathy, kappa light chain  4/26/22: right subclavian vein thrombus   4/27/22: M spike 0.2 with free monoclonal kappa band. B2mg 19.57, IgG 496, IgA 10, IgM <5. Kappa 15708, Lambda 7.9, ratio >1000  5/12/22: BMBx: plasma cell myeloma, marrow cellularity 100%, plasma cell percentage 100%. Plasma cell phenotype +, kappa +, CD20- -, CD30-, EMA-, SADAF-, Congo red negative. Peripheral blood with pancytopenia and no circulating blasts. Decreased storage iron. FISH canceled due to quantity not sufficient  5/18/22: CyBorD C1D1  5/23/22: rasburicase  5/24/22: Xgeva  6/6/22: Kappa 17480, lambda 4.0, ratio >1000, M spike 0.1 with free monoclonal kappa band present  6/6/22: CyBorD C2D1  6/9/22: BMBx Plasma cell neoplasm compatible with plasma cell myeloma. Extent of involvement 80-90% of bone marrow elements. Cytology: Plasmablastic. FISH cytogenetics positive for 1q21/CKS1B gain. Karyotype failed. Myelofibrosis diffuse (MF-3)  6/20/22: CyBorD C2D8 delayed due to covid  6/23/22: CyBOrD C2D11  7/5/22: C1D1 DVRd  7/7/22 - 7/18/22: hospital admission for septic shock resulting in renal failure  7/25/22: started again on DVRd  8/8/2022: kappa 1402.8, lambda 7.5, ratio 167.04. M spike 0.1, two faint restricted bands in gamma globulin regions.    10/10/22: C5D1 DVRd. Revlimid on hold for upcoming stem cell collection  10/31/22: C6D1 DVRd. Revlimid on hold  He is currently Day +18 from a Torie 140 auto SCT.    (Not in a hospital admission)      Patient has no known allergies.     Past Medical History:   Diagnosis Date    Chronic infection of prosthetic knee, subsequent encounter     Encounter for blood transfusion     Essential (primary) hypertension     Multiple myeloma      Past Surgical History:   Procedure Laterality Date    KNEE SURGERY      neck infusion       Family History       Problem Relation (Age of Onset)    Heart attack Father          Tobacco Use    Smoking status: Former    Smokeless tobacco: Never    Tobacco comments:     quit smoking in the 90s   Substance and Sexual Activity    Alcohol use: Not Currently    Drug use: Not on file    Sexual activity: Not on file       Review of Systems   Constitutional:  Positive for appetite change, fatigue and fever. Negative for activity change and chills.   HENT:  Negative for congestion, mouth sores, nosebleeds, rhinorrhea, sinus pressure, sinus pain, sneezing and sore throat.    Eyes:  Negative for photophobia, pain, discharge, redness, itching and visual disturbance.   Respiratory:  Negative for cough, chest tightness, shortness of breath and wheezing.    Cardiovascular:  Negative for chest pain, palpitations and leg swelling.   Gastrointestinal:  Positive for diarrhea, nausea and vomiting. Negative for abdominal distention, abdominal pain, blood in stool and constipation.   Endocrine: Negative for cold intolerance, heat intolerance, polydipsia, polyphagia and polyuria.   Genitourinary:  Negative for difficulty urinating, frequency, hematuria and urgency.   Musculoskeletal:  Negative for arthralgias, back pain, myalgias, neck pain and neck stiffness.   Skin:  Negative for pallor, rash and wound.   Allergic/Immunologic: Negative for environmental allergies, food allergies and  immunocompromised state.   Neurological:  Positive for weakness. Negative for dizziness, tremors, seizures, syncope, speech difficulty, light-headedness, numbness and headaches.   Hematological:  Negative for adenopathy. Does not bruise/bleed easily.   Psychiatric/Behavioral:  Negative for agitation, confusion, hallucinations, sleep disturbance and suicidal ideas. The patient is not nervous/anxious.    Objective:     Vital Signs (Most Recent):  Temp: 99.2 °F (37.3 °C) (01/17/23 0916)  Pulse: 68 (01/17/23 1318)  Resp: 18 (01/17/23 0916)  BP: (!) 157/72 (01/17/23 1318)  SpO2: 100 % (01/17/23 1318)   Vital Signs (24h Range):  Temp:  [99.2 °F (37.3 °C)] 99.2 °F (37.3 °C)  Pulse:  [68-98] 68  Resp:  [18] 18  SpO2:  [97 %-100 %] 100 %  BP: (119-157)/(67-84) 157/72     Weight: 99.3 kg (219 lb)  Body mass index is 30.54 kg/m².  Body surface area is 2.23 meters squared.    ECOG SCORE           [unfilled]    Lines/Drains/Airways       Central Venous Catheter Line  Duration                  Hemodialysis Catheter right subclavian -- days    Percutaneous Central Line Insertion/Assessment - Double Lumen  -- days              Peripheral Intravenous Line  Duration                  Peripheral IV - Single Lumen 01/17/23 1030 27 G Right Antecubital <1 day                    Physical Exam  Constitutional:       Appearance: He is well-developed.   HENT:      Head: Normocephalic and atraumatic.      Mouth/Throat:      Pharynx: No oropharyngeal exudate.   Eyes:      General:         Right eye: No discharge.         Left eye: No discharge.      Conjunctiva/sclera: Conjunctivae normal.      Pupils: Pupils are equal, round, and reactive to light.   Cardiovascular:      Rate and Rhythm: Normal rate and regular rhythm.      Heart sounds: Normal heart sounds. No murmur heard.  Pulmonary:      Effort: Pulmonary effort is normal. No respiratory distress.      Breath sounds: Normal breath sounds. No wheezing or rales.   Abdominal:      General:  Bowel sounds are normal. There is no distension.      Palpations: Abdomen is soft.      Tenderness: There is no abdominal tenderness.   Musculoskeletal:         General: No deformity. Normal range of motion.      Cervical back: Normal range of motion and neck supple.   Skin:     General: Skin is warm and dry.      Findings: No erythema or rash.      Comments: Right chest wall dialysis catheter. Dressing c/d/i. No sign of infection to site.   Neurological:      Mental Status: He is alert and oriented to person, place, and time.   Psychiatric:         Behavior: Behavior normal.         Thought Content: Thought content normal.         Judgment: Judgment normal.       Significant Labs:   CBC:   Recent Labs   Lab 01/15/23  1525 01/15/23  1604 01/17/23  1039   WBC 4.06  --  2.02*   HGB 8.8*  --  8.3*   HCT 26.9* 25* 26.0*   PLT 31*  --  31*    and CMP:   Recent Labs   Lab 01/15/23  1525 01/17/23  1039    142   K 4.2 4.2    106   CO2 23 27    110   BUN 22* 14   CREATININE 4.8* 4.4*   CALCIUM 8.9 8.4*   PROT 6.0 5.7*   ALBUMIN 3.7 3.7   BILITOT 0.5 0.4   ALKPHOS 83 81   AST 7* 11   ALT 5* 6*   ANIONGAP 10 9       Diagnostic Results:  I have reviewed all pertinent imaging results/findings within the past 24 hours.    Assessment/Plan:     * Neutropenic fever  - Wife reports a temp of 101.6 yesterday evening and a temp of 100.5 this morning.   - He was scheduled for his first post transplant f/u in the BMT clinic today, but was instructed to go to the ED instead.   - Of note, he presented to the ED over the weekend with c/o n/v and weakness and was discharged back to the [facility] from the ED without hospital admission.  - Lactic acid wnl. Second level pending. Procal elevated to 0.62  - COVID neg  - Blood cx in process  - CXR neg  - U/a pending collection  - No sign of infection to left knee or dialysis catheter site  - In ED afebrile, VSS  - Will admit and cover with broad spectum abx with Cefepime  pending infection w/u    History of autologous stem cell transplant  - Patient of Dr. King  - Diagnosed with MM in April 2022. Underwent 2 cycles CyBorD then transitioned to DVRd (now s/p C6)  - Admitted 12/28/22 for Torie 140 auto SCT   - Today is Day +18  - Completed chemotherapy 12/29 without issue  - Received 6 bags with a total CD34 dose of 3.13 x10^6/kg on 12/30/23.  - Engrafted Day +14 with an ANC of 869.    Multiple myeloma not having achieved remission  - Patient of Dr. King. Per most recent clinic note:   - 4/20/22: renal biopsy: light chain cast nephropathy, kappa light chain  - 4/26/22: right subclavian vein thrombus   - 4/27/22: M spike 0.2 with free monoclonal kappa band. B2mg 19.57, IgG 496, IgA 10, IgM <5. Kappa 25124, Lambda 7.9, ratio >1000  - 5/12/22: BMBx: plasma cell myeloma, marrow cellularity 100%, plasma cell percentage 100%. Plasma cell phenotype +, kappa +, CD20- -, CD30-, EMA-, SADAF-, Congo red negative. Peripheral blood with pancytopenia and no circulating blasts. Decreased storage iron. FISH canceled due to quantity not sufficient  - 5/18/22: CyBorD C1D1  - 5/23/22: rasburicase  - 5/24/22: Xgeva  - 6/6/22: Kappa 24606, lambda 4.0, ratio >1000, M spike 0.1 with free monoclonal kappa band present  - 6/6/22: CyBorD C2D1  - 6/9/22: BMBx Plasma cell neoplasm compatible with plasma cell myeloma. Extent of involvement 80-90% of bone marrow elements. Cytology: Plasmablastic. FISH cytogenetics positive for 1q21/CKS1B gain. Karyotype failed. Myelofibrosis diffuse (MF-3)  - 6/20/22: CyBorD C2D8 delayed due to covid  - 6/23/22: CyBOrD C2D11  - 7/5/22: C1D1 DVRd  - 7/7/22 - 7/18/22: hospital admission for septic shock resulting in renal failure  - 7/25/22: started again on DVRd  - 8/8/2022: kappa 1402.8, lambda 7.5, ratio 167.04. M spike 0.1, two faint restricted bands in gamma globulin regions.   - 10/10/22: C5D1 DVRd. Revlimid on hold for upcoming stem   - Admitted for Torie 140  auto SCT on 12/28/22    Pancytopenia due to antineoplastic chemotherapy  - transfuse for Hgb <7 or plt < 10K  - daily CBC while inpatient    Hypocalcemia  - Continue home calcium supplement    Hyperphosphatemia  - Continue home sevelamer with meals    Fall during current hospitalization  - See syncope  - Unclear if patient hit head. CT head neg 1/2 for bleed.  - Fall precautions ordered    Syncope  - Two falls during transplant admission with reported loss of consciousness. Both following dialysis, so suspect orthostatic  - Metoprolol dose decreased from 25 mg daily to 12.5 mg daily  - Can consider midodrine  - EEG ordered; symptoms now improved so discontinued order for EEG  - continue working with PT/OT  - tele sitter in place    Chemotherapy induced diarrhea  - C-diff neg 12/31  - Continue PRN imodium.    CINV (chemotherapy-induced nausea and vomiting)  - home Scopalamine patch in place  - reports unrelieved at home with prn zofran. will add prn compazine while inpatient.    Hyperlipidemia  - Will hold home statin while inpatient    Depression  - Continue home Zoloft    Deep vein thrombosis (DVT) of upper extremity  - Holding home apixiban for thrombocytopenia. Will resume once plts are consistently > 50K.      Hypertension  - Continue home metoprolol. Decreased dose during transplant admission to 12.5 mg daily 1/3/23 due to soft BP and syncopal episodes x 2.    End stage renal disease  - developed during ICU stay. Believed to be 2/2 hypoprofusion due to hypotension  - nephrology consulted on admission  - on dialysis MWF at home. Continuing this schedule inpatient.  - right chest wall dialysis catheter in place  - He will receive dialysis at [facility] in [place] through Day +30    Dependence on hemodialysis  - see ESRD    Chronic infection of prosthetic knee  - Seen in ID clinic with Dr. Chatterjee prior to transplant   - ID rec'd continuing doxycycline throughout transplant. No sign of infection recurrence at  this time.         VTE Risk Mitigation (From admission, onward)         Ordered     IP VTE HIGH RISK PATIENT  Once         01/17/23 1329     Place sequential compression device  Until discontinued         01/17/23 1329                Disposition: Inpatient.    Amy Thomas NP  Bone Marrow Transplant  Hematology  [facility] Dept

## 2023-01-17 NOTE — ASSESSMENT & PLAN NOTE
- Patient of Dr. King. Per most recent clinic note:   - 4/20/22: renal biopsy: light chain cast nephropathy, kappa light chain  - 4/26/22: right subclavian vein thrombus   - 4/27/22: M spike 0.2 with free monoclonal kappa band. B2mg 19.57, IgG 496, IgA 10, IgM <5. Kappa 20133, Lambda 7.9, ratio >1000  - 5/12/22: BMBx: plasma cell myeloma, marrow cellularity 100%, plasma cell percentage 100%. Plasma cell phenotype +, kappa +, CD20- -, CD30-, EMA-, SADAF-, Congo red negative. Peripheral blood with pancytopenia and no circulating blasts. Decreased storage iron. FISH canceled due to quantity not sufficient  - 5/18/22: CyBorD C1D1  - 5/23/22: rasburicase  - 5/24/22: Xgeva  - 6/6/22: Kappa 94216, lambda 4.0, ratio >1000, M spike 0.1 with free monoclonal kappa band present  - 6/6/22: CyBorD C2D1  - 6/9/22: BMBx Plasma cell neoplasm compatible with plasma cell myeloma. Extent of involvement 80-90% of bone marrow elements. Cytology: Plasmablastic. FISH cytogenetics positive for 1q21/CKS1B gain. Karyotype failed. Myelofibrosis diffuse (MF-3)  - 6/20/22: CyBorD C2D8 delayed due to covid  - 6/23/22: CyBOrD C2D11  - 7/5/22: C1D1 DVRd  - 7/7/22 - 7/18/22: hospital admission for septic shock resulting in renal failure  - 7/25/22: started again on DVRd  - 8/8/2022: kappa 1402.8, lambda 7.5, ratio 167.04. M spike 0.1, two faint restricted bands in gamma globulin regions.   - 10/10/22: C5D1 DVRd. Revlimid on hold for upcoming stem   - Admitted for Torie 140 auto SCT on 12/28/22

## 2023-01-17 NOTE — ASSESSMENT & PLAN NOTE
- Continue home metoprolol. Decreased dose during transplant admission to 12.5 mg daily 1/3/23 due to soft BP and syncopal episodes x 2.

## 2023-01-18 LAB
ALBUMIN SERPL BCP-MCNC: 3.1 G/DL (ref 3.5–5.2)
ALP SERPL-CCNC: 68 U/L (ref 55–135)
ALT SERPL W/O P-5'-P-CCNC: 5 U/L (ref 10–44)
ANION GAP SERPL CALC-SCNC: 10 MMOL/L (ref 8–16)
ANISOCYTOSIS BLD QL SMEAR: SLIGHT
AST SERPL-CCNC: 9 U/L (ref 10–40)
BASOPHILS NFR BLD: 0 % (ref 0–1.9)
BILIRUB SERPL-MCNC: 0.4 MG/DL (ref 0.1–1)
BUN SERPL-MCNC: 17 MG/DL (ref 6–20)
CALCIUM SERPL-MCNC: 7.4 MG/DL (ref 8.7–10.5)
CHLORIDE SERPL-SCNC: 110 MMOL/L (ref 95–110)
CO2 SERPL-SCNC: 22 MMOL/L (ref 23–29)
CREAT SERPL-MCNC: 4.9 MG/DL (ref 0.5–1.4)
DIFFERENTIAL METHOD: ABNORMAL
EOSINOPHIL NFR BLD: 0 % (ref 0–8)
ERYTHROCYTE [DISTWIDTH] IN BLOOD BY AUTOMATED COUNT: 17 % (ref 11.5–14.5)
EST. GFR  (NO RACE VARIABLE): 13 ML/MIN/1.73 M^2
GLUCOSE SERPL-MCNC: 98 MG/DL (ref 70–110)
HCT VFR BLD AUTO: 21.8 % (ref 40–54)
HGB BLD-MCNC: 7.2 G/DL (ref 14–18)
HYPOCHROMIA BLD QL SMEAR: ABNORMAL
IMM GRANULOCYTES # BLD AUTO: ABNORMAL K/UL (ref 0–0.04)
IMM GRANULOCYTES NFR BLD AUTO: ABNORMAL % (ref 0–0.5)
LYMPHOCYTES NFR BLD: 11 % (ref 18–48)
MAGNESIUM SERPL-MCNC: 1.7 MG/DL (ref 1.6–2.6)
MCH RBC QN AUTO: 32.3 PG (ref 27–31)
MCHC RBC AUTO-ENTMCNC: 33 G/DL (ref 32–36)
MCV RBC AUTO: 98 FL (ref 82–98)
METAMYELOCYTES NFR BLD MANUAL: 2 %
MONOCYTES NFR BLD: 14 % (ref 4–15)
MYELOCYTES NFR BLD MANUAL: 2 %
NEUTROPHILS NFR BLD: 68 % (ref 38–73)
NEUTS BAND NFR BLD MANUAL: 3 %
NRBC BLD-RTO: 0 /100 WBC
OVALOCYTES BLD QL SMEAR: ABNORMAL
PHOSPHATE SERPL-MCNC: 2.5 MG/DL (ref 2.7–4.5)
PLATELET # BLD AUTO: 26 K/UL (ref 150–450)
PLATELET BLD QL SMEAR: ABNORMAL
PMV BLD AUTO: 11.7 FL (ref 9.2–12.9)
POIKILOCYTOSIS BLD QL SMEAR: SLIGHT
POLYCHROMASIA BLD QL SMEAR: ABNORMAL
POTASSIUM SERPL-SCNC: 3.7 MMOL/L (ref 3.5–5.1)
PROT SERPL-MCNC: 5 G/DL (ref 6–8.4)
RBC # BLD AUTO: 2.23 M/UL (ref 4.6–6.2)
SODIUM SERPL-SCNC: 142 MMOL/L (ref 136–145)
SPHEROCYTES BLD QL SMEAR: ABNORMAL
WBC # BLD AUTO: 1.49 K/UL (ref 3.9–12.7)

## 2023-01-18 PROCEDURE — 85007 BL SMEAR W/DIFF WBC COUNT: CPT | Performed by: NURSE PRACTITIONER

## 2023-01-18 PROCEDURE — 84100 ASSAY OF PHOSPHORUS: CPT | Performed by: NURSE PRACTITIONER

## 2023-01-18 PROCEDURE — 99223 1ST HOSP IP/OBS HIGH 75: CPT | Mod: ,,, | Performed by: NURSE PRACTITIONER

## 2023-01-18 PROCEDURE — 20600001 HC STEP DOWN PRIVATE ROOM

## 2023-01-18 PROCEDURE — 80053 COMPREHEN METABOLIC PANEL: CPT | Performed by: NURSE PRACTITIONER

## 2023-01-18 PROCEDURE — 83735 ASSAY OF MAGNESIUM: CPT | Performed by: NURSE PRACTITIONER

## 2023-01-18 PROCEDURE — 25000003 PHARM REV CODE 250: Performed by: NURSE PRACTITIONER

## 2023-01-18 PROCEDURE — 85027 COMPLETE CBC AUTOMATED: CPT | Performed by: NURSE PRACTITIONER

## 2023-01-18 PROCEDURE — 99233 PR SUBSEQUENT HOSPITAL CARE,LEVL III: ICD-10-PCS | Mod: ,,, | Performed by: INTERNAL MEDICINE

## 2023-01-18 PROCEDURE — 99233 SBSQ HOSP IP/OBS HIGH 50: CPT | Mod: ,,, | Performed by: INTERNAL MEDICINE

## 2023-01-18 PROCEDURE — 63600175 PHARM REV CODE 636 W HCPCS: Performed by: NURSE PRACTITIONER

## 2023-01-18 PROCEDURE — 99223 PR INITIAL HOSPITAL CARE,LEVL III: ICD-10-PCS | Mod: ,,, | Performed by: NURSE PRACTITIONER

## 2023-01-18 RX ORDER — SODIUM CHLORIDE 9 MG/ML
INJECTION, SOLUTION INTRAVENOUS ONCE
Status: COMPLETED | OUTPATIENT
Start: 2023-01-19 | End: 2023-01-19

## 2023-01-18 RX ORDER — OLANZAPINE 5 MG/1
5 TABLET ORAL NIGHTLY
Status: DISCONTINUED | OUTPATIENT
Start: 2023-01-18 | End: 2023-01-23 | Stop reason: HOSPADM

## 2023-01-18 RX ORDER — ONDANSETRON 4 MG/1
8 TABLET, FILM COATED ORAL EVERY 8 HOURS
Status: DISCONTINUED | OUTPATIENT
Start: 2023-01-18 | End: 2023-01-23 | Stop reason: HOSPADM

## 2023-01-18 RX ADMIN — CALCIUM CARBONATE (ANTACID) CHEW TAB 500 MG 1000 MG: 500 CHEW TAB at 08:01

## 2023-01-18 RX ADMIN — SEVELAMER CARBONATE 800 MG: 800 TABLET, FILM COATED ORAL at 08:01

## 2023-01-18 RX ADMIN — OLANZAPINE 5 MG: 5 TABLET, FILM COATED ORAL at 08:01

## 2023-01-18 RX ADMIN — ONDANSETRON HYDROCHLORIDE 8 MG: 4 TABLET, FILM COATED ORAL at 01:01

## 2023-01-18 RX ADMIN — CALCIUM CARBONATE (ANTACID) CHEW TAB 500 MG 1000 MG: 500 CHEW TAB at 03:01

## 2023-01-18 RX ADMIN — ONDANSETRON HYDROCHLORIDE 8 MG: 4 TABLET, FILM COATED ORAL at 08:01

## 2023-01-18 RX ADMIN — ACYCLOVIR 400 MG: 200 CAPSULE ORAL at 08:01

## 2023-01-18 RX ADMIN — CEFEPIME 2 G: 2 INJECTION, POWDER, FOR SOLUTION INTRAVENOUS at 08:01

## 2023-01-18 RX ADMIN — DOXYCYCLINE HYCLATE 100 MG: 100 TABLET, COATED ORAL at 08:01

## 2023-01-18 RX ADMIN — ONDANSETRON 8 MG: 2 INJECTION INTRAMUSCULAR; INTRAVENOUS at 08:01

## 2023-01-18 RX ADMIN — CHOLECALCIFEROL TAB 25 MCG (1000 UNIT) 3000 UNITS: 25 TAB at 08:01

## 2023-01-18 RX ADMIN — SERTRALINE HYDROCHLORIDE 50 MG: 50 TABLET ORAL at 08:01

## 2023-01-18 RX ADMIN — FAMOTIDINE 20 MG: 20 TABLET ORAL at 08:01

## 2023-01-18 NOTE — ASSESSMENT & PLAN NOTE
- Wife reports a temp of 101.6 yesterday evening and a temp of 100.5 this morning.   - He was scheduled for his first post transplant f/u in the BMT clinic today, but was instructed to go to the ED instead.   - Of note, he presented to the ED over the weekend with c/o n/v and weakness and was discharged back to the [facility] from the ED without hospital admission.  - Lactic acid wnl. Second level pending. Procal elevated to 0.62  - COVID neg  - Blood cx with NGTD  - CXR neg  - U/a not suggestive of infection  - No sign of infection to left knee or dialysis catheter site  - In ED afebrile, VSS  - Admitted and covering with broad spectum abx with Cefepime. Will transition to oral abx and discharge tomorrow if he remains afebrile and infection w/u remains unremarkable.

## 2023-01-18 NOTE — HPI
Mr. Lakhani is a 57-y-o patient of Dr. King with high risk kappa light chain multiple myeloma. He is Day +18 from a Torie auto SCT. Other medical history includes hypertension, chronic infection of prosthetic knee (on ppx doxycycline), ESRD (MWF), upper extremity DVT, HLD, and depression. He presented to the ED today with c/o fever. His wife reports a temp of 101.6 yesterday evening and a temp of 100.5 this morning. He was scheduled for his first post transplant f/u in the BMT clinic today, but was instructed to go to the ED instead. Of note, he presented to the ED over the weekend with c/o n/v and weakness and was discharged back to the [facility] from the ED without hospital admission. He endorses ongoing n/v/d, poor oral intake, generalized weakness, and fatigue. He denies SOB, cough, chest pain, urinary symptoms, and bleeding or bruising. He has been admitted for infection w/u and IV abx for neutropenic fever. Electrolytes stable. Nephrology consulted for ESRD on HD.

## 2023-01-18 NOTE — ED NOTES
Assumed care of patient. Patient is alert and resting comfortably in bed in NAD. BP, cardiac, and O2 monitoring continued. Family member at bedside. Patient updated on plan of care, pt denies any needs or complaints at this time. Bed locked in lowest position, side rails up x2, call light within reach. VSS. Will continue to monitor.

## 2023-01-18 NOTE — SUBJECTIVE & OBJECTIVE
Past Medical History:   Diagnosis Date    Chronic infection of prosthetic knee, subsequent encounter     Encounter for blood transfusion     Essential (primary) hypertension     Multiple myeloma        Past Surgical History:   Procedure Laterality Date    KNEE SURGERY      neck infusion         Review of patient's allergies indicates:  No Known Allergies  Current Facility-Administered Medications   Medication Frequency    acyclovir capsule 400 mg BID    calcium carbonate 200 mg calcium (500 mg) chewable tablet 1,000 mg TID    ceFEPIme (MAXIPIME) 2 g in dextrose 5 % in water (D5W) 5 % 50 mL IVPB (MB+) Q24H    dextrose 10% bolus 125 mL 125 mL PRN    dextrose 10% bolus 250 mL 250 mL PRN    doxycycline tablet 100 mg Q12H    [START ON 1/30/2023] ergocalciferol capsule 50,000 Units Q14 Days    famotidine tablet 20 mg Daily    glucagon (human recombinant) injection 1 mg PRN    glucose chewable tablet 16 g PRN    glucose chewable tablet 24 g PRN    LIDOcaine HCl 2% oral solution 15 mL Q6H PRN    loperamide capsule 2 mg QID PRN    metoprolol succinate (TOPROL-XL) 24 hr split tablet 12.5 mg Daily    naloxone 0.4 mg/mL injection 0.02 mg PRN    ondansetron injection 8 mg Q8H PRN    prochlorperazine tablet 10 mg TID PRN    scopolamine 1.3-1.5 mg (1 mg over 3 days) 1 patch Q3 Days    sertraline tablet 50 mg Daily    sevelamer carbonate tablet 800 mg TID WM    sodium chloride 0.9% flush 10 mL Q12H PRN    vitamin D 1000 units tablet 3,000 Units Daily     Current Outpatient Medications   Medication    acyclovir (ZOVIRAX) 200 MG capsule    calcium carbonate (TUMS) 200 mg calcium (500 mg) chewable tablet    cholecalciferol, vitamin D3, 75 mcg (3,000 unit) Tab    doxycycline (VIBRAMYCIN) 100 MG Cap    ergocalciferol (ERGOCALCIFEROL) 50,000 unit Cap    famotidine (PEPCID) 20 MG tablet    LIDOcaine HCl 2% (XYLOCAINE) 2 % Soln    loperamide (IMODIUM) 2 mg capsule    metoprolol succinate (TOPROL-XL) 25 MG 24 hr tablet    ondansetron  (ZOFRAN) 4 MG tablet    oxyCODONE-acetaminophen (PERCOCET)  mg per tablet    rosuvastatin (CRESTOR) 20 MG tablet    scopolamine (TRANSDERM-SCOP) 1.3-1.5 mg (1 mg over 3 days)    sertraline (ZOLOFT) 50 MG tablet    traMADoL (ULTRAM) 50 mg tablet    apixaban (ELIQUIS) 5 mg Tab    sevelamer carbonate (RENVELA) 800 mg Tab     Family History       Problem Relation (Age of Onset)    Heart attack Father          Tobacco Use    Smoking status: Former    Smokeless tobacco: Never    Tobacco comments:     quit smoking in the 90s   Substance and Sexual Activity    Alcohol use: Not Currently    Drug use: Not on file    Sexual activity: Not on file     Review of Systems   Constitutional:  Positive for chills.   Respiratory:  Negative for cough and shortness of breath.    Cardiovascular:  Negative for chest pain and leg swelling.   Gastrointestinal:  Positive for nausea.   Endocrine: Negative.    Genitourinary:  Positive for decreased urine volume.   Musculoskeletal: Negative.    Allergic/Immunologic: Negative.    Neurological:  Positive for weakness.   Hematological: Negative.    Psychiatric/Behavioral: Negative.     Objective:     Vital Signs (Most Recent):  Temp: 98.8 °F (37.1 °C) (01/18/23 0745)  Pulse: 83 (01/18/23 0745)  Resp: 16 (01/18/23 0745)  BP: 123/71 (01/18/23 0745)  SpO2: 100 % (01/18/23 0745) Vital Signs (24h Range):  Temp:  [98 °F (36.7 °C)-98.8 °F (37.1 °C)] 98.8 °F (37.1 °C)  Pulse:  [67-90] 83  Resp:  [16-18] 16  SpO2:  [97 %-100 %] 100 %  BP: (109-157)/(64-84) 123/71     Weight: 99.3 kg (219 lb) (01/17/23 0916)  Body mass index is 30.54 kg/m².  Body surface area is 2.23 meters squared.    No intake/output data recorded.    Physical Exam  Vitals and nursing note reviewed.   Constitutional:       Appearance: He is well-developed.   HENT:      Head: Normocephalic and atraumatic.      Mouth/Throat:      Pharynx: No oropharyngeal exudate.   Eyes:      General:         Right eye: No discharge.         Left eye:  No discharge.      Conjunctiva/sclera: Conjunctivae normal.      Pupils: Pupils are equal, round, and reactive to light.   Cardiovascular:      Rate and Rhythm: Normal rate and regular rhythm.      Heart sounds: Normal heart sounds. No murmur heard.  Pulmonary:      Effort: Pulmonary effort is normal. No respiratory distress.      Breath sounds: Normal breath sounds. No wheezing or rales.   Abdominal:      General: Bowel sounds are normal. There is no distension.      Palpations: Abdomen is soft.      Tenderness: There is no abdominal tenderness.   Musculoskeletal:         General: No deformity. Normal range of motion.      Cervical back: Normal range of motion and neck supple.   Skin:     General: Skin is warm and dry.      Findings: No erythema or rash.      Comments: Right chest wall dialysis catheter. Dressing c/d/i. No sign of infection to site.   Neurological:      Mental Status: He is alert and oriented to person, place, and time.   Psychiatric:         Behavior: Behavior normal.         Thought Content: Thought content normal.         Judgment: Judgment normal.     Significant Labs:  CBC:   Recent Labs   Lab 01/18/23  0347   WBC 1.49*   RBC 2.23*   HGB 7.2*   HCT 21.8*   PLT 26*   MCV 98   MCH 32.3*   MCHC 33.0     CMP:   Recent Labs   Lab 01/18/23  0347   GLU 98   CALCIUM 7.4*   ALBUMIN 3.1*   PROT 5.0*      K 3.7   CO2 22*      BUN 17   CREATININE 4.9*   ALKPHOS 68   ALT 5*   AST 9*   BILITOT 0.4     All labs within the past 24 hours have been reviewed.

## 2023-01-18 NOTE — ASSESSMENT & PLAN NOTE
- Patient of Dr. King  - Diagnosed with MM in April 2022. Underwent 2 cycles CyBorD then transitioned to DVRd (now s/p C6)  - Admitted 12/28/22 for Torie 140 auto SCT   - Today is Day +19  - Completed chemotherapy 12/29 without issue  - Received 6 bags with a total CD34 dose of 3.13 x10^6/kg on 12/30/23.  - Engrafted Day +14 with an ANC of 869.  - ANC 1013 today

## 2023-01-18 NOTE — CONSULTS
[facility]  Nephrology  Consult Note    Patient Name: De Lakhani  MRN: [id number]  Admission Date: 1/17/2023  Hospital Length of Stay: 1 days  Attending Provider: eRnuka Call MD   Primary Care Physician: To Obtain Unable  Principal Problem:Neutropenic fever    Inpatient consult to Nephrology  Consult performed by: Nichelle Araujo DNP  Consult ordered by: Amy Thomas NP  Reason for consult: ESRD on HD         Subjective:     HPI: Mr. Lakhani is a 57-y-o patient of Dr. King with high risk kappa light chain multiple myeloma. He is Day +18 from a Torie auto SCT. Other medical history includes hypertension, chronic infection of prosthetic knee (on ppx doxycycline), ESRD (MWF), upper extremity DVT, HLD, and depression. He presented to the ED today with c/o fever. His wife reports a temp of 101.6 yesterday evening and a temp of 100.5 this morning. He was scheduled for his first post transplant f/u in the BMT clinic today, but was instructed to go to the ED instead. Of note, he presented to the ED over the weekend with c/o n/v and weakness and was discharged back to the [facility] from the ED without hospital admission. He endorses ongoing n/v/d, poor oral intake, generalized weakness, and fatigue. He denies SOB, cough, chest pain, urinary symptoms, and bleeding or bruising. He has been admitted for infection w/u and IV abx for neutropenic fever. Electrolytes stable. Nephrology consulted for ESRD on HD.             Past Medical History:   Diagnosis Date    Chronic infection of prosthetic knee, subsequent encounter     Encounter for blood transfusion     Essential (primary) hypertension     Multiple myeloma        Past Surgical History:   Procedure Laterality Date    KNEE SURGERY      neck infusion         Review of patient's allergies indicates:  No Known Allergies  Current Facility-Administered Medications   Medication Frequency    acyclovir capsule 400 mg BID    calcium carbonate 200 mg  calcium (500 mg) chewable tablet 1,000 mg TID    ceFEPIme (MAXIPIME) 2 g in dextrose 5 % in water (D5W) 5 % 50 mL IVPB (MB+) Q24H    dextrose 10% bolus 125 mL 125 mL PRN    dextrose 10% bolus 250 mL 250 mL PRN    doxycycline tablet 100 mg Q12H    [START ON 1/30/2023] ergocalciferol capsule 50,000 Units Q14 Days    famotidine tablet 20 mg Daily    glucagon (human recombinant) injection 1 mg PRN    glucose chewable tablet 16 g PRN    glucose chewable tablet 24 g PRN    LIDOcaine HCl 2% oral solution 15 mL Q6H PRN    loperamide capsule 2 mg QID PRN    metoprolol succinate (TOPROL-XL) 24 hr split tablet 12.5 mg Daily    naloxone 0.4 mg/mL injection 0.02 mg PRN    ondansetron injection 8 mg Q8H PRN    prochlorperazine tablet 10 mg TID PRN    scopolamine 1.3-1.5 mg (1 mg over 3 days) 1 patch Q3 Days    sertraline tablet 50 mg Daily    sevelamer carbonate tablet 800 mg TID WM    sodium chloride 0.9% flush 10 mL Q12H PRN    vitamin D 1000 units tablet 3,000 Units Daily     Current Outpatient Medications   Medication    acyclovir (ZOVIRAX) 200 MG capsule    calcium carbonate (TUMS) 200 mg calcium (500 mg) chewable tablet    cholecalciferol, vitamin D3, 75 mcg (3,000 unit) Tab    doxycycline (VIBRAMYCIN) 100 MG Cap    ergocalciferol (ERGOCALCIFEROL) 50,000 unit Cap    famotidine (PEPCID) 20 MG tablet    LIDOcaine HCl 2% (XYLOCAINE) 2 % Soln    loperamide (IMODIUM) 2 mg capsule    metoprolol succinate (TOPROL-XL) 25 MG 24 hr tablet    ondansetron (ZOFRAN) 4 MG tablet    oxyCODONE-acetaminophen (PERCOCET)  mg per tablet    rosuvastatin (CRESTOR) 20 MG tablet    scopolamine (TRANSDERM-SCOP) 1.3-1.5 mg (1 mg over 3 days)    sertraline (ZOLOFT) 50 MG tablet    traMADoL (ULTRAM) 50 mg tablet    apixaban (ELIQUIS) 5 mg Tab    sevelamer carbonate (RENVELA) 800 mg Tab     Family History       Problem Relation (Age of Onset)    Heart attack Father          Tobacco Use    Smoking status:  Former    Smokeless tobacco: Never    Tobacco comments:     quit smoking in the 90s   Substance and Sexual Activity    Alcohol use: Not Currently    Drug use: Not on file    Sexual activity: Not on file     Review of Systems   Constitutional:  Positive for chills.   Respiratory:  Negative for cough and shortness of breath.    Cardiovascular:  Negative for chest pain and leg swelling.   Gastrointestinal:  Positive for nausea.   Endocrine: Negative.    Genitourinary:  Positive for decreased urine volume.   Musculoskeletal: Negative.    Allergic/Immunologic: Negative.    Neurological:  Positive for weakness.   Hematological: Negative.    Psychiatric/Behavioral: Negative.     Objective:     Vital Signs (Most Recent):  Temp: 98.8 °F (37.1 °C) (01/18/23 0745)  Pulse: 83 (01/18/23 0745)  Resp: 16 (01/18/23 0745)  BP: 123/71 (01/18/23 0745)  SpO2: 100 % (01/18/23 0745) Vital Signs (24h Range):  Temp:  [98 °F (36.7 °C)-98.8 °F (37.1 °C)] 98.8 °F (37.1 °C)  Pulse:  [67-90] 83  Resp:  [16-18] 16  SpO2:  [97 %-100 %] 100 %  BP: (109-157)/(64-84) 123/71     Weight: 99.3 kg (219 lb) (01/17/23 0916)  Body mass index is 30.54 kg/m².  Body surface area is 2.23 meters squared.    No intake/output data recorded.    Physical Exam  Vitals and nursing note reviewed.   Constitutional:       Appearance: He is well-developed.   HENT:      Head: Normocephalic and atraumatic.      Mouth/Throat:      Pharynx: No oropharyngeal exudate.   Eyes:      General:         Right eye: No discharge.         Left eye: No discharge.      Conjunctiva/sclera: Conjunctivae normal.      Pupils: Pupils are equal, round, and reactive to light.   Cardiovascular:      Rate and Rhythm: Normal rate and regular rhythm.      Heart sounds: Normal heart sounds. No murmur heard.  Pulmonary:      Effort: Pulmonary effort is normal. No respiratory distress.      Breath sounds: Normal breath sounds. No wheezing or rales.   Abdominal:      General: Bowel sounds are  normal. There is no distension.      Palpations: Abdomen is soft.      Tenderness: There is no abdominal tenderness.   Musculoskeletal:         General: No deformity. Normal range of motion.      Cervical back: Normal range of motion and neck supple.   Skin:     General: Skin is warm and dry.      Findings: No erythema or rash.      Comments: Right chest wall dialysis catheter. Dressing c/d/i. No sign of infection to site.   Neurological:      Mental Status: He is alert and oriented to person, place, and time.   Psychiatric:         Behavior: Behavior normal.         Thought Content: Thought content normal.         Judgment: Judgment normal.     Significant Labs:  CBC:   Recent Labs   Lab 01/18/23 0347   WBC 1.49*   RBC 2.23*   HGB 7.2*   HCT 21.8*   PLT 26*   MCV 98   MCH 32.3*   MCHC 33.0     CMP:   Recent Labs   Lab 01/18/23 0347   GLU 98   CALCIUM 7.4*   ALBUMIN 3.1*   PROT 5.0*      K 3.7   CO2 22*      BUN 17   CREATININE 4.9*   ALKPHOS 68   ALT 5*   AST 9*   BILITOT 0.4     All labs within the past 24 hours have been reviewed.        Assessment/Plan:     * Neutropenic fever  -management per primary     Anemia in ESRD (end-stage renal disease)  -Hgb goal 10-11   Transfuse for Hgb <7.0    End stage renal disease  Mr. Lakhani was diagnosed with multiple myeloma in April 2022 after presenting with ASHLEE. 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 He had bone marrow biopsy which showed 100% involvement by plasma cells. He was started on CyBorD on 5/18 and received two cycles of treatment. His bone marrow biopsy after the first cycle showed a decrease in his plasma cell involvement to 80-90%. Dr. Bunch then started DVRd on 7/5/22. However, he was admitted to the hospital on 7/7/22 with septic shock requiring ICU care. He improved with antibiotics but had an ASHLEE thought to be due to hypotension which resulted in renal failure. Dialysis dependent since 07/22. Stem cell transplant on  12/30        Nephrology History  iHD Schedule: MWF   Unit/MD: Art   Duration: 4 hours   UF: ?  EDW: 107kg   Access: R permcath   Residual Renal Function: moderate   Last HD prior to 1/16/23     Plan/Reccomendations      No emergent indication for HD today, electrolytes and volume status stable.   Due to high volume of patients and need to triage will plan for iHD tomorrow 1/19  Caution in administering IVF in ESRD patient   Strict I&Os    Pre and post HD weight   Renal diet, if not NPO    Hold phos binder for now, phos 2.5       Multiple myeloma not having achieved remission  -management per primary         Thank you for your consult. I will follow-up with patient. Please contact us if you have any additional questions.    Nichelle Araujo DNP  Nephrology  [facility]

## 2023-01-18 NOTE — ASSESSMENT & PLAN NOTE
Mr. Lakhani was diagnosed with multiple myeloma in April 2022 after presenting with ASHLEE. 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 He had bone marrow biopsy which showed 100% involvement by plasma cells. He was started on CyBorD on 5/18 and received two cycles of treatment. His bone marrow biopsy after the first cycle showed a decrease in his plasma cell involvement to 80-90%. Dr. Bunch then started DVRd on 7/5/22. However, he was admitted to the hospital on 7/7/22 with septic shock requiring ICU care. He improved with antibiotics but had an ASHLEE thought to be due to hypotension which resulted in renal failure. Dialysis dependent since 07/22. Stem cell transplant on 12/30        Nephrology History  iHD Schedule: MWF   Unit/MD: Art   Duration: 4 hours   UF: ?  EDW: 107kg   Access: R permcath   Residual Renal Function: moderate   Last HD prior to 1/16/23     Plan/Reccomendations      No emergent indication for HD today, electrolytes and volume status stable.   Due to high volume of patients and need to triage will plan for iHD tomorrow 1/19  Caution in administering IVF in ESRD patient   Strict I&Os    Pre and post HD weight   Renal diet, if not NPO    Hold phos binder for now, phos 2.5

## 2023-01-18 NOTE — ASSESSMENT & PLAN NOTE
- continue home Scopalamine patch  - reports unrelieved at home with prn zofran. Added prn compazine on admission.  - changing zofran from IV to PO and scheduling. Will start nightly Zyprexa.

## 2023-01-18 NOTE — ASSESSMENT & PLAN NOTE
- Patient of Dr. King. Per most recent clinic note:   - 4/20/22: renal biopsy: light chain cast nephropathy, kappa light chain  - 4/26/22: right subclavian vein thrombus   - 4/27/22: M spike 0.2 with free monoclonal kappa band. B2mg 19.57, IgG 496, IgA 10, IgM <5. Kappa 04400, Lambda 7.9, ratio >1000  - 5/12/22: BMBx: plasma cell myeloma, marrow cellularity 100%, plasma cell percentage 100%. Plasma cell phenotype +, kappa +, CD20- -, CD30-, EMA-, SADAF-, Congo red negative. Peripheral blood with pancytopenia and no circulating blasts. Decreased storage iron. FISH canceled due to quantity not sufficient  - 5/18/22: CyBorD C1D1  - 5/23/22: rasburicase  - 5/24/22: Xgeva  - 6/6/22: Kappa 22255, lambda 4.0, ratio >1000, M spike 0.1 with free monoclonal kappa band present  - 6/6/22: CyBorD C2D1  - 6/9/22: BMBx Plasma cell neoplasm compatible with plasma cell myeloma. Extent of involvement 80-90% of bone marrow elements. Cytology: Plasmablastic. FISH cytogenetics positive for 1q21/CKS1B gain. Karyotype failed. Myelofibrosis diffuse (MF-3)  - 6/20/22: CyBorD C2D8 delayed due to covid  - 6/23/22: CyBOrD C2D11  - 7/5/22: C1D1 DVRd  - 7/7/22 - 7/18/22: hospital admission for septic shock resulting in renal failure  - 7/25/22: started again on DVRd  - 8/8/2022: kappa 1402.8, lambda 7.5, ratio 167.04. M spike 0.1, two faint restricted bands in gamma globulin regions.   - 10/10/22: C5D1 DVRd. Revlimid on hold for upcoming stem   - Admitted for Torie 140 auto SCT on 12/28/22

## 2023-01-18 NOTE — ASSESSMENT & PLAN NOTE
- Two falls during transplant admission with reported loss of consciousness. Both following dialysis, so suspect orthostatic  - Metoprolol dose decreased from 25 mg daily to 12.5 mg daily  - Can consider midodrine  - EEG ordered; symptoms now improved so discontinued order for EEG  - Fall precautions in place.

## 2023-01-18 NOTE — SUBJECTIVE & OBJECTIVE
Subjective:     Interval History: Day +19 from a Torie 140 auto SCT. Admitted through ED yesterday with neutropenic fever and uncontrolled n/v. Started on Cefepime. Infection w/u unremarkable thus far. Has been afebrile since admission. VSS. Will likely transition to oral abx and discharge back to [facility] tomorrow if he remains afebrile. Main complaint today is persistent nausea. Changed zofran from IV to PO and scheduled. Continuing home scopalamine patch and prn PO compazine. Will start nightly Zyprexa this evening. ESRD team consulted and will dialyze patient today.    Objective:     Vital Signs (Most Recent):  Temp: 98.8 °F (37.1 °C) (01/18/23 1155)  Pulse: 85 (01/18/23 1155)  Resp: 16 (01/18/23 1155)  BP: 123/75 (01/18/23 1155)  SpO2: 98 % (01/18/23 1155) Vital Signs (24h Range):  Temp:  [98 °F (36.7 °C)-98.8 °F (37.1 °C)] 98.8 °F (37.1 °C)  Pulse:  [67-90] 85  Resp:  [16-18] 16  SpO2:  [97 %-100 %] 98 %  BP: (109-157)/(64-84) 123/75     Weight: 99.3 kg (219 lb)  Body mass index is 30.54 kg/m².  Body surface area is 2.23 meters squared.    ECOG SCORE           [unfilled]    Intake/Output - Last 3 Shifts       None            Physical Exam  Constitutional:       Appearance: He is well-developed.   HENT:      Head: Normocephalic and atraumatic.      Mouth/Throat:      Pharynx: No oropharyngeal exudate.   Eyes:      General:         Right eye: No discharge.         Left eye: No discharge.      Conjunctiva/sclera: Conjunctivae normal.      Pupils: Pupils are equal, round, and reactive to light.   Cardiovascular:      Rate and Rhythm: Normal rate and regular rhythm.      Heart sounds: Normal heart sounds. No murmur heard.  Pulmonary:      Effort: Pulmonary effort is normal. No respiratory distress.      Breath sounds: Normal breath sounds. No wheezing or rales.   Abdominal:      General: Bowel sounds are normal. There is no distension.      Palpations: Abdomen is soft.      Tenderness: There is no abdominal  tenderness.   Musculoskeletal:         General: No deformity. Normal range of motion.      Cervical back: Normal range of motion and neck supple.   Skin:     General: Skin is warm and dry.      Findings: No erythema or rash.      Comments: Right chest wall dialysis cath. Dressing c/d/i. No sign of infection to site.   Neurological:      Mental Status: He is alert and oriented to person, place, and time.   Psychiatric:         Behavior: Behavior normal.         Thought Content: Thought content normal.         Judgment: Judgment normal.       Significant Labs:   CBC:   Recent Labs   Lab 01/17/23  1039 01/18/23  0347   WBC 2.02* 1.49*   HGB 8.3* 7.2*   HCT 26.0* 21.8*   PLT 31* 26*    and CMP:   Recent Labs   Lab 01/17/23  1039 01/18/23  0347    142   K 4.2 3.7    110   CO2 27 22*    98   BUN 14 17   CREATININE 4.4* 4.9*   CALCIUM 8.4* 7.4*   PROT 5.7* 5.0*   ALBUMIN 3.7 3.1*   BILITOT 0.4 0.4   ALKPHOS 81 68   AST 11 9*   ALT 6* 5*   ANIONGAP 9 10       Diagnostic Results:  I have reviewed all pertinent imaging results/findings within the past 24 hours.

## 2023-01-18 NOTE — PROGRESS NOTES
[facility]  Hematology  Bone Marrow Transplant  Progress Note    Patient Name: De Lakhani  Admission Date: 1/17/2023  Hospital Length of Stay: 1 days  Code Status: Full Code    Subjective:     Interval History: Day +19 from a Torie 140 auto SCT. Admitted through ED yesterday with neutropenic fever and uncontrolled n/v. Started on Cefepime. Infection w/u unremarkable thus far. Has been afebrile since admission. VSS. Will likely transition to oral abx and discharge back to [facility] tomorrow if he remains afebrile. Main complaint today is persistent nausea. Changed zofran from IV to PO and scheduled. Continuing home scopalamine patch and prn PO compazine. Will start nightly Zyprexa this evening. ESRD team consulted and will dialyze patient today.    Objective:     Vital Signs (Most Recent):  Temp: 98.8 °F (37.1 °C) (01/18/23 1155)  Pulse: 85 (01/18/23 1155)  Resp: 16 (01/18/23 1155)  BP: 123/75 (01/18/23 1155)  SpO2: 98 % (01/18/23 1155) Vital Signs (24h Range):  Temp:  [98 °F (36.7 °C)-98.8 °F (37.1 °C)] 98.8 °F (37.1 °C)  Pulse:  [67-90] 85  Resp:  [16-18] 16  SpO2:  [97 %-100 %] 98 %  BP: (109-157)/(64-84) 123/75     Weight: 99.3 kg (219 lb)  Body mass index is 30.54 kg/m².  Body surface area is 2.23 meters squared.    ECOG SCORE           [unfilled]    Intake/Output - Last 3 Shifts       None            Physical Exam  Constitutional:       Appearance: He is well-developed.   HENT:      Head: Normocephalic and atraumatic.      Mouth/Throat:      Pharynx: No oropharyngeal exudate.   Eyes:      General:         Right eye: No discharge.         Left eye: No discharge.      Conjunctiva/sclera: Conjunctivae normal.      Pupils: Pupils are equal, round, and reactive to light.   Cardiovascular:      Rate and Rhythm: Normal rate and regular rhythm.      Heart sounds: Normal heart sounds. No murmur heard.  Pulmonary:      Effort: Pulmonary effort is normal. No respiratory distress.      Breath sounds: Normal  breath sounds. No wheezing or rales.   Abdominal:      General: Bowel sounds are normal. There is no distension.      Palpations: Abdomen is soft.      Tenderness: There is no abdominal tenderness.   Musculoskeletal:         General: No deformity. Normal range of motion.      Cervical back: Normal range of motion and neck supple.   Skin:     General: Skin is warm and dry.      Findings: No erythema or rash.      Comments: Right chest wall dialysis cath. Dressing c/d/i. No sign of infection to site.   Neurological:      Mental Status: He is alert and oriented to person, place, and time.   Psychiatric:         Behavior: Behavior normal.         Thought Content: Thought content normal.         Judgment: Judgment normal.       Significant Labs:   CBC:   Recent Labs   Lab 01/17/23  1039 01/18/23  0347   WBC 2.02* 1.49*   HGB 8.3* 7.2*   HCT 26.0* 21.8*   PLT 31* 26*    and CMP:   Recent Labs   Lab 01/17/23  1039 01/18/23  0347    142   K 4.2 3.7    110   CO2 27 22*    98   BUN 14 17   CREATININE 4.4* 4.9*   CALCIUM 8.4* 7.4*   PROT 5.7* 5.0*   ALBUMIN 3.7 3.1*   BILITOT 0.4 0.4   ALKPHOS 81 68   AST 11 9*   ALT 6* 5*   ANIONGAP 9 10       Diagnostic Results:  I have reviewed all pertinent imaging results/findings within the past 24 hours.    Assessment/Plan:     * Neutropenic fever  - Wife reports a temp of 101.6 yesterday evening and a temp of 100.5 this morning.   - He was scheduled for his first post transplant f/u in the BMT clinic today, but was instructed to go to the ED instead.   - Of note, he presented to the ED over the weekend with c/o n/v and weakness and was discharged back to the [facility] from the ED without hospital admission.  - Lactic acid wnl. Second level pending. Procal elevated to 0.62  - COVID neg  - Blood cx with NGTD  - CXR neg  - U/a not suggestive of infection  - No sign of infection to left knee or dialysis catheter site  - In ED afebrile, VSS  - Admitted and covering with  broad spectum abx with Cefepime. Will transition to oral abx and discharge tomorrow if he remains afebrile and infection w/u remains unremarkable.    History of autologous stem cell transplant  - Patient of Dr. King  - Diagnosed with MM in April 2022. Underwent 2 cycles CyBorD then transitioned to DVRd (now s/p C6)  - Admitted 12/28/22 for Torie 140 auto SCT   - Today is Day +19  - Completed chemotherapy 12/29 without issue  - Received 6 bags with a total CD34 dose of 3.13 x10^6/kg on 12/30/23.  - Engrafted Day +14 with an ANC of 869.  - ANC 1013 today    Multiple myeloma not having achieved remission  - Patient of Dr. King. Per most recent clinic note:   - 4/20/22: renal biopsy: light chain cast nephropathy, kappa light chain  - 4/26/22: right subclavian vein thrombus   - 4/27/22: M spike 0.2 with free monoclonal kappa band. B2mg 19.57, IgG 496, IgA 10, IgM <5. Kappa 19348, Lambda 7.9, ratio >1000  - 5/12/22: BMBx: plasma cell myeloma, marrow cellularity 100%, plasma cell percentage 100%. Plasma cell phenotype +, kappa +, CD20- -, CD30-, EMA-, SADAF-, Congo red negative. Peripheral blood with pancytopenia and no circulating blasts. Decreased storage iron. FISH canceled due to quantity not sufficient  - 5/18/22: CyBorD C1D1  - 5/23/22: rasburicase  - 5/24/22: Xgeva  - 6/6/22: Kappa 04208, lambda 4.0, ratio >1000, M spike 0.1 with free monoclonal kappa band present  - 6/6/22: CyBorD C2D1  - 6/9/22: BMBx Plasma cell neoplasm compatible with plasma cell myeloma. Extent of involvement 80-90% of bone marrow elements. Cytology: Plasmablastic. FISH cytogenetics positive for 1q21/CKS1B gain. Karyotype failed. Myelofibrosis diffuse (MF-3)  - 6/20/22: CyBorD C2D8 delayed due to covid  - 6/23/22: CyBOrD C2D11  - 7/5/22: C1D1 DVRd  - 7/7/22 - 7/18/22: hospital admission for septic shock resulting in renal failure  - 7/25/22: started again on DVRd  - 8/8/2022: kappa 1402.8, lambda 7.5, ratio 167.04. M spike  0.1, two faint restricted bands in gamma globulin regions.   - 10/10/22: C5D1 DVRd. Revlimid on hold for upcoming stem   - Admitted for Torie 140 auto SCT on 12/28/22    Pancytopenia due to antineoplastic chemotherapy  - transfuse for Hgb <7 or plt < 10K  - daily CBC while inpatient    Hypocalcemia  - Continue home calcium supplement    Hyperphosphatemia  - Continue home sevelamer with meals    Fall during current hospitalization  - See syncope  - Unclear if patient hit head. CT head neg 1/2 for bleed.  - Fall precautions ordered    Syncope  - Two falls during transplant admission with reported loss of consciousness. Both following dialysis, so suspect orthostatic  - Metoprolol dose decreased from 25 mg daily to 12.5 mg daily  - Can consider midodrine  - EEG ordered; symptoms now improved so discontinued order for EEG  - Fall precautions in place.      Chemotherapy induced diarrhea  - C-diff neg 12/31  - Continue PRN imodium.      CINV (chemotherapy-induced nausea and vomiting)  - continue home Scopalamine patch  - reports unrelieved at home with prn zofran. Added prn compazine on admission.  - changing zofran from IV to PO and scheduling. Will start nightly Zyprexa.    Hyperlipidemia  - Will hold home statin while inpatient    Depression  - Continue home Zoloft    Deep vein thrombosis (DVT) of upper extremity  - Holding home apixiban for thrombocytopenia. Will resume once plts are consistently > 50K.      Hypertension  - Continue home metoprolol. Decreased dose during transplant admission to 12.5 mg daily 1/3/23 due to soft BP and syncopal episodes x 2.    End stage renal disease  - developed during ICU stay. Believed to be 2/2 hypoprofusion due to hypotension  - nephrology consulted on admission  - on dialysis MWF at home. Continuing this schedule inpatient.  - right chest wall dialysis catheter in place  - [facility] [place] through Day +30    Dependence on hemodialysis  - see  ESRD    Chronic infection of prosthetic knee  - Seen in ID clinic with Dr. Chatterjee prior to transplant   - ID rec'd continuing doxycycline throughout transplant. No sign of infection recurrence at this time.         VTE Risk Mitigation (From admission, onward)         Ordered     IP VTE HIGH RISK PATIENT  Once         01/17/23 1329     Place sequential compression device  Until discontinued         01/17/23 1329                Disposition: Inpatient.    Amy Thomas, ZURI  Bone Marrow Transplant  [facility] Dept

## 2023-01-18 NOTE — HOSPITAL COURSE
01/18/2023: Day +19 from a Torie 140 auto SCT. Admitted through ED yesterday with neutropenic fever and uncontrolled n/v. Started on Cefepime. Infection w/u unremarkable thus far. Has been afebrile since admission. VSS. Will likely transition to oral abx and discharge back to [facility] tomorrow if he remains afebrile. Main complaint today is persistent nausea. Changed zofran from IV to PO and scheduled. Continuing home scopalamine patch and prn PO compazine. Will start nightly Zyprexa this evening. ESRD team consulted and will dialyze patient today.  01/19/2023: Day +20 from a Torie 140 auto SCT for MM. Had unsustained temp of 100.5 yesterday afternoon and another unsustained temp of 100.4 this morning. Stopped Cefepime and starting oral cipro and flagyl given that symptoms are abdominal. No diarrhea since yesterday. Nausea persists but is improved on current regimen. Oral intake continues to be poor.  WBC count up to 2.13. ANC still pending. Expect symptoms to improve as counts improve. Did not receive dialysis yesterday. Receiving dialysis this morning, so will likely not be dialyzed tomorrow. Given this, will probably remain inpatient so he can be dialyzed Saturday and then resume [facility] outpatient schedule at discharge.  01/20/2023: Day+21 from Torei 140 ASCT  for MM. Had sustained temp 100.9 yesterday afternoon, so Cipro/Flagyl converted to Zosyn. Has remained less than 100.4 since. With no abdominal complaints and benign exam, will convert back to Cipro/Flagyl today and monitor. Infectious workup negative so far, will obtain RIP today. Fevers possibly related to engraftment still. ANC 1730 today. He reports an episode of water diarrhea overnight. If continues, could send C.diff as last C.diff was from 12/30. Plan to monitor today, dialysis again on Saturday. Potentional discharge Saturday after dialysis if patient remains afebrile.   01/21/2023 continues to have fevers, CT was not done as patient refused oral  contrast. ID consulted ,recommended CT without contrast, RPP and GPP and continue with zosyn.   01/23/2023: Day + 24 from Torie 140 Auto. Has been afebrile since Friday night when Tmax 101.1. Diarrhea has also improved, only 2 episodes/24 hours. No abdominal complaints. CT without source of infection, however does show pancreatic mass recommending MRI or CT with pancreas protocol - f/u outpatient. ID on board rec changing Zosyn to Augmentin daily through Wednesday to complete a 7 day course. On days when patient receive dialysis, he should take Augmentin AFTER dialysis. Review with patient/wife who state understanding. Since patient is feeling well, has remained afebrile since Friday, and ANC 1868, will plan to discharge this afternoon as along as he continues to remain afebrile. Patient very agreeable to plan. He may or may not receive dialysis today prior to discharge pending nephro schedule. Given that his labs are stable and no evidence of volume overload, nephro ok with him missing today's session and resuming his outpatient schedule on Wednesday.

## 2023-01-19 PROBLEM — Z91.81 AT HIGH RISK FOR FALLS: Status: ACTIVE | Noted: 2023-01-03

## 2023-01-19 LAB
25(OH)D3+25(OH)D2 SERPL-MCNC: 41 NG/ML (ref 30–96)
ALBUMIN SERPL BCP-MCNC: 3.3 G/DL (ref 3.5–5.2)
ALBUMIN SERPL BCP-MCNC: 3.4 G/DL (ref 3.5–5.2)
ALP SERPL-CCNC: 75 U/L (ref 55–135)
ALT SERPL W/O P-5'-P-CCNC: 7 U/L (ref 10–44)
ANION GAP SERPL CALC-SCNC: 10 MMOL/L (ref 8–16)
ANION GAP SERPL CALC-SCNC: 9 MMOL/L (ref 8–16)
ANISOCYTOSIS BLD QL SMEAR: SLIGHT
AST SERPL-CCNC: 11 U/L (ref 10–40)
BASOPHILS NFR BLD: 1 % (ref 0–1.9)
BILIRUB SERPL-MCNC: 0.4 MG/DL (ref 0.1–1)
BUN SERPL-MCNC: 27 MG/DL (ref 6–20)
BUN SERPL-MCNC: 28 MG/DL (ref 6–20)
CALCIUM SERPL-MCNC: 7.7 MG/DL (ref 8.7–10.5)
CALCIUM SERPL-MCNC: 7.9 MG/DL (ref 8.7–10.5)
CHLORIDE SERPL-SCNC: 105 MMOL/L (ref 95–110)
CHLORIDE SERPL-SCNC: 107 MMOL/L (ref 95–110)
CO2 SERPL-SCNC: 23 MMOL/L (ref 23–29)
CO2 SERPL-SCNC: 24 MMOL/L (ref 23–29)
CREAT SERPL-MCNC: 6.5 MG/DL (ref 0.5–1.4)
CREAT SERPL-MCNC: 6.6 MG/DL (ref 0.5–1.4)
DACRYOCYTES BLD QL SMEAR: ABNORMAL
DIFFERENTIAL METHOD: ABNORMAL
EOSINOPHIL NFR BLD: 0 % (ref 0–8)
ERYTHROCYTE [DISTWIDTH] IN BLOOD BY AUTOMATED COUNT: 17.2 % (ref 11.5–14.5)
EST. GFR  (NO RACE VARIABLE): 9.1 ML/MIN/1.73 M^2
EST. GFR  (NO RACE VARIABLE): 9.2 ML/MIN/1.73 M^2
FERRITIN SERPL-MCNC: 2359 NG/ML (ref 20–300)
GLUCOSE SERPL-MCNC: 109 MG/DL (ref 70–110)
GLUCOSE SERPL-MCNC: 110 MG/DL (ref 70–110)
HCT VFR BLD AUTO: 25.7 % (ref 40–54)
HGB BLD-MCNC: 8.2 G/DL (ref 14–18)
HYPOCHROMIA BLD QL SMEAR: ABNORMAL
IMM GRANULOCYTES # BLD AUTO: ABNORMAL K/UL (ref 0–0.04)
IMM GRANULOCYTES NFR BLD AUTO: ABNORMAL % (ref 0–0.5)
IRON SERPL-MCNC: 24 UG/DL (ref 45–160)
LYMPHOCYTES NFR BLD: 5 % (ref 18–48)
MAGNESIUM SERPL-MCNC: 1.7 MG/DL (ref 1.6–2.6)
MCH RBC QN AUTO: 31.1 PG (ref 27–31)
MCHC RBC AUTO-ENTMCNC: 31.9 G/DL (ref 32–36)
MCV RBC AUTO: 97 FL (ref 82–98)
METAMYELOCYTES NFR BLD MANUAL: 1 %
MONOCYTES NFR BLD: 4 % (ref 4–15)
NEUTROPHILS NFR BLD: 86 % (ref 38–73)
NEUTS BAND NFR BLD MANUAL: 2 %
NRBC BLD-RTO: 0 /100 WBC
OVALOCYTES BLD QL SMEAR: ABNORMAL
PHOSPHATE SERPL-MCNC: 2.5 MG/DL (ref 2.7–4.5)
PHOSPHATE SERPL-MCNC: 2.5 MG/DL (ref 2.7–4.5)
PLATELET # BLD AUTO: 42 K/UL (ref 150–450)
PLATELET BLD QL SMEAR: ABNORMAL
PMV BLD AUTO: 12.1 FL (ref 9.2–12.9)
POIKILOCYTOSIS BLD QL SMEAR: SLIGHT
POTASSIUM SERPL-SCNC: 3.8 MMOL/L (ref 3.5–5.1)
POTASSIUM SERPL-SCNC: 3.9 MMOL/L (ref 3.5–5.1)
PROMYELOCYTES NFR BLD MANUAL: 1 %
PROT SERPL-MCNC: 5.7 G/DL (ref 6–8.4)
PTH-INTACT SERPL-MCNC: 151.9 PG/ML (ref 9–77)
RBC # BLD AUTO: 2.64 M/UL (ref 4.6–6.2)
SATURATED IRON: 13 % (ref 20–50)
SCHISTOCYTES BLD QL SMEAR: ABNORMAL
SCHISTOCYTES BLD QL SMEAR: PRESENT
SODIUM SERPL-SCNC: 139 MMOL/L (ref 136–145)
SODIUM SERPL-SCNC: 139 MMOL/L (ref 136–145)
TOTAL IRON BINDING CAPACITY: 182 UG/DL (ref 250–450)
TRANSFERRIN SERPL-MCNC: 123 MG/DL (ref 200–375)
WBC # BLD AUTO: 2.13 K/UL (ref 3.9–12.7)

## 2023-01-19 PROCEDURE — 36415 COLL VENOUS BLD VENIPUNCTURE: CPT | Performed by: INTERNAL MEDICINE

## 2023-01-19 PROCEDURE — 20600001 HC STEP DOWN PRIVATE ROOM

## 2023-01-19 PROCEDURE — 80053 COMPREHEN METABOLIC PANEL: CPT | Performed by: NURSE PRACTITIONER

## 2023-01-19 PROCEDURE — 82728 ASSAY OF FERRITIN: CPT | Performed by: INTERNAL MEDICINE

## 2023-01-19 PROCEDURE — 83735 ASSAY OF MAGNESIUM: CPT | Performed by: NURSE PRACTITIONER

## 2023-01-19 PROCEDURE — 36415 COLL VENOUS BLD VENIPUNCTURE: CPT | Performed by: NURSE PRACTITIONER

## 2023-01-19 PROCEDURE — 85027 COMPLETE CBC AUTOMATED: CPT | Performed by: NURSE PRACTITIONER

## 2023-01-19 PROCEDURE — 83970 ASSAY OF PARATHORMONE: CPT | Performed by: INTERNAL MEDICINE

## 2023-01-19 PROCEDURE — 80069 RENAL FUNCTION PANEL: CPT | Performed by: INTERNAL MEDICINE

## 2023-01-19 PROCEDURE — 25000003 PHARM REV CODE 250: Performed by: NURSE PRACTITIONER

## 2023-01-19 PROCEDURE — 63600175 PHARM REV CODE 636 W HCPCS: Performed by: NURSE PRACTITIONER

## 2023-01-19 PROCEDURE — 90935 HEMODIALYSIS ONE EVALUATION: CPT

## 2023-01-19 PROCEDURE — 82306 VITAMIN D 25 HYDROXY: CPT | Performed by: INTERNAL MEDICINE

## 2023-01-19 PROCEDURE — 25000003 PHARM REV CODE 250: Performed by: INTERNAL MEDICINE

## 2023-01-19 PROCEDURE — 84100 ASSAY OF PHOSPHORUS: CPT | Performed by: NURSE PRACTITIONER

## 2023-01-19 PROCEDURE — 99233 SBSQ HOSP IP/OBS HIGH 50: CPT | Mod: ,,, | Performed by: INTERNAL MEDICINE

## 2023-01-19 PROCEDURE — 99233 PR SUBSEQUENT HOSPITAL CARE,LEVL III: ICD-10-PCS | Mod: ,,, | Performed by: INTERNAL MEDICINE

## 2023-01-19 PROCEDURE — 84466 ASSAY OF TRANSFERRIN: CPT | Performed by: INTERNAL MEDICINE

## 2023-01-19 PROCEDURE — 94761 N-INVAS EAR/PLS OXIMETRY MLT: CPT

## 2023-01-19 PROCEDURE — 87040 BLOOD CULTURE FOR BACTERIA: CPT | Mod: 59 | Performed by: NURSE PRACTITIONER

## 2023-01-19 PROCEDURE — 85007 BL SMEAR W/DIFF WBC COUNT: CPT | Performed by: NURSE PRACTITIONER

## 2023-01-19 RX ORDER — ACETAMINOPHEN 325 MG/1
650 TABLET ORAL ONCE
Status: COMPLETED | OUTPATIENT
Start: 2023-01-19 | End: 2023-01-19

## 2023-01-19 RX ORDER — CIPROFLOXACIN 500 MG/1
500 TABLET ORAL EVERY 12 HOURS
Status: DISCONTINUED | OUTPATIENT
Start: 2023-01-19 | End: 2023-01-19

## 2023-01-19 RX ORDER — CIPROFLOXACIN 250 MG/1
750 TABLET, FILM COATED ORAL
Status: DISCONTINUED | OUTPATIENT
Start: 2023-01-19 | End: 2023-01-19

## 2023-01-19 RX ORDER — METRONIDAZOLE 500 MG/1
500 TABLET ORAL EVERY 8 HOURS
Status: DISCONTINUED | OUTPATIENT
Start: 2023-01-19 | End: 2023-01-19

## 2023-01-19 RX ORDER — HEPARIN SODIUM 1000 [USP'U]/ML
1000 INJECTION, SOLUTION INTRAVENOUS; SUBCUTANEOUS
Status: DISCONTINUED | OUTPATIENT
Start: 2023-01-19 | End: 2023-01-23 | Stop reason: HOSPADM

## 2023-01-19 RX ADMIN — DOXYCYCLINE HYCLATE 100 MG: 100 TABLET, COATED ORAL at 08:01

## 2023-01-19 RX ADMIN — CIPROFLOXACIN 750 MG: 250 TABLET, COATED ORAL at 02:01

## 2023-01-19 RX ADMIN — ONDANSETRON HYDROCHLORIDE 8 MG: 4 TABLET, FILM COATED ORAL at 10:01

## 2023-01-19 RX ADMIN — METOPROLOL SUCCINATE 12.5 MG: 25 TABLET, EXTENDED RELEASE ORAL at 08:01

## 2023-01-19 RX ADMIN — HEPARIN SODIUM 1000 UNITS: 1000 INJECTION, SOLUTION INTRAVENOUS; SUBCUTANEOUS at 01:01

## 2023-01-19 RX ADMIN — METRONIDAZOLE 500 MG: 500 TABLET ORAL at 02:01

## 2023-01-19 RX ADMIN — CALCIUM CARBONATE (ANTACID) CHEW TAB 500 MG 1000 MG: 500 CHEW TAB at 02:01

## 2023-01-19 RX ADMIN — ACYCLOVIR 400 MG: 200 CAPSULE ORAL at 09:01

## 2023-01-19 RX ADMIN — ACETAMINOPHEN 650 MG: 325 TABLET ORAL at 05:01

## 2023-01-19 RX ADMIN — CALCIUM CARBONATE (ANTACID) CHEW TAB 500 MG 1000 MG: 500 CHEW TAB at 09:01

## 2023-01-19 RX ADMIN — PIPERACILLIN SODIUM AND TAZOBACTAM SODIUM 4.5 G: 4; .5 INJECTION, POWDER, LYOPHILIZED, FOR SOLUTION INTRAVENOUS at 06:01

## 2023-01-19 RX ADMIN — FAMOTIDINE 20 MG: 20 TABLET ORAL at 08:01

## 2023-01-19 RX ADMIN — ONDANSETRON HYDROCHLORIDE 8 MG: 4 TABLET, FILM COATED ORAL at 02:01

## 2023-01-19 RX ADMIN — SODIUM CHLORIDE 500 ML: 9 INJECTION, SOLUTION INTRAVENOUS at 09:01

## 2023-01-19 RX ADMIN — DOXYCYCLINE HYCLATE 100 MG: 100 TABLET, COATED ORAL at 09:01

## 2023-01-19 RX ADMIN — SODIUM CHLORIDE: 9 INJECTION, SOLUTION INTRAVENOUS at 10:01

## 2023-01-19 RX ADMIN — OLANZAPINE 5 MG: 5 TABLET, FILM COATED ORAL at 09:01

## 2023-01-19 RX ADMIN — CHOLECALCIFEROL TAB 25 MCG (1000 UNIT) 3000 UNITS: 25 TAB at 08:01

## 2023-01-19 RX ADMIN — ACYCLOVIR 400 MG: 200 CAPSULE ORAL at 08:01

## 2023-01-19 RX ADMIN — SERTRALINE HYDROCHLORIDE 50 MG: 50 TABLET ORAL at 08:01

## 2023-01-19 NOTE — PROGRESS NOTES
[facility] ([place] [address])  Hematology  Bone Marrow Transplant  Progress Note    Patient Name: De Lakhani  Admission Date: 1/17/2023  Hospital Length of Stay: 2 days  Code Status: Full Code    Subjective:     Interval History: Day +20 from a Torie 140 auto SCT for MM. Had unsustained temp of 100.5 yesterday afternoon and another unsustained temp of 100.4 this morning. Stopped Cefepime and starting oral cipro and flagyl given that symptoms are abdominal. No diarrhea since yesterday. Nausea persists but is improved on current regimen. Oral intake continues to be poor.  WBC count up to 2.13. ANC still pending. Expect symptoms to improve as counts improve. Did not receive dialysis yesterday. Receiving dialysis this morning, so will likely not be dialyzed tomorrow. Given this, will probably remain inpatient so he can be dialyzed Saturday and then resume [facility] outpatient schedule at discharge.    Objective:     Vital Signs (Most Recent):  Temp: 99.9 °F (37.7 °C) (01/19/23 1014)  Pulse: 74 (01/19/23 1100)  Resp: 18 (01/19/23 1014)  BP: 127/75 (01/19/23 1100)  SpO2: 96 % (01/19/23 1014) Vital Signs (24h Range):  Temp:  [98.8 °F (37.1 °C)-100.5 °F (38.1 °C)] 99.9 °F (37.7 °C)  Pulse:  [] 74  Resp:  [16-20] 18  SpO2:  [96 %-100 %] 96 %  BP: (108-149)/(56-82) 127/75     Weight: 98.9 kg (218 lb)  Body mass index is 30.4 kg/m².  Body surface area is 2.23 meters squared.    ECOG SCORE           [unfilled]    Intake/Output - Last 3 Shifts       None            Physical Exam  Constitutional:       Appearance: He is well-developed.   HENT:      Head: Normocephalic and atraumatic.      Mouth/Throat:      Pharynx: No oropharyngeal exudate.   Eyes:      General:         Right eye: No discharge.         Left eye: No discharge.      Conjunctiva/sclera: Conjunctivae normal.      Pupils: Pupils are equal, round, and reactive to light.   Cardiovascular:      Rate and Rhythm: Normal rate and regular rhythm.      Heart  sounds: Normal heart sounds. No murmur heard.  Pulmonary:      Effort: Pulmonary effort is normal. No respiratory distress.      Breath sounds: Normal breath sounds. No wheezing or rales.   Abdominal:      General: Bowel sounds are normal. There is no distension.      Palpations: Abdomen is soft.      Tenderness: There is no abdominal tenderness.   Musculoskeletal:         General: No deformity. Normal range of motion.      Cervical back: Normal range of motion and neck supple.   Skin:     General: Skin is warm and dry.      Findings: No erythema or rash.      Comments: Right chest wall dialysis cath. Dressing c/d/i. No sign of infection to site.   Neurological:      Mental Status: He is alert and oriented to person, place, and time.   Psychiatric:         Behavior: Behavior normal.         Thought Content: Thought content normal.         Judgment: Judgment normal.       Significant Labs:   CBC:   Recent Labs   Lab 01/18/23 0347 01/19/23  0819   WBC 1.49* 2.13*   HGB 7.2* 8.2*   HCT 21.8* 25.7*   PLT 26* 42*      and CMP:   Recent Labs   Lab 01/18/23 0347 01/19/23  0819    139  139   K 3.7 3.8  3.9    105  107   CO2 22* 24  23   GLU 98 110  109   BUN 17 27*  28*   CREATININE 4.9* 6.5*  6.6*   CALCIUM 7.4* 7.7*  7.9*   PROT 5.0* 5.7*   ALBUMIN 3.1* 3.4*  3.3*   BILITOT 0.4 0.4   ALKPHOS 68 75   AST 9* 11   ALT 5* 7*   ANIONGAP 10 10  9         Diagnostic Results:  I have reviewed all pertinent imaging results/findings within the past 24 hours.    Assessment/Plan:     * Neutropenic fever  - Wife reports a temp of 101.6 yesterday evening and a temp of 100.5 this morning.   - He was scheduled for his first post transplant f/u in the BMT clinic today, but was instructed to go to the ED instead.   - Of note, he presented to the ED over the weekend with c/o n/v and weakness and was discharged back to the [facility] from the ED without hospital admission.  - Lactic acid wnl. Second level pending.  Procal elevated to 0.62  - COVID neg  - Blood cx with NGTD  - CXR neg  - U/a not suggestive of infection  - No sign of infection to left knee or dialysis catheter site  - In ED afebrile, VSS  - Admitted and started with broad spectum abx with Cefepime. Transitioned to oral abx today. Ordered cipro and flagyl for abdominal coverage given GI symptoms.    History of autologous stem cell transplant  - Patient of Dr. King  - Diagnosed with MM in April 2022. Underwent 2 cycles CyBorD then transitioned to DVRd (now s/p C6)  - Admitted 12/28/22 for Torie 140 auto SCT   - Today is Day +20  - Completed chemotherapy 12/29 without issue  - Received 6 bags with a total CD34 dose of 3.13 x10^6/kg on 12/30/23.  - Engrafted Day +14 with an ANC of 869.  - ANC pending today, but WBC count up from yesterday    Multiple myeloma not having achieved remission  - Patient of Dr. King. Per most recent clinic note:   - 4/20/22: renal biopsy: light chain cast nephropathy, kappa light chain  - 4/26/22: right subclavian vein thrombus   - 4/27/22: M spike 0.2 with free monoclonal kappa band. B2mg 19.57, IgG 496, IgA 10, IgM <5. Kappa 09065, Lambda 7.9, ratio >1000  - 5/12/22: BMBx: plasma cell myeloma, marrow cellularity 100%, plasma cell percentage 100%. Plasma cell phenotype +, kappa +, CD20- -, CD30-, EMA-, SADAF-, Congo red negative. Peripheral blood with pancytopenia and no circulating blasts. Decreased storage iron. FISH canceled due to quantity not sufficient  - 5/18/22: CyBorD C1D1  - 5/23/22: rasburicase  - 5/24/22: Xgeva  - 6/6/22: Kappa 12994, lambda 4.0, ratio >1000, M spike 0.1 with free monoclonal kappa band present  - 6/6/22: CyBorD C2D1  - 6/9/22: BMBx Plasma cell neoplasm compatible with plasma cell myeloma. Extent of involvement 80-90% of bone marrow elements. Cytology: Plasmablastic. FISH cytogenetics positive for 1q21/CKS1B gain. Karyotype failed. Myelofibrosis diffuse (MF-3)  - 6/20/22: CyBorD C2D8 delayed  due to covid  - 6/23/22: CyBOrD C2D11  - 7/5/22: C1D1 DVRd  - 7/7/22 - 7/18/22: hospital admission for septic shock resulting in renal failure  - 7/25/22: started again on DVRd  - 8/8/2022: kappa 1402.8, lambda 7.5, ratio 167.04. M spike 0.1, two faint restricted bands in gamma globulin regions.   - 10/10/22: C5D1 DVRd. Revlimid on hold for upcoming stem   - Admitted for Torie 140 auto SCT on 12/28/22    Pancytopenia due to antineoplastic chemotherapy  - transfuse for Hgb <7 or plt < 10K  - daily CBC while inpatient  - blood counts improved today    Hypocalcemia  - Continue home calcium supplement    Hyperphosphatemia  - Continue home sevelamer with meals    At high risk for falls  - Falls x 2 during transplant admission  - See syncope  - Unclear if patient hit head. CT head neg 1/2 for bleed.  - Fall precautions ordered    Syncope  - Two falls during transplant admission with reported loss of consciousness. Both following dialysis, so suspect orthostatic  - Metoprolol dose decreased from 25 mg daily to 12.5 mg daily  - EEG ordered; symptoms now improved so discontinued order for EEG  - Fall precautions in place.      Chemotherapy induced diarrhea  - C-diff neg 12/31  - Continue PRN imodium.      CINV (chemotherapy-induced nausea and vomiting)  - continue home Scopalamine patch  - reported unrelieved at home with prn zofran on admission. Added prn compazine on admission.  - changed zofran from IV to PO and scheduled and started nightly Zyprexa on 1/18/23.    Hyperlipidemia  - Will hold home statin while inpatient    Depression  - Continue home Zoloft    Deep vein thrombosis (DVT) of upper extremity  - Holding home apixiban for thrombocytopenia. Will resume once plts are consistently > 50K.      Hypertension  - Continue home metoprolol. Decreased dose during transplant admission to 12.5 mg daily 1/3/23 due to soft BP and syncopal episodes x 2.    End stage renal disease  - developed during ICU stay. Believed to be 2/2  hypoprofusion due to hypotension  - nephrology consulted on admission  - on dialysis MWF at home. Continuing this schedule inpatient.  - right chest wall dialysis catheter in place  - He will receive dialysis at [facility] in [place] through Day +30  - Did not receive dialysis yesterday (Wednesday, 1/18), so being dialyzed this morning instead. Given this, will likely need to be dialyzed inpatient on Saturday.    Dependence on hemodialysis  - see ESRD    Chronic infection of prosthetic knee  - Seen in ID clinic with Dr. Chatterjee prior to transplant   - ID rec'd continuing doxycycline throughout transplant. No sign of infection recurrence at this time.         VTE Risk Mitigation (From admission, onward)         Ordered     heparin (porcine) injection 1,000 Units  As needed (PRN)         01/19/23 0929     IP VTE HIGH RISK PATIENT  Once         01/17/23 1329     Place sequential compression device  Until discontinued         01/17/23 1329                Disposition: Inpatient.    Amy Thomas, NP  Bone Marrow Transplant  [facility] - [facility] ([place]-14)

## 2023-01-19 NOTE — ASSESSMENT & PLAN NOTE
- Falls x 2 during transplant admission  - See syncope  - Unclear if patient hit head. CT head neg 1/2 for bleed.  - Fall precautions ordered

## 2023-01-19 NOTE — PLAN OF CARE
Patient tolerated 3 hour treatment well. No fluid removed. Patient rinsed back. Catheter flushed with NS and Heparin 1000 units/ml instilled to fill volume of each lumen. Catheter clamped, capped, taped, and wrapped. VS stable. Patient without complaint.

## 2023-01-19 NOTE — ASSESSMENT & PLAN NOTE
Mr. Lakhani was diagnosed with multiple myeloma in April 2022 after presenting with ASHLEE. 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 He had bone marrow biopsy which showed 100% involvement by plasma cells. He was started on CyBorD on 5/18 and received two cycles of treatment. His bone marrow biopsy after the first cycle showed a decrease in his plasma cell involvement to 80-90%. Dr. Bunch then started DVRd on 7/5/22. However, he was admitted to the hospital on 7/7/22 with septic shock requiring ICU care. He improved with antibiotics but had an ASHLEE thought to be due to hypotension which resulted in renal failure. Dialysis dependent since 07/22. Stem cell transplant on 12/30        Nephrology History  iHD Schedule: MWF   Unit/MD: Art   Duration: 4 hours   UF: ?  EDW: 107kg   Access: R permcath   Residual Renal Function: moderate   Last HD prior to 1/16/23     Assessment and Recommendations  Hemodialysis today for metabolic clearance and volume management today.   Seen on HD. No complaints.   Dialysate adjusted to current labs   Continue to monitor intake and output, daily weights   Avoid nephrotoxic medication and renal dose medications to GFR  Strict I/Os    Anemia of Chronic Kidney Disease   Hgb goal in ESRD is Hgb of 10-11.   Will check iron studies in am.   Transfuse for Hg <7     Lab Results   Component Value Date    HGB 8.2 (L) 01/19/2023    MCV 97 01/19/2023       Mineral Bone Disease in CKD   Renal diet if not NPO  Novasource with meals  Hold phos binders for now  Daily RFP  Vitamin D and PTH to be checked with am labs.     Lab Results   Component Value Date    CALCIUM 7.7 (L) 01/19/2023    CALCIUM 7.9 (L) 01/19/2023    PHOS 2.5 (L) 01/19/2023    PHOS 2.5 (L) 01/19/2023

## 2023-01-19 NOTE — PLAN OF CARE
Pt AAOX4. VSS. Pt on continuous tele monitoring. Pt continues to run temps. Ice packs applied. MD notified. No interventions given at this time. Page team if temps of 100.4 and greater sustained.

## 2023-01-19 NOTE — NURSING
Assumed pt care.Pt Aox4 in bed resting comfortably, wife at bedside.VVS. pt is on RA sating well.All questions and concerned answered. Updated pt on plan of care. Pt denies any needs or complaints at this time. Safety measures in placed. Instructed pt to call if in need of anything. Will continue to monitor

## 2023-01-19 NOTE — ASSESSMENT & PLAN NOTE
- continue home Scopalamine patch  - reported unrelieved at home with prn zofran on admission. Added prn compazine on admission.  - changed zofran from IV to PO and scheduled and started nightly Zyprexa on 1/18/23.

## 2023-01-19 NOTE — ASSESSMENT & PLAN NOTE
- Wife reports a temp of 101.6 yesterday evening and a temp of 100.5 this morning.   - He was scheduled for his first post transplant f/u in the BMT clinic today, but was instructed to go to the ED instead.   - Of note, he presented to the ED over the weekend with c/o n/v and weakness and was discharged back to the [facility] from the ED without hospital admission.  - Lactic acid wnl. Second level pending. Procal elevated to 0.62  - COVID neg  - Blood cx with NGTD  - CXR neg  - U/a not suggestive of infection  - No sign of infection to left knee or dialysis catheter site  - In ED afebrile, VSS  - Admitted and started with broad spectum abx with Cefepime. Transitioned to oral abx today. Ordered cipro and flagyl for abdominal coverage given GI symptoms.

## 2023-01-19 NOTE — ASSESSMENT & PLAN NOTE
- Two falls during transplant admission with reported loss of consciousness. Both following dialysis, so suspect orthostatic  - Metoprolol dose decreased from 25 mg daily to 12.5 mg daily  - EEG ordered; symptoms now improved so discontinued order for EEG  - Fall precautions in place.

## 2023-01-19 NOTE — SUBJECTIVE & OBJECTIVE
Subjective:     Interval History: Day +20 from a Torie 140 auto SCT for MM. Had unsustained temp of 100.5 yesterday afternoon and another unsustained temp of 100.4 this morning. Stopped Cefepime and starting oral cipro and flagyl given that symptoms are abdominal. No diarrhea since yesterday. Nausea persists but is improved on current regimen. Oral intake continues to be poor.  WBC count up to 2.13. ANC still pending. Expect symptoms to improve as counts improve. Did not receive dialysis yesterday. Receiving dialysis this morning, so will likely not be dialyzed tomorrow. Given this, will probably remain inpatient so he can be dialyzed Saturday and then resume [facility] outpatient schedule at discharge.    Objective:     Vital Signs (Most Recent):  Temp: 99.9 °F (37.7 °C) (01/19/23 1014)  Pulse: 74 (01/19/23 1100)  Resp: 18 (01/19/23 1014)  BP: 127/75 (01/19/23 1100)  SpO2: 96 % (01/19/23 1014) Vital Signs (24h Range):  Temp:  [98.8 °F (37.1 °C)-100.5 °F (38.1 °C)] 99.9 °F (37.7 °C)  Pulse:  [] 74  Resp:  [16-20] 18  SpO2:  [96 %-100 %] 96 %  BP: (108-149)/(56-82) 127/75     Weight: 98.9 kg (218 lb)  Body mass index is 30.4 kg/m².  Body surface area is 2.23 meters squared.    ECOG SCORE           [unfilled]    Intake/Output - Last 3 Shifts       None            Physical Exam  Constitutional:       Appearance: He is well-developed.   HENT:      Head: Normocephalic and atraumatic.      Mouth/Throat:      Pharynx: No oropharyngeal exudate.   Eyes:      General:         Right eye: No discharge.         Left eye: No discharge.      Conjunctiva/sclera: Conjunctivae normal.      Pupils: Pupils are equal, round, and reactive to light.   Cardiovascular:      Rate and Rhythm: Normal rate and regular rhythm.      Heart sounds: Normal heart sounds. No murmur heard.  Pulmonary:      Effort: Pulmonary effort is normal. No respiratory distress.      Breath sounds: Normal breath sounds. No wheezing or rales.   Abdominal:      General:  Bowel sounds are normal. There is no distension.      Palpations: Abdomen is soft.      Tenderness: There is no abdominal tenderness.   Musculoskeletal:         General: No deformity. Normal range of motion.      Cervical back: Normal range of motion and neck supple.   Skin:     General: Skin is warm and dry.      Findings: No erythema or rash.      Comments: Right chest wall dialysis cath. Dressing c/d/i. No sign of infection to site.   Neurological:      Mental Status: He is alert and oriented to person, place, and time.   Psychiatric:         Behavior: Behavior normal.         Thought Content: Thought content normal.         Judgment: Judgment normal.       Significant Labs:   CBC:   Recent Labs   Lab 01/18/23 0347 01/19/23 0819   WBC 1.49* 2.13*   HGB 7.2* 8.2*   HCT 21.8* 25.7*   PLT 26* 42*      and CMP:   Recent Labs   Lab 01/18/23 0347 01/19/23 0819    139  139   K 3.7 3.8  3.9    105  107   CO2 22* 24  23   GLU 98 110  109   BUN 17 27*  28*   CREATININE 4.9* 6.5*  6.6*   CALCIUM 7.4* 7.7*  7.9*   PROT 5.0* 5.7*   ALBUMIN 3.1* 3.4*  3.3*   BILITOT 0.4 0.4   ALKPHOS 68 75   AST 9* 11   ALT 5* 7*   ANIONGAP 10 10  9         Diagnostic Results:  I have reviewed all pertinent imaging results/findings within the past 24 hours.

## 2023-01-19 NOTE — ASSESSMENT & PLAN NOTE
- Patient of Dr. King  - Diagnosed with MM in April 2022. Underwent 2 cycles CyBorD then transitioned to DVRd (now s/p C6)  - Admitted 12/28/22 for Torie 140 auto SCT   - Today is Day +20  - Completed chemotherapy 12/29 without issue  - Received 6 bags with a total CD34 dose of 3.13 x10^6/kg on 12/30/23.  - Engrafted Day +14 with an ANC of 869.  - ANC pending today, but WBC count up from yesterday

## 2023-01-19 NOTE — ASSESSMENT & PLAN NOTE
- Patient of Dr. King. Per most recent clinic note:   - 4/20/22: renal biopsy: light chain cast nephropathy, kappa light chain  - 4/26/22: right subclavian vein thrombus   - 4/27/22: M spike 0.2 with free monoclonal kappa band. B2mg 19.57, IgG 496, IgA 10, IgM <5. Kappa 43914, Lambda 7.9, ratio >1000  - 5/12/22: BMBx: plasma cell myeloma, marrow cellularity 100%, plasma cell percentage 100%. Plasma cell phenotype +, kappa +, CD20- -, CD30-, EMA-, SADAF-, Congo red negative. Peripheral blood with pancytopenia and no circulating blasts. Decreased storage iron. FISH canceled due to quantity not sufficient  - 5/18/22: CyBorD C1D1  - 5/23/22: rasburicase  - 5/24/22: Xgeva  - 6/6/22: Kappa 62313, lambda 4.0, ratio >1000, M spike 0.1 with free monoclonal kappa band present  - 6/6/22: CyBorD C2D1  - 6/9/22: BMBx Plasma cell neoplasm compatible with plasma cell myeloma. Extent of involvement 80-90% of bone marrow elements. Cytology: Plasmablastic. FISH cytogenetics positive for 1q21/CKS1B gain. Karyotype failed. Myelofibrosis diffuse (MF-3)  - 6/20/22: CyBorD C2D8 delayed due to covid  - 6/23/22: CyBOrD C2D11  - 7/5/22: C1D1 DVRd  - 7/7/22 - 7/18/22: hospital admission for septic shock resulting in renal failure  - 7/25/22: started again on DVRd  - 8/8/2022: kappa 1402.8, lambda 7.5, ratio 167.04. M spike 0.1, two faint restricted bands in gamma globulin regions.   - 10/10/22: C5D1 DVRd. Revlimid on hold for upcoming stem   - Admitted for Torie 140 auto SCT on 12/28/22

## 2023-01-19 NOTE — SUBJECTIVE & OBJECTIVE
Interval History: Seen and evaluated by bedside. No acute complaints. Resting comfortably in bed. Tolerating dialysis well.     Review of patient's allergies indicates:  No Known Allergies  Current Facility-Administered Medications   Medication Frequency    0.9%  NaCl infusion Once    acyclovir capsule 400 mg BID    calcium carbonate 200 mg calcium (500 mg) chewable tablet 1,000 mg TID    ciprofloxacin HCl tablet 750 mg Q24H    dextrose 10% bolus 125 mL 125 mL PRN    dextrose 10% bolus 250 mL 250 mL PRN    doxycycline tablet 100 mg Q12H    [START ON 1/30/2023] ergocalciferol capsule 50,000 Units Q14 Days    famotidine tablet 20 mg Daily    glucagon (human recombinant) injection 1 mg PRN    glucose chewable tablet 16 g PRN    glucose chewable tablet 24 g PRN    heparin (porcine) injection 1,000 Units PRN    LIDOcaine HCl 2% oral solution 15 mL Q6H PRN    loperamide capsule 2 mg QID PRN    metoprolol succinate (TOPROL-XL) 24 hr split tablet 12.5 mg Daily    metroNIDAZOLE tablet 500 mg Q8H    naloxone 0.4 mg/mL injection 0.02 mg PRN    OLANZapine tablet 5 mg QHS    ondansetron tablet 8 mg Q8H    prochlorperazine tablet 10 mg TID PRN    scopolamine 1.3-1.5 mg (1 mg over 3 days) 1 patch Q3 Days    sertraline tablet 50 mg Daily    sodium chloride 0.9% flush 10 mL Q12H PRN    vitamin D 1000 units tablet 3,000 Units Daily       Objective:     Vital Signs (Most Recent):  Temp: 99.9 °F (37.7 °C) (01/19/23 0852)  Pulse: 100 (01/19/23 0750)  Resp: 20 (01/19/23 0500)  BP: (!) 108/56 (01/19/23 0750)  SpO2: 96 % (01/19/23 0750)   Vital Signs (24h Range):  Temp:  [98.8 °F (37.1 °C)-100.5 °F (38.1 °C)] 99.9 °F (37.7 °C)  Pulse:  [] 100  Resp:  [16-20] 20  SpO2:  [96 %-100 %] 96 %  BP: (108-149)/(56-82) 108/56     Weight: 98.9 kg (218 lb) (01/18/23 1413)  Body mass index is 30.4 kg/m².  Body surface area is 2.23 meters squared.    No intake/output data recorded.    Physical Exam  Vitals and nursing note reviewed.    Constitutional:       Appearance: He is well-developed.   HENT:      Head: Normocephalic and atraumatic.      Mouth/Throat:      Pharynx: No oropharyngeal exudate.   Eyes:      General:         Right eye: No discharge.         Left eye: No discharge.      Conjunctiva/sclera: Conjunctivae normal.      Pupils: Pupils are equal, round, and reactive to light.   Cardiovascular:      Rate and Rhythm: Normal rate and regular rhythm.      Heart sounds: Normal heart sounds. No murmur heard.  Pulmonary:      Effort: Pulmonary effort is normal. No respiratory distress.      Breath sounds: Normal breath sounds. No wheezing or rales.   Abdominal:      General: Bowel sounds are normal. There is no distension.      Palpations: Abdomen is soft.      Tenderness: There is no abdominal tenderness.   Musculoskeletal:         General: No deformity. Normal range of motion.      Cervical back: Normal range of motion and neck supple.   Skin:     General: Skin is warm and dry.      Findings: No erythema or rash.      Comments: Right chest wall dialysis catheter. Dressing c/d/i. No sign of infection to site.   Neurological:      Mental Status: He is alert and oriented to person, place, and time.   Psychiatric:         Behavior: Behavior normal.         Thought Content: Thought content normal.         Judgment: Judgment normal.       Significant Labs:  CBC:   Recent Labs   Lab 01/19/23  0819   WBC 2.13*   RBC 2.64*   HGB 8.2*   HCT 25.7*   PLT 42*   MCV 97   MCH 31.1*   MCHC 31.9*     CMP:   Recent Labs   Lab 01/19/23  0819     109   CALCIUM 7.7*  7.9*   ALBUMIN 3.4*  3.3*   PROT 5.7*     139   K 3.8  3.9   CO2 24  23     107   BUN 27*  28*   CREATININE 6.5*  6.6*   ALKPHOS 75   ALT 7*   AST 11   BILITOT 0.4

## 2023-01-19 NOTE — PROGRESS NOTES
[facility] ([place] [address])  Nephrology  Progress Note    Patient Name: De Lakhani  MRN: [id number]  Admission Date: 1/17/2023  Hospital Length of Stay: 2 days  Attending Provider: Renuka Call MD   Primary Care Physician: To Obtain Unable  Principal Problem:Neutropenic fever    Subjective:     HPI: Mr. Lakhani is a 57-y-o patient of Dr. King with high risk kappa light chain multiple myeloma. He is Day +18 from a Torie auto SCT. Other medical history includes hypertension, chronic infection of prosthetic knee (on ppx doxycycline), ESRD (MWF), upper extremity DVT, HLD, and depression. He presented to the ED today with c/o fever. His wife reports a temp of 101.6 yesterday evening and a temp of 100.5 this morning. He was scheduled for his first post transplant f/u in the BMT clinic today, but was instructed to go to the ED instead. Of note, he presented to the ED over the weekend with c/o n/v and weakness and was discharged back to the [facility] from the ED without hospital admission. He endorses ongoing n/v/d, poor oral intake, generalized weakness, and fatigue. He denies SOB, cough, chest pain, urinary symptoms, and bleeding or bruising. He has been admitted for infection w/u and IV abx for neutropenic fever. Electrolytes stable. Nephrology consulted for ESRD on HD.             Interval History: Seen and evaluated by bedside. No acute complaints. Resting comfortably in bed. Tolerating dialysis well.     Review of patient's allergies indicates:  No Known Allergies  Current Facility-Administered Medications   Medication Frequency    0.9%  NaCl infusion Once    acyclovir capsule 400 mg BID    calcium carbonate 200 mg calcium (500 mg) chewable tablet 1,000 mg TID    ciprofloxacin HCl tablet 750 mg Q24H    dextrose 10% bolus 125 mL 125 mL PRN    dextrose 10% bolus 250 mL 250 mL PRN    doxycycline tablet 100 mg Q12H    [START ON 1/30/2023] ergocalciferol capsule 50,000 Units Q14 Days     famotidine tablet 20 mg Daily    glucagon (human recombinant) injection 1 mg PRN    glucose chewable tablet 16 g PRN    glucose chewable tablet 24 g PRN    heparin (porcine) injection 1,000 Units PRN    LIDOcaine HCl 2% oral solution 15 mL Q6H PRN    loperamide capsule 2 mg QID PRN    metoprolol succinate (TOPROL-XL) 24 hr split tablet 12.5 mg Daily    metroNIDAZOLE tablet 500 mg Q8H    naloxone 0.4 mg/mL injection 0.02 mg PRN    OLANZapine tablet 5 mg QHS    ondansetron tablet 8 mg Q8H    prochlorperazine tablet 10 mg TID PRN    scopolamine 1.3-1.5 mg (1 mg over 3 days) 1 patch Q3 Days    sertraline tablet 50 mg Daily    sodium chloride 0.9% flush 10 mL Q12H PRN    vitamin D 1000 units tablet 3,000 Units Daily       Objective:     Vital Signs (Most Recent):  Temp: 99.9 °F (37.7 °C) (01/19/23 0852)  Pulse: 100 (01/19/23 0750)  Resp: 20 (01/19/23 0500)  BP: (!) 108/56 (01/19/23 0750)  SpO2: 96 % (01/19/23 0750)   Vital Signs (24h Range):  Temp:  [98.8 °F (37.1 °C)-100.5 °F (38.1 °C)] 99.9 °F (37.7 °C)  Pulse:  [] 100  Resp:  [16-20] 20  SpO2:  [96 %-100 %] 96 %  BP: (108-149)/(56-82) 108/56     Weight: 98.9 kg (218 lb) (01/18/23 1413)  Body mass index is 30.4 kg/m².  Body surface area is 2.23 meters squared.    No intake/output data recorded.    Physical Exam  Vitals and nursing note reviewed.   Constitutional:       Appearance: He is well-developed.   HENT:      Head: Normocephalic and atraumatic.      Mouth/Throat:      Pharynx: No oropharyngeal exudate.   Eyes:      General:         Right eye: No discharge.         Left eye: No discharge.      Conjunctiva/sclera: Conjunctivae normal.      Pupils: Pupils are equal, round, and reactive to light.   Cardiovascular:      Rate and Rhythm: Normal rate and regular rhythm.      Heart sounds: Normal heart sounds. No murmur heard.  Pulmonary:      Effort: Pulmonary effort is normal. No respiratory distress.      Breath sounds: Normal breath sounds. No  wheezing or rales.   Abdominal:      General: Bowel sounds are normal. There is no distension.      Palpations: Abdomen is soft.      Tenderness: There is no abdominal tenderness.   Musculoskeletal:         General: No deformity. Normal range of motion.      Cervical back: Normal range of motion and neck supple.   Skin:     General: Skin is warm and dry.      Findings: No erythema or rash.      Comments: Right chest wall dialysis catheter. Dressing c/d/i. No sign of infection to site.   Neurological:      Mental Status: He is alert and oriented to person, place, and time.   Psychiatric:         Behavior: Behavior normal.         Thought Content: Thought content normal.         Judgment: Judgment normal.       Significant Labs:  CBC:   Recent Labs   Lab 01/19/23  0819   WBC 2.13*   RBC 2.64*   HGB 8.2*   HCT 25.7*   PLT 42*   MCV 97   MCH 31.1*   MCHC 31.9*     CMP:   Recent Labs   Lab 01/19/23 0819     109   CALCIUM 7.7*  7.9*   ALBUMIN 3.4*  3.3*   PROT 5.7*     139   K 3.8  3.9   CO2 24  23     107   BUN 27*  28*   CREATININE 6.5*  6.6*   ALKPHOS 75   ALT 7*   AST 11   BILITOT 0.4          Assessment/Plan:     * Neutropenic fever  -management per primary     End stage renal disease  Mr. Lakhani was diagnosed with multiple myeloma in April 2022 after presenting with ASHLEE. 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 He had bone marrow biopsy which showed 100% involvement by plasma cells. He was started on CyBorD on 5/18 and received two cycles of treatment. His bone marrow biopsy after the first cycle showed a decrease in his plasma cell involvement to 80-90%. Dr. Bunch then started DVRd on 7/5/22. However, he was admitted to the hospital on 7/7/22 with septic shock requiring ICU care. He improved with antibiotics but had an ASHLEE thought to be due to hypotension which resulted in renal failure. Dialysis dependent since 07/22. Stem cell transplant on 12/30        Nephrology  History  iHD Schedule: MWF   Unit/MD: Art   Duration: 4 hours   UF: ?  EDW: 107kg   Access: R permcath   Residual Renal Function: moderate   Last HD prior to 1/16/23     Assessment and Recommendations  Hemodialysis today for metabolic clearance and volume management today.   Seen on HD. No complaints.   Dialysate adjusted to current labs   Continue to monitor intake and output, daily weights   Avoid nephrotoxic medication and renal dose medications to GFR  Strict I/Os    Anemia of Chronic Kidney Disease   Hgb goal in ESRD is Hgb of 10-11.   Will check iron studies in am.   Transfuse for Hg <7     Lab Results   Component Value Date    HGB 8.2 (L) 01/19/2023    MCV 97 01/19/2023       Mineral Bone Disease in CKD   Renal diet if not NPO  Novasource with meals  Hold phos binders for now  Daily RFP  Vitamin D and PTH to be checked with am labs.     Lab Results   Component Value Date    CALCIUM 7.7 (L) 01/19/2023    CALCIUM 7.9 (L) 01/19/2023    PHOS 2.5 (L) 01/19/2023    PHOS 2.5 (L) 01/19/2023            Multiple myeloma not having achieved remission  -management per primary         Brice Barrera MD  Nephrology  [facility] - [facility] ([place]

## 2023-01-19 NOTE — PROGRESS NOTES
Patient arrived via wc and weighed standing. MWF chronic dialysis patient. (1014) Right IJ permcath accessed without difficulty. HD initiated as ordered and lines secured. Dr. Araujo at bedside. UF goal of 0 discussed and confirmed.

## 2023-01-19 NOTE — NURSING
Spoke with transplant NP Amy via phone, updated with patient's current temp off 100.9 (rechecked in room). Orders to be entered by NP.

## 2023-01-19 NOTE — PLAN OF CARE
Problem: Adult Inpatient Plan of Care  Goal: Plan of Care Review  Outcome: Ongoing, Progressing  Goal: Patient-Specific Goal (Individualized)  Outcome: Ongoing, Progressing  Goal: Absence of Hospital-Acquired Illness or Injury  Outcome: Ongoing, Progressing  Goal: Optimal Comfort and Wellbeing  Outcome: Ongoing, Progressing  Goal: Readiness for Transition of Care  Outcome: Ongoing, Progressing     Problem: Fall Injury Risk  Goal: Absence of Fall and Fall-Related Injury  Outcome: Ongoing, Progressing     Problem: Device-Related Complication Risk (Hemodialysis)  Goal: Safe, Effective Therapy Delivery  Outcome: Ongoing, Progressing     Problem: Hemodynamic Instability (Hemodialysis)  Goal: Effective Tissue Perfusion  Outcome: Ongoing, Progressing     Problem: Infection (Hemodialysis)  Goal: Absence of Infection Signs and Symptoms  Outcome: Ongoing, Progressing     Problem: Infection  Goal: Absence of Infection Signs and Symptoms  Outcome: Ongoing, Progressing     Problem: Adjustment to Illness (Chronic Kidney Disease)  Goal: Optimal Coping with Chronic Illness  Outcome: Ongoing, Progressing     Problem: Electrolyte Imbalance (Chronic Kidney Disease)  Goal: Electrolyte Balance  Outcome: Ongoing, Progressing     Problem: Fluid Volume Excess (Chronic Kidney Disease)  Goal: Fluid Balance  Outcome: Ongoing, Progressing     Problem: Functional Decline (Chronic Kidney Disease)  Goal: Optimal Functional Ability  Outcome: Ongoing, Progressing     Problem: Hematologic Alteration (Chronic Kidney Disease)  Goal: Absence of Anemia Signs and Symptoms  Outcome: Ongoing, Progressing     Problem: Oral Intake Inadequate (Chronic Kidney Disease)  Goal: Optimal Oral Intake  Outcome: Ongoing, Progressing     Problem: Pain (Chronic Kidney Disease)  Goal: Acceptable Pain Control  Outcome: Ongoing, Progressing     Problem: Renal Function Impairment (Chronic Kidney Disease)  Goal: Minimize Renal Failure Effects  Outcome: Ongoing,  Progressing

## 2023-01-19 NOTE — ASSESSMENT & PLAN NOTE
- developed during ICU stay. Believed to be 2/2 hypoprofusion due to hypotension  - nephrology consulted on admission  - on dialysis MWF at home. Continuing this schedule inpatient.  - right chest wall dialysis catheter in place  - He will receive dialysis at [facility] in [place] through Day +30  - Did not receive dialysis yesterday (Wednesday, 1/18), so being dialyzed this morning instead. Given this, will likely need to be dialyzed inpatient on Saturday.

## 2023-01-20 PROBLEM — I10 HYPERTENSION: Chronic | Status: ACTIVE | Noted: 2022-12-28

## 2023-01-20 PROBLEM — E83.51 HYPOCALCEMIA: Chronic | Status: ACTIVE | Noted: 2023-01-09

## 2023-01-20 PROBLEM — F32.A DEPRESSION: Chronic | Status: ACTIVE | Noted: 2022-12-28

## 2023-01-20 PROBLEM — D63.1 ANEMIA IN ESRD (END-STAGE RENAL DISEASE): Status: RESOLVED | Noted: 2023-01-11 | Resolved: 2023-01-20

## 2023-01-20 PROBLEM — N18.6 ANEMIA IN ESRD (END-STAGE RENAL DISEASE): Status: RESOLVED | Noted: 2023-01-11 | Resolved: 2023-01-20

## 2023-01-20 PROBLEM — N18.6 END STAGE RENAL DISEASE: Chronic | Status: ACTIVE | Noted: 2022-08-26

## 2023-01-20 PROBLEM — Z99.2 DEPENDENCE ON HEMODIALYSIS: Chronic | Status: ACTIVE | Noted: 2022-11-14

## 2023-01-20 LAB
ALBUMIN SERPL BCP-MCNC: 3.2 G/DL (ref 3.5–5.2)
ALP SERPL-CCNC: 78 U/L (ref 55–135)
ALT SERPL W/O P-5'-P-CCNC: 10 U/L (ref 10–44)
ANION GAP SERPL CALC-SCNC: 9 MMOL/L (ref 8–16)
ANISOCYTOSIS BLD QL SMEAR: SLIGHT
AST SERPL-CCNC: 18 U/L (ref 10–40)
BASOPHILS # BLD AUTO: ABNORMAL K/UL (ref 0–0.2)
BASOPHILS NFR BLD: 0 % (ref 0–1.9)
BILIRUB SERPL-MCNC: 0.4 MG/DL (ref 0.1–1)
BUN SERPL-MCNC: 20 MG/DL (ref 6–20)
CALCIUM SERPL-MCNC: 8.2 MG/DL (ref 8.7–10.5)
CHLORIDE SERPL-SCNC: 104 MMOL/L (ref 95–110)
CO2 SERPL-SCNC: 24 MMOL/L (ref 23–29)
CREAT SERPL-MCNC: 4.4 MG/DL (ref 0.5–1.4)
DIFFERENTIAL METHOD: ABNORMAL
EOSINOPHIL # BLD AUTO: ABNORMAL K/UL (ref 0–0.5)
EOSINOPHIL NFR BLD: 0 % (ref 0–8)
ERYTHROCYTE [DISTWIDTH] IN BLOOD BY AUTOMATED COUNT: 17.2 % (ref 11.5–14.5)
EST. GFR  (NO RACE VARIABLE): 14.7 ML/MIN/1.73 M^2
GLUCOSE SERPL-MCNC: 94 MG/DL (ref 70–110)
HCT VFR BLD AUTO: 24.4 % (ref 40–54)
HGB BLD-MCNC: 8 G/DL (ref 14–18)
HYPOCHROMIA BLD QL SMEAR: ABNORMAL
IMM GRANULOCYTES # BLD AUTO: ABNORMAL K/UL (ref 0–0.04)
IMM GRANULOCYTES NFR BLD AUTO: ABNORMAL % (ref 0–0.5)
LACTATE SERPL-SCNC: 1.6 MMOL/L (ref 0.5–2.2)
LYMPHOCYTES # BLD AUTO: ABNORMAL K/UL (ref 1–4.8)
LYMPHOCYTES NFR BLD: 2 % (ref 18–48)
MAGNESIUM SERPL-MCNC: 1.6 MG/DL (ref 1.6–2.6)
MCH RBC QN AUTO: 31.9 PG (ref 27–31)
MCHC RBC AUTO-ENTMCNC: 32.8 G/DL (ref 32–36)
MCV RBC AUTO: 97 FL (ref 82–98)
METAMYELOCYTES NFR BLD MANUAL: 1 %
MONOCYTES # BLD AUTO: ABNORMAL K/UL (ref 0.3–1)
MONOCYTES NFR BLD: 10 % (ref 4–15)
NEUTROPHILS # BLD AUTO: ABNORMAL K/UL (ref 1.8–7.7)
NEUTROPHILS NFR BLD: 82 % (ref 38–73)
NEUTS BAND NFR BLD MANUAL: 5 %
NRBC BLD-RTO: 0 /100 WBC
OVALOCYTES BLD QL SMEAR: ABNORMAL
PHOSPHATE SERPL-MCNC: 2.8 MG/DL (ref 2.7–4.5)
PLATELET # BLD AUTO: 40 K/UL (ref 150–450)
PMV BLD AUTO: 12.6 FL (ref 9.2–12.9)
POIKILOCYTOSIS BLD QL SMEAR: SLIGHT
POLYCHROMASIA BLD QL SMEAR: ABNORMAL
POTASSIUM SERPL-SCNC: 3.8 MMOL/L (ref 3.5–5.1)
PROCALCITONIN SERPL IA-MCNC: 1.16 NG/ML
PROT SERPL-MCNC: 5.6 G/DL (ref 6–8.4)
RBC # BLD AUTO: 2.51 M/UL (ref 4.6–6.2)
SODIUM SERPL-SCNC: 137 MMOL/L (ref 136–145)
SPHEROCYTES BLD QL SMEAR: ABNORMAL
WBC # BLD AUTO: 2.11 K/UL (ref 3.9–12.7)

## 2023-01-20 PROCEDURE — 25000003 PHARM REV CODE 250: Performed by: NURSE PRACTITIONER

## 2023-01-20 PROCEDURE — 85007 BL SMEAR W/DIFF WBC COUNT: CPT | Performed by: NURSE PRACTITIONER

## 2023-01-20 PROCEDURE — 84100 ASSAY OF PHOSPHORUS: CPT | Performed by: NURSE PRACTITIONER

## 2023-01-20 PROCEDURE — 80053 COMPREHEN METABOLIC PANEL: CPT | Performed by: NURSE PRACTITIONER

## 2023-01-20 PROCEDURE — 63600175 PHARM REV CODE 636 W HCPCS: Performed by: INTERNAL MEDICINE

## 2023-01-20 PROCEDURE — 99233 SBSQ HOSP IP/OBS HIGH 50: CPT | Mod: ,,, | Performed by: NURSE PRACTITIONER

## 2023-01-20 PROCEDURE — 63600175 PHARM REV CODE 636 W HCPCS: Performed by: NURSE PRACTITIONER

## 2023-01-20 PROCEDURE — 25000003 PHARM REV CODE 250: Performed by: INTERNAL MEDICINE

## 2023-01-20 PROCEDURE — 84145 PROCALCITONIN (PCT): CPT | Performed by: NURSE PRACTITIONER

## 2023-01-20 PROCEDURE — 83605 ASSAY OF LACTIC ACID: CPT | Performed by: NURSE PRACTITIONER

## 2023-01-20 PROCEDURE — 27000207 HC ISOLATION

## 2023-01-20 PROCEDURE — 99233 SBSQ HOSP IP/OBS HIGH 50: CPT | Mod: ,,, | Performed by: INTERNAL MEDICINE

## 2023-01-20 PROCEDURE — 25500020 PHARM REV CODE 255: Performed by: INTERNAL MEDICINE

## 2023-01-20 PROCEDURE — 83735 ASSAY OF MAGNESIUM: CPT | Performed by: NURSE PRACTITIONER

## 2023-01-20 PROCEDURE — 85027 COMPLETE CBC AUTOMATED: CPT | Performed by: NURSE PRACTITIONER

## 2023-01-20 PROCEDURE — 36415 COLL VENOUS BLD VENIPUNCTURE: CPT | Performed by: NURSE PRACTITIONER

## 2023-01-20 PROCEDURE — 99233 PR SUBSEQUENT HOSPITAL CARE,LEVL III: ICD-10-PCS | Mod: ,,, | Performed by: NURSE PRACTITIONER

## 2023-01-20 PROCEDURE — 20600001 HC STEP DOWN PRIVATE ROOM

## 2023-01-20 PROCEDURE — 87040 BLOOD CULTURE FOR BACTERIA: CPT | Mod: 59 | Performed by: NURSE PRACTITIONER

## 2023-01-20 PROCEDURE — 99233 PR SUBSEQUENT HOSPITAL CARE,LEVL III: ICD-10-PCS | Mod: ,,, | Performed by: INTERNAL MEDICINE

## 2023-01-20 RX ORDER — METRONIDAZOLE 500 MG/1
500 TABLET ORAL EVERY 8 HOURS
Status: DISCONTINUED | OUTPATIENT
Start: 2023-01-20 | End: 2023-01-20

## 2023-01-20 RX ORDER — SODIUM CHLORIDE 9 MG/ML
INJECTION, SOLUTION INTRAVENOUS ONCE
Status: COMPLETED | OUTPATIENT
Start: 2023-01-21 | End: 2023-01-21

## 2023-01-20 RX ORDER — CIPROFLOXACIN 500 MG/1
500 TABLET ORAL EVERY 24 HOURS
Status: DISCONTINUED | OUTPATIENT
Start: 2023-01-20 | End: 2023-01-20

## 2023-01-20 RX ORDER — ACETAMINOPHEN 325 MG/1
650 TABLET ORAL EVERY 6 HOURS PRN
Status: DISCONTINUED | OUTPATIENT
Start: 2023-01-20 | End: 2023-01-23 | Stop reason: HOSPADM

## 2023-01-20 RX ORDER — MUPIROCIN 20 MG/G
OINTMENT TOPICAL 2 TIMES DAILY
Status: DISCONTINUED | OUTPATIENT
Start: 2023-01-20 | End: 2023-01-23 | Stop reason: HOSPADM

## 2023-01-20 RX ORDER — PROCHLORPERAZINE MALEATE 10 MG
10 TABLET ORAL 3 TIMES DAILY PRN
Qty: 30 TABLET | Refills: 0 | Status: SHIPPED | OUTPATIENT
Start: 2023-01-20

## 2023-01-20 RX ORDER — OLANZAPINE 5 MG/1
5 TABLET ORAL NIGHTLY
Qty: 30 TABLET | Refills: 11 | Status: SHIPPED | OUTPATIENT
Start: 2023-01-20 | End: 2024-01-20

## 2023-01-20 RX ORDER — ACETAMINOPHEN 325 MG/1
650 TABLET ORAL ONCE
Status: COMPLETED | OUTPATIENT
Start: 2023-01-20 | End: 2023-01-20

## 2023-01-20 RX ADMIN — CALCIUM CARBONATE (ANTACID) CHEW TAB 500 MG 1000 MG: 500 CHEW TAB at 02:01

## 2023-01-20 RX ADMIN — OLANZAPINE 5 MG: 5 TABLET, FILM COATED ORAL at 09:01

## 2023-01-20 RX ADMIN — CIPROFLOXACIN HYDROCHLORIDE 500 MG: 500 TABLET, FILM COATED ORAL at 02:01

## 2023-01-20 RX ADMIN — ONDANSETRON HYDROCHLORIDE 8 MG: 4 TABLET, FILM COATED ORAL at 09:01

## 2023-01-20 RX ADMIN — ACETAMINOPHEN 650 MG: 325 TABLET ORAL at 06:01

## 2023-01-20 RX ADMIN — MUPIROCIN: 20 OINTMENT TOPICAL at 02:01

## 2023-01-20 RX ADMIN — SODIUM CHLORIDE 1000 ML: 9 INJECTION, SOLUTION INTRAVENOUS at 09:01

## 2023-01-20 RX ADMIN — ONDANSETRON HYDROCHLORIDE 8 MG: 4 TABLET, FILM COATED ORAL at 02:01

## 2023-01-20 RX ADMIN — SERTRALINE HYDROCHLORIDE 50 MG: 50 TABLET ORAL at 09:01

## 2023-01-20 RX ADMIN — DOXYCYCLINE HYCLATE 100 MG: 100 TABLET, COATED ORAL at 09:01

## 2023-01-20 RX ADMIN — IOHEXOL 15 ML: 350 INJECTION, SOLUTION INTRAVENOUS at 10:01

## 2023-01-20 RX ADMIN — ACYCLOVIR 400 MG: 200 CAPSULE ORAL at 09:01

## 2023-01-20 RX ADMIN — ONDANSETRON HYDROCHLORIDE 8 MG: 4 TABLET, FILM COATED ORAL at 05:01

## 2023-01-20 RX ADMIN — MUPIROCIN: 20 OINTMENT TOPICAL at 10:01

## 2023-01-20 RX ADMIN — PIPERACILLIN SODIUM AND TAZOBACTAM SODIUM 4.5 G: 4; .5 INJECTION, POWDER, LYOPHILIZED, FOR SOLUTION INTRAVENOUS at 10:01

## 2023-01-20 RX ADMIN — FAMOTIDINE 20 MG: 20 TABLET ORAL at 09:01

## 2023-01-20 RX ADMIN — PIPERACILLIN SODIUM AND TAZOBACTAM SODIUM 4.5 G: 4; .5 INJECTION, POWDER, LYOPHILIZED, FOR SOLUTION INTRAVENOUS at 05:01

## 2023-01-20 RX ADMIN — CALCIUM CARBONATE (ANTACID) CHEW TAB 500 MG 1000 MG: 500 CHEW TAB at 09:01

## 2023-01-20 RX ADMIN — CHOLECALCIFEROL TAB 25 MCG (1000 UNIT) 3000 UNITS: 25 TAB at 09:01

## 2023-01-20 RX ADMIN — METRONIDAZOLE 500 MG: 500 TABLET ORAL at 02:01

## 2023-01-20 NOTE — ASSESSMENT & PLAN NOTE
- Tmax 101.6 1/17/23 and a Tmax 100.5 1/18/23.   - He was scheduled for his first post transplant f/u in the BMT clinic 1/18, but was instructed to go to the ED instead.   - Of note, he presented to the ED over the weekend with c/o n/v and weakness and was discharged back to the [facility] from the ED without hospital admission.  - Blood cultures (NGTD), UA (neg) COVID (neg) Lactic acid (WNL), CXR (neg), Procal elevated to 0.62, RIP (pend)  - No sign of infection to left knee or dialysis catheter site  - Admitted and started with broad spectum abx with Cefepime. Transitioned to Cipro/Flagyl for abdominal coverage 1/19 given GI symptoms, then transitioned to Zosyn with sustained fever 100.9. Fevers <100.4 since yesterday afternoon with no acute abdominal complaints so changed back to Cipro/Flagyl 1/20.   - Fevers likely related to engraftment, but could consider CT a/p if continues to fever given GI symptoms.

## 2023-01-20 NOTE — ASSESSMENT & PLAN NOTE
- Two falls during transplant admission with reported loss of consciousness. Both following dialysis, so suspect orthostatic  - Metoprolol dose decreased from 25 mg daily to 12.5 mg daily  - EEG ordered; symptoms now improved so discontinued order for EEG  - Fall precautions

## 2023-01-20 NOTE — ASSESSMENT & PLAN NOTE
- Patient of Dr. King  - Diagnosed with MM in April 2022. Underwent 2 cycles CyBorD then transitioned to DVRd (now s/p C6)  - Admitted 12/28/22 for Torie 140 auto SCT   - He received 6 bags with a total CD34 dose of 3.13 x10^6/kg on 12/30/22.  - Today is Day + 21  - Engrafted Day +14 with an ANC of 869  - ANC 1730 today

## 2023-01-20 NOTE — SUBJECTIVE & OBJECTIVE
Interval History: HD yesterday, tolerated well.     Review of patient's allergies indicates:  No Known Allergies  Current Facility-Administered Medications   Medication Frequency    [START ON 1/21/2023] 0.9%  NaCl infusion Once    acyclovir capsule 400 mg BID    calcium carbonate 200 mg calcium (500 mg) chewable tablet 1,000 mg TID    dextrose 10% bolus 125 mL 125 mL PRN    dextrose 10% bolus 250 mL 250 mL PRN    doxycycline tablet 100 mg Q12H    [START ON 1/30/2023] ergocalciferol capsule 50,000 Units Q14 Days    famotidine tablet 20 mg Daily    glucagon (human recombinant) injection 1 mg PRN    glucose chewable tablet 16 g PRN    glucose chewable tablet 24 g PRN    heparin (porcine) injection 1,000 Units PRN    LIDOcaine HCl 2% oral solution 15 mL Q6H PRN    loperamide capsule 2 mg QID PRN    metoprolol succinate (TOPROL-XL) 24 hr split tablet 12.5 mg Daily    mupirocin 2 % ointment BID    naloxone 0.4 mg/mL injection 0.02 mg PRN    OLANZapine tablet 5 mg QHS    ondansetron tablet 8 mg Q8H    piperacillin-tazobactam (ZOSYN) 4.5 g in dextrose 5 % in water (D5W) 5 % 100 mL IVPB (MB+) Q12H    prochlorperazine tablet 10 mg TID PRN    scopolamine 1.3-1.5 mg (1 mg over 3 days) 1 patch Q3 Days    sertraline tablet 50 mg Daily    sodium chloride 0.9% flush 10 mL Q12H PRN    vitamin D 1000 units tablet 3,000 Units Daily       Objective:     Vital Signs (Most Recent):  Temp: 100.2 °F (37.9 °C) (01/20/23 1002)  Pulse: 82 (01/20/23 0843)  Resp: 18 (01/20/23 0456)  BP: 108/68 (01/20/23 0800)  SpO2: (!) 93 % (01/20/23 0800)   Vital Signs (24h Range):  Temp:  [98.5 °F (36.9 °C)-100.9 °F (38.3 °C)] 100.2 °F (37.9 °C)  Pulse:  [] 82  Resp:  [18-20] 18  SpO2:  [93 %-98 %] 93 %  BP: ()/(45-87) 108/68     Weight: 98.2 kg (216 lb 6.4 oz) (01/20/23 0400)  Body mass index is 30.18 kg/m².  Body surface area is 2.22 meters squared.    I/O last 3 completed shifts:  In: 300 [Other:300]  Out: 600 [Other:600]    Physical  Exam  Vitals and nursing note reviewed.   Constitutional:       Appearance: He is well-developed.   HENT:      Head: Normocephalic and atraumatic.      Mouth/Throat:      Pharynx: No oropharyngeal exudate.   Eyes:      General:         Right eye: No discharge.         Left eye: No discharge.      Conjunctiva/sclera: Conjunctivae normal.      Pupils: Pupils are equal, round, and reactive to light.   Cardiovascular:      Rate and Rhythm: Normal rate and regular rhythm.      Heart sounds: Normal heart sounds. No murmur heard.  Pulmonary:      Effort: Pulmonary effort is normal. No respiratory distress.      Breath sounds: Normal breath sounds. No wheezing or rales.   Abdominal:      General: Bowel sounds are normal. There is no distension.      Palpations: Abdomen is soft.      Tenderness: There is no abdominal tenderness.   Musculoskeletal:         General: No deformity. Normal range of motion.      Cervical back: Normal range of motion and neck supple.   Skin:     General: Skin is warm and dry.      Findings: No erythema or rash.      Comments: Right chest wall dialysis catheter. Dressing c/d/i. No sign of infection to site.   Neurological:      Mental Status: He is alert and oriented to person, place, and time.   Psychiatric:         Behavior: Behavior normal.         Thought Content: Thought content normal.         Judgment: Judgment normal.       Significant Labs:  CBC:   Recent Labs   Lab 01/20/23  0348   WBC 2.11*   RBC 2.51*   HGB 8.0*   HCT 24.4*   PLT 40*   MCV 97   MCH 31.9*   MCHC 32.8     CMP:   Recent Labs   Lab 01/20/23  0348   GLU 94   CALCIUM 8.2*   ALBUMIN 3.2*   PROT 5.6*      K 3.8   CO2 24      BUN 20   CREATININE 4.4*   ALKPHOS 78   ALT 10   AST 18   BILITOT 0.4     All labs within the past 24 hours have been reviewed.

## 2023-01-20 NOTE — ASSESSMENT & PLAN NOTE
- Patient of Dr. King. Per most recent clinic note:   - 4/20/22: renal biopsy: light chain cast nephropathy, kappa light chain  - 4/26/22: right subclavian vein thrombus   - 4/27/22: M spike 0.2 with free monoclonal kappa band. B2mg 19.57, IgG 496, IgA 10, IgM <5. Kappa 03642, Lambda 7.9, ratio >1000  - 5/12/22: BMBx: plasma cell myeloma, marrow cellularity 100%, plasma cell percentage 100%. Plasma cell phenotype +, kappa +, CD20- -, CD30-, EMA-, SADAF-, Congo red negative. Peripheral blood with pancytopenia and no circulating blasts. Decreased storage iron. FISH canceled due to quantity not sufficient  - 5/18/22: CyBorD C1D1  - 5/23/22: rasburicase  - 5/24/22: Xgeva  - 6/6/22: Kappa 49487, lambda 4.0, ratio >1000, M spike 0.1 with free monoclonal kappa band present  - 6/6/22: CyBorD C2D1  - 6/9/22: BMBx Plasma cell neoplasm compatible with plasma cell myeloma. Extent of involvement 80-90% of bone marrow elements. Cytology: Plasmablastic. FISH cytogenetics positive for 1q21/CKS1B gain. Karyotype failed. Myelofibrosis diffuse (MF-3)  - 6/20/22: CyBorD C2D8 delayed due to covid  - 6/23/22: CyBOrD C2D11  - 7/5/22: C1D1 DVRd  - 7/7/22 - 7/18/22: hospital admission for septic shock resulting in renal failure  - 7/25/22: started again on DVRd  - 8/8/2022: kappa 1402.8, lambda 7.5, ratio 167.04. M spike 0.1, two faint restricted bands in gamma globulin regions.   - 10/10/22: C5D1 DVRd. Revlimid on hold for upcoming stem   - Admitted for Torie 140 auto SCT on 12/28/22. Transplanted on 12/30/22. Engrafted on Day +14 with

## 2023-01-20 NOTE — ASSESSMENT & PLAN NOTE
Mineral Bone Disease in CKD   Renal diet if not NPO  Novasource with meals  Hold phos binders for now  Daily RFP  Vitamin D and PTH to be checked with am labs.     Lab Results   Component Value Date    .9 (H) 01/19/2023    CALCIUM 8.2 (L) 01/20/2023    PHOS 2.8 01/20/2023    [id number] 41 01/19/2023

## 2023-01-20 NOTE — ASSESSMENT & PLAN NOTE
- C-diff neg 12/31  - Continue PRN imodium  - See neutropenic fever - if diarrhea/fevers continue, could consider resending C.diff

## 2023-01-20 NOTE — ASSESSMENT & PLAN NOTE
- Falls x 2 during transplant admission  - See syncope  - Unclear if patient hit head. CT head neg 1/2 for bleed.  - Fall precautions

## 2023-01-20 NOTE — PROGRESS NOTES
Attempted to see patient seen for follow up. Pt and spouse sleeping soundly in room at this writing.  Receiving temporary dialysis at [facility] ([phone]) at [address] through 1/30 MWF at 0645; will need notification on discharge.  Will re-attempt.  Will continue to follow and assist as needed.

## 2023-01-20 NOTE — ASSESSMENT & PLAN NOTE
- Continue home Scopalamine patch  - Nausea unrelieved at home with PRN zofran on admission. Added PRN compazine on admission which seems to work better per patient.   - Changed zofran from IV to PO and scheduled and started nightly Zyprexa on 1/18/23.

## 2023-01-20 NOTE — RESPIRATORY THERAPY
RAPID RESPONSE RESPIRATORY THERAPY PROACTIVE NOTE           Time of visit: 1001     Code Status: Full Code   : 1965  Bed: 45095/11153 A:   MRN: [id number]  Time spent at the bedside: < 15 min    SITUATION    Evaluated patient for: Hypotenstion    BACKGROUND    Why is the patient in the hospital?: Neutropenic fever    Patient has a past medical history of Chronic infection of prosthetic knee, subsequent encounter, Encounter for blood transfusion, Essential (primary) hypertension, and Multiple myeloma.    24 Hours Vitals Range:  Temp:  [98.5 °F (36.9 °C)-100.9 °F (38.3 °C)]   Pulse:  []   Resp:  [18]   BP: ()/(45-68)   SpO2:  [93 %-98 %]     Labs:    Recent Labs     23  0347 23  0819 23  034    139  139 137   K 3.7 3.8  3.9 3.8    105  107 104   CO2 22* 24  23 24   CREATININE 4.9* 6.5*  6.6* 4.4*   GLU 98 110  109 94   PHOS 2.5* 2.5*  2.5* 2.8   MG 1.7 1.7 1.6        No results for input(s): PH, PCO2, PO2, HCO3, POCSATURATED, BE in the last 72 hours.    ASSESSMENT/INTERVENTIONS  Patient in bed resting. No respiratory needs at this time.    Last VS   Temp: 99.7 °F (37.6 °C) ( 1201)  Pulse: 82 ( 1546)  Resp: 18 ( 0456)  BP: 106/61 ( 1201)  SpO2: 97 % ( 1201)    Level of Consciousness: Level of Consciousness (AVPU): alert  Respiratory Effort:   Expansion/Accessory Muscle Usage:    All Lung Field Breath Sounds: All Lung Fields Breath Sounds: Anterior:, Posterior:  O2 Device/Concentration: Room Air  NIPPV: No Surgical airway: No  ETCO2 monitored:    Ambu at bedside:      Active Orders   Respiratory Care    Pulse Oximetry Continuous     Frequency: Continuous     Number of Occurrences: Until Specified       RECOMMENDATIONS    We recommend: RRT Recs: Continue POC per primary team.    FOLLOW-UP    Please call back the Rapid Response RT, Lilli Araujo, SOPHIA at x [phone] for any questions or concerns.

## 2023-01-20 NOTE — PROGRESS NOTES
[facility] ([place] [address])  Hematology  Bone Marrow Transplant  Progress Note    Patient Name: De Lakhani  Admission Date: 1/17/2023  Hospital Length of Stay: 3 days  Code Status: Full Code  Subjective:   Interval History: Day+21 from Torie 140 ASCT  for MM. Had sustained temp 100.9 yesterday afternoon, so Cipro/Flagyl converted to Zosyn. Has remained less than 100.4 since. With no abdominal complaints and benign exam, will convert back to Cipro/Flagyl today and monitor. Infectious workup negative so far, will obtain RIP today. Fevers possibly related to engraftment still. ANC 1730 today. He reports an episode of water diarrhea overnight. If continues, could send C.diff as last C.diff was from 12/30. Plan to monitor today, dialysis again on Saturday. Potentional discharge Saturday after dialysis if patient remains afebrile.   Objective:     Vital Signs (Most Recent):  Temp: 99.7 °F (37.6 °C) (01/20/23 1201)  Pulse: 94 (01/20/23 1201)  Resp: 18 (01/20/23 0456)  BP: 106/61 (01/20/23 1201)  SpO2: 97 % (01/20/23 1201) Vital Signs (24h Range):  Temp:  [98.5 °F (36.9 °C)-100.9 °F (38.3 °C)] 99.7 °F (37.6 °C)  Pulse:  [] 94  Resp:  [18-20] 18  SpO2:  [93 %-98 %] 97 %  BP: ()/(45-87) 106/61     Weight: 98.2 kg (216 lb 6.4 oz)  Body mass index is 30.18 kg/m².  Body surface area is 2.22 meters squared.    Intake/Output - Last 3 Shifts         01/18 0700  01/19 0659 01/19 0700 01/20 0659 01/20 0700 01/21 0659    Other  300     Total Intake(mL/kg)  300 (3.1)     Other  600     Total Output  600     Net  -300                    Physical Exam  Vitals and nursing note reviewed.   Constitutional:       General: He is not in acute distress.     Appearance: Normal appearance.   HENT:      Head: Normocephalic.      Mouth/Throat:      Mouth: Mucous membranes are moist.   Eyes:      Extraocular Movements: Extraocular movements intact.      Conjunctiva/sclera: Conjunctivae normal.      Pupils: Pupils are  equal, round, and reactive to light.   Cardiovascular:      Rate and Rhythm: Normal rate and regular rhythm.      Pulses: Normal pulses.      Heart sounds: Normal heart sounds.   Pulmonary:      Effort: Pulmonary effort is normal.      Breath sounds: Normal breath sounds. No wheezing.   Abdominal:      General: Bowel sounds are normal. There is no distension.      Palpations: Abdomen is soft.      Tenderness: There is no abdominal tenderness.   Musculoskeletal:         General: Normal range of motion.      Cervical back: Normal range of motion and neck supple.      Right lower leg: No edema.      Left lower leg: No edema.   Skin:     General: Skin is warm and dry.      Capillary Refill: Capillary refill takes less than 2 seconds.      Comments: Right tunneled central line clean, dry, intact. No erythema, edema, exudate.   Neurological:      General: No focal deficit present.      Mental Status: He is alert and oriented to person, place, and time.   Psychiatric:         Mood and Affect: Mood normal.         Behavior: Behavior normal.         Thought Content: Thought content normal.         Judgment: Judgment normal.       Significant Labs:   CBC:   Recent Labs   Lab 01/19/23  0819 01/20/23  0348   WBC 2.13* 2.11*   HGB 8.2* 8.0*   HCT 25.7* 24.4*   PLT 42* 40*   CMP:   Recent Labs   Lab 01/19/23  0819 01/20/23  0348     139 137   K 3.8  3.9 3.8     107 104   CO2 24  23 24     109 94   BUN 27*  28* 20   CREATININE 6.5*  6.6* 4.4*   CALCIUM 7.7*  7.9* 8.2*   PROT 5.7* 5.6*   ALBUMIN 3.4*  3.3* 3.2*   BILITOT 0.4 0.4   ALKPHOS 75 78   AST 11 18   ALT 7* 10   ANIONGAP 10  9 9   and LFTs:   Recent Labs   Lab 01/19/23  0819 01/20/23  0348   ALT 7* 10   AST 11 18   ALKPHOS 75 78   BILITOT 0.4 0.4   PROT 5.7* 5.6*   ALBUMIN 3.4*  3.3* 3.2*     Diagnostic Results:  None    Assessment/Plan:     * Neutropenic fever  - Tmax 101.6 1/17/23 and a Tmax 100.5 1/18/23.   - He was scheduled for his first  post transplant f/u in the BMT clinic 1/18, but was instructed to go to the ED instead.   - Of note, he presented to the ED over the weekend with c/o n/v and weakness and was discharged back to the [facility] from the ED without hospital admission.  - Blood cultures (NGTD), UA (neg) COVID (neg) Lactic acid (WNL), CXR (neg), Procal elevated to 0.62, RIP (pend)  - No sign of infection to left knee or dialysis catheter site  - Admitted and started with broad spectum abx with Cefepime. Transitioned to Cipro/Flagyl for abdominal coverage 1/19 given GI symptoms, then transitioned to Zosyn with sustained fever 100.9. Fevers <100.4 since yesterday afternoon with no acute abdominal complaints so changed back to Cipro/Flagyl 1/20.   - Fevers likely related to engraftment, but could consider CT a/p if continues to fever given GI symptoms.     History of autologous stem cell transplant  - Patient of Dr. King  - Diagnosed with MM in April 2022. Underwent 2 cycles CyBorD then transitioned to DVRd (now s/p C6)  - Admitted 12/28/22 for Torie 140 auto SCT   - He received 6 bags with a total CD34 dose of 3.13 x10^6/kg on 12/30/22.  - Today is Day + 21  - Engrafted Day +14 with an ANC of 869  - ANC 1730 today     Multiple myeloma not having achieved remission  - Patient of Dr. King. Per most recent clinic note:   - 4/20/22: renal biopsy: light chain cast nephropathy, kappa light chain  - 4/26/22: right subclavian vein thrombus   - 4/27/22: M spike 0.2 with free monoclonal kappa band. B2mg 19.57, IgG 496, IgA 10, IgM <5. Kappa 41254, Lambda 7.9, ratio >1000  - 5/12/22: BMBx: plasma cell myeloma, marrow cellularity 100%, plasma cell percentage 100%. Plasma cell phenotype +, kappa +, CD20- -, CD30-, EMA-, SADAF-, Congo red negative. Peripheral blood with pancytopenia and no circulating blasts. Decreased storage iron. FISH canceled due to quantity not sufficient  - 5/18/22: Gianfranco JAMESD1  - 5/23/22: rasburicase  - 5/24/22:  Xgeva  - 6/6/22: Kappa 66183, lambda 4.0, ratio >1000, M spike 0.1 with free monoclonal kappa band present  - 6/6/22: CyBorD C2D1  - 6/9/22: BMBx Plasma cell neoplasm compatible with plasma cell myeloma. Extent of involvement 80-90% of bone marrow elements. Cytology: Plasmablastic. FISH cytogenetics positive for 1q21/CKS1B gain. Karyotype failed. Myelofibrosis diffuse (MF-3)  - 6/20/22: CyBorD C2D8 delayed due to covid  - 6/23/22: CyBOrD C2D11  - 7/5/22: C1D1 DVRd  - 7/7/22 - 7/18/22: hospital admission for septic shock resulting in renal failure  - 7/25/22: started again on DVRd  - 8/8/2022: kappa 1402.8, lambda 7.5, ratio 167.04. M spike 0.1, two faint restricted bands in gamma globulin regions.   - 10/10/22: C5D1 DVRd. Revlimid on hold for upcoming stem   - Admitted for Torie 140 auto SCT on 12/28/22. Transplanted on 12/30/22. Engrafted on Day +14 with      Chemotherapy induced diarrhea  - C-diff neg 12/31  - Continue PRN imodium  - See neutropenic fever - if diarrhea/fevers continue, could consider resending C.diff      Chronic infection of prosthetic knee  - Seen in ID clinic with Dr. Chatterjee prior to transplant   - ID rec'd continuing doxycycline throughout transplant. No sign of infection recurrence at this time.     CINV (chemotherapy-induced nausea and vomiting)  - Continue home Scopalamine patch  - Nausea unrelieved at home with PRN zofran on admission. Added PRN compazine on admission which seems to work better per patient.   - Changed zofran from IV to PO and scheduled and started nightly Zyprexa on 1/18/23.    Hypocalcemia  - Continue home calcium supplement    Hyperphosphatemia  - Continue home sevelamer with meals    At high risk for falls  - Falls x 2 during transplant admission  - See syncope  - Unclear if patient hit head. CT head neg 1/2 for bleed.  - Fall precautions    Syncope  - Two falls during transplant admission with reported loss of consciousness. Both following dialysis, so suspect  orthostatic  - Metoprolol dose decreased from 25 mg daily to 12.5 mg daily  - EEG ordered; symptoms now improved so discontinued order for EEG  - Fall precautions      Pancytopenia due to antineoplastic chemotherapy  - Transfuse for Hgb <7, PLT < 10K, or PLT <50k if bleeding   - Daily CBC while inpatient  - blood counts improved today    Hyperlipidemia  - Will hold home statin while inpatient    Depression  - Continue home Zoloft    Deep vein thrombosis (DVT) of upper extremity  - Holding home apixiban for thrombocytopenia. Will resume once plts are consistently > 50K.      Hypertension  - Continue home metoprolol. Decreased dose during transplant admission to 12.5 mg daily 1/3/23 due to soft BP and syncopal episodes x 2.    End stage renal disease  - Developed during ICU stay. Believed to be 2/2 hypoprofusion due to hypotension  - Nephrology consulted on admission  - On dialysis MWF at home   - He will receive dialysis at [facility] in [place] through Day +30  - Dialysis timing off due to Wednesday hospital admit - so received Thursday instead. Given this, will likely need to be dialyzed inpatient on Saturday.    Dependence on hemodialysis  - See ESRD        VTE Risk Mitigation (From admission, onward)         Ordered     heparin (porcine) injection 1,000 Units  As needed (PRN)         01/19/23 0929     IP VTE HIGH RISK PATIENT  Once         01/17/23 1329     Place sequential compression device  Until discontinued         01/17/23 1329                Disposition: remain inpatient to monitor fever curve / dialysis on Saturday. Potentia discharge Saturday after dialysis if fevers resolved     Della Rogel NP  Bone Marrow Transplant  [facility] ([facility]

## 2023-01-20 NOTE — PROGRESS NOTES
Admit Assessment    Patient Identification  De Lakhani   :  1965  Admit Date:  2023  Attending Provider:  Peter King MD              Referral:   Pt was admitted to  with a diagnosis of Neutropenic fever, and was admitted this hospital stay due to Infection [B99.9]  Neutropenic fever [D70.9, R50.81].   is involved was referred to the Social Work Department via routine referral.  Patient presents as a 57 y.o. year old  male.    Persons interviewed: spouse; patient in dialysis.    Living Situation:  Lives with wife Darlyn ([phone]) in own single-story home.  Independent with ADLs.  Currently at [facility] for post-transplant stay.    Resides at [address] [place] [address] [id number] [place] [address],   phone: [phone] (home).      (RETIRED) Functional Status Prior  Ambulation Prior: 0-->independent  Transferrin-->independent  Toiletin-->independent    Current or Past Agencies and Description of Services/Supplies    DME  Agency Name: none current  Equipment Currently Used at Home: shower chair (owns RW and SPC, does not use them)     Home Health  Agency Name: past use of [facility] after knee replacement     IV Infusion  Agency Name: past use of [facility]     Nutrition: Oral, renal diet with Boost supplements, any flavor., recently changed to normal diet.      Outpatient Pharmacy:     [facility] - [place], [place] - [address]  [address]  [address]  [facility] [address]  Phone: [phone] Fax: [phone]    [facility] [address] [address]  Phone: [phone] Fax: [phone]      Patient Preference of agencies include: none expressed.      Patient/Caregiver informed of right to choose providers or agencies.  Patient provides permission to release any necessary information to [facility] and to Non[facility] agencies as needed to facilitate patient care, treatment planning, and  patient discharge planning.  Written and verbal resources provided.      Coping   Coping well with support of family.        Adjustment to Diagnosis and Treatment  Adequate.      Emotional/Behavioral/Cognitive Issues   Hx of depression; compliant with Zoloft 50mg daily.          History/Current Symptoms of Anxiety/Depression: Yes  History/Current Substance Use:   Social History     Tobacco Use    Smoking status: Former    Smokeless tobacco: Never    Tobacco comments:     quit smoking in the 90s   Substance and Sexual Activity    Alcohol use: Not Currently    Drug use: Not on file    Sexual activity: Not on file       Indications of Abuse/Neglect: No:   Abuse Screen (yes response referral indicated)  Feels Unsafe at Home or Work/School: no  Feels Threatened by Someone: no  Does anyone try to keep you from having contact with others or doing things outside your home?: no  Physical Signs of Abuse Present: no    Financial:  Payer/Plan Subscr  Sex Relation Sub. Ins. ID Effective Group Num   1. [employer] MANAGE* JUAN PABLO JUAN 1965 Male Self [id number] 11/1/21 [id number]                                   [address] [address]      Cancer policy claim in place; itemized bills from financial counselor pending.      Other identified concerns/needs: Receiving temporary dialysis at [facility] ([phone]) at [address] through  MWF at 0645.      Plan: to [facility] in care of family.      Interventions/Referrals: Notified Aidee at [facility] of anticipated discharge today; will resume service Wednesday.  Wife will request adjustment to chair time for Monday to allow for clinic follow-up.  No other needs noted.  Safe discharge plan in place.    Patient/caregiver engaged in treatment planning process.     providing psychosocial and supportive counseling, resources, education, assistance and discharge planning as appropriate.  Patient/caregiver state understanding of  available  resources,  following, remains available.  Given SWer contact info and encouraged to call with any needs or concerns.

## 2023-01-20 NOTE — CARE UPDATE
RAPID RESPONSE NURSE PROACTIVE ROUNDING NOTE       Time of Visit: 1000    Admit Date: 2023  LOS: 3  Code Status: Full Code   Date of Visit: 2023  : 1965  Age: 57 y.o.  Sex: male  Race: White  Bed: 10374/09985 A:   MRN: [id number]  Was the patient discharged from an ICU this admission? No   Was the patient discharged from a PACU within last 24 hours? No   Did the patient receive conscious sedation/general anesthesia in last 24 hours? No  Was the patient in the ED within the past 24 hours? No  Was the patient on NIPPV within the past 24 hours? No   Attending Physician: Peter King MD  Primary Service: [facility] HEMATOLOGY BMT   Time spent at the bedside: < 15 min    SITUATION    Notified by charge RN during rounding.  Reason for alert: hypotension overnight  Called to evaluate the patient for Circulatory    BACKGROUND     Why is the patient in the hospital?: Neutropenic fever    Patient has a past medical history of Chronic infection of prosthetic knee, subsequent encounter, Encounter for blood transfusion, Essential (primary) hypertension, and Multiple myeloma.    Last Vitals:  Temp: 100.2 °F (37.9 °C) ( 1002)  Pulse: 84 ( 1134)  Resp: 18 ( 0456)  BP: 108/68 ( 0800)  SpO2: 93 % ( 0800)    24 Hours Vitals Range:  Temp:  [98.5 °F (36.9 °C)-100.9 °F (38.3 °C)]   Pulse:  []   Resp:  [18-20]   BP: ()/(45-87)   SpO2:  [93 %-98 %]     Labs:  Recent Labs     23  03423  0819 23  034   WBC 1.49* 2.13* 2.11*   HGB 7.2* 8.2* 8.0*   HCT 21.8* 25.7* 24.4*   PLT 26* 42* 40*       Recent Labs     23  03423  0819 23  034    139  139 137   K 3.7 3.8  3.9 3.8    105  107 104   CO2 22* 24  23 24   CREATININE 4.9* 6.5*  6.6* 4.4*   GLU 98 110  109 94   PHOS 2.5* 2.5*  2.5* 2.8   MG 1.7 1.7 1.6        No results for input(s): PH, PCO2, PO2, HCO3, POCSATURATED, BE in the last 72 hours.     ASSESSMENT    Physical  Exam  Vitals and nursing note reviewed.   Constitutional:       General: He is awake.   Cardiovascular:      Rate and Rhythm: Normal rate.   Pulmonary:      Effort: Pulmonary effort is normal.   Skin:     General: Skin is warm and dry.   Neurological:      Mental Status: He is alert and oriented to person, place, and time.   Psychiatric:         Behavior: Behavior is cooperative.     Patient states he feels better today today compared to yesterday.    His wife says he has been sleeping most of the morning.  The patient states he did not get much rest overnight related to frequent interruptions.      INTERVENTIONS    The patient was seen for Medical problem. Staff concerns included hypotension. The following interventions were performed: no additional interventions needed at this time.  Reinforced fall precautions with patient and spouse at bedside.    RECOMMENDATIONS    Maintain continuous cardiac monitoring.  Continue close monitoring of VS.      PROVIDER ESCALATION    Yes/No  no    Disposition: Remain in room 23066.    FOLLOW-UP    Charge Ti ZARAGOZA  updated on plan of care. Instructed to call the Rapid Response Nurse, Casie Gupta RN at [phone] for additional questions or concerns.

## 2023-01-20 NOTE — PROGRESS NOTES
[facility] ([place] [address])  Nephrology  Progress Note    Patient Name: De Lakhani  MRN: [id number]  Admission Date: 1/17/2023  Hospital Length of Stay: 3 days  Attending Provider: Peter King MD   Primary Care Physician: To Obtain Unable  Principal Problem:Neutropenic fever    Subjective:     Interval History: HD yesterday, tolerated well. UF 1L.     Review of patient's allergies indicates:  No Known Allergies  Current Facility-Administered Medications   Medication Frequency    [START ON 1/21/2023] 0.9%  NaCl infusion Once    acyclovir capsule 400 mg BID    calcium carbonate 200 mg calcium (500 mg) chewable tablet 1,000 mg TID    dextrose 10% bolus 125 mL 125 mL PRN    dextrose 10% bolus 250 mL 250 mL PRN    doxycycline tablet 100 mg Q12H    [START ON 1/30/2023] ergocalciferol capsule 50,000 Units Q14 Days    famotidine tablet 20 mg Daily    glucagon (human recombinant) injection 1 mg PRN    glucose chewable tablet 16 g PRN    glucose chewable tablet 24 g PRN    heparin (porcine) injection 1,000 Units PRN    LIDOcaine HCl 2% oral solution 15 mL Q6H PRN    loperamide capsule 2 mg QID PRN    metoprolol succinate (TOPROL-XL) 24 hr split tablet 12.5 mg Daily    mupirocin 2 % ointment BID    naloxone 0.4 mg/mL injection 0.02 mg PRN    OLANZapine tablet 5 mg QHS    ondansetron tablet 8 mg Q8H    piperacillin-tazobactam (ZOSYN) 4.5 g in dextrose 5 % in water (D5W) 5 % 100 mL IVPB (MB+) Q12H    prochlorperazine tablet 10 mg TID PRN    scopolamine 1.3-1.5 mg (1 mg over 3 days) 1 patch Q3 Days    sertraline tablet 50 mg Daily    sodium chloride 0.9% flush 10 mL Q12H PRN    vitamin D 1000 units tablet 3,000 Units Daily       Objective:     Vital Signs (Most Recent):  Temp: 100.2 °F (37.9 °C) (01/20/23 1002)  Pulse: 82 (01/20/23 0843)  Resp: 18 (01/20/23 0456)  BP: 108/68 (01/20/23 0800)  SpO2: (!) 93 % (01/20/23 0800)   Vital Signs (24h Range):  Temp:  [98.5 °F (36.9 °C)-100.9  °F (38.3 °C)] 100.2 °F (37.9 °C)  Pulse:  [] 82  Resp:  [18-20] 18  SpO2:  [93 %-98 %] 93 %  BP: ()/(45-87) 108/68     Weight: 98.2 kg (216 lb 6.4 oz) (01/20/23 0400)  Body mass index is 30.18 kg/m².  Body surface area is 2.22 meters squared.    I/O last 3 completed shifts:  In: 300 [Other:300]  Out: 600 [Other:600]    Physical Exam  Vitals and nursing note reviewed.   Constitutional:       Appearance: He is well-developed.   HENT:      Head: Normocephalic and atraumatic.      Mouth/Throat:      Pharynx: No oropharyngeal exudate.   Eyes:      General:         Right eye: No discharge.         Left eye: No discharge.      Conjunctiva/sclera: Conjunctivae normal.      Pupils: Pupils are equal, round, and reactive to light.   Cardiovascular:      Rate and Rhythm: Normal rate and regular rhythm.      Heart sounds: Normal heart sounds. No murmur heard.  Pulmonary:      Effort: Pulmonary effort is normal. No respiratory distress.      Breath sounds: Normal breath sounds. No wheezing or rales.   Abdominal:      General: Bowel sounds are normal. There is no distension.      Palpations: Abdomen is soft.      Tenderness: There is no abdominal tenderness.   Musculoskeletal:         General: No deformity. Normal range of motion.      Cervical back: Normal range of motion and neck supple.   Skin:     General: Skin is warm and dry.      Findings: No erythema or rash.      Comments: Right chest wall dialysis catheter. Dressing c/d/i. No sign of infection to site.   Neurological:      Mental Status: He is alert and oriented to person, place, and time.   Psychiatric:         Behavior: Behavior normal.         Thought Content: Thought content normal.         Judgment: Judgment normal.       Significant Labs:  CBC:   Recent Labs   Lab 01/20/23  0348   WBC 2.11*   RBC 2.51*   HGB 8.0*   HCT 24.4*   PLT 40*   MCV 97   MCH 31.9*   MCHC 32.8     CMP:   Recent Labs   Lab 01/20/23  0348   GLU 94   CALCIUM 8.2*   ALBUMIN 3.2*    PROT 5.6*      K 3.8   CO2 24      BUN 20   CREATININE 4.4*   ALKPHOS 78   ALT 10   AST 18   BILITOT 0.4     All labs within the past 24 hours have been reviewed.       Assessment/Plan:     * Neutropenic fever  -management per primary     Anemia in ESRD (end-stage renal disease)  -Hgb goal 10-11   Transfuse for Hgb <7.0  Elevated ferritin, hold epo in setting of MM     Chronic kidney disease-mineral and bone disorder  Mineral Bone Disease in CKD   Renal diet if not NPO  Novasource with meals  Hold phos binders for now  Daily RFP  Vitamin D and PTH to be checked with am labs.     Lab Results   Component Value Date    .9 (H) 01/19/2023    CALCIUM 8.2 (L) 01/20/2023    PHOS 2.8 01/20/2023    [id number] 01/19/2023            End stage renal disease  Mr. Lakhani was diagnosed with multiple myeloma in April 2022 after presenting with ASHLEE. 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 He had bone marrow biopsy which showed 100% involvement by plasma cells. He was started on CyBorD on 5/18 and received two cycles of treatment. His bone marrow biopsy after the first cycle showed a decrease in his plasma cell involvement to 80-90%. Dr. Bunch then started DVRd on 7/5/22. However, he was admitted to the hospital on 7/7/22 with septic shock requiring ICU care. He improved with antibiotics but had an ASHLEE thought to be due to hypotension which resulted in renal failure. Dialysis dependent since 07/22. Stem cell transplant on 12/30        Nephrology History  iHD Schedule: MWF   Unit/MD: Art   Duration: 4 hours   UF: ?  EDW: 107kg   Access: R permcath   Residual Renal Function: moderate   Last HD prior to 1/16/23     Assessment and Recommendations  Hemodialysis tomorrow 1/21 for metabolic clearance and volume management today.    Dialysate adjusted to current labs   Continue to monitor intake and output, daily weights   Avoid nephrotoxic medication and renal dose medications to  GFR  Strict I/Os            Thank you for your consult. I will follow-up with patient. Please contact us if you have any additional questions.    Nichelle Araujo DNP  Nephrology  [facility] - [facility] ([place]

## 2023-01-20 NOTE — SUBJECTIVE & OBJECTIVE
Subjective:   Interval History: Day+21 from Torie 140 ASCT  for MM. Had sustained temp 100.9 yesterday afternoon, so Cipro/Flagyl converted to Zosyn. Has remained less than 100.4 since. With no abdominal complaints and benign exam, will convert back to Cipro/Flagyl today and monitor. Infectious workup negative so far, will obtain RIP today. Fevers possibly related to engraftment still. ANC 1730 today. He reports an episode of water diarrhea overnight. If continues, could send C.diff as last C.diff was from 12/30. Plan to monitor today, dialysis again on Saturday. Potentional discharge Saturday after dialysis if patient remains afebrile.   Objective:     Vital Signs (Most Recent):  Temp: 99.7 °F (37.6 °C) (01/20/23 1201)  Pulse: 94 (01/20/23 1201)  Resp: 18 (01/20/23 0456)  BP: 106/61 (01/20/23 1201)  SpO2: 97 % (01/20/23 1201) Vital Signs (24h Range):  Temp:  [98.5 °F (36.9 °C)-100.9 °F (38.3 °C)] 99.7 °F (37.6 °C)  Pulse:  [] 94  Resp:  [18-20] 18  SpO2:  [93 %-98 %] 97 %  BP: ()/(45-87) 106/61     Weight: 98.2 kg (216 lb 6.4 oz)  Body mass index is 30.18 kg/m².  Body surface area is 2.22 meters squared.    Intake/Output - Last 3 Shifts         01/18 0700  01/19 0659 01/19 0700  01/20 0659 01/20 0700  01/21 0659    Other  300     Total Intake(mL/kg)  300 (3.1)     Other  600     Total Output  600     Net  -300                    Physical Exam  Vitals and nursing note reviewed.   Constitutional:       General: He is not in acute distress.     Appearance: Normal appearance.   HENT:      Head: Normocephalic.      Mouth/Throat:      Mouth: Mucous membranes are moist.   Eyes:      Extraocular Movements: Extraocular movements intact.      Conjunctiva/sclera: Conjunctivae normal.      Pupils: Pupils are equal, round, and reactive to light.   Cardiovascular:      Rate and Rhythm: Normal rate and regular rhythm.      Pulses: Normal pulses.      Heart sounds: Normal heart sounds.   Pulmonary:      Effort: Pulmonary  effort is normal.      Breath sounds: Normal breath sounds. No wheezing.   Abdominal:      General: Bowel sounds are normal. There is no distension.      Palpations: Abdomen is soft.      Tenderness: There is no abdominal tenderness.   Musculoskeletal:         General: Normal range of motion.      Cervical back: Normal range of motion and neck supple.      Right lower leg: No edema.      Left lower leg: No edema.   Skin:     General: Skin is warm and dry.      Capillary Refill: Capillary refill takes less than 2 seconds.      Comments: Right tunneled central line clean, dry, intact. No erythema, edema, exudate.   Neurological:      General: No focal deficit present.      Mental Status: He is alert and oriented to person, place, and time.   Psychiatric:         Mood and Affect: Mood normal.         Behavior: Behavior normal.         Thought Content: Thought content normal.         Judgment: Judgment normal.       Significant Labs:   CBC:   Recent Labs   Lab 01/19/23  0819 01/20/23  0348   WBC 2.13* 2.11*   HGB 8.2* 8.0*   HCT 25.7* 24.4*   PLT 42* 40*   CMP:   Recent Labs   Lab 01/19/23  0819 01/20/23  0348     139 137   K 3.8  3.9 3.8     107 104   CO2 24  23 24     109 94   BUN 27*  28* 20   CREATININE 6.5*  6.6* 4.4*   CALCIUM 7.7*  7.9* 8.2*   PROT 5.7* 5.6*   ALBUMIN 3.4*  3.3* 3.2*   BILITOT 0.4 0.4   ALKPHOS 75 78   AST 11 18   ALT 7* 10   ANIONGAP 10  9 9   and LFTs:   Recent Labs   Lab 01/19/23  0819 01/20/23  0348   ALT 7* 10   AST 11 18   ALKPHOS 75 78   BILITOT 0.4 0.4   PROT 5.7* 5.6*   ALBUMIN 3.4*  3.3* 3.2*     Diagnostic Results:  None

## 2023-01-20 NOTE — ASSESSMENT & PLAN NOTE
Mr. Lakhani was diagnosed with multiple myeloma in April 2022 after presenting with ASHLEE. 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 He had bone marrow biopsy which showed 100% involvement by plasma cells. He was started on CyBorD on 5/18 and received two cycles of treatment. His bone marrow biopsy after the first cycle showed a decrease in his plasma cell involvement to 80-90%. Dr. Bunch then started DVRd on 7/5/22. However, he was admitted to the hospital on 7/7/22 with septic shock requiring ICU care. He improved with antibiotics but had an ASHLEE thought to be due to hypotension which resulted in renal failure. Dialysis dependent since 07/22. Stem cell transplant on 12/30        Nephrology History  iHD Schedule: MWF   Unit/MD: Art   Duration: 4 hours   UF: ?  EDW: 107kg   [facility]: [facility]   Residual Renal Function: moderate   Last HD prior to 1/16/23     Assessment and Recommendations  Hemodialysis tomorrow 1/21 for metabolic clearance and volume management today.    Dialysate adjusted to current labs   Continue to monitor intake and output, daily weights   Avoid nephrotoxic medication and renal dose medications to GFR  Strict I/Os

## 2023-01-20 NOTE — ASSESSMENT & PLAN NOTE
- Transfuse for Hgb <7, PLT < 10K, or PLT <50k if bleeding   - Daily CBC while inpatient  - blood counts improved today

## 2023-01-21 PROBLEM — R50.9 FEVER: Status: ACTIVE | Noted: 2023-01-21

## 2023-01-21 LAB
ALBUMIN SERPL BCP-MCNC: 3 G/DL (ref 3.5–5.2)
ALP SERPL-CCNC: 77 U/L (ref 55–135)
ALT SERPL W/O P-5'-P-CCNC: 8 U/L (ref 10–44)
ANION GAP SERPL CALC-SCNC: 10 MMOL/L (ref 8–16)
AST SERPL-CCNC: 14 U/L (ref 10–40)
BASOPHILS # BLD AUTO: 0.01 K/UL (ref 0–0.2)
BASOPHILS NFR BLD: 0.4 % (ref 0–1.9)
BILIRUB SERPL-MCNC: 0.4 MG/DL (ref 0.1–1)
BUN SERPL-MCNC: 29 MG/DL (ref 6–20)
CALCIUM SERPL-MCNC: 7.6 MG/DL (ref 8.7–10.5)
CHLORIDE SERPL-SCNC: 106 MMOL/L (ref 95–110)
CO2 SERPL-SCNC: 22 MMOL/L (ref 23–29)
CREAT SERPL-MCNC: 5.5 MG/DL (ref 0.5–1.4)
DIFFERENTIAL METHOD: ABNORMAL
EOSINOPHIL # BLD AUTO: 0 K/UL (ref 0–0.5)
EOSINOPHIL NFR BLD: 0 % (ref 0–8)
ERYTHROCYTE [DISTWIDTH] IN BLOOD BY AUTOMATED COUNT: 17.4 % (ref 11.5–14.5)
EST. GFR  (NO RACE VARIABLE): 11.3 ML/MIN/1.73 M^2
GLUCOSE SERPL-MCNC: 96 MG/DL (ref 70–110)
HCT VFR BLD AUTO: 23.3 % (ref 40–54)
HGB BLD-MCNC: 7.7 G/DL (ref 14–18)
IMM GRANULOCYTES # BLD AUTO: 0.09 K/UL (ref 0–0.04)
IMM GRANULOCYTES NFR BLD AUTO: 4 % (ref 0–0.5)
LYMPHOCYTES # BLD AUTO: 0.1 K/UL (ref 1–4.8)
LYMPHOCYTES NFR BLD: 2.2 % (ref 18–48)
MAGNESIUM SERPL-MCNC: 1.5 MG/DL (ref 1.6–2.6)
MCH RBC QN AUTO: 32 PG (ref 27–31)
MCHC RBC AUTO-ENTMCNC: 33 G/DL (ref 32–36)
MCV RBC AUTO: 97 FL (ref 82–98)
MONOCYTES # BLD AUTO: 0.4 K/UL (ref 0.3–1)
MONOCYTES NFR BLD: 16.1 % (ref 4–15)
NEUTROPHILS # BLD AUTO: 1.7 K/UL (ref 1.8–7.7)
NEUTROPHILS NFR BLD: 77.3 % (ref 38–73)
NRBC BLD-RTO: 0 /100 WBC
PHOSPHATE SERPL-MCNC: 2.8 MG/DL (ref 2.7–4.5)
PLATELET # BLD AUTO: 50 K/UL (ref 150–450)
PMV BLD AUTO: 13 FL (ref 9.2–12.9)
POTASSIUM SERPL-SCNC: 3.8 MMOL/L (ref 3.5–5.1)
PROT SERPL-MCNC: 5.4 G/DL (ref 6–8.4)
RBC # BLD AUTO: 2.41 M/UL (ref 4.6–6.2)
SODIUM SERPL-SCNC: 138 MMOL/L (ref 136–145)
WBC # BLD AUTO: 2.24 K/UL (ref 3.9–12.7)

## 2023-01-21 PROCEDURE — 99233 PR SUBSEQUENT HOSPITAL CARE,LEVL III: ICD-10-PCS | Mod: ,,, | Performed by: INTERNAL MEDICINE

## 2023-01-21 PROCEDURE — 20600001 HC STEP DOWN PRIVATE ROOM

## 2023-01-21 PROCEDURE — 99233 SBSQ HOSP IP/OBS HIGH 50: CPT | Mod: ,,, | Performed by: INTERNAL MEDICINE

## 2023-01-21 PROCEDURE — 94761 N-INVAS EAR/PLS OXIMETRY MLT: CPT

## 2023-01-21 PROCEDURE — 99223 PR INITIAL HOSPITAL CARE,LEVL III: ICD-10-PCS | Mod: ,,, | Performed by: INTERNAL MEDICINE

## 2023-01-21 PROCEDURE — 99223 1ST HOSP IP/OBS HIGH 75: CPT | Mod: ,,, | Performed by: INTERNAL MEDICINE

## 2023-01-21 PROCEDURE — 63600175 PHARM REV CODE 636 W HCPCS: Performed by: INTERNAL MEDICINE

## 2023-01-21 PROCEDURE — 36415 COLL VENOUS BLD VENIPUNCTURE: CPT | Performed by: NURSE PRACTITIONER

## 2023-01-21 PROCEDURE — 25000003 PHARM REV CODE 250: Performed by: NURSE PRACTITIONER

## 2023-01-21 PROCEDURE — 63600175 PHARM REV CODE 636 W HCPCS: Performed by: NURSE PRACTITIONER

## 2023-01-21 PROCEDURE — 90935 HEMODIALYSIS ONE EVALUATION: CPT | Mod: ,,, | Performed by: NURSE PRACTITIONER

## 2023-01-21 PROCEDURE — 84100 ASSAY OF PHOSPHORUS: CPT | Performed by: NURSE PRACTITIONER

## 2023-01-21 PROCEDURE — 85025 COMPLETE CBC W/AUTO DIFF WBC: CPT | Performed by: NURSE PRACTITIONER

## 2023-01-21 PROCEDURE — 25000003 PHARM REV CODE 250: Performed by: INTERNAL MEDICINE

## 2023-01-21 PROCEDURE — 80100014 HC HEMODIALYSIS 1:1

## 2023-01-21 PROCEDURE — 27000207 HC ISOLATION

## 2023-01-21 PROCEDURE — 80053 COMPREHEN METABOLIC PANEL: CPT | Performed by: NURSE PRACTITIONER

## 2023-01-21 PROCEDURE — 83735 ASSAY OF MAGNESIUM: CPT | Performed by: NURSE PRACTITIONER

## 2023-01-21 PROCEDURE — 90935 PR HEMODIALYSIS, ONE EVALUATION: ICD-10-PCS | Mod: ,,, | Performed by: NURSE PRACTITIONER

## 2023-01-21 RX ADMIN — DOXYCYCLINE HYCLATE 100 MG: 100 TABLET, COATED ORAL at 08:01

## 2023-01-21 RX ADMIN — ONDANSETRON HYDROCHLORIDE 8 MG: 4 TABLET, FILM COATED ORAL at 05:01

## 2023-01-21 RX ADMIN — ONDANSETRON HYDROCHLORIDE 8 MG: 4 TABLET, FILM COATED ORAL at 03:01

## 2023-01-21 RX ADMIN — FAMOTIDINE 20 MG: 20 TABLET ORAL at 11:01

## 2023-01-21 RX ADMIN — CALCIUM CARBONATE (ANTACID) CHEW TAB 500 MG 1000 MG: 500 CHEW TAB at 03:01

## 2023-01-21 RX ADMIN — PIPERACILLIN SODIUM AND TAZOBACTAM SODIUM 4.5 G: 4; .5 INJECTION, POWDER, LYOPHILIZED, FOR SOLUTION INTRAVENOUS at 11:01

## 2023-01-21 RX ADMIN — MUPIROCIN: 20 OINTMENT TOPICAL at 08:01

## 2023-01-21 RX ADMIN — ACYCLOVIR 400 MG: 200 CAPSULE ORAL at 08:01

## 2023-01-21 RX ADMIN — OLANZAPINE 5 MG: 5 TABLET, FILM COATED ORAL at 08:01

## 2023-01-21 RX ADMIN — SODIUM CHLORIDE 500 ML: 9 INJECTION, SOLUTION INTRAVENOUS at 03:01

## 2023-01-21 RX ADMIN — CALCIUM CARBONATE (ANTACID) CHEW TAB 500 MG 1000 MG: 500 CHEW TAB at 11:01

## 2023-01-21 RX ADMIN — SCOPALAMINE 1 PATCH: 1 PATCH, EXTENDED RELEASE TRANSDERMAL at 12:01

## 2023-01-21 RX ADMIN — SERTRALINE HYDROCHLORIDE 50 MG: 50 TABLET ORAL at 11:01

## 2023-01-21 RX ADMIN — DOXYCYCLINE HYCLATE 100 MG: 100 TABLET, COATED ORAL at 11:01

## 2023-01-21 RX ADMIN — PIPERACILLIN SODIUM AND TAZOBACTAM SODIUM 4.5 G: 4; .5 INJECTION, POWDER, LYOPHILIZED, FOR SOLUTION INTRAVENOUS at 09:01

## 2023-01-21 RX ADMIN — MUPIROCIN: 20 OINTMENT TOPICAL at 11:01

## 2023-01-21 RX ADMIN — CHOLECALCIFEROL TAB 25 MCG (1000 UNIT) 3000 UNITS: 25 TAB at 11:01

## 2023-01-21 RX ADMIN — ACYCLOVIR 400 MG: 200 CAPSULE ORAL at 11:01

## 2023-01-21 RX ADMIN — HEPARIN SODIUM 1000 UNITS: 1000 INJECTION, SOLUTION INTRAVENOUS; SUBCUTANEOUS at 10:01

## 2023-01-21 RX ADMIN — ONDANSETRON HYDROCHLORIDE 8 MG: 4 TABLET, FILM COATED ORAL at 10:01

## 2023-01-21 RX ADMIN — SODIUM CHLORIDE: 9 INJECTION, SOLUTION INTRAVENOUS at 07:01

## 2023-01-21 NOTE — SUBJECTIVE & OBJECTIVE
Past Medical History:   Diagnosis Date    Chronic infection of prosthetic knee, subsequent encounter     Encounter for blood transfusion     Essential (primary) hypertension     Multiple myeloma        Past Surgical History:   Procedure Laterality Date    KNEE SURGERY      neck infusion         Review of patient's allergies indicates:  No Known Allergies    Medications:  Medications Prior to Admission   Medication Sig    acyclovir (ZOVIRAX) 200 MG capsule Take 2 capsules (400 mg total) by mouth 2 (two) times daily.    calcium carbonate (TUMS) 200 mg calcium (500 mg) chewable tablet Chew and swallow 2 tablets (1,000 mg total) by mouth 3 (three) times daily.    cholecalciferol, vitamin D3, 75 mcg (3,000 unit) Tab Take 1 tablet by mouth once daily.    doxycycline (VIBRAMYCIN) 100 MG Cap Take 100 mg by mouth every 12 (twelve) hours. Preventative for past knee infection    ergocalciferol (ERGOCALCIFEROL) 50,000 unit Cap Take 50,000 Units by mouth every 14 (fourteen) days. Given at dialysis center    famotidine (PEPCID) 20 MG tablet Take 20 mg by mouth once daily.    LIDOcaine HCl 2% (XYLOCAINE) 2 % Soln 15 mLs by Mucous Membrane route every 6 (six) hours as needed (mouth sores).    loperamide (IMODIUM) 2 mg capsule Take 1 capsule (2 mg total) by mouth 4 (four) times daily as needed for Diarrhea.    metoprolol succinate (TOPROL-XL) 25 MG 24 hr tablet Take 0.5 tablets (12.5 mg total) by mouth once daily.    ondansetron (ZOFRAN) 4 MG tablet TAKE ONE TABLET BY MOUTH EVERY EIGHT HOURS IF NEEDED FOR NAUSEA.    oxyCODONE-acetaminophen (PERCOCET)  mg per tablet Take 1 tablet by mouth every 12 (twelve) hours as needed for Pain.    rosuvastatin (CRESTOR) 20 MG tablet Take 20 mg by mouth once daily.    scopolamine (TRANSDERM-SCOP) 1.3-1.5 mg (1 mg over 3 days) Place 1 patch onto the skin Every 3 (three) days. Place 1 patch onto the skin every 3 days as needed for nausea    sertraline (ZOLOFT) 50 MG tablet Take 50 mg by mouth  once daily.    traMADoL (ULTRAM) 50 mg tablet Take 50 mg by mouth every 6 to 8 hours as needed for Pain.    sevelamer carbonate (RENVELA) 800 mg Tab Take 1 tablet (800 mg total) by mouth 3 (three) times daily with meals.     Antibiotics (From admission, onward)      Start     Stop Route Frequency Ordered    01/20/23 2200  piperacillin-tazobactam (ZOSYN) 4.5 g in dextrose 5 % in water (D5W) 5 % 100 mL IVPB (MB+)         -- IV Every 12 hours (non-standard times) 01/20/23 2059 01/20/23 1045  mupirocin 2 % ointment         01/25 0859 Nasl 2 times daily 01/20/23 0934    01/17/23 2100  doxycycline tablet 100 mg         -- Oral Every 12 hours 01/17/23 1332          Antifungals (From admission, onward)      None          Antivirals (From admission, onward)          Stop Route Frequency     acyclovir         -- Oral 2 times daily               There is no immunization history on file for this patient.    Family History       Problem Relation (Age of Onset)    Heart attack Father          Social History     Socioeconomic History    Marital status:      Spouse name: Darlyn    Number of children: 1   Tobacco Use    Smoking status: Former    Smokeless tobacco: Never    Tobacco comments:     quit smoking in the 90s   Substance and Sexual Activity    Alcohol use: Not Currently     Review of Systems   Constitutional:  Negative for chills, diaphoresis and fever.   HENT:  Negative for rhinorrhea and sore throat.    Respiratory:  Negative for cough and shortness of breath.    Cardiovascular:  Negative for chest pain and leg swelling.   Gastrointestinal:  Positive for diarrhea, nausea and vomiting. Negative for abdominal pain.   Genitourinary:  Negative for dysuria and hematuria.   Musculoskeletal:  Negative for arthralgias and myalgias.   Skin:  Negative for rash.   Neurological:  Negative for headaches.   Objective:     Vital Signs (Most Recent):  Temp: 99.8 °F (37.7 °C) (01/21/23 1234)  Pulse: 98 (01/21/23 1234)  Resp: 18  (01/21/23 0730)  BP: (!) 112/57 (01/21/23 1234)  SpO2: 97 % (01/21/23 1234)   Vital Signs (24h Range):  Temp:  [97.1 °F (36.2 °C)-101.1 °F (38.4 °C)] 99.8 °F (37.7 °C)  Pulse:  [] 98  Resp:  [18-20] 18  SpO2:  [91 %-97 %] 97 %  BP: ()/(52-78) 112/57     Weight: 98.2 kg (216 lb 6.4 oz)  Body mass index is 30.18 kg/m².    Estimated Creatinine Clearance: 17.5 mL/min (A) (based on SCr of 5.5 mg/dL (H)).    Physical Exam  Vitals reviewed.   Constitutional:       General: He is not in acute distress.     Appearance: He is well-developed. He is ill-appearing. He is not toxic-appearing or diaphoretic.   HENT:      Head: Normocephalic and atraumatic.      Nose: Nose normal.   Eyes:      Conjunctiva/sclera: Conjunctivae normal.   Cardiovascular:      Rate and Rhythm: Normal rate and regular rhythm.      Comments: Right tunneled HD line with no redness, swelling, pain  Pulmonary:      Effort: Pulmonary effort is normal. No respiratory distress.   Abdominal:      General: Abdomen is flat. There is no distension.   Musculoskeletal:         General: Normal range of motion.      Cervical back: Normal range of motion.      Right lower leg: No edema.      Left lower leg: No edema.      Comments: Left prosthetic joint with chronic swelling and warmth, no redness, good ROM   Skin:     General: Skin is warm and dry.      Findings: No erythema or rash.   Neurological:      Mental Status: He is alert.   Psychiatric:         Behavior: Behavior normal.       Significant Labs: All pertinent labs within the past 24 hours have been reviewed.    Significant Imaging: I have reviewed all pertinent imaging results/findings within the past 24 hours.

## 2023-01-21 NOTE — NURSING
Patient refusing to drink oral contrast for CT scan. Explained the importance and reason of CT scan, patient stated I'm aware of what it is for and continued to refuse.

## 2023-01-21 NOTE — CONSULTS
[facility] ([place] [address])  Infectious Disease  Consult Note    Patient Name: De Lakhani  MRN: [id number]  Admission Date: 1/17/2023  Hospital Length of Stay: 4 days  Attending Physician: Peter King MD  Primary Care Provider: To Obtain Unable     Isolation Status: Special Contact      Inpatient consult to Infectious Diseases  Consult performed by: Yarelis Campos MD  Consult ordered by: Alek Philippe MD        Consult received, full consult to follow

## 2023-01-21 NOTE — ASSESSMENT & PLAN NOTE
57-year-old male with history of MM s/p autoSCT 12/30/2022, chronic prosthetic joint infection on suppressive doxycycline, ESRD on HD, presented with fevers, ongoing nausea, vomiting, diarrhea since transplant.    Recommendations:  - Follow-up resp infection panel, GI pathogens panel  - Blood cultures with fevers  - Follow-up CT CAP  - Continue pip-tazo IV

## 2023-01-21 NOTE — PROGRESS NOTES
[facility] ([place] [address])  Hematology  Bone Marrow Transplant  Progress Note    Patient Name: De Lakhani  Admission Date: 1/17/2023  Hospital Length of Stay: 4 days  Code Status: Full Code  Subjective:   Interval History: continues to have fevers, CT was not done as patient refused oral contrast. ID consulted ,recommended CT without contrast, RPP and GPP and continue with zosyn.     Objective:     Vital Signs (Most Recent):  Temp: 99.8 °F (37.7 °C) (01/21/23 1234)  Pulse: (!) 117 (01/21/23 1454)  Resp: 18 (01/21/23 0730)  BP: (!) 112/57 (01/21/23 1234)  SpO2: 97 % (01/21/23 1234) Vital Signs (24h Range):  Temp:  [97.1 °F (36.2 °C)-101.1 °F (38.4 °C)] 99.8 °F (37.7 °C)  Pulse:  [] 117  Resp:  [18-20] 18  SpO2:  [91 %-97 %] 97 %  BP: ()/(52-78) 112/57     Weight: 98.2 kg (216 lb 6.4 oz)  Body mass index is 30.18 kg/m².  Body surface area is 2.22 meters squared.    Intake/Output - Last 3 Shifts         01/19 0700  01/20 0659 01/20 0700  01/21 0659 01/21 0700  01/22 0659    I.V. (mL/kg)   300.1 (3.1)    Other 300  300    Total Intake(mL/kg) 300 (3.1)  600.1 (6.1)    Other 600  1600    Total Output 600  1600    Net -300  -999.9                   Physical Exam  Vitals and nursing note reviewed.   Constitutional:       General: He is not in acute distress.     Appearance: Normal appearance.   HENT:      Head: Normocephalic.      Mouth/Throat:      Mouth: Mucous membranes are moist.   Eyes:      Extraocular Movements: Extraocular movements intact.      Conjunctiva/sclera: Conjunctivae normal.      Pupils: Pupils are equal, round, and reactive to light.   Cardiovascular:      Rate and Rhythm: Normal rate and regular rhythm.      Pulses: Normal pulses.      Heart sounds: Normal heart sounds.   Pulmonary:      Effort: Pulmonary effort is normal.      Breath sounds: Normal breath sounds. No wheezing.   Abdominal:      General: Bowel sounds are normal. There is no distension.      Palpations:  Abdomen is soft.      Tenderness: There is no abdominal tenderness.   Musculoskeletal:         General: Normal range of motion.      Cervical back: Normal range of motion and neck supple.      Right lower leg: No edema.      Left lower leg: No edema.   Skin:     General: Skin is warm and dry.      Capillary Refill: Capillary refill takes less than 2 seconds.      Comments: Right tunneled central line clean, dry, intact. No erythema, edema, exudate.   Neurological:      General: No focal deficit present.      Mental Status: He is alert and oriented to person, place, and time.   Psychiatric:         Mood and Affect: Mood normal.         Behavior: Behavior normal.         Thought Content: Thought content normal.         Judgment: Judgment normal.       Significant Labs:   CBC:   Recent Labs   Lab 01/20/23  0348 01/21/23  0200   WBC 2.11* 2.24*   HGB 8.0* 7.7*   HCT 24.4* 23.3*   PLT 40* 50*     CMP:   Recent Labs   Lab 01/20/23  0348 01/21/23  0200    138   K 3.8 3.8    106   CO2 24 22*   GLU 94 96   BUN 20 29*   CREATININE 4.4* 5.5*   CALCIUM 8.2* 7.6*   PROT 5.6* 5.4*   ALBUMIN 3.2* 3.0*   BILITOT 0.4 0.4   ALKPHOS 78 77   AST 18 14   ALT 10 8*   ANIONGAP 9 10     and LFTs:   Recent Labs   Lab 01/20/23  0348 01/21/23  0200   ALT 10 8*   AST 18 14   ALKPHOS 78 77   BILITOT 0.4 0.4   PROT 5.6* 5.4*   ALBUMIN 3.2* 3.0*       Diagnostic Results:  None    Assessment/Plan:     * Neutropenic fever  - Tmax 101.6 1/17/23 and a Tmax 100.5 1/18/23.   - He was scheduled for his first post transplant f/u in the BMT clinic 1/18, but was instructed to go to the ED instead.   - Of note, he presented to the ED over the weekend with c/o n/v and weakness and was discharged back to the [facility] from the ED without hospital admission.  - Blood cultures (NGTD), UA (neg) COVID (neg) Lactic acid (WNL), CXR (neg), Procal elevated to 0.62, RIP (pend)  - No sign of infection to left knee or dialysis catheter site  - Admitted and  started with broad spectum abx with Cefepime. Transitioned to Cipro/Flagyl for abdominal coverage 1/19 given GI symptoms, then transitioned to Zosyn with sustained fever 100.9. Fevers <100.4 since yesterday afternoon with no acute abdominal complaints so changed back to Cipro/Flagyl 1/20.   - recurrent fevers on 1/20. Prompted repeat infectious workup, CT CAP without contrast and change abx to zosyn  - ID consulted 1/21, recommended RPP and GPP and continue zosyn for now    Hypocalcemia  - Continue home calcium supplement    Hyperphosphatemia  - Continue home sevelamer with meals    At high risk for falls  - Falls x 2 during transplant admission  - See syncope  - Unclear if patient hit head. CT head neg 1/2 for bleed.  - Fall precautions    Syncope  - Two falls during transplant admission with reported loss of consciousness. Both following dialysis, so suspect orthostatic  - Metoprolol dose decreased from 25 mg daily to 12.5 mg daily  - EEG ordered; symptoms now improved so discontinued order for EEG  - Fall precautions      Chemotherapy induced diarrhea  - C-diff neg 12/31  - Continue PRN imodium  - See neutropenic fever - if diarrhea/fevers continue, could consider resending C.diff      CINV (chemotherapy-induced nausea and vomiting)  - Continue home Scopalamine patch  - Nausea unrelieved at home with PRN zofran on admission. Added PRN compazine on admission which seems to work better per patient.   - Changed zofran from IV to PO and scheduled and started nightly Zyprexa on 1/18/23.    Pancytopenia due to antineoplastic chemotherapy  - Transfuse for Hgb <7, PLT < 10K, or PLT <50k if bleeding   - Daily CBC while inpatient  - blood counts improved today    Hyperlipidemia  - Will hold home statin while inpatient    Depression  - Continue home Zoloft    Deep vein thrombosis (DVT) of upper extremity  - Holding home apixiban for thrombocytopenia. Will resume once plts are consistently > 50K.      Hypertension  -  Continue home metoprolol. Decreased dose during transplant admission to 12.5 mg daily 1/3/23 due to soft BP and syncopal episodes x 2.    End stage renal disease  - Developed during ICU stay. Believed to be 2/2 hypoprofusion due to hypotension  - Nephrology consulted on admission  - On dialysis MWF at home   - He will receive dialysis at [facility] in [place] through Day +30  - Dialysis timing off due to Wednesday hospital admit - so received Thursday instead. Given this, will likely need to be dialyzed inpatient on Saturday.    History of autologous stem cell transplant  - Patient of Dr. King  - Diagnosed with MM in April 2022. Underwent 2 cycles CyBorD then transitioned to DVRd (now s/p C6)  - Admitted 12/28/22 for Torie 140 auto SCT   - He received 6 bags with a total CD34 dose of 3.13 x10^6/kg on 12/30/22.  - Today is Day + 21  - Engrafted Day +14 with an ANC of 869  - ANC 1730 today     Dependence on hemodialysis  - See ESRD    Chronic infection of prosthetic knee  - Seen in ID clinic with Dr. Chatterjee prior to transplant   - ID rec'd continuing doxycycline throughout transplant. No sign of infection recurrence at this time.     Multiple myeloma not having achieved remission  - Patient of Dr. King. Per most recent clinic note:   - 4/20/22: renal biopsy: light chain cast nephropathy, kappa light chain  - 4/26/22: right subclavian vein thrombus   - 4/27/22: M spike 0.2 with free monoclonal kappa band. B2mg 19.57, IgG 496, IgA 10, IgM <5. Kappa 98465, Lambda 7.9, ratio >1000  - 5/12/22: BMBx: plasma cell myeloma, marrow cellularity 100%, plasma cell percentage 100%. Plasma cell phenotype +, kappa +, CD20- -, CD30-, EMA-, SADAF-, Congo red negative. Peripheral blood with pancytopenia and no circulating blasts. Decreased storage iron. FISH canceled due to quantity not sufficient  - 5/18/22: CyBorD C1D1  - 5/23/22: rasburicase  - 5/24/22: Xgeva  - 6/6/22: Kappa 52099, lambda 4.0, ratio >1000, M  spike 0.1 with free monoclonal kappa band present  - 6/6/22: CyBorD C2D1  - 6/9/22: BMBx Plasma cell neoplasm compatible with plasma cell myeloma. Extent of involvement 80-90% of bone marrow elements. Cytology: Plasmablastic. FISH cytogenetics positive for 1q21/CKS1B gain. Karyotype failed. Myelofibrosis diffuse (MF-3)  - 6/20/22: CyBorD C2D8 delayed due to covid  - 6/23/22: CyBOrD C2D11  - 7/5/22: C1D1 DVRd  - 7/7/22 - 7/18/22: hospital admission for septic shock resulting in renal failure  - 7/25/22: started again on DVRd  - 8/8/2022: kappa 1402.8, lambda 7.5, ratio 167.04. M spike 0.1, two faint restricted bands in gamma globulin regions.   - 10/10/22: C5D1 DVRd. Revlimid on hold for upcoming stem   - Admitted for Torie 140 auto SCT on 12/28/22. Transplanted on 12/30/22. Engrafted on Day +14 with          VTE Risk Mitigation (From admission, onward)         Ordered     heparin (porcine) injection 1,000 Units  As needed (PRN)         01/19/23 0929     IP VTE HIGH RISK PATIENT  Once         01/17/23 1329     Place sequential compression device  Until discontinued         01/17/23 1329                Alek Philippe MD  Hematology and Medical Oncology fellow

## 2023-01-21 NOTE — ASSESSMENT & PLAN NOTE
- Tmax 101.6 1/17/23 and a Tmax 100.5 1/18/23.   - He was scheduled for his first post transplant f/u in the BMT clinic 1/18, but was instructed to go to the ED instead.   - Of note, he presented to the ED over the weekend with c/o n/v and weakness and was discharged back to the [facility] from the ED without hospital admission.  - Blood cultures (NGTD), UA (neg) COVID (neg) Lactic acid (WNL), CXR (neg), Procal elevated to 0.62, RIP (pend)  - No sign of infection to left knee or dialysis catheter site  - Admitted and started with broad spectum abx with Cefepime. Transitioned to Cipro/Flagyl for abdominal coverage 1/19 given GI symptoms, then transitioned to Zosyn with sustained fever 100.9. Fevers <100.4 since yesterday afternoon with no acute abdominal complaints so changed back to Cipro/Flagyl 1/20.   - recurrent fevers on 1/20. Prompted repeat infectious workup, CT CAP without contrast and change abx to zosyn  - ID consulted 1/21, recommended RPP and GPP and continue zosyn for now

## 2023-01-21 NOTE — NURSING
2000 patients bp 81/54,temp 100.4. Patient stated he did not feel well and stated he felt weak. Notified MD, received orders to bolus 1000ml 0.9% n/s. Post infusion patients bp 116/66, temp 97.1. Patient states he currently feels better.safety maintained. Wife at bedside.

## 2023-01-21 NOTE — PROGRESS NOTES
[facility] ST[facility]FF HEMODIALYSIS NOTE     Patient currently on hemodialysis for removal of uremic toxins and volume.     Patient seen and evaluated on hemodialysis, tolerating treatment, see HD flowsheet for vitals and assessments.    Labs have been reviewed and the dialysate bath has been adjusted.       Assessment/Plan:      -Patient seen on HD, tolerating treatment well, w/o complaints   -UF goal of 1-2L  -Renal diet, if not NPO   -Strict I/O's and daily weights  -Daily renal function panels  -Keep MAP >65 while on HD   -Hgb goal 10-11  -Hold epo in setting of MM   -no need for phos binders at this time   -Will continue to follow while inpatient     Nichelle Araujo DNP-FNP, C  Nephrology  Pager: [phone]

## 2023-01-21 NOTE — PROGRESS NOTES
Hemodialysis treatment completed.    Treatment time received: 3 hours    Net fluid removed: 1 liter    Tolerated Treatment ok. VSS. No acute distress.    Blood returned, heparin locked ports per order, capped, and taped.    Report given as documented in PER Handoff on Doc Flowsheet  Refer to flowsheet and MAR for details.  Patient transported back in wheelchair from Dialysis to primary unit.

## 2023-01-21 NOTE — SUBJECTIVE & OBJECTIVE
Subjective:   Interval History: continues to have fevers, CT was not done as patient refused oral contrast. ID consulted ,recommended CT without contrast, RPP and GPP and continue with zosyn.     Objective:     Vital Signs (Most Recent):  Temp: 99.8 °F (37.7 °C) (01/21/23 1234)  Pulse: (!) 117 (01/21/23 1454)  Resp: 18 (01/21/23 0730)  BP: (!) 112/57 (01/21/23 1234)  SpO2: 97 % (01/21/23 1234) Vital Signs (24h Range):  Temp:  [97.1 °F (36.2 °C)-101.1 °F (38.4 °C)] 99.8 °F (37.7 °C)  Pulse:  [] 117  Resp:  [18-20] 18  SpO2:  [91 %-97 %] 97 %  BP: ()/(52-78) 112/57     Weight: 98.2 kg (216 lb 6.4 oz)  Body mass index is 30.18 kg/m².  Body surface area is 2.22 meters squared.    Intake/Output - Last 3 Shifts         01/19 0700  01/20 0659 01/20 0700  01/21 0659 01/21 0700  01/22 0659    I.V. (mL/kg)   300.1 (3.1)    Other 300  300    Total Intake(mL/kg) 300 (3.1)  600.1 (6.1)    Other 600  1600    Total Output 600  1600    Net -300  -999.9                   Physical Exam  Vitals and nursing note reviewed.   Constitutional:       General: He is not in acute distress.     Appearance: Normal appearance.   HENT:      Head: Normocephalic.      Mouth/Throat:      Mouth: Mucous membranes are moist.   Eyes:      Extraocular Movements: Extraocular movements intact.      Conjunctiva/sclera: Conjunctivae normal.      Pupils: Pupils are equal, round, and reactive to light.   Cardiovascular:      Rate and Rhythm: Normal rate and regular rhythm.      Pulses: Normal pulses.      Heart sounds: Normal heart sounds.   Pulmonary:      Effort: Pulmonary effort is normal.      Breath sounds: Normal breath sounds. No wheezing.   Abdominal:      General: Bowel sounds are normal. There is no distension.      Palpations: Abdomen is soft.      Tenderness: There is no abdominal tenderness.   Musculoskeletal:         General: Normal range of motion.      Cervical back: Normal range of motion and neck supple.      Right lower leg: No  edema.      Left lower leg: No edema.   Skin:     General: Skin is warm and dry.      Capillary Refill: Capillary refill takes less than 2 seconds.      Comments: Right tunneled central line clean, dry, intact. No erythema, edema, exudate.   Neurological:      General: No focal deficit present.      Mental Status: He is alert and oriented to person, place, and time.   Psychiatric:         Mood and Affect: Mood normal.         Behavior: Behavior normal.         Thought Content: Thought content normal.         Judgment: Judgment normal.       Significant Labs:   CBC:   Recent Labs   Lab 01/20/23 0348 01/21/23 0200   WBC 2.11* 2.24*   HGB 8.0* 7.7*   HCT 24.4* 23.3*   PLT 40* 50*     CMP:   Recent Labs   Lab 01/20/23 0348 01/21/23  0200    138   K 3.8 3.8    106   CO2 24 22*   GLU 94 96   BUN 20 29*   CREATININE 4.4* 5.5*   CALCIUM 8.2* 7.6*   PROT 5.6* 5.4*   ALBUMIN 3.2* 3.0*   BILITOT 0.4 0.4   ALKPHOS 78 77   AST 18 14   ALT 10 8*   ANIONGAP 9 10     and LFTs:   Recent Labs   Lab 01/20/23 0348 01/21/23  0200   ALT 10 8*   AST 18 14   ALKPHOS 78 77   BILITOT 0.4 0.4   PROT 5.6* 5.4*   ALBUMIN 3.2* 3.0*       Diagnostic Results:  None

## 2023-01-21 NOTE — NURSING
Vitals remained stable until 1600 temp of 101.1. MD notified and put in one time order of tylenol in. MD also placed orders for labs to find a root of infection. Metoprolol was also held today due to pt having trending hypotension. MD gave verbal order to hold for morning meds. Other than that pt was stable throughout day while wife remained at bedside.

## 2023-01-21 NOTE — PROGRESS NOTES
Patient arrived in a wheelchair to dialysis unit.   Report received as documented in PER Handoff on Doc Flowsheet.  VS's per Doc Flowsheet.    ESRD on MWF schedule  Maintenance Hemodialysis 1:1 for isolation initiated using the following:    Dialysis Access: right dialysis catheter    Will Maintain telemetry and blood pressure monitoring throughout treatment.  Refer to flowsheet and MAR for details.

## 2023-01-21 NOTE — CONSULTS
[facility] - [facility] ([place] [address])  Infectious Disease  Consult Note    Patient Name: De Lakhani  MRN: [id number]  Admission Date: 1/17/2023  Hospital Length of Stay: 4 days  Attending Physician: Peter King MD  Primary Care Provider: To Obtain Unable     Isolation Status: Special Contact    Patient information was obtained from patient, spouse/SO and past medical records.      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    Recommendations:  - Follow-up resp infection panel, GI pathogens panel  - Blood cultures with fevers  - Follow-up CT CAP  - Continue pip-tazo IV          Thank you for your consult. I will follow-up with patient. Please contact us if you have any additional questions.    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 Patient was feeling well overall until transplant. He subsequently developed nausea, vomiting, diarrhea. After he was discharged to [facility], he developed fevers. No other localizing symptoms - no rhinorrhea, sore throat, SOB, cough, CP, abdominal pain, joint pain, rashes. No problems with HD catheter.         Past Medical History:   Diagnosis Date    Chronic infection of prosthetic knee, subsequent encounter     Encounter for blood transfusion     Essential (primary) hypertension     Multiple myeloma        Past Surgical History:   Procedure Laterality Date    KNEE SURGERY      neck infusion         Review of patient's allergies indicates:  No Known Allergies    Medications:  Medications Prior to Admission    Medication Sig    acyclovir (ZOVIRAX) 200 MG capsule Take 2 capsules (400 mg total) by mouth 2 (two) times daily.    calcium carbonate (TUMS) 200 mg calcium (500 mg) chewable tablet Chew and swallow 2 tablets (1,000 mg total) by mouth 3 (three) times daily.    cholecalciferol, vitamin D3, 75 mcg (3,000 unit) Tab Take 1 tablet by mouth once daily.    doxycycline (VIBRAMYCIN) 100 MG Cap Take 100 mg by mouth every 12 (twelve) hours. Preventative for past knee infection    ergocalciferol (ERGOCALCIFEROL) 50,000 unit Cap Take 50,000 Units by mouth every 14 (fourteen) days. Given at dialysis center    famotidine (PEPCID) 20 MG tablet Take 20 mg by mouth once daily.    LIDOcaine HCl 2% (XYLOCAINE) 2 % Soln 15 mLs by Mucous Membrane route every 6 (six) hours as needed (mouth sores).    loperamide (IMODIUM) 2 mg capsule Take 1 capsule (2 mg total) by mouth 4 (four) times daily as needed for Diarrhea.    metoprolol succinate (TOPROL-XL) 25 MG 24 hr tablet Take 0.5 tablets (12.5 mg total) by mouth once daily.    ondansetron (ZOFRAN) 4 MG tablet TAKE ONE TABLET BY MOUTH EVERY EIGHT HOURS IF NEEDED FOR NAUSEA.    oxyCODONE-acetaminophen (PERCOCET)  mg per tablet Take 1 tablet by mouth every 12 (twelve) hours as needed for Pain.    rosuvastatin (CRESTOR) 20 MG tablet Take 20 mg by mouth once daily.    scopolamine (TRANSDERM-SCOP) 1.3-1.5 mg (1 mg over 3 days) Place 1 patch onto the skin Every 3 (three) days. Place 1 patch onto the skin every 3 days as needed for nausea    sertraline (ZOLOFT) 50 MG tablet Take 50 mg by mouth once daily.    traMADoL (ULTRAM) 50 mg tablet Take 50 mg by mouth every 6 to 8 hours as needed for Pain.    sevelamer carbonate (RENVELA) 800 mg Tab Take 1 tablet (800 mg total) by mouth 3 (three) times daily with meals.     Antibiotics (From admission, onward)      Start     Stop Route Frequency Ordered    01/20/23 2200  piperacillin-tazobactam (ZOSYN) 4.5 g in dextrose 5 % in water  (D5W) 5 % 100 mL IVPB (MB+)         -- IV Every 12 hours (non-standard times) 01/20/23 2059 01/20/23 1045  mupirocin 2 % ointment         01/25 0859 Nasl 2 times daily 01/20/23 0934    01/17/23 2100  doxycycline tablet 100 mg         -- Oral Every 12 hours 01/17/23 1332          Antifungals (From admission, onward)      None          Antivirals (From admission, onward)          Stop Route Frequency     acyclovir         -- Oral 2 times daily               There is no immunization history on file for this patient.    Family History       Problem Relation (Age of Onset)    Heart attack Father          Social History     Socioeconomic History    Marital status:      Spouse name: Darlyn    Number of children: 1   Tobacco Use    Smoking status: Former    Smokeless tobacco: Never    Tobacco comments:     quit smoking in the 90s   Substance and Sexual Activity    Alcohol use: Not Currently     Review of Systems   Constitutional:  Negative for chills, diaphoresis and fever.   HENT:  Negative for rhinorrhea and sore throat.    Respiratory:  Negative for cough and shortness of breath.    Cardiovascular:  Negative for chest pain and leg swelling.   Gastrointestinal:  Positive for diarrhea, nausea and vomiting. Negative for abdominal pain.   Genitourinary:  Negative for dysuria and hematuria.   Musculoskeletal:  Negative for arthralgias and myalgias.   Skin:  Negative for rash.   Neurological:  Negative for headaches.   Objective:     Vital Signs (Most Recent):  Temp: 99.8 °F (37.7 °C) (01/21/23 1234)  Pulse: 98 (01/21/23 1234)  Resp: 18 (01/21/23 0730)  BP: (!) 112/57 (01/21/23 1234)  SpO2: 97 % (01/21/23 1234)   Vital Signs (24h Range):  Temp:  [97.1 °F (36.2 °C)-101.1 °F (38.4 °C)] 99.8 °F (37.7 °C)  Pulse:  [] 98  Resp:  [18-20] 18  SpO2:  [91 %-97 %] 97 %  BP: ()/(52-78) 112/57     Weight: 98.2 kg (216 lb 6.4 oz)  Body mass index is 30.18 kg/m².    Estimated Creatinine Clearance: 17.5 mL/min (A)  (based on SCr of 5.5 mg/dL (H)).    Physical Exam  Vitals reviewed.   Constitutional:       General: He is not in acute distress.     Appearance: He is well-developed. He is ill-appearing. He is not toxic-appearing or diaphoretic.   HENT:      Head: Normocephalic and atraumatic.      Nose: Nose normal.   Eyes:      Conjunctiva/sclera: Conjunctivae normal.   Cardiovascular:      Rate and Rhythm: Normal rate and regular rhythm.      Comments: Right tunneled HD line with no redness, swelling, pain  Pulmonary:      Effort: Pulmonary effort is normal. No respiratory distress.   Abdominal:      General: Abdomen is flat. There is no distension.   Musculoskeletal:         General: Normal range of motion.      Cervical back: Normal range of motion.      Right lower leg: No edema.      Left lower leg: No edema.      Comments: Left prosthetic joint with chronic swelling and warmth, no redness, good ROM   Skin:     General: Skin is warm and dry.      Findings: No erythema or rash.   Neurological:      Mental Status: He is alert.   Psychiatric:         Behavior: Behavior normal.       Significant Labs: All pertinent labs within the past 24 hours have been reviewed.    Significant Imaging: I have reviewed all pertinent imaging results/findings within the past 24 hours.

## 2023-01-21 NOTE — HPI
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 Patient was feeling well overall until transplant. He subsequently developed nausea, vomiting, diarrhea. After he was discharged to [facility], he developed fevers. No other localizing symptoms - no rhinorrhea, sore throat, SOB, cough, CP, abdominal pain, joint pain, rashes. No problems with HD catheter.

## 2023-01-22 PROBLEM — E83.39 HYPERPHOSPHATEMIA: Status: RESOLVED | Noted: 2023-01-03 | Resolved: 2023-01-22

## 2023-01-22 PROBLEM — K86.89 PANCREATIC MASS: Status: ACTIVE | Noted: 2023-01-22

## 2023-01-22 LAB
ALBUMIN SERPL BCP-MCNC: 2.9 G/DL (ref 3.5–5.2)
ALP SERPL-CCNC: 74 U/L (ref 55–135)
ALT SERPL W/O P-5'-P-CCNC: 7 U/L (ref 10–44)
ANION GAP SERPL CALC-SCNC: 12 MMOL/L (ref 8–16)
ANISOCYTOSIS BLD QL SMEAR: SLIGHT
AST SERPL-CCNC: 13 U/L (ref 10–40)
BACTERIA BLD CULT: NORMAL
BACTERIA BLD CULT: NORMAL
BASOPHILS # BLD AUTO: 0.02 K/UL (ref 0–0.2)
BASOPHILS NFR BLD: 0.8 % (ref 0–1.9)
BILIRUB SERPL-MCNC: 0.5 MG/DL (ref 0.1–1)
BUN SERPL-MCNC: 24 MG/DL (ref 6–20)
CALCIUM SERPL-MCNC: 7.6 MG/DL (ref 8.7–10.5)
CHLORIDE SERPL-SCNC: 104 MMOL/L (ref 95–110)
CO2 SERPL-SCNC: 21 MMOL/L (ref 23–29)
CREAT SERPL-MCNC: 4.7 MG/DL (ref 0.5–1.4)
CRP SERPL-MCNC: 99.5 MG/L (ref 0–8.2)
DIFFERENTIAL METHOD: ABNORMAL
EOSINOPHIL # BLD AUTO: 0 K/UL (ref 0–0.5)
EOSINOPHIL NFR BLD: 0 % (ref 0–8)
ERYTHROCYTE [DISTWIDTH] IN BLOOD BY AUTOMATED COUNT: 17.5 % (ref 11.5–14.5)
EST. GFR  (NO RACE VARIABLE): 13.6 ML/MIN/1.73 M^2
GLUCOSE SERPL-MCNC: 91 MG/DL (ref 70–110)
HCT VFR BLD AUTO: 22.4 % (ref 40–54)
HGB BLD-MCNC: 7.5 G/DL (ref 14–18)
HYPOCHROMIA BLD QL SMEAR: ABNORMAL
IMM GRANULOCYTES # BLD AUTO: 0.08 K/UL (ref 0–0.04)
IMM GRANULOCYTES NFR BLD AUTO: 3.1 % (ref 0–0.5)
LYMPHOCYTES # BLD AUTO: 0.2 K/UL (ref 1–4.8)
LYMPHOCYTES NFR BLD: 7.4 % (ref 18–48)
MAGNESIUM SERPL-MCNC: 1.7 MG/DL (ref 1.6–2.6)
MCH RBC QN AUTO: 31.4 PG (ref 27–31)
MCHC RBC AUTO-ENTMCNC: 33.5 G/DL (ref 32–36)
MCV RBC AUTO: 94 FL (ref 82–98)
MONOCYTES # BLD AUTO: 0.3 K/UL (ref 0.3–1)
MONOCYTES NFR BLD: 13.2 % (ref 4–15)
NEUTROPHILS # BLD AUTO: 1.9 K/UL (ref 1.8–7.7)
NEUTROPHILS NFR BLD: 75.5 % (ref 38–73)
NRBC BLD-RTO: 0 /100 WBC
OVALOCYTES BLD QL SMEAR: ABNORMAL
PHOSPHATE SERPL-MCNC: 2.9 MG/DL (ref 2.7–4.5)
PLATELET # BLD AUTO: 75 K/UL (ref 150–450)
PLATELET BLD QL SMEAR: ABNORMAL
PMV BLD AUTO: 12.8 FL (ref 9.2–12.9)
POIKILOCYTOSIS BLD QL SMEAR: SLIGHT
POLYCHROMASIA BLD QL SMEAR: ABNORMAL
POTASSIUM SERPL-SCNC: 3.7 MMOL/L (ref 3.5–5.1)
PROT SERPL-MCNC: 5.4 G/DL (ref 6–8.4)
RBC # BLD AUTO: 2.39 M/UL (ref 4.6–6.2)
SODIUM SERPL-SCNC: 137 MMOL/L (ref 136–145)
WBC # BLD AUTO: 2.57 K/UL (ref 3.9–12.7)

## 2023-01-22 PROCEDURE — 85025 COMPLETE CBC W/AUTO DIFF WBC: CPT | Performed by: NURSE PRACTITIONER

## 2023-01-22 PROCEDURE — 63600175 PHARM REV CODE 636 W HCPCS: Performed by: INTERNAL MEDICINE

## 2023-01-22 PROCEDURE — 80053 COMPREHEN METABOLIC PANEL: CPT | Performed by: NURSE PRACTITIONER

## 2023-01-22 PROCEDURE — 27000207 HC ISOLATION

## 2023-01-22 PROCEDURE — 84100 ASSAY OF PHOSPHORUS: CPT | Performed by: NURSE PRACTITIONER

## 2023-01-22 PROCEDURE — 99233 PR SUBSEQUENT HOSPITAL CARE,LEVL III: ICD-10-PCS | Mod: ,,, | Performed by: INTERNAL MEDICINE

## 2023-01-22 PROCEDURE — 86140 C-REACTIVE PROTEIN: CPT | Performed by: INTERNAL MEDICINE

## 2023-01-22 PROCEDURE — 99233 SBSQ HOSP IP/OBS HIGH 50: CPT | Mod: ,,, | Performed by: INTERNAL MEDICINE

## 2023-01-22 PROCEDURE — 25000003 PHARM REV CODE 250: Performed by: INTERNAL MEDICINE

## 2023-01-22 PROCEDURE — 83735 ASSAY OF MAGNESIUM: CPT | Performed by: NURSE PRACTITIONER

## 2023-01-22 PROCEDURE — 99233 SBSQ HOSP IP/OBS HIGH 50: CPT | Mod: GC,,, | Performed by: INTERNAL MEDICINE

## 2023-01-22 PROCEDURE — 99233 PR SUBSEQUENT HOSPITAL CARE,LEVL III: ICD-10-PCS | Mod: GC,,, | Performed by: INTERNAL MEDICINE

## 2023-01-22 PROCEDURE — 20600001 HC STEP DOWN PRIVATE ROOM

## 2023-01-22 PROCEDURE — 25000003 PHARM REV CODE 250: Performed by: NURSE PRACTITIONER

## 2023-01-22 RX ORDER — HEPARIN SODIUM 5000 [USP'U]/ML
5000 INJECTION, SOLUTION INTRAVENOUS; SUBCUTANEOUS EVERY 8 HOURS
Status: DISCONTINUED | OUTPATIENT
Start: 2023-01-22 | End: 2023-01-23 | Stop reason: HOSPADM

## 2023-01-22 RX ADMIN — CALCIUM CARBONATE (ANTACID) CHEW TAB 500 MG 1000 MG: 500 CHEW TAB at 03:01

## 2023-01-22 RX ADMIN — DOXYCYCLINE HYCLATE 100 MG: 100 TABLET, COATED ORAL at 09:01

## 2023-01-22 RX ADMIN — PIPERACILLIN SODIUM AND TAZOBACTAM SODIUM 4.5 G: 4; .5 INJECTION, POWDER, LYOPHILIZED, FOR SOLUTION INTRAVENOUS at 10:01

## 2023-01-22 RX ADMIN — MUPIROCIN: 20 OINTMENT TOPICAL at 09:01

## 2023-01-22 RX ADMIN — ONDANSETRON HYDROCHLORIDE 8 MG: 4 TABLET, FILM COATED ORAL at 10:01

## 2023-01-22 RX ADMIN — ONDANSETRON HYDROCHLORIDE 8 MG: 4 TABLET, FILM COATED ORAL at 03:01

## 2023-01-22 RX ADMIN — HEPARIN SODIUM 5000 UNITS: 5000 INJECTION INTRAVENOUS; SUBCUTANEOUS at 09:01

## 2023-01-22 RX ADMIN — PIPERACILLIN SODIUM AND TAZOBACTAM SODIUM 4.5 G: 4; .5 INJECTION, POWDER, LYOPHILIZED, FOR SOLUTION INTRAVENOUS at 09:01

## 2023-01-22 RX ADMIN — ACYCLOVIR 400 MG: 200 CAPSULE ORAL at 09:01

## 2023-01-22 RX ADMIN — FAMOTIDINE 20 MG: 20 TABLET ORAL at 09:01

## 2023-01-22 RX ADMIN — ONDANSETRON HYDROCHLORIDE 8 MG: 4 TABLET, FILM COATED ORAL at 06:01

## 2023-01-22 RX ADMIN — CALCIUM CARBONATE (ANTACID) CHEW TAB 500 MG 1000 MG: 500 CHEW TAB at 09:01

## 2023-01-22 RX ADMIN — OLANZAPINE 5 MG: 5 TABLET, FILM COATED ORAL at 09:01

## 2023-01-22 RX ADMIN — SERTRALINE HYDROCHLORIDE 50 MG: 50 TABLET ORAL at 09:01

## 2023-01-22 RX ADMIN — CHOLECALCIFEROL TAB 25 MCG (1000 UNIT) 3000 UNITS: 25 TAB at 09:01

## 2023-01-22 NOTE — PROGRESS NOTES
[facility] - [facility] ([place] [address])  Hematology  Bone Marrow Transplant  Progress Note    Patient Name: De Lakhani  Admission Date: 1/17/2023  Hospital Length of Stay: 5 days  Code Status: Full Code  Subjective:   Interval History:     Afebrile since 1/20 8pm. Microbiological workup has been negative. CT didn't show any foci of infection. It did showed 1.3 cm hypoattenuating lesion of the head of the pancreas.     Continues on zosyn. GPP and RPP are pending.     Objective:     Vital Signs (Most Recent):  Temp: 98.7 °F (37.1 °C) (01/22/23 1155)  Pulse: 79 (01/22/23 1155)  Resp: 20 (01/22/23 0512)  BP: (!) 102/57 (01/22/23 1155)  SpO2: 98 % (01/22/23 1155) Vital Signs (24h Range):  Temp:  [98 °F (36.7 °C)-99.8 °F (37.7 °C)] 98.7 °F (37.1 °C)  Pulse:  [] 79  Resp:  [20] 20  SpO2:  [94 %-99 %] 98 %  BP: ()/(51-57) 102/57     Weight: 98.2 kg (216 lb 6.4 oz)  Body mass index is 30.18 kg/m².  Body surface area is 2.22 meters squared.    Intake/Output - Last 3 Shifts         01/20 0700  01/21 0659 01/21 0700  01/22 0659 01/22 0700  01/23 0659    I.V. (mL/kg)  300.1 (3.1)     Other  300     Total Intake(mL/kg)  600.1 (6.1)     Other  1600     Total Output  1600     Net  -999.9                    Physical Exam  Vitals and nursing note reviewed.   Constitutional:       General: He is not in acute distress.     Appearance: Normal appearance.   HENT:      Head: Normocephalic.      Mouth/Throat:      Mouth: Mucous membranes are moist.   Eyes:      Extraocular Movements: Extraocular movements intact.      Conjunctiva/sclera: Conjunctivae normal.      Pupils: Pupils are equal, round, and reactive to light.   Cardiovascular:      Rate and Rhythm: Normal rate and regular rhythm.      Pulses: Normal pulses.      Heart sounds: Normal heart sounds.   Pulmonary:      Effort: Pulmonary effort is normal.      Breath sounds: Normal breath sounds. No wheezing.   Abdominal:      General: Bowel sounds are normal. There is  no distension.      Palpations: Abdomen is soft.      Tenderness: There is no abdominal tenderness.   Musculoskeletal:         General: Normal range of motion.      Cervical back: Normal range of motion and neck supple.      Right lower leg: No edema.      Left lower leg: No edema.   Skin:     General: Skin is warm and dry.      Capillary Refill: Capillary refill takes less than 2 seconds.      Comments: Right tunneled central line clean, dry, intact. No erythema, edema, exudate.   Neurological:      General: No focal deficit present.      Mental Status: He is alert and oriented to person, place, and time.   Psychiatric:         Mood and Affect: Mood normal.         Behavior: Behavior normal.         Thought Content: Thought content normal.         Judgment: Judgment normal.       Significant Labs:   CBC:   Recent Labs   Lab 01/21/23  0200 01/22/23  0500   WBC 2.24* 2.57*   HGB 7.7* 7.5*   HCT 23.3* 22.4*   PLT 50* 75*     CMP:   Recent Labs   Lab 01/21/23  0200 01/22/23  0500    137   K 3.8 3.7    104   CO2 22* 21*   GLU 96 91   BUN 29* 24*   CREATININE 5.5* 4.7*   CALCIUM 7.6* 7.6*   PROT 5.4* 5.4*   ALBUMIN 3.0* 2.9*   BILITOT 0.4 0.5   ALKPHOS 77 74   AST 14 13   ALT 8* 7*   ANIONGAP 10 12     and LFTs:   Recent Labs   Lab 01/21/23  0200 01/22/23  0500   ALT 8* 7*   AST 14 13   ALKPHOS 77 74   BILITOT 0.4 0.5   PROT 5.4* 5.4*   ALBUMIN 3.0* 2.9*       Diagnostic Results:  None    Assessment/Plan:     * Neutropenic fever  - Tmax 101.6 1/17/23 and a Tmax 100.5 1/18/23.   - He was scheduled for his first post transplant f/u in the BMT clinic 1/18, but was instructed to go to the ED instead.   - Of note, he presented to the ED over the weekend with c/o n/v and weakness and was discharged back to the [facility] from the ED without hospital admission.  - Blood cultures (NGTD), UA (neg) COVID (neg) Lactic acid (WNL), CXR (neg), Procal elevated to 0.62, RIP (pend)  - No sign of infection to left knee or  dialysis catheter site  - Admitted and started with broad spectum abx with Cefepime. Transitioned to Cipro/Flagyl for abdominal coverage 1/19 given GI symptoms, then transitioned to Zosyn with sustained fever 100.9. Fevers <100.4 since yesterday afternoon with no acute abdominal complaints so changed back to Cipro/Flagyl 1/20.   - recurrent fevers on 1/20. Prompted repeat infectious workup, CT CAP without contrast and change abx to zosyn  - ID consulted 1/21, recommended RPP and GPP and continue zosyn for now  - if remains afebrile by tomorrow and no microbiological evidence of infection, de escalate to augmentin and complete 7 day course of abx    Pancreatic mass  CT A/P wo contrast showed 1.3 cm hypoattenuating lesion of the head of the pancreas, cannot be fully characterized, MRI pancreas protocol or CT pancreas protocol advised to rule out a malignancy.    Hypocalcemia  - Continue home calcium supplement    At high risk for falls  - Falls x 2 during transplant admission  - See syncope  - Unclear if patient hit head. CT head neg 1/2 for bleed.  - Fall precautions    Syncope  - Two falls during transplant admission with reported loss of consciousness. Both following dialysis, so suspect orthostatic  - Metoprolol dose decreased from 25 mg daily to 12.5 mg daily  - EEG ordered; symptoms now improved so discontinued order for EEG  - Fall precautions      Chemotherapy induced diarrhea  - C-diff neg 12/31  - Continue PRN imodium  - See neutropenic fever - if diarrhea/fevers continue, could consider resending C.diff      CINV (chemotherapy-induced nausea and vomiting)  - Continue home Scopalamine patch  - Nausea unrelieved at home with PRN zofran on admission. Added PRN compazine on admission which seems to work better per patient.   - Changed zofran from IV to PO and scheduled and started nightly Zyprexa on 1/18/23.    Pancytopenia due to antineoplastic chemotherapy  - Transfuse for Hgb <7, PLT < 10K, or PLT <50k if  bleeding   - Daily CBC while inpatient  - blood counts improved today    Hyperlipidemia  - Will hold home statin while inpatient    Depression  - Continue home Zoloft    Deep vein thrombosis (DVT) of upper extremity  - Holding home apixiban for thrombocytopenia. Will resume once plts are consistently > 50K.      Hypertension  - Continue home metoprolol. Decreased dose during transplant admission to 12.5 mg daily 1/3/23 due to soft BP and syncopal episodes x 2.    End stage renal disease  - Developed during ICU stay. Believed to be 2/2 hypoprofusion due to hypotension  - Nephrology consulted on admission  - On dialysis MWF at home   - He will receive dialysis at [facility] in [place] through Day +30  - Dialysis timing off due to Wednesday hospital admit - so received Thursday instead. Given this, will likely need to be dialyzed inpatient on Saturday.    History of autologous stem cell transplant  - Patient of Dr. King  - Diagnosed with MM in April 2022. Underwent 2 cycles CyBorD then transitioned to DVRd (now s/p C6)  - Admitted 12/28/22 for Torie 140 auto SCT   - He received 6 bags with a total CD34 dose of 3.13 x10^6/kg on 12/30/22.  - Today is Day + 23  - Engrafted Day +14 with an ANC of 869  - ANC 1730 today     Dependence on hemodialysis  - See ESRD    Chronic infection of prosthetic knee  - Seen in ID clinic with Dr. Chatterjee prior to transplant   - ID rec'd continuing doxycycline throughout transplant. No sign of infection recurrence at this time.     Multiple myeloma not having achieved remission  - Patient of Dr. King. Per most recent clinic note:   - 4/20/22: renal biopsy: light chain cast nephropathy, kappa light chain  - 4/26/22: right subclavian vein thrombus   - 4/27/22: M spike 0.2 with free monoclonal kappa band. B2mg 19.57, IgG 496, IgA 10, IgM <5. Kappa 70032, Lambda 7.9, ratio >1000  - 5/12/22: BMBx: plasma cell myeloma, marrow cellularity 100%, plasma cell percentage 100%. Plasma cell  phenotype +, kappa +, CD20- -, CD30-, EMA-, SADAF-, [place] red negative. Peripheral blood with pancytopenia and no circulating blasts. Decreased storage iron. FISH canceled due to quantity not sufficient  - 5/18/22: CyBorD C1D1  - 5/23/22: rasburicase  - 5/24/22: Xgeva  - 6/6/22: Kappa 34821, lambda 4.0, ratio >1000, M spike 0.1 with free monoclonal kappa band present  - 6/6/22: CyBorD C2D1  - 6/9/22: BMBx Plasma cell neoplasm compatible with plasma cell myeloma. Extent of involvement 80-90% of bone marrow elements. Cytology: Plasmablastic. FISH cytogenetics positive for 1q21/CKS1B gain. Karyotype failed. Myelofibrosis diffuse (MF-3)  - 6/20/22: CyBorD C2D8 delayed due to covid  - 6/23/22: CyBOrD C2D11  - 7/5/22: C1D1 DVRd  - 7/7/22 - 7/18/22: hospital admission for septic shock resulting in renal failure  - 7/25/22: started again on DVRd  - 8/8/2022: kappa 1402.8, lambda 7.5, ratio 167.04. M spike 0.1, two faint restricted bands in gamma globulin regions.   - 10/10/22: C5D1 DVRd. Revlimid on hold for upcoming stem   - Admitted for Torie 140 auto SCT on 12/28/22. Transplanted on 12/30/22. Engrafted on Day +14 with          VTE Risk Mitigation (From admission, onward)         Ordered     heparin (porcine) injection 5,000 Units  Every 8 hours         01/22/23 1434     heparin (porcine) injection 1,000 Units  As needed (PRN)         01/19/23 0929     IP VTE HIGH RISK PATIENT  Once         01/17/23 1329     Place sequential compression device  Until discontinued         01/17/23 1329                Alek Philippe MD  Hematology and Medical Oncology fellow

## 2023-01-22 NOTE — ASSESSMENT & PLAN NOTE
CT A/P wo contrast showed 1.3 cm hypoattenuating lesion of the head of the pancreas, cannot be fully characterized, MRI pancreas protocol or CT pancreas protocol advised to rule out a malignancy.

## 2023-01-22 NOTE — NURSING
"Pt became hypotensive MD notified "pt bp 86/53. pulse 115. Symptomatic saying he is dizzy when trying to get up to go to CT"  500 cc bolus ordered and given. Pt bp went up to 98/56 after bolus given.   "

## 2023-01-22 NOTE — ASSESSMENT & PLAN NOTE
- Patient of Dr. King  - Diagnosed with MM in April 2022. Underwent 2 cycles CyBorD then transitioned to DVRd (now s/p C6)  - Admitted 12/28/22 for Torie 140 auto SCT   - He received 6 bags with a total CD34 dose of 3.13 x10^6/kg on 12/30/22.  - Today is Day + 23  - Engrafted Day +14 with an ANC of 869  - ANC 1730 today

## 2023-01-22 NOTE — SUBJECTIVE & OBJECTIVE
Subjective:   Interval History:     Afebrile since 1/20 8pm. Microbiological workup has been negative. CT didn't show any foci of infection. It did showed 1.3 cm hypoattenuating lesion of the head of the pancreas.     Continues on zosyn. GPP and RPP are pending.     Objective:     Vital Signs (Most Recent):  Temp: 98.7 °F (37.1 °C) (01/22/23 1155)  Pulse: 79 (01/22/23 1155)  Resp: 20 (01/22/23 0512)  BP: (!) 102/57 (01/22/23 1155)  SpO2: 98 % (01/22/23 1155) Vital Signs (24h Range):  Temp:  [98 °F (36.7 °C)-99.8 °F (37.7 °C)] 98.7 °F (37.1 °C)  Pulse:  [] 79  Resp:  [20] 20  SpO2:  [94 %-99 %] 98 %  BP: ()/(51-57) 102/57     Weight: 98.2 kg (216 lb 6.4 oz)  Body mass index is 30.18 kg/m².  Body surface area is 2.22 meters squared.    Intake/Output - Last 3 Shifts         01/20 0700  01/21 0659 01/21 0700  01/22 0659 01/22 0700  01/23 0659    I.V. (mL/kg)  300.1 (3.1)     Other  300     Total Intake(mL/kg)  600.1 (6.1)     Other  1600     Total Output  1600     Net  -999.9                    Physical Exam  Vitals and nursing note reviewed.   Constitutional:       General: He is not in acute distress.     Appearance: Normal appearance.   HENT:      Head: Normocephalic.      Mouth/Throat:      Mouth: Mucous membranes are moist.   Eyes:      Extraocular Movements: Extraocular movements intact.      Conjunctiva/sclera: Conjunctivae normal.      Pupils: Pupils are equal, round, and reactive to light.   Cardiovascular:      Rate and Rhythm: Normal rate and regular rhythm.      Pulses: Normal pulses.      Heart sounds: Normal heart sounds.   Pulmonary:      Effort: Pulmonary effort is normal.      Breath sounds: Normal breath sounds. No wheezing.   Abdominal:      General: Bowel sounds are normal. There is no distension.      Palpations: Abdomen is soft.      Tenderness: There is no abdominal tenderness.   Musculoskeletal:         General: Normal range of motion.      Cervical back: Normal range of motion and  neck supple.      Right lower leg: No edema.      Left lower leg: No edema.   Skin:     General: Skin is warm and dry.      Capillary Refill: Capillary refill takes less than 2 seconds.      Comments: Right tunneled central line clean, dry, intact. No erythema, edema, exudate.   Neurological:      General: No focal deficit present.      Mental Status: He is alert and oriented to person, place, and time.   Psychiatric:         Mood and Affect: Mood normal.         Behavior: Behavior normal.         Thought Content: Thought content normal.         Judgment: Judgment normal.       Significant Labs:   CBC:   Recent Labs   Lab 01/21/23  0200 01/22/23  0500   WBC 2.24* 2.57*   HGB 7.7* 7.5*   HCT 23.3* 22.4*   PLT 50* 75*     CMP:   Recent Labs   Lab 01/21/23  0200 01/22/23  0500    137   K 3.8 3.7    104   CO2 22* 21*   GLU 96 91   BUN 29* 24*   CREATININE 5.5* 4.7*   CALCIUM 7.6* 7.6*   PROT 5.4* 5.4*   ALBUMIN 3.0* 2.9*   BILITOT 0.4 0.5   ALKPHOS 77 74   AST 14 13   ALT 8* 7*   ANIONGAP 10 12     and LFTs:   Recent Labs   Lab 01/21/23  0200 01/22/23  0500   ALT 8* 7*   AST 14 13   ALKPHOS 77 74   BILITOT 0.4 0.5   PROT 5.4* 5.4*   ALBUMIN 3.0* 2.9*       Diagnostic Results:  None

## 2023-01-22 NOTE — ASSESSMENT & PLAN NOTE
- Tmax 101.6 1/17/23 and a Tmax 100.5 1/18/23.   - He was scheduled for his first post transplant f/u in the BMT clinic 1/18, but was instructed to go to the ED instead.   - Of note, he presented to the ED over the weekend with c/o n/v and weakness and was discharged back to the [facility] from the ED without hospital admission.  - Blood cultures (NGTD), UA (neg) COVID (neg) Lactic acid (WNL), CXR (neg), Procal elevated to 0.62, RIP (pend)  - No sign of infection to left knee or dialysis catheter site  - Admitted and started with broad spectum abx with Cefepime. Transitioned to Cipro/Flagyl for abdominal coverage 1/19 given GI symptoms, then transitioned to Zosyn with sustained fever 100.9. Fevers <100.4 since yesterday afternoon with no acute abdominal complaints so changed back to Cipro/Flagyl 1/20.   - recurrent fevers on 1/20. Prompted repeat infectious workup, CT CAP without contrast and change abx to zosyn  - ID consulted 1/21, recommended RPP and GPP and continue zosyn for now  - if remains afebrile by tomorrow and no microbiological evidence of infection, de escalate to augmentin and complete 7 day course of abx

## 2023-01-22 NOTE — PROGRESS NOTES
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 CT CAP with no acute findings suggestive of infection    Recommendations:  - Follow-up resp infection panel, GI pathogens panel  - Blood cultures with fevers  - Continue pip-tazo IV   - If no additional fevers, can transition to amox-clav 500 mg PO q24 hours, after HD on HD days, last day 1/25/2023  - Continue chronic suppressive doxycycline            Thank you for your consult. ID will follow-up with patient. Please contact us if you have any additional questions.    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 Patient was feeling well overall until transplant. He subsequently developed nausea, vomiting, diarrhea. After he was discharged to [facility], he developed fevers. No other localizing symptoms - no rhinorrhea, sore throat, SOB, cough, CP, abdominal pain, joint pain, rashes. No problems with HD catheter.       Interval History:  No fevers overnight  Reports ongoing nausea  Two episodes of diarrhea yesterday  One episode of diarrhea this AM    Review of Systems   Constitutional:  Negative for chills, diaphoresis and fever.   HENT:  Negative for rhinorrhea and sore throat.    Respiratory:  Negative for cough and shortness of breath.     Cardiovascular:  Negative for chest pain and leg swelling.   Gastrointestinal:  Positive for diarrhea and nausea. Negative for abdominal pain and vomiting.   Genitourinary:  Negative for dysuria and hematuria.   Musculoskeletal:  Negative for arthralgias and myalgias.   Skin:  Negative for rash.   Neurological:  Negative for headaches.     Objective:     Vital Signs (Most Recent):  Temp: 98.7 °F (37.1 °C) (01/22/23 0827)  Pulse: 79 (01/22/23 0827)  Resp: 20 (01/22/23 0512)  BP: (!) 106/51 (01/22/23 0827)  SpO2: 99 % (01/22/23 0827)   Vital Signs (24h Range):  Temp:  [98 °F (36.7 °C)-99.8 °F (37.7 °C)] 98.7 °F (37.1 °C)  Pulse:  [] 79  Resp:  [20] 20  SpO2:  [94 %-99 %] 99 %  BP: ()/(51-58) 106/51     Weight: 98.2 kg (216 lb 6.4 oz)  Body mass index is 30.18 kg/m².    Estimated Creatinine Clearance: 20.5 mL/min (A) (based on SCr of 4.7 mg/dL (H)).    Physical Exam  Vitals reviewed.   Constitutional:       General: He is not in acute distress.     Appearance: He is well-developed. He is not diaphoretic.   HENT:      Head: Normocephalic and atraumatic.      Nose: Nose normal.   Eyes:      Conjunctiva/sclera: Conjunctivae normal.   Pulmonary:      Effort: Pulmonary effort is normal. No respiratory distress.   Abdominal:      General: Abdomen is flat. There is no distension.      Tenderness: There is no abdominal tenderness.   Musculoskeletal:      Cervical back: Normal range of motion.      Right lower leg: No edema.      Left lower leg: No edema.   Skin:     General: Skin is warm and dry.      Findings: No erythema or rash.   Neurological:      Mental Status: He is alert.   Psychiatric:         Behavior: Behavior normal.       Significant Labs: Blood Culture:   Recent Labs   Lab 01/17/23  1039 01/19/23  1812 01/20/23  1820   LABBLOO No Growth to date  No Growth to date  No Growth to date  No Growth to date  No Growth to date  No Growth to date  No Growth to date  No Growth to date  No Growth to  date  No Growth to date No Growth to date  No Growth to date  No Growth to date  No Growth to date  No Growth to date  No Growth to date No Growth to date  No Growth to date  No Growth to date  No Growth to date     BMP:   Recent Labs   Lab 01/22/23  0500   GLU 91      K 3.7      CO2 21*   BUN 24*   CREATININE 4.7*   CALCIUM 7.6*   MG 1.7     CBC:   Recent Labs   Lab 01/21/23  0200 01/22/23  0500   WBC 2.24* 2.57*   HGB 7.7* 7.5*   HCT 23.3* 22.4*   PLT 50* 75*     CMP:   Recent Labs   Lab 01/21/23  0200 01/22/23  0500    137   K 3.8 3.7    104   CO2 22* 21*   GLU 96 91   BUN 29* 24*   CREATININE 5.5* 4.7*   CALCIUM 7.6* 7.6*   PROT 5.4* 5.4*   ALBUMIN 3.0* 2.9*   BILITOT 0.4 0.5   ALKPHOS 77 74   AST 14 13   ALT 8* 7*   ANIONGAP 10 12     Microbiology Results (last 7 days)       Procedure Component Value Units Date/Time    Blood culture [[id number]] Collected: 01/20/23 1820    Order Status: Completed Specimen: Blood Updated: 01/22/23 0613     Blood Culture, Routine No Growth to date      No Growth to date    Blood culture [[id number]] Collected: 01/19/23 1812    Order Status: Completed Specimen: Blood Updated: 01/21/23 2212     Blood Culture, Routine No Growth to date      No Growth to date      No Growth to date    Blood culture [[id number]] Collected: 01/19/23 1812    Order Status: Completed Specimen: Blood Updated: 01/21/23 2212     Blood Culture, Routine No Growth to date      No Growth to date      No Growth to date    Blood culture [[id number]] Collected: 01/20/23 1820    Order Status: Completed Specimen: Blood Updated: 01/21/23 2013     Blood Culture, Routine No Growth to date      No Growth to date    Respiratory Infection Panel (PCR), Nasopharyngeal [[id number]]     Order Status: No result Specimen: Nasopharyngeal Swab     Blood culture x two cultures. Draw prior to antibiotics. [[id number]] Collected: 01/17/23 1039    Order Status: Completed Specimen: Blood from Peripheral,  Antecubital, Right Updated: 01/21/23 1212     Blood Culture, Routine No Growth to date      No Growth to date      No Growth to date      No Growth to date      No Growth to date    Narrative:      Aerobic and anaerobic    Blood culture x two cultures. Draw prior to antibiotics. [[id number]] Collected: 01/17/23 1039    Order Status: Completed Specimen: Blood from Peripheral, Antecubital, Left Updated: 01/21/23 1212     Blood Culture, Routine No Growth to date      No Growth to date      No Growth to date      No Growth to date      No Growth to date    Narrative:      Aerobic and anaerobic    Urine Culture High Risk [[id number]]     Order Status: No result Specimen: Urine, Clean Catch     Stool culture [[id number]]     Order Status: No result Specimen: Stool     Clostridium difficile EIA [[id number]]     Order Status: Canceled Specimen: Stool     Respiratory Infection Panel (PCR), Nasopharyngeal [[id number]]     Order Status: Canceled Specimen: Nasopharyngeal Swab           All pertinent labs within the past 24 hours have been reviewed.  Recent Lab Results         01/22/23  0500        Albumin 2.9       Alkaline Phosphatase 74       ALT 7       Anion Gap 12       Aniso Slight       AST 13       Baso # 0.02       Basophil % 0.8       BILIRUBIN TOTAL 0.5  Comment: For infants and newborns, interpretation of results should be based  on gestational age, weight and in agreement with clinical  observations.    Premature Infant recommended reference ranges:  Up to 24 hours.............<8.0 mg/dL  Up to 48 hours............<12.0 mg/dL  3-5 days..................<15.0 mg/dL  6-29 days.................<15.0 mg/dL         BUN 24       Calcium 7.6       Chloride 104       CO2 21       Creatinine 4.7       CRP 99.5       Differential Method Automated       eGFR 13.6       Eos # 0.0       Eosinophil % 0.0       Glucose 91       Gran # (ANC) 1.9       Gran % 75.5       Hematocrit 22.4       Hemoglobin 7.5       Hypo Occasional        Immature Grans (Abs) 0.08  Comment: Mild elevation in immature granulocytes is non specific and   can be seen in a variety of conditions including stress response,   acute inflammation, trauma and pregnancy. Correlation with other   laboratory and clinical findings is essential.         Immature Granulocytes 3.1       Lymph # 0.2       Lymph % 7.4       Magnesium 1.7       MCH 31.4       MCHC 33.5       MCV 94       Mono # 0.3       Mono % 13.2       MPV 12.8       nRBC 0       Ovalocytes Occasional       Phosphorus 2.9       Platelet Estimate Decreased       Platelets 75       Poikilocytosis Slight       Poly Occasional       Potassium 3.7       PROTEIN TOTAL 5.4       RBC 2.39       RDW 17.5       Sodium 137       WBC 2.57               Significant Imaging: I have reviewed all pertinent imaging results/findings within the past 24 hours.

## 2023-01-22 NOTE — SUBJECTIVE & OBJECTIVE
Interval History:  No fevers overnight  Reports ongoing nausea  Two episodes of diarrhea yesterday  One episode of diarrhea this AM    Review of Systems   Constitutional:  Negative for chills, diaphoresis and fever.   HENT:  Negative for rhinorrhea and sore throat.    Respiratory:  Negative for cough and shortness of breath.    Cardiovascular:  Negative for chest pain and leg swelling.   Gastrointestinal:  Positive for diarrhea and nausea. Negative for abdominal pain and vomiting.   Genitourinary:  Negative for dysuria and hematuria.   Musculoskeletal:  Negative for arthralgias and myalgias.   Skin:  Negative for rash.   Neurological:  Negative for headaches.     Objective:     Vital Signs (Most Recent):  Temp: 98.7 °F (37.1 °C) (01/22/23 0827)  Pulse: 79 (01/22/23 0827)  Resp: 20 (01/22/23 0512)  BP: (!) 106/51 (01/22/23 0827)  SpO2: 99 % (01/22/23 0827)   Vital Signs (24h Range):  Temp:  [98 °F (36.7 °C)-99.8 °F (37.7 °C)] 98.7 °F (37.1 °C)  Pulse:  [] 79  Resp:  [20] 20  SpO2:  [94 %-99 %] 99 %  BP: ()/(51-58) 106/51     Weight: 98.2 kg (216 lb 6.4 oz)  Body mass index is 30.18 kg/m².    Estimated Creatinine Clearance: 20.5 mL/min (A) (based on SCr of 4.7 mg/dL (H)).    Physical Exam  Vitals reviewed.   Constitutional:       General: He is not in acute distress.     Appearance: He is well-developed. He is not diaphoretic.   HENT:      Head: Normocephalic and atraumatic.      Nose: Nose normal.   Eyes:      Conjunctiva/sclera: Conjunctivae normal.   Pulmonary:      Effort: Pulmonary effort is normal. No respiratory distress.   Abdominal:      General: Abdomen is flat. There is no distension.      Tenderness: There is no abdominal tenderness.   Musculoskeletal:      Cervical back: Normal range of motion.      Right lower leg: No edema.      Left lower leg: No edema.   Skin:     General: Skin is warm and dry.      Findings: No erythema or rash.   Neurological:      Mental Status: He is alert.    Psychiatric:         Behavior: Behavior normal.       Significant Labs: Blood Culture:   Recent Labs   Lab 01/17/23  1039 01/19/23 1812 01/20/23 1820   LABBLOO No Growth to date  No Growth to date  No Growth to date  No Growth to date  No Growth to date  No Growth to date  No Growth to date  No Growth to date  No Growth to date  No Growth to date No Growth to date  No Growth to date  No Growth to date  No Growth to date  No Growth to date  No Growth to date No Growth to date  No Growth to date  No Growth to date  No Growth to date     BMP:   Recent Labs   Lab 01/22/23  0500   GLU 91      K 3.7      CO2 21*   BUN 24*   CREATININE 4.7*   CALCIUM 7.6*   MG 1.7     CBC:   Recent Labs   Lab 01/21/23  0200 01/22/23  0500   WBC 2.24* 2.57*   HGB 7.7* 7.5*   HCT 23.3* 22.4*   PLT 50* 75*     CMP:   Recent Labs   Lab 01/21/23  0200 01/22/23  0500    137   K 3.8 3.7    104   CO2 22* 21*   GLU 96 91   BUN 29* 24*   CREATININE 5.5* 4.7*   CALCIUM 7.6* 7.6*   PROT 5.4* 5.4*   ALBUMIN 3.0* 2.9*   BILITOT 0.4 0.5   ALKPHOS 77 74   AST 14 13   ALT 8* 7*   ANIONGAP 10 12     Microbiology Results (last 7 days)       Procedure Component Value Units Date/Time    Blood culture [[id number]] Collected: 01/20/23 1820    Order Status: Completed Specimen: Blood Updated: 01/22/23 0613     Blood Culture, Routine No Growth to date      No Growth to date    Blood culture [[id number]] Collected: 01/19/23 1812    Order Status: Completed Specimen: Blood Updated: 01/21/23 2212     Blood Culture, Routine No Growth to date      No Growth to date      No Growth to date    Blood culture [[id number]] Collected: 01/19/23 1812    Order Status: Completed Specimen: Blood Updated: 01/21/23 2212     Blood Culture, Routine No Growth to date      No Growth to date      No Growth to date    Blood culture [[id number]] Collected: 01/20/23 1820    Order Status: Completed Specimen: Blood Updated: 01/21/23 2013     Blood  Culture, Routine No Growth to date      No Growth to date    Respiratory Infection Panel (PCR), Nasopharyngeal [[id number]]     Order Status: No result Specimen: Nasopharyngeal Swab     Blood culture x two cultures. Draw prior to antibiotics. [[id number]] Collected: 01/17/23 1039    Order Status: Completed Specimen: Blood from Peripheral, Antecubital, Right Updated: 01/21/23 1212     Blood Culture, Routine No Growth to date      No Growth to date      No Growth to date      No Growth to date      No Growth to date    Narrative:      Aerobic and anaerobic    Blood culture x two cultures. Draw prior to antibiotics. [[id number]] Collected: 01/17/23 1039    Order Status: Completed Specimen: Blood from Peripheral, Antecubital, Left Updated: 01/21/23 1212     Blood Culture, Routine No Growth to date      No Growth to date      No Growth to date      No Growth to date      No Growth to date    Narrative:      Aerobic and anaerobic    Urine Culture High Risk [[id number]]     Order Status: No result Specimen: Urine, Clean Catch     Stool culture [[id number]]     Order Status: No result Specimen: Stool     Clostridium difficile EIA [[id number]]     Order Status: Canceled Specimen: Stool     Respiratory Infection Panel (PCR), Nasopharyngeal [[id number]]     Order Status: Canceled Specimen: Nasopharyngeal Swab           All pertinent labs within the past 24 hours have been reviewed.  Recent Lab Results         01/22/23  0500        Albumin 2.9       Alkaline Phosphatase 74       ALT 7       Anion Gap 12       Aniso Slight       AST 13       Baso # 0.02       Basophil % 0.8       BILIRUBIN TOTAL 0.5  Comment: For infants and newborns, interpretation of results should be based  on gestational age, weight and in agreement with clinical  observations.    Premature Infant recommended reference ranges:  Up to 24 hours.............<8.0 mg/dL  Up to 48 hours............<12.0 mg/dL  3-5 days..................<15.0 mg/dL  6-29  days.................<15.0 mg/dL         BUN 24       Calcium 7.6       Chloride 104       CO2 21       Creatinine 4.7       CRP 99.5       Differential Method Automated       eGFR 13.6       Eos # 0.0       Eosinophil % 0.0       Glucose 91       Gran # (ANC) 1.9       Gran % 75.5       Hematocrit 22.4       Hemoglobin 7.5       Hypo Occasional       Immature Grans (Abs) 0.08  Comment: Mild elevation in immature granulocytes is non specific and   can be seen in a variety of conditions including stress response,   acute inflammation, trauma and pregnancy. Correlation with other   laboratory and clinical findings is essential.         Immature Granulocytes 3.1       Lymph # 0.2       Lymph % 7.4       Magnesium 1.7       MCH 31.4       MCHC 33.5       MCV 94       Mono # 0.3       Mono % 13.2       MPV 12.8       nRBC 0       Ovalocytes Occasional       Phosphorus 2.9       Platelet Estimate Decreased       Platelets 75       Poikilocytosis Slight       Poly Occasional       Potassium 3.7       PROTEIN TOTAL 5.4       RBC 2.39       RDW 17.5       Sodium 137       WBC 2.57               Significant Imaging: I have reviewed all pertinent imaging results/findings within the past 24 hours.

## 2023-01-23 VITALS
HEART RATE: 65 BPM | TEMPERATURE: 99 F | WEIGHT: 219.38 LBS | SYSTOLIC BLOOD PRESSURE: 139 MMHG | RESPIRATION RATE: 18 BRPM | OXYGEN SATURATION: 100 % | BODY MASS INDEX: 30.71 KG/M2 | HEIGHT: 71 IN | DIASTOLIC BLOOD PRESSURE: 76 MMHG

## 2023-01-23 PROBLEM — R50.9 FEVER: Status: ACTIVE | Noted: 2023-01-23

## 2023-01-23 LAB
ALBUMIN SERPL BCP-MCNC: 2.8 G/DL (ref 3.5–5.2)
ALP SERPL-CCNC: 68 U/L (ref 55–135)
ALT SERPL W/O P-5'-P-CCNC: 7 U/L (ref 10–44)
ANION GAP SERPL CALC-SCNC: 12 MMOL/L (ref 8–16)
ANISOCYTOSIS BLD QL SMEAR: SLIGHT
AST SERPL-CCNC: 11 U/L (ref 10–40)
BASOPHILS # BLD AUTO: 0.02 K/UL (ref 0–0.2)
BASOPHILS NFR BLD: 0.8 % (ref 0–1.9)
BILIRUB SERPL-MCNC: 0.4 MG/DL (ref 0.1–1)
BUN SERPL-MCNC: 34 MG/DL (ref 6–20)
CALCIUM SERPL-MCNC: 7.4 MG/DL (ref 8.7–10.5)
CHLORIDE SERPL-SCNC: 106 MMOL/L (ref 95–110)
CO2 SERPL-SCNC: 20 MMOL/L (ref 23–29)
CREAT SERPL-MCNC: 5.9 MG/DL (ref 0.5–1.4)
DIFFERENTIAL METHOD: ABNORMAL
EOSINOPHIL # BLD AUTO: 0 K/UL (ref 0–0.5)
EOSINOPHIL NFR BLD: 0 % (ref 0–8)
ERYTHROCYTE [DISTWIDTH] IN BLOOD BY AUTOMATED COUNT: 18 % (ref 11.5–14.5)
EST. GFR  (NO RACE VARIABLE): 10.4 ML/MIN/1.73 M^2
GLUCOSE SERPL-MCNC: 99 MG/DL (ref 70–110)
HCT VFR BLD AUTO: 24.1 % (ref 40–54)
HGB BLD-MCNC: 7.9 G/DL (ref 14–18)
HYPOCHROMIA BLD QL SMEAR: ABNORMAL
IMM GRANULOCYTES # BLD AUTO: 0.06 K/UL (ref 0–0.04)
IMM GRANULOCYTES NFR BLD AUTO: 2.3 % (ref 0–0.5)
LYMPHOCYTES # BLD AUTO: 0.4 K/UL (ref 1–4.8)
LYMPHOCYTES NFR BLD: 14.7 % (ref 18–48)
MAGNESIUM SERPL-MCNC: 1.7 MG/DL (ref 1.6–2.6)
MCH RBC QN AUTO: 32.1 PG (ref 27–31)
MCHC RBC AUTO-ENTMCNC: 32.8 G/DL (ref 32–36)
MCV RBC AUTO: 98 FL (ref 82–98)
MONOCYTES # BLD AUTO: 0.3 K/UL (ref 0.3–1)
MONOCYTES NFR BLD: 11.7 % (ref 4–15)
NEUTROPHILS # BLD AUTO: 1.9 K/UL (ref 1.8–7.7)
NEUTROPHILS NFR BLD: 70.5 % (ref 38–73)
NRBC BLD-RTO: 0 /100 WBC
OVALOCYTES BLD QL SMEAR: ABNORMAL
PHOSPHATE SERPL-MCNC: 3.3 MG/DL (ref 2.7–4.5)
PLATELET # BLD AUTO: 87 K/UL (ref 150–450)
PMV BLD AUTO: 11.9 FL (ref 9.2–12.9)
POIKILOCYTOSIS BLD QL SMEAR: SLIGHT
POLYCHROMASIA BLD QL SMEAR: ABNORMAL
POTASSIUM SERPL-SCNC: 3.6 MMOL/L (ref 3.5–5.1)
PROT SERPL-MCNC: 5.3 G/DL (ref 6–8.4)
RBC # BLD AUTO: 2.46 M/UL (ref 4.6–6.2)
SODIUM SERPL-SCNC: 138 MMOL/L (ref 136–145)
SPHEROCYTES BLD QL SMEAR: ABNORMAL
WBC # BLD AUTO: 2.65 K/UL (ref 3.9–12.7)

## 2023-01-23 PROCEDURE — 36415 COLL VENOUS BLD VENIPUNCTURE: CPT | Performed by: NURSE PRACTITIONER

## 2023-01-23 PROCEDURE — 85025 COMPLETE CBC W/AUTO DIFF WBC: CPT | Performed by: NURSE PRACTITIONER

## 2023-01-23 PROCEDURE — 25000003 PHARM REV CODE 250: Performed by: NURSE PRACTITIONER

## 2023-01-23 PROCEDURE — 99233 PR SUBSEQUENT HOSPITAL CARE,LEVL III: ICD-10-PCS | Mod: ,,, | Performed by: NURSE PRACTITIONER

## 2023-01-23 PROCEDURE — 63600175 PHARM REV CODE 636 W HCPCS: Performed by: INTERNAL MEDICINE

## 2023-01-23 PROCEDURE — 83735 ASSAY OF MAGNESIUM: CPT | Performed by: NURSE PRACTITIONER

## 2023-01-23 PROCEDURE — 99238 PR HOSPITAL DISCHARGE DAY,<30 MIN: ICD-10-PCS | Mod: ,,, | Performed by: INTERNAL MEDICINE

## 2023-01-23 PROCEDURE — 80100014 HC HEMODIALYSIS 1:1

## 2023-01-23 PROCEDURE — 99233 SBSQ HOSP IP/OBS HIGH 50: CPT | Mod: ,,, | Performed by: NURSE PRACTITIONER

## 2023-01-23 PROCEDURE — 25000003 PHARM REV CODE 250: Performed by: INTERNAL MEDICINE

## 2023-01-23 PROCEDURE — 84100 ASSAY OF PHOSPHORUS: CPT | Performed by: NURSE PRACTITIONER

## 2023-01-23 PROCEDURE — 99233 PR SUBSEQUENT HOSPITAL CARE,LEVL III: ICD-10-PCS | Mod: ,,, | Performed by: INTERNAL MEDICINE

## 2023-01-23 PROCEDURE — 99233 SBSQ HOSP IP/OBS HIGH 50: CPT | Mod: ,,, | Performed by: INTERNAL MEDICINE

## 2023-01-23 PROCEDURE — 99238 HOSP IP/OBS DSCHRG MGMT 30/<: CPT | Mod: ,,, | Performed by: INTERNAL MEDICINE

## 2023-01-23 PROCEDURE — 80053 COMPREHEN METABOLIC PANEL: CPT | Performed by: NURSE PRACTITIONER

## 2023-01-23 RX ORDER — AMOXICILLIN AND CLAVULANATE POTASSIUM 500; 125 MG/1; MG/1
1 TABLET, FILM COATED ORAL EVERY 24 HOURS
Status: DISCONTINUED | OUTPATIENT
Start: 2023-01-23 | End: 2023-01-23 | Stop reason: HOSPADM

## 2023-01-23 RX ORDER — SODIUM CHLORIDE 9 MG/ML
INJECTION, SOLUTION INTRAVENOUS ONCE
Status: DISCONTINUED | OUTPATIENT
Start: 2023-01-24 | End: 2023-01-23

## 2023-01-23 RX ORDER — AMOXICILLIN AND CLAVULANATE POTASSIUM 500; 125 MG/1; MG/1
1 TABLET, FILM COATED ORAL DAILY
Qty: 3 TABLET | Refills: 0 | Status: SHIPPED | OUTPATIENT
Start: 2023-01-24 | End: 2023-01-27

## 2023-01-23 RX ORDER — AMOXICILLIN AND CLAVULANATE POTASSIUM 500; 125 MG/1; MG/1
1 TABLET, FILM COATED ORAL EVERY 24 HOURS
Status: DISCONTINUED | OUTPATIENT
Start: 2023-01-23 | End: 2023-01-23

## 2023-01-23 RX ORDER — HEPARIN SODIUM 1000 [USP'U]/ML
1000 INJECTION, SOLUTION INTRAVENOUS; SUBCUTANEOUS
Status: DISCONTINUED | OUTPATIENT
Start: 2023-01-23 | End: 2023-01-23 | Stop reason: HOSPADM

## 2023-01-23 RX ORDER — SODIUM CHLORIDE 9 MG/ML
INJECTION, SOLUTION INTRAVENOUS ONCE
Status: COMPLETED | OUTPATIENT
Start: 2023-01-23 | End: 2023-01-23

## 2023-01-23 RX ADMIN — AMOXICILLIN AND CLAVULANATE POTASSIUM 500 MG: 500; 125 TABLET, FILM COATED ORAL at 05:01

## 2023-01-23 RX ADMIN — CALCIUM CARBONATE (ANTACID) CHEW TAB 500 MG 1000 MG: 500 CHEW TAB at 05:01

## 2023-01-23 RX ADMIN — CHOLECALCIFEROL TAB 25 MCG (1000 UNIT) 3000 UNITS: 25 TAB at 09:01

## 2023-01-23 RX ADMIN — FAMOTIDINE 20 MG: 20 TABLET ORAL at 09:01

## 2023-01-23 RX ADMIN — ACYCLOVIR 400 MG: 200 CAPSULE ORAL at 09:01

## 2023-01-23 RX ADMIN — SERTRALINE HYDROCHLORIDE 50 MG: 50 TABLET ORAL at 09:01

## 2023-01-23 RX ADMIN — HEPARIN SODIUM 1000 UNITS: 1000 INJECTION, SOLUTION INTRAVENOUS; SUBCUTANEOUS at 04:01

## 2023-01-23 RX ADMIN — MUPIROCIN: 20 OINTMENT TOPICAL at 09:01

## 2023-01-23 RX ADMIN — HEPARIN SODIUM 5000 UNITS: 5000 INJECTION INTRAVENOUS; SUBCUTANEOUS at 05:01

## 2023-01-23 RX ADMIN — CALCIUM CARBONATE (ANTACID) CHEW TAB 500 MG 1000 MG: 500 CHEW TAB at 09:01

## 2023-01-23 RX ADMIN — ONDANSETRON HYDROCHLORIDE 8 MG: 4 TABLET, FILM COATED ORAL at 05:01

## 2023-01-23 RX ADMIN — SODIUM CHLORIDE: 9 INJECTION, SOLUTION INTRAVENOUS at 01:01

## 2023-01-23 RX ADMIN — DOXYCYCLINE HYCLATE 100 MG: 100 TABLET, COATED ORAL at 09:01

## 2023-01-23 NOTE — PROGRESS NOTES
Patient arrived to the [facility] via wheelchair. Maintenance dialysis started via right IJ tunneled CVC. Isolation precautions maintained.

## 2023-01-23 NOTE — ASSESSMENT & PLAN NOTE
- Tmax 101.6 1/17/23 and a Tmax 100.5 1/18/23.   - He was scheduled for his first post transplant f/u in the BMT clinic 1/18, but was instructed to go to the ED instead.   - Of note, he presented to the ED over the weekend with c/o n/v and weakness and was discharged back to the [facility] from the ED without hospital admission.  - Blood cultures (NGTD), UA (neg) COVID (neg) Lactic acid (WNL), CXR (neg), Procal elevated to 0.62   - No sign of infection to left knee or dialysis catheter site  - Admitted and started with broad spectum abx with Cefepime. Transitioned to Cipro/Flagyl for abdominal coverage 1/19 given GI symptoms, then transitioned to Zosyn with sustained fever 100.9. Then fevers <100.4 with no acute abdominal complaints so changed back to Cipro/Flagyl 1/20.   - Recurrent fevers on 1/20 with Tmax 101.1 - Repeat infectious workup with Bcx (NGTD), UA, stool studies (pend), Lactic (WNL), PRocal (elevated), CRP (elevated). Zosyn added back.   - CT c/a/p with no source of infection, but concerning for pancreatic mass   - ID consulted 1/21, recommended RIP and GI Path panel (pend) PP and continue zosyn for now  - if remains afebrile by tomorrow and no microbiological evidence of infection, de escalate to augmentin and complete 7 day course of abx

## 2023-01-23 NOTE — CARE UPDATE
[facility] Follow Up with Ruchi Qureshi PA-C  Monday Jan 30, 2023 10:00 AM  [facility]  [address]   [facility] [address]  [phone]

## 2023-01-23 NOTE — PROGRESS NOTES
Dialysis completed. Right IJ tunneled CVC flushed,heparinized, capped and taped.Patient dialyzed for 3 hours with no net fluid removal per orders. Report given to primary care nurse.

## 2023-01-23 NOTE — SUBJECTIVE & OBJECTIVE
Interval History: NAEON    Review of Systems   Constitutional:  Negative for activity change, appetite change and fever.   HENT:  Negative for congestion and sore throat.    Eyes:  Negative for pain and itching.   Respiratory:  Negative for chest tightness and shortness of breath.    Cardiovascular:  Negative for chest pain.   Gastrointestinal:  Positive for diarrhea. Negative for blood in stool, nausea and vomiting.   Genitourinary:  Negative for dysuria and hematuria.   Allergic/Immunologic: Positive for immunocompromised state.   Psychiatric/Behavioral:  Negative for confusion.    Objective:     Vital Signs (Most Recent):  Temp: 98.5 °F (36.9 °C) (01/23/23 1615)  Pulse: 65 (01/23/23 1615)  Resp: 18 (01/23/23 1615)  BP: 139/76 (01/23/23 1615)  SpO2: 100 % (01/23/23 1615) Vital Signs (24h Range):  Temp:  [97.9 °F (36.6 °C)-98.5 °F (36.9 °C)] 98.5 °F (36.9 °C)  Pulse:  [62-90] 65  Resp:  [18-20] 18  SpO2:  [97 %-100 %] 100 %  BP: ()/(53-90) 139/76     Weight: 99.5 kg (219 lb 5.7 oz)  Body mass index is 30.59 kg/m².    Estimated Creatinine Clearance: 16.4 mL/min (A) (based on SCr of 5.9 mg/dL (H)).    Physical Exam  Constitutional:       Appearance: Normal appearance.   HENT:      Head: Normocephalic and atraumatic.      Nose: Nose normal.      Mouth/Throat:      Mouth: Mucous membranes are moist.      Pharynx: Oropharynx is clear.   Eyes:      General: No scleral icterus.     Conjunctiva/sclera: Conjunctivae normal.      Pupils: Pupils are equal, round, and reactive to light.   Cardiovascular:      Rate and Rhythm: Normal rate and regular rhythm.      Pulses: Normal pulses.      Heart sounds: Normal heart sounds.   Pulmonary:      Effort: Pulmonary effort is normal.      Breath sounds: Normal breath sounds.   Abdominal:      General: Abdomen is flat. Bowel sounds are normal.      Palpations: Abdomen is soft.      Tenderness: There is no guarding or rebound.   Musculoskeletal:         General: No deformity.  Normal range of motion.      Cervical back: Normal range of motion and neck supple.   Skin:     General: Skin is warm and dry.      Coloration: Skin is not jaundiced.      Findings: No rash.   Neurological:      Mental Status: He is alert and oriented to person, place, and time. Mental status is at baseline.   Psychiatric:         Behavior: Behavior normal.         Thought Content: Thought content normal.       Significant Labs:   Microbiology Results (last 7 days)       Procedure Component Value Units Date/Time    Blood culture [[id number]] Collected: 01/20/23 1820    Order Status: Completed Specimen: Blood Updated: 01/23/23 0613     Blood Culture, Routine No Growth to date      No Growth to date      No Growth to date    Blood culture [[id number]] Collected: 01/19/23 1812    Order Status: Completed Specimen: Blood Updated: 01/22/23 2212     Blood Culture, Routine No Growth to date      No Growth to date      No Growth to date      No Growth to date    Blood culture [[id number]] Collected: 01/19/23 1812    Order Status: Completed Specimen: Blood Updated: 01/22/23 2212     Blood Culture, Routine No Growth to date      No Growth to date      No Growth to date      No Growth to date    Blood culture [[id number]] Collected: 01/20/23 1820    Order Status: Completed Specimen: Blood Updated: 01/22/23 2012     Blood Culture, Routine No Growth to date      No Growth to date      No Growth to date    Blood culture x two cultures. Draw prior to antibiotics. [[id number]] Collected: 01/17/23 1039    Order Status: Completed Specimen: Blood from Peripheral, Antecubital, Left Updated: 01/22/23 1212     Blood Culture, Routine No growth after 5 days.    Narrative:      Aerobic and anaerobic    Blood culture x two cultures. Draw prior to antibiotics. [[id number]] Collected: 01/17/23 1039    Order Status: Completed Specimen: Blood from Peripheral, Antecubital, Right Updated: 01/22/23 1212     Blood Culture, Routine No growth after 5 days.     Narrative:      Aerobic and anaerobic    Respiratory Infection Panel (PCR), Nasopharyngeal [[id number]]     Order Status: No result Specimen: Nasopharyngeal Swab     Urine Culture High Risk [[id number]]     Order Status: No result Specimen: Urine, Clean Catch     Stool culture [[id number]]     Order Status: No result Specimen: Stool     Clostridium difficile EIA [[id number]]     Order Status: Canceled Specimen: Stool     Respiratory Infection Panel (PCR), Nasopharyngeal [[id number]]     Order Status: Canceled Specimen: Nasopharyngeal Swab           Recent Lab Results         01/23/23  0546        Albumin 2.8       Alkaline Phosphatase 68       ALT 7       Anion Gap 12       Aniso Slight       AST 11       Baso # 0.02       Basophil % 0.8       BILIRUBIN TOTAL 0.4  Comment: For infants and newborns, interpretation of results should be based  on gestational age, weight and in agreement with clinical  observations.    Premature Infant recommended reference ranges:  Up to 24 hours.............<8.0 mg/dL  Up to 48 hours............<12.0 mg/dL  3-5 days..................<15.0 mg/dL  6-29 days.................<15.0 mg/dL         BUN 34       Calcium 7.4       Chloride 106       CO2 20       Creatinine 5.9       Differential Method Automated       eGFR 10.4       Eos # 0.0       Eosinophil % 0.0       Glucose 99       Gran # (ANC) 1.9       Gran % 70.5       Hematocrit 24.1       Hemoglobin 7.9       Hypo Occasional       Immature Grans (Abs) 0.06  Comment: Mild elevation in immature granulocytes is non specific and   can be seen in a variety of conditions including stress response,   acute inflammation, trauma and pregnancy. Correlation with other   laboratory and clinical findings is essential.         Immature Granulocytes 2.3       Lymph # 0.4       Lymph % 14.7       Magnesium 1.7       MCH 32.1       MCHC 32.8       MCV 98       Mono # 0.3       Mono % 11.7       MPV 11.9       nRBC 0       Ovalocytes Occasional        Phosphorus 3.3       Platelets 87       Poikilocytosis Slight       Poly Occasional       Potassium 3.6       PROTEIN TOTAL 5.3       RBC 2.46       RDW 18.0       Sodium 138       Spherocytes Occasional       WBC 2.65               Significant Imaging: I have reviewed all pertinent imaging results/findings within the past 24 hours.

## 2023-01-23 NOTE — SUBJECTIVE & OBJECTIVE
Interval History: Last dialyzed on Saturday. Pending discharge today. Due for HD (dialyzes MWF).     Review of patient's allergies indicates:  No Known Allergies  Current Facility-Administered Medications   Medication Frequency    [START ON 1/24/2023] 0.9%  NaCl infusion Once    0.9%  NaCl infusion Once    acetaminophen tablet 650 mg Q6H PRN    acyclovir capsule 400 mg BID    amoxicillin-clavulanate 500-125mg per tablet 500 mg Daily    calcium carbonate 200 mg calcium (500 mg) chewable tablet 1,000 mg TID    dextrose 10% bolus 125 mL 125 mL PRN    dextrose 10% bolus 250 mL 250 mL PRN    doxycycline tablet 100 mg Q12H    [START ON 1/30/2023] ergocalciferol capsule 50,000 Units Q14 Days    famotidine tablet 20 mg Daily    glucagon (human recombinant) injection 1 mg PRN    glucose chewable tablet 16 g PRN    glucose chewable tablet 24 g PRN    heparin (porcine) injection 1,000 Units PRN    heparin (porcine) injection 1,000 Units PRN    heparin (porcine) injection 5,000 Units Q8H    iohexol (OMNIPAQUE) oral solution 15 mL PRN    LIDOcaine HCl 2% oral solution 15 mL Q6H PRN    loperamide capsule 2 mg QID PRN    mupirocin 2 % ointment BID    naloxone 0.4 mg/mL injection 0.02 mg PRN    OLANZapine tablet 5 mg QHS    ondansetron tablet 8 mg Q8H    prochlorperazine tablet 10 mg TID PRN    scopolamine 1.3-1.5 mg (1 mg over 3 days) 1 patch Q3 Days    sertraline tablet 50 mg Daily    sodium chloride 0.9% bolus 250 mL 250 mL PRN    sodium chloride 0.9% flush 10 mL Q12H PRN    vitamin D 1000 units tablet 3,000 Units Daily       Objective:     Vital Signs (Most Recent):  Temp: 98.1 °F (36.7 °C) (01/23/23 1159)  Pulse: 77 (01/23/23 1159)  Resp: 20 (01/23/23 0503)  BP: 113/68 (01/23/23 1159)  SpO2: 100 % (01/23/23 1159)   Vital Signs (24h Range):  Temp:  [98 °F (36.7 °C)-98.9 °F (37.2 °C)] 98.1 °F (36.7 °C)  Pulse:  [70-90] 77  Resp:  [20] 20  SpO2:  [97 %-100 %] 100 %  BP: ()/(53-68) 113/68     Weight: 99.5 kg (219 lb 5.7 oz)  (01/23/23 0400)  Body mass index is 30.59 kg/m².  Body surface area is 2.23 meters squared.    No intake/output data recorded.    Physical Exam  Vitals and nursing note reviewed.   Constitutional:       Appearance: He is well-developed.   HENT:      Head: Normocephalic and atraumatic.      Mouth/Throat:      Pharynx: No oropharyngeal exudate.   Eyes:      General:         Right eye: No discharge.         Left eye: No discharge.      Conjunctiva/sclera: Conjunctivae normal.      Pupils: Pupils are equal, round, and reactive to light.   Cardiovascular:      Rate and Rhythm: Normal rate and regular rhythm.      Heart sounds: Normal heart sounds. No murmur heard.  Pulmonary:      Effort: Pulmonary effort is normal. No respiratory distress.      Breath sounds: Normal breath sounds. No wheezing or rales.   Abdominal:      General: Bowel sounds are normal. There is no distension.      Palpations: Abdomen is soft.      Tenderness: There is no abdominal tenderness.   Musculoskeletal:         General: No deformity. Normal range of motion.      Cervical back: Normal range of motion and neck supple.   Skin:     General: Skin is warm and dry.      Findings: No erythema or rash.      Comments: Right chest wall dialysis catheter. Dressing c/d/i. No sign of infection to site.   Neurological:      Mental Status: He is alert and oriented to person, place, and time.   Psychiatric:         Behavior: Behavior normal.         Thought Content: Thought content normal.         Judgment: Judgment normal.       Significant Labs:  CBC:   Recent Labs   Lab 01/23/23  0546   WBC 2.65*   RBC 2.46*   HGB 7.9*   HCT 24.1*   PLT 87*   MCV 98   MCH 32.1*   MCHC 32.8       CMP:   Recent Labs   Lab 01/23/23  0546   GLU 99   CALCIUM 7.4*   ALBUMIN 2.8*   PROT 5.3*      K 3.6   CO2 20*      BUN 34*   CREATININE 5.9*   ALKPHOS 68   ALT 7*   AST 11   BILITOT 0.4       All labs within the past 24 hours have been reviewed.

## 2023-01-23 NOTE — NURSING
Pt had uneventful day. Vitals remained stable and at baseline. No complaints of pain or discomfort. Will continue to monitor pt remaining of shift.  Pt did not produce any sputum for respiratory culture.

## 2023-01-23 NOTE — ASSESSMENT & PLAN NOTE
Mr. Lakhani was diagnosed with multiple myeloma in April 2022 after presenting with ASHLEE. 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 He had bone marrow biopsy which showed 100% involvement by plasma cells. He was started on CyBorD on 5/18 and received two cycles of treatment. His bone marrow biopsy after the first cycle showed a decrease in his plasma cell involvement to 80-90%. Dr. Bunch then started DVRd on 7/5/22. However, he was admitted to the hospital on 7/7/22 with septic shock requiring ICU care. He improved with antibiotics but had an ASHLEE thought to be due to hypotension which resulted in renal failure. Dialysis dependent since 07/22. Stem cell transplant on 12/30        Nephrology History  iHD Schedule: MWF   Unit/MD: Art   Duration: 4 hours   UF: ?  EDW: 107kg   Access: R permcat   Residual Renal Function: moderate     Plan/Recommendations:     -HD today for metabolic clearance and volume management   -Strict I/O's   -Trend renal function panels daily   -Renally dose meds/avoid nephrotoxic meds   -Renal diet/formulations, if not NPO

## 2023-01-23 NOTE — ASSESSMENT & PLAN NOTE
- Patient of Dr. King. Per most recent clinic note:   - 4/20/22: renal biopsy: light chain cast nephropathy, kappa light chain  - 4/26/22: right subclavian vein thrombus   - 4/27/22: M spike 0.2 with free monoclonal kappa band. B2mg 19.57, IgG 496, IgA 10, IgM <5. Kappa 29599, Lambda 7.9, ratio >1000  - 5/12/22: BMBx: plasma cell myeloma, marrow cellularity 100%, plasma cell percentage 100%. Plasma cell phenotype +, kappa +, CD20- -, CD30-, EMA-, SADAF-, [place] red negative. Peripheral blood with pancytopenia and no circulating blasts. Decreased storage iron. FISH canceled due to quantity not sufficient  - 5/18/22: CyBorD C1D1  - 5/23/22: rasburicase  - 5/24/22: Xgeva  - 6/6/22: Kappa 65301, lambda 4.0, ratio >1000, M spike 0.1 with free monoclonal kappa band present  - 6/6/22: CyBorD C2D1  - 6/9/22: BMBx Plasma cell neoplasm compatible with plasma cell myeloma. Extent of involvement 80-90% of bone marrow elements. Cytology: Plasmablastic. FISH cytogenetics positive for 1q21/CKS1B gain. Karyotype failed. Myelofibrosis diffuse (MF-3)  - 6/20/22: CyBorD C2D8 delayed due to covid  - 6/23/22: CyBOrD C2D11  - 7/5/22: C1D1 DVRd  - 7/7/22 - 7/18/22: hospital admission for septic shock resulting in renal failure  - 7/25/22: started again on DVRd  - 8/8/2022: kappa 1402.8, lambda 7.5, ratio 167.04. M spike 0.1, two faint restricted bands in gamma globulin regions.   - 10/10/22: C5D1 DVRd. Revlimid on hold for upcoming stem   - Admitted for Torie 140 auto SCT on 12/28/22. Transplanted on 12/30/22. Engrafted on Day +14 with

## 2023-01-23 NOTE — ASSESSMENT & PLAN NOTE
- Patient of Dr. King  - Diagnosed with MM in April 2022. Underwent 2 cycles CyBorD then transitioned to DVRd (now s/p C6)  - Admitted 12/28/22 for Torie 140 auto SCT   - He received 6 bags with a total CD34 dose of 3.13 x10^6/kg on 12/30/22.  - Today is Day + 24  - Engrafted Day +14 with an ANC of 869

## 2023-01-23 NOTE — SUBJECTIVE & OBJECTIVE
Interval History: NAEON, reports minimal diarrhea, < 3 times per day.     Review of Systems   Constitutional:  Negative for activity change, appetite change and fever.   HENT:  Negative for congestion and sore throat.    Respiratory:  Negative for chest tightness and shortness of breath.    Cardiovascular:  Negative for chest pain.   Gastrointestinal:  Positive for diarrhea. Negative for blood in stool, nausea and vomiting.   Genitourinary:  Negative for dysuria and hematuria.   Musculoskeletal:  Negative for arthralgias, joint swelling and neck stiffness.   Allergic/Immunologic: Positive for immunocompromised state.   Neurological:  Negative for syncope and light-headedness.   Psychiatric/Behavioral:  Negative for confusion.      Objective:     Vital Signs (Most Recent):  Temp: 98.5 °F (36.9 °C) (01/23/23 1615)  Pulse: 65 (01/23/23 1615)  Resp: 18 (01/23/23 1615)  BP: 139/76 (01/23/23 1615)  SpO2: 100 % (01/23/23 1615) Vital Signs (24h Range):  Temp:  [97.9 °F (36.6 °C)-98.5 °F (36.9 °C)] 98.5 °F (36.9 °C)  Pulse:  [62-90] 65  Resp:  [18-20] 18  SpO2:  [97 %-100 %] 100 %  BP: ()/(53-90) 139/76     Weight: 99.5 kg (219 lb 5.7 oz)  Body mass index is 30.59 kg/m².    Estimated Creatinine Clearance: 16.4 mL/min (A) (based on SCr of 5.9 mg/dL (H)).    Physical Exam  Constitutional:       Appearance: Normal appearance.   HENT:      Head: Normocephalic and atraumatic.      Nose: Nose normal.      Mouth/Throat:      Mouth: Mucous membranes are moist.      Pharynx: Oropharynx is clear.   Eyes:      General: No scleral icterus.     Conjunctiva/sclera: Conjunctivae normal.      Pupils: Pupils are equal, round, and reactive to light.   Cardiovascular:      Rate and Rhythm: Normal rate and regular rhythm.      Pulses: Normal pulses.      Heart sounds: Normal heart sounds.   Pulmonary:      Effort: Pulmonary effort is normal.      Breath sounds: Normal breath sounds.   Abdominal:      General: Abdomen is flat. Bowel sounds  are normal.      Palpations: Abdomen is soft.      Tenderness: There is no guarding or rebound.   Musculoskeletal:         General: No deformity. Normal range of motion.      Cervical back: Normal range of motion and neck supple.   Skin:     General: Skin is warm and dry.      Coloration: Skin is not jaundiced.      Findings: No rash.   Neurological:      Mental Status: He is alert and oriented to person, place, and time. Mental status is at baseline.   Psychiatric:         Behavior: Behavior normal.         Thought Content: Thought content normal.       Significant Labs: BMP:   Recent Labs   Lab 01/23/23  0546   GLU 99      K 3.6      CO2 20*   BUN 34*   CREATININE 5.9*   CALCIUM 7.4*   MG 1.7     Microbiology Results (last 7 days)       Procedure Component Value Units Date/Time    Blood culture [[id number]] Collected: 01/20/23 1820    Order Status: Completed Specimen: Blood Updated: 01/23/23 0613     Blood Culture, Routine No Growth to date      No Growth to date      No Growth to date    Blood culture [[id number]] Collected: 01/19/23 1812    Order Status: Completed Specimen: Blood Updated: 01/22/23 2212     Blood Culture, Routine No Growth to date      No Growth to date      No Growth to date      No Growth to date    Blood culture [[id number]] Collected: 01/19/23 1812    Order Status: Completed Specimen: Blood Updated: 01/22/23 2212     Blood Culture, Routine No Growth to date      No Growth to date      No Growth to date      No Growth to date    Blood culture [[id number]] Collected: 01/20/23 1820    Order Status: Completed Specimen: Blood Updated: 01/22/23 2012     Blood Culture, Routine No Growth to date      No Growth to date      No Growth to date    Blood culture x two cultures. Draw prior to antibiotics. [[id number]] Collected: 01/17/23 1039    Order Status: Completed Specimen: Blood from Peripheral, Antecubital, Left Updated: 01/22/23 1212     Blood Culture, Routine No growth after 5 days.     Narrative:      Aerobic and anaerobic    Blood culture x two cultures. Draw prior to antibiotics. [[id number]] Collected: 01/17/23 1039    Order Status: Completed Specimen: Blood from Peripheral, Antecubital, Right Updated: 01/22/23 1212     Blood Culture, Routine No growth after 5 days.    Narrative:      Aerobic and anaerobic    Respiratory Infection Panel (PCR), Nasopharyngeal [[id number]]     Order Status: No result Specimen: Nasopharyngeal Swab     Urine Culture High Risk [[id number]]     Order Status: No result Specimen: Urine, Clean Catch     Stool culture [[id number]]     Order Status: No result Specimen: Stool     Clostridium difficile EIA [[id number]]     Order Status: Canceled Specimen: Stool     Respiratory Infection Panel (PCR), Nasopharyngeal [[id number]]     Order Status: Canceled Specimen: Nasopharyngeal Swab           Recent Lab Results         01/23/23  0546        Albumin 2.8       Alkaline Phosphatase 68       ALT 7       Anion Gap 12       Aniso Slight       AST 11       Baso # 0.02       Basophil % 0.8       BILIRUBIN TOTAL 0.4  Comment: For infants and newborns, interpretation of results should be based  on gestational age, weight and in agreement with clinical  observations.    Premature Infant recommended reference ranges:  Up to 24 hours.............<8.0 mg/dL  Up to 48 hours............<12.0 mg/dL  3-5 days..................<15.0 mg/dL  6-29 days.................<15.0 mg/dL         BUN 34       Calcium 7.4       Chloride 106       CO2 20       Creatinine 5.9       Differential Method Automated       eGFR 10.4       Eos # 0.0       Eosinophil % 0.0       Glucose 99       Gran # (ANC) 1.9       Gran % 70.5       Hematocrit 24.1       Hemoglobin 7.9       Hypo Occasional       Immature Grans (Abs) 0.06  Comment: Mild elevation in immature granulocytes is non specific and   can be seen in a variety of conditions including stress response,   acute inflammation, trauma and pregnancy. Correlation with  other   laboratory and clinical findings is essential.         Immature Granulocytes 2.3       Lymph # 0.4       Lymph % 14.7       Magnesium 1.7       MCH 32.1       MCHC 32.8       MCV 98       Mono # 0.3       Mono % 11.7       MPV 11.9       nRBC 0       Ovalocytes Occasional       Phosphorus 3.3       Platelets 87       Poikilocytosis Slight       Poly Occasional       Potassium 3.6       PROTEIN TOTAL 5.3       RBC 2.46       RDW 18.0       Sodium 138       Spherocytes Occasional       WBC 2.65               Significant Imaging: I have reviewed all pertinent imaging results/findings within the past 24 hours.

## 2023-01-23 NOTE — SUBJECTIVE & OBJECTIVE
Interval History: Seen and evaluated while on hemodialysis. Tolerating well. Awaiting for discharge later today.     Review of patient's allergies indicates:  No Known Allergies  Current Facility-Administered Medications   Medication Frequency    0.9%  NaCl infusion Once    acetaminophen tablet 650 mg Q6H PRN    acyclovir capsule 400 mg BID    amoxicillin-clavulanate 500-125mg per tablet 500 mg Daily    calcium carbonate 200 mg calcium (500 mg) chewable tablet 1,000 mg TID    dextrose 10% bolus 125 mL 125 mL PRN    dextrose 10% bolus 250 mL 250 mL PRN    doxycycline tablet 100 mg Q12H    [START ON 1/30/2023] ergocalciferol capsule 50,000 Units Q14 Days    famotidine tablet 20 mg Daily    glucagon (human recombinant) injection 1 mg PRN    glucose chewable tablet 16 g PRN    glucose chewable tablet 24 g PRN    heparin (porcine) injection 1,000 Units PRN    heparin (porcine) injection 1,000 Units PRN    heparin (porcine) injection 5,000 Units Q8H    iohexol (OMNIPAQUE) oral solution 15 mL PRN    LIDOcaine HCl 2% oral solution 15 mL Q6H PRN    loperamide capsule 2 mg QID PRN    mupirocin 2 % ointment BID    naloxone 0.4 mg/mL injection 0.02 mg PRN    OLANZapine tablet 5 mg QHS    ondansetron tablet 8 mg Q8H    prochlorperazine tablet 10 mg TID PRN    scopolamine 1.3-1.5 mg (1 mg over 3 days) 1 patch Q3 Days    sertraline tablet 50 mg Daily    sodium chloride 0.9% bolus 250 mL 250 mL PRN    sodium chloride 0.9% flush 10 mL Q12H PRN    vitamin D 1000 units tablet 3,000 Units Daily       Objective:     Vital Signs (Most Recent):  Temp: 97.9 °F (36.6 °C) (01/23/23 1300)  Pulse: 66 (01/23/23 1500)  Resp: 18 (01/23/23 1300)  BP: 126/73 (01/23/23 1500)  SpO2: 100 % (01/23/23 1300)   Vital Signs (24h Range):  Temp:  [97.9 °F (36.6 °C)-98.9 °F (37.2 °C)] 97.9 °F (36.6 °C)  Pulse:  [62-90] 66  Resp:  [18-20] 18  SpO2:  [97 %-100 %] 100 %  BP: ()/(53-84) 126/73     Weight: 99.5 kg (219 lb 5.7 oz) (01/23/23 0400)  Body mass  index is 30.59 kg/m².  Body surface area is 2.23 meters squared.    No intake/output data recorded.    Physical Exam  Vitals reviewed.   Constitutional:       Appearance: He is well-developed.   HENT:      Head: Normocephalic and atraumatic.      Mouth/Throat:      Pharynx: No oropharyngeal exudate.   Eyes:      General:         Right eye: No discharge.         Left eye: No discharge.      Conjunctiva/sclera: Conjunctivae normal.      Pupils: Pupils are equal, round, and reactive to light.   Cardiovascular:      Rate and Rhythm: Normal rate and regular rhythm.      Heart sounds: Normal heart sounds. No murmur heard.  Pulmonary:      Effort: Pulmonary effort is normal. No respiratory distress.      Breath sounds: Normal breath sounds. No wheezing or rales.   Abdominal:      General: Bowel sounds are normal. There is no distension.      Palpations: Abdomen is soft.      Tenderness: There is no abdominal tenderness.   Musculoskeletal:         General: No deformity. Normal range of motion.      Cervical back: Normal range of motion and neck supple.   Skin:     General: Skin is warm and dry.      Findings: No erythema or rash.      Comments: Right chest wall dialysis catheter. Dressing c/d/i. No sign of infection to site.   Neurological:      Mental Status: He is alert and oriented to person, place, and time.   Psychiatric:         Behavior: Behavior normal.         Thought Content: Thought content normal.         Judgment: Judgment normal.       Significant Labs:  CBC:   Recent Labs   Lab 01/23/23  0546   WBC 2.65*   RBC 2.46*   HGB 7.9*   HCT 24.1*   PLT 87*   MCV 98   MCH 32.1*   MCHC 32.8       CMP:   Recent Labs   Lab 01/23/23  0546   GLU 99   CALCIUM 7.4*   ALBUMIN 2.8*   PROT 5.3*      K 3.6   CO2 20*      BUN 34*   CREATININE 5.9*   ALKPHOS 68   ALT 7*   AST 11   BILITOT 0.4       All labs within the past 24 hours have been reviewed.

## 2023-01-23 NOTE — PROGRESS NOTES
[facility] ([place] [address])  Nephrology  Progress Note    Patient Name: De Lakhani  MRN: [id number]  Admission Date: 1/17/2023  Hospital Length of Stay: 6 days  Attending Provider: Peter King MD   Primary Care Physician: To Obtain Unable  Principal Problem:Neutropenic fever      Interval History: Last dialyzed on Saturday. Pending discharge today. Due for HD (dialyzes MWF).     Review of patient's allergies indicates:  No Known Allergies  Current Facility-Administered Medications   Medication Frequency    [START ON 1/24/2023] 0.9%  NaCl infusion Once    0.9%  NaCl infusion Once    acetaminophen tablet 650 mg Q6H PRN    acyclovir capsule 400 mg BID    amoxicillin-clavulanate 500-125mg per tablet 500 mg Daily    calcium carbonate 200 mg calcium (500 mg) chewable tablet 1,000 mg TID    dextrose 10% bolus 125 mL 125 mL PRN    dextrose 10% bolus 250 mL 250 mL PRN    doxycycline tablet 100 mg Q12H    [START ON 1/30/2023] ergocalciferol capsule 50,000 Units Q14 Days    famotidine tablet 20 mg Daily    glucagon (human recombinant) injection 1 mg PRN    glucose chewable tablet 16 g PRN    glucose chewable tablet 24 g PRN    heparin (porcine) injection 1,000 Units PRN    heparin (porcine) injection 1,000 Units PRN    heparin (porcine) injection 5,000 Units Q8H    iohexol (OMNIPAQUE) oral solution 15 mL PRN    LIDOcaine HCl 2% oral solution 15 mL Q6H PRN    loperamide capsule 2 mg QID PRN    mupirocin 2 % ointment BID    naloxone 0.4 mg/mL injection 0.02 mg PRN    OLANZapine tablet 5 mg QHS    ondansetron tablet 8 mg Q8H    prochlorperazine tablet 10 mg TID PRN    scopolamine 1.3-1.5 mg (1 mg over 3 days) 1 patch Q3 Days    sertraline tablet 50 mg Daily    sodium chloride 0.9% bolus 250 mL 250 mL PRN    sodium chloride 0.9% flush 10 mL Q12H PRN    vitamin D 1000 units tablet 3,000 Units Daily       Objective:     Vital Signs (Most Recent):  Temp: 98.1 °F (36.7 °C)  (01/23/23 1159)  Pulse: 77 (01/23/23 1159)  Resp: 20 (01/23/23 0503)  BP: 113/68 (01/23/23 1159)  SpO2: 100 % (01/23/23 1159)   Vital Signs (24h Range):  Temp:  [98 °F (36.7 °C)-98.9 °F (37.2 °C)] 98.1 °F (36.7 °C)  Pulse:  [70-90] 77  Resp:  [20] 20  SpO2:  [97 %-100 %] 100 %  BP: ()/(53-68) 113/68     Weight: 99.5 kg (219 lb 5.7 oz) (01/23/23 0400)  Body mass index is 30.59 kg/m².  Body surface area is 2.23 meters squared.    No intake/output data recorded.    Physical Exam  Vitals and nursing note reviewed.   Constitutional:       Appearance: He is well-developed.   HENT:      Head: Normocephalic and atraumatic.      Mouth/Throat:      Pharynx: No oropharyngeal exudate.   Eyes:      General:         Right eye: No discharge.         Left eye: No discharge.      Conjunctiva/sclera: Conjunctivae normal.      Pupils: Pupils are equal, round, and reactive to light.   Cardiovascular:      Rate and Rhythm: Normal rate and regular rhythm.      Heart sounds: Normal heart sounds. No murmur heard.  Pulmonary:      Effort: Pulmonary effort is normal. No respiratory distress.      Breath sounds: Normal breath sounds. No wheezing or rales.   Abdominal:      General: Bowel sounds are normal. There is no distension.      Palpations: Abdomen is soft.      Tenderness: There is no abdominal tenderness.   Musculoskeletal:         General: No deformity. Normal range of motion.      Cervical back: Normal range of motion and neck supple.   Skin:     General: Skin is warm and dry.      Findings: No erythema or rash.      Comments: Right chest wall dialysis catheter. Dressing c/d/i. No sign of infection to site.   Neurological:      Mental Status: He is alert and oriented to person, place, and time.   Psychiatric:         Behavior: Behavior normal.         Thought Content: Thought content normal.         Judgment: Judgment normal.       Significant Labs:  CBC:   Recent Labs   Lab 01/23/23  0546   WBC 2.65*   RBC 2.46*   HGB 7.9*    HCT 24.1*   PLT 87*   MCV 98   MCH 32.1*   MCHC 32.8       CMP:   Recent Labs   Lab 01/23/23  0546   GLU 99   CALCIUM 7.4*   ALBUMIN 2.8*   PROT 5.3*      K 3.6   CO2 20*      BUN 34*   CREATININE 5.9*   ALKPHOS 68   ALT 7*   AST 11   BILITOT 0.4       All labs within the past 24 hours have been reviewed.       Assessment/Plan:     * Neutropenic fever  -management per primary     Anemia in ESRD (end-stage renal disease)  -Transfuse for Hg <7.0  -Target Hg of 10-12    Chronic kidney disease-mineral and bone disorder  -No need for phos binders at this time     End stage renal disease  Mr. Lakhani was diagnosed with multiple myeloma in April 2022 after presenting with ASHLEE. 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 He had bone marrow biopsy which showed 100% involvement by plasma cells. He was started on CyBorD on 5/18 and received two cycles of treatment. His bone marrow biopsy after the first cycle showed a decrease in his plasma cell involvement to 80-90%. Dr. Bunch then started DVRd on 7/5/22. However, he was admitted to the hospital on 7/7/22 with septic shock requiring ICU care. He improved with antibiotics but had an ASHLEE thought to be due to hypotension which resulted in renal failure. Dialysis dependent since 07/22. Stem cell transplant on 12/30        Nephrology History  iHD Schedule: [facility]   Unit/MD: Art   Duration: 4 hours   UF: ?  EDW: 107kg   Access: R permcath   Residual Renal Function: moderate     Plan/Recommendations:     -HD today for metabolic clearance and volume management   -Strict I/O's   -Trend renal function panels daily   -Renally dose meds/avoid nephrotoxic meds   -Renal diet/formulations, if not NPO                    Thank you for your consult. I will follow-up with patient. Please contact us if you have any additional questions.    Wolf Deal, NP  Nephrology  [facility] ([place])

## 2023-01-23 NOTE — NURSING
AOX4, no issues through night. Slept well through night. Safety maintained, call bell in reach. Wife at bedside.

## 2023-01-24 ENCOUNTER — PATIENT OUTREACH (OUTPATIENT)
Dept: ADMINISTRATIVE | Facility: CLINIC | Age: 58
End: 2023-01-24
Payer: MEDICARE

## 2023-01-24 ENCOUNTER — TELEPHONE (OUTPATIENT)
Dept: INFUSION THERAPY | Facility: HOSPITAL | Age: 58
End: 2023-01-24
Payer: MEDICARE

## 2023-01-24 LAB
BACTERIA BLD CULT: NORMAL
BACTERIA BLD CULT: NORMAL

## 2023-01-24 NOTE — PROGRESS NOTES
[facility] ([place] [address])  Infectious Disease  Progress Note    Patient Name: De Lakhani  MRN: [id number]  Admission Date: 1/17/2023  Length of Stay: 6 days  Attending Physician: No att. 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 No growth on cultures. No localizing symptoms and hemodynamically stable. Plan to discharge today.         Recommendations:  - Transition from pip/tazo on discharge to to amox-clav 500 mg PO q24 hours, after HD on HD days, last day 1/25/2023  - Continue chronic suppressive doxycycline           Anticipated Disposition: DC per primary     Thank you for your consult. I will sign off. Please contact us if you have any additional questions.    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 Patient was feeling well overall until transplant. He subsequently developed nausea, vomiting, diarrhea. After he was discharged to [facility], he developed fevers. No other localizing symptoms - no rhinorrhea, sore throat, SOB, cough, CP, abdominal pain, joint pain, rashes. No problems with HD catheter.       Interval History: NAEON    Review of Systems   Constitutional:  Negative for activity change, appetite change and fever.   HENT:  Negative for congestion and sore  throat.    Eyes:  Negative for pain and itching.   Respiratory:  Negative for chest tightness and shortness of breath.    Cardiovascular:  Negative for chest pain.   Gastrointestinal:  Positive for diarrhea. Negative for blood in stool, nausea and vomiting.   Genitourinary:  Negative for dysuria and hematuria.   Allergic/Immunologic: Positive for immunocompromised state.   Psychiatric/Behavioral:  Negative for confusion.    Objective:     Vital Signs (Most Recent):  Temp: 98.5 °F (36.9 °C) (01/23/23 1615)  Pulse: 65 (01/23/23 1615)  Resp: 18 (01/23/23 1615)  BP: 139/76 (01/23/23 1615)  SpO2: 100 % (01/23/23 1615) Vital Signs (24h Range):  Temp:  [97.9 °F (36.6 °C)-98.5 °F (36.9 °C)] 98.5 °F (36.9 °C)  Pulse:  [62-90] 65  Resp:  [18-20] 18  SpO2:  [97 %-100 %] 100 %  BP: ()/(53-90) 139/76     Weight: 99.5 kg (219 lb 5.7 oz)  Body mass index is 30.59 kg/m².    Estimated Creatinine Clearance: 16.4 mL/min (A) (based on SCr of 5.9 mg/dL (H)).    Physical Exam  Constitutional:       Appearance: Normal appearance.   HENT:      Head: Normocephalic and atraumatic.      Nose: Nose normal.      Mouth/Throat:      Mouth: Mucous membranes are moist.      Pharynx: Oropharynx is clear.   Eyes:      General: No scleral icterus.     Conjunctiva/sclera: Conjunctivae normal.      Pupils: Pupils are equal, round, and reactive to light.   Cardiovascular:      Rate and Rhythm: Normal rate and regular rhythm.      Pulses: Normal pulses.      Heart sounds: Normal heart sounds.   Pulmonary:      Effort: Pulmonary effort is normal.      Breath sounds: Normal breath sounds.   Abdominal:      General: Abdomen is flat. Bowel sounds are normal.      Palpations: Abdomen is soft.      Tenderness: There is no guarding or rebound.   Musculoskeletal:         General: No deformity. Normal range of motion.      Cervical back: Normal range of motion and neck supple.   Skin:     General: Skin is warm and dry.      Coloration: Skin is not jaundiced.       Findings: No rash.   Neurological:      Mental Status: He is alert and oriented to person, place, and time. Mental status is at baseline.   Psychiatric:         Behavior: Behavior normal.         Thought Content: Thought content normal.       Significant Labs:   Microbiology Results (last 7 days)       Procedure Component Value Units Date/Time    Blood culture [[id number]] Collected: 01/20/23 1820    Order Status: Completed Specimen: Blood Updated: 01/23/23 0613     Blood Culture, Routine No Growth to date      No Growth to date      No Growth to date    Blood culture [[id number]] Collected: 01/19/23 1812    Order Status: Completed Specimen: Blood Updated: 01/22/23 2212     Blood Culture, Routine No Growth to date      No Growth to date      No Growth to date      No Growth to date    Blood culture [[id number]] Collected: 01/19/23 1812    Order Status: Completed Specimen: Blood Updated: 01/22/23 2212     Blood Culture, Routine No Growth to date      No Growth to date      No Growth to date      No Growth to date    Blood culture [[id number]] Collected: 01/20/23 1820    Order Status: Completed Specimen: Blood Updated: 01/22/23 2012     Blood Culture, Routine No Growth to date      No Growth to date      No Growth to date    Blood culture x two cultures. Draw prior to antibiotics. [[id number]] Collected: 01/17/23 1039    Order Status: Completed Specimen: Blood from Peripheral, Antecubital, Left Updated: 01/22/23 1212     Blood Culture, Routine No growth after 5 days.    Narrative:      Aerobic and anaerobic    Blood culture x two cultures. Draw prior to antibiotics. [[id number]] Collected: 01/17/23 1039    Order Status: Completed Specimen: Blood from Peripheral, Antecubital, Right Updated: 01/22/23 1212     Blood Culture, Routine No growth after 5 days.    Narrative:      Aerobic and anaerobic    Respiratory Infection Panel (PCR), Nasopharyngeal [[id number]]     Order Status: No result Specimen: Nasopharyngeal Swab      Urine Culture High Risk [[id number]]     Order Status: No result Specimen: Urine, Clean Catch     Stool culture [[id number]]     Order Status: No result Specimen: Stool     Clostridium difficile EIA [[id number]]     Order Status: Canceled Specimen: Stool     Respiratory Infection Panel (PCR), Nasopharyngeal [[id number]]     Order Status: Canceled Specimen: Nasopharyngeal Swab           Recent Lab Results         01/23/23  0546        Albumin 2.8       Alkaline Phosphatase 68       ALT 7       Anion Gap 12       Aniso Slight       AST 11       Baso # 0.02       Basophil % 0.8       BILIRUBIN TOTAL 0.4  Comment: For infants and newborns, interpretation of results should be based  on gestational age, weight and in agreement with clinical  observations.    Premature Infant recommended reference ranges:  Up to 24 hours.............<8.0 mg/dL  Up to 48 hours............<12.0 mg/dL  3-5 days..................<15.0 mg/dL  6-29 days.................<15.0 mg/dL         BUN 34       Calcium 7.4       Chloride 106       CO2 20       Creatinine 5.9       Differential Method Automated       eGFR 10.4       Eos # 0.0       Eosinophil % 0.0       Glucose 99       Gran # (ANC) 1.9       Gran % 70.5       Hematocrit 24.1       Hemoglobin 7.9       Hypo Occasional       Immature Grans (Abs) 0.06  Comment: Mild elevation in immature granulocytes is non specific and   can be seen in a variety of conditions including stress response,   acute inflammation, trauma and pregnancy. Correlation with other   laboratory and clinical findings is essential.         Immature Granulocytes 2.3       Lymph # 0.4       Lymph % 14.7       Magnesium 1.7       MCH 32.1       MCHC 32.8       MCV 98       Mono # 0.3       Mono % 11.7       MPV 11.9       nRBC 0       Ovalocytes Occasional       Phosphorus 3.3       Platelets 87       Poikilocytosis Slight       Poly Occasional       Potassium 3.6       PROTEIN TOTAL 5.3       RBC 2.46       RDW 18.0        Sodium 138       Spherocytes Occasional       WBC 2.65               Significant Imaging: I have reviewed all pertinent imaging results/findings within the past 24 hours.

## 2023-01-24 NOTE — ASSESSMENT & PLAN NOTE
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 No growth on cultures. No localizing symptoms and hemodynamically stable. Plan to discharge today.         Recommendations:  - Transition from pip/tazo on discharge to to amox-clav 500 mg PO q24 hours, after HD on HD days, last day 1/25/2023  - Continue chronic suppressive doxycycline

## 2023-01-25 LAB — BACTERIA BLD CULT: NORMAL

## 2023-01-26 LAB — BACTERIA BLD CULT: NORMAL

## 2023-01-27 ENCOUNTER — TELEPHONE (OUTPATIENT)
Dept: HEMATOLOGY/ONCOLOGY | Facility: CLINIC | Age: 58
End: 2023-01-27
Payer: MEDICARE

## 2023-01-27 NOTE — TELEPHONE ENCOUNTER
I attempt to reach pt  to remind him of his apt on 1/30/23 at 9:30,but unfortunately I was  unable to  so i left both a voice mail and call back number.

## 2023-01-30 ENCOUNTER — OFFICE VISIT (OUTPATIENT)
Dept: HEMATOLOGY/ONCOLOGY | Facility: CLINIC | Age: 58
End: 2023-01-30
Payer: MEDICARE

## 2023-01-30 ENCOUNTER — LAB VISIT (OUTPATIENT)
Dept: LAB | Facility: HOSPITAL | Age: 58
End: 2023-01-30
Attending: NURSE PRACTITIONER
Payer: MEDICARE

## 2023-01-30 VITALS
OXYGEN SATURATION: 99 % | BODY MASS INDEX: 29.95 KG/M2 | HEIGHT: 71 IN | SYSTOLIC BLOOD PRESSURE: 110 MMHG | RESPIRATION RATE: 16 BRPM | TEMPERATURE: 98 F | DIASTOLIC BLOOD PRESSURE: 64 MMHG | HEART RATE: 95 BPM | WEIGHT: 213.94 LBS

## 2023-01-30 DIAGNOSIS — C90.00 MULTIPLE MYELOMA, REMISSION STATUS UNSPECIFIED: ICD-10-CM

## 2023-01-30 DIAGNOSIS — Z76.82 STEM CELL TRANSPLANT CANDIDATE: ICD-10-CM

## 2023-01-30 DIAGNOSIS — E78.2 MIXED HYPERLIPIDEMIA: ICD-10-CM

## 2023-01-30 DIAGNOSIS — T50.905A DRUG-INDUCED CYTOPENIA: ICD-10-CM

## 2023-01-30 DIAGNOSIS — Z99.2 DEPENDENCE ON HEMODIALYSIS: ICD-10-CM

## 2023-01-30 DIAGNOSIS — N18.6 ESRD ON HEMODIALYSIS: ICD-10-CM

## 2023-01-30 DIAGNOSIS — D75.9 DRUG-INDUCED CYTOPENIA: ICD-10-CM

## 2023-01-30 DIAGNOSIS — Z94.84 HISTORY OF AUTOLOGOUS STEM CELL TRANSPLANT: Primary | ICD-10-CM

## 2023-01-30 DIAGNOSIS — F32.0 CURRENT MILD EPISODE OF MAJOR DEPRESSIVE DISORDER WITHOUT PRIOR EPISODE: ICD-10-CM

## 2023-01-30 DIAGNOSIS — Z99.2 ESRD ON HEMODIALYSIS: ICD-10-CM

## 2023-01-30 DIAGNOSIS — T45.1X5A CINV (CHEMOTHERAPY-INDUCED NAUSEA AND VOMITING): ICD-10-CM

## 2023-01-30 DIAGNOSIS — R11.2 CINV (CHEMOTHERAPY-INDUCED NAUSEA AND VOMITING): ICD-10-CM

## 2023-01-30 DIAGNOSIS — I10 ESSENTIAL HYPERTENSION: ICD-10-CM

## 2023-01-30 DIAGNOSIS — C90.00 MULTIPLE MYELOMA NOT HAVING ACHIEVED REMISSION: ICD-10-CM

## 2023-01-30 LAB
ALBUMIN SERPL BCP-MCNC: 3.4 G/DL (ref 3.5–5.2)
ALP SERPL-CCNC: 65 U/L (ref 55–135)
ALT SERPL W/O P-5'-P-CCNC: 9 U/L (ref 10–44)
ANION GAP SERPL CALC-SCNC: 12 MMOL/L (ref 8–16)
AST SERPL-CCNC: 13 U/L (ref 10–40)
BASOPHILS # BLD AUTO: 0.05 K/UL (ref 0–0.2)
BASOPHILS NFR BLD: 1.3 % (ref 0–1.9)
BILIRUB SERPL-MCNC: 0.3 MG/DL (ref 0.1–1)
BUN SERPL-MCNC: 32 MG/DL (ref 6–20)
CALCIUM SERPL-MCNC: 8.2 MG/DL (ref 8.7–10.5)
CHLORIDE SERPL-SCNC: 104 MMOL/L (ref 95–110)
CO2 SERPL-SCNC: 23 MMOL/L (ref 23–29)
CREAT SERPL-MCNC: 5.7 MG/DL (ref 0.5–1.4)
DIFFERENTIAL METHOD: ABNORMAL
EOSINOPHIL # BLD AUTO: 0 K/UL (ref 0–0.5)
EOSINOPHIL NFR BLD: 0.3 % (ref 0–8)
ERYTHROCYTE [DISTWIDTH] IN BLOOD BY AUTOMATED COUNT: 17.8 % (ref 11.5–14.5)
EST. GFR  (NO RACE VARIABLE): 10.8 ML/MIN/1.73 M^2
GLUCOSE SERPL-MCNC: 104 MG/DL (ref 70–110)
HCT VFR BLD AUTO: 28.3 % (ref 40–54)
HGB BLD-MCNC: 9.1 G/DL (ref 14–18)
IMM GRANULOCYTES # BLD AUTO: 0.06 K/UL (ref 0–0.04)
IMM GRANULOCYTES NFR BLD AUTO: 1.6 % (ref 0–0.5)
LYMPHOCYTES # BLD AUTO: 0.9 K/UL (ref 1–4.8)
LYMPHOCYTES NFR BLD: 23 % (ref 18–48)
MAGNESIUM SERPL-MCNC: 1.8 MG/DL (ref 1.6–2.6)
MCH RBC QN AUTO: 31.6 PG (ref 27–31)
MCHC RBC AUTO-ENTMCNC: 32.2 G/DL (ref 32–36)
MCV RBC AUTO: 98 FL (ref 82–98)
MONOCYTES # BLD AUTO: 1.2 K/UL (ref 0.3–1)
MONOCYTES NFR BLD: 30.1 % (ref 4–15)
NEUTROPHILS # BLD AUTO: 1.7 K/UL (ref 1.8–7.7)
NEUTROPHILS NFR BLD: 43.7 % (ref 38–73)
NRBC BLD-RTO: 0 /100 WBC
PHOSPHATE SERPL-MCNC: 3.9 MG/DL (ref 2.7–4.5)
PLATELET # BLD AUTO: 154 K/UL (ref 150–450)
PMV BLD AUTO: 11 FL (ref 9.2–12.9)
POTASSIUM SERPL-SCNC: 3.9 MMOL/L (ref 3.5–5.1)
PROT SERPL-MCNC: 5.6 G/DL (ref 6–8.4)
RBC # BLD AUTO: 2.88 M/UL (ref 4.6–6.2)
SODIUM SERPL-SCNC: 139 MMOL/L (ref 136–145)
WBC # BLD AUTO: 3.82 K/UL (ref 3.9–12.7)

## 2023-01-30 PROCEDURE — 1159F MED LIST DOCD IN RCRD: CPT | Mod: CPTII,S$GLB,, | Performed by: NURSE PRACTITIONER

## 2023-01-30 PROCEDURE — 83735 ASSAY OF MAGNESIUM: CPT | Performed by: INTERNAL MEDICINE

## 2023-01-30 PROCEDURE — 3078F PR MOST RECENT DIASTOLIC BLOOD PRESSURE < 80 MM HG: ICD-10-PCS | Mod: CPTII,S$GLB,, | Performed by: NURSE PRACTITIONER

## 2023-01-30 PROCEDURE — 99215 OFFICE O/P EST HI 40 MIN: CPT | Mod: S$GLB,,, | Performed by: NURSE PRACTITIONER

## 2023-01-30 PROCEDURE — 36415 COLL VENOUS BLD VENIPUNCTURE: CPT | Performed by: INTERNAL MEDICINE

## 2023-01-30 PROCEDURE — 80053 COMPREHEN METABOLIC PANEL: CPT | Performed by: INTERNAL MEDICINE

## 2023-01-30 PROCEDURE — 3078F DIAST BP <80 MM HG: CPT | Mod: CPTII,S$GLB,, | Performed by: NURSE PRACTITIONER

## 2023-01-30 PROCEDURE — 3074F SYST BP LT 130 MM HG: CPT | Mod: CPTII,S$GLB,, | Performed by: NURSE PRACTITIONER

## 2023-01-30 PROCEDURE — 3066F NEPHROPATHY DOC TX: CPT | Mod: CPTII,S$GLB,, | Performed by: NURSE PRACTITIONER

## 2023-01-30 PROCEDURE — 1159F PR MEDICATION LIST DOCUMENTED IN MEDICAL RECORD: ICD-10-PCS | Mod: CPTII,S$GLB,, | Performed by: NURSE PRACTITIONER

## 2023-01-30 PROCEDURE — 84100 ASSAY OF PHOSPHORUS: CPT | Performed by: INTERNAL MEDICINE

## 2023-01-30 PROCEDURE — 1160F RVW MEDS BY RX/DR IN RCRD: CPT | Mod: CPTII,S$GLB,, | Performed by: NURSE PRACTITIONER

## 2023-01-30 PROCEDURE — 3066F PR DOCUMENTATION OF TREATMENT FOR NEPHROPATHY: ICD-10-PCS | Mod: CPTII,S$GLB,, | Performed by: NURSE PRACTITIONER

## 2023-01-30 PROCEDURE — 1160F PR REVIEW ALL MEDS BY PRESCRIBER/CLIN PHARMACIST DOCUMENTED: ICD-10-PCS | Mod: CPTII,S$GLB,, | Performed by: NURSE PRACTITIONER

## 2023-01-30 PROCEDURE — 3008F PR BODY MASS INDEX (BMI) DOCUMENTED: ICD-10-PCS | Mod: CPTII,S$GLB,, | Performed by: NURSE PRACTITIONER

## 2023-01-30 PROCEDURE — 99215 PR OFFICE/OUTPT VISIT, EST, LEVL V, 40-54 MIN: ICD-10-PCS | Mod: S$GLB,,, | Performed by: NURSE PRACTITIONER

## 2023-01-30 PROCEDURE — 3008F BODY MASS INDEX DOCD: CPT | Mod: CPTII,S$GLB,, | Performed by: NURSE PRACTITIONER

## 2023-01-30 PROCEDURE — 3074F PR MOST RECENT SYSTOLIC BLOOD PRESSURE < 130 MM HG: ICD-10-PCS | Mod: CPTII,S$GLB,, | Performed by: NURSE PRACTITIONER

## 2023-01-30 PROCEDURE — 85025 COMPLETE CBC W/AUTO DIFF WBC: CPT | Performed by: INTERNAL MEDICINE

## 2023-01-30 NOTE — PROGRESS NOTES
HEMATOLOGY ONCOLOGY FELLOW CLINIC    Hematology History  Initial consult  Mr. De Lakhani is a 57 year old male with hypertension, history of prosthetic joint infection who was referred by Dr. Jonh Bunch for transplant evaluation for high-risk kappa light chain multiple myeloma with 1q gain.     Mr. Lakhani was diagnosed with multiple myeloma in April 2022 after presenting with ASHLEE. 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 He had bone marrow biopsy which showed 100% involvement by plasma cells. He was started on CyBorD on 5/18 and received two cycles of treatment. His bone marrow biopsy after the first cycle showed a decrease in his plasma cell involvement to 80-90%. Dr. Bunch then started DVRd on 7/5/22. However, he was admitted to the hospital on 7/7/22 with septic shock requiring ICU care. He improved with antibiotics but had an ASHLEE thought to be due to hypotension which resulted in renal failure. He is now dialysis dependent. He saw Dr. Bunch after he was discharged from the hospital and started back on DVRd with dose reduced lenalidomide on 7/25/22.     Oncology history:  4/20/22: renal biopsy: light chain cast nephropathy, kappa light chain  4/26/22: right subclavian vein thrombus   4/27/22: M spike 0.2 with free monoclonal kappa band. B2mg 19.57, IgG 496, IgA 10, IgM <5. Kappa 58810, Lambda 7.9, ratio >1000  5/12/22: BMBx: plasma cell myeloma, marrow cellularity 100%, plasma cell percentage 100%. Plasma cell phenotype +, kappa +, CD20- -, CD30-, EMA-, SADAF-, Congo red negative. Peripheral blood with pancytopenia and no circulating blasts. Decreased storage iron. FISH canceled due to quantity not sufficient  5/18/22: CyBorD C1D1  5/23/22: rasburicase  5/24/22: Xgeva  6/6/22: Kappa 26126, lambda 4.0, ratio >1000, M spike 0.1 with free monoclonal kappa band present  6/6/22: CyBorD C2D1  6/9/22: BMBx Plasma cell neoplasm compatible with plasma cell myeloma. Extent of  involvement 80-90% of bone marrow elements. Cytology: Plasmablastic. FISH cytogenetics positive for 1q21/CKS1B gain. Karyotype failed. Myelofibrosis diffuse (MF-3)  6/20/22: CyBorD C2D8 delayed due to covid  6/23/22: CyBOrD C2D11  7/5/22: C1D1 DVRd  7/7/22 - 7/18/22: hospital admission for septic shock resulting in renal failure  7/25/22: started again on DVRd  8/8/2022: kappa 1402.8, lambda 7.5, ratio 167.04. M spike 0.1, two faint restricted bands in gamma globulin regions.   10/10/22: C5D1 DVRd. Revlimid on hold for upcoming stem cell collection  10/31/22: C6D1 DVRd. Revlimid on hold    Patient had a left knee prosthetic joint infection in the summer of 2021. In March, still had bacteria in joint space, now on prophylactic doxycycline.   He had a colonoscopy 8 years ago which he thinks was clear.   Transplant History: Patient presented for Torie 140 auto stem cell transplant for multiple myeloma.  Patient had expected chemo induced nausea and vomiting which was managed with antiemetics.  Patient did also have diarrhea managed with Imodium.  Patient tolerated dialysis well.  He is day 2 engraftment.  CBC with differential pending although WBC 2.08.  Platelets 18 K, will give 1 unit platelets today.  He has follow-up next week  Hospital Course: Admitted on Day +18 for a neutropenic fever and uncontrolled n/v. Diarrhea continued. Infectious workup obtained - unremarkable except for an elevated procal and CRP. Was initially started on Cefepime. Changed to Cipro/Flagyl with diarrhea and brief abdominal complaints. Planned to discharge Saturday after dialysis session, however, sustained fever reoccurred Friday night to 100.9. Infectious workup repeated and started back on Zosyn. CT c/a/p obtained and negative for infectious process. Did show a lesion on the pancreas - rec outpatient f/u with MRI or CT pancreas protocol. After restarting Zosyn, remained afebrile for nearly 72 hours, so was switched to Augmentin per ID  recs. He will complete a total of 7 day course of abx on Wednesday 1/25. Regarding his nausea, he was started on Zyprexa and continued home scopolamine patch and compazine with improvement in nausea symptoms. On day of discharge patient eager to leave stating he is feeling good. Per Dr. King, today can count as his weekly check for transplant. ANC 1868. Not requiring transfusion support. He has his next weekly follow-up scheduled for next Monday 1/30/23.  Interval History:  Mr. Lakhani presents today following hospital admission. Received kortney asct for his MM. Today is Day + 31. On Day +18 readmitted with neutropenic fever and GI toxicities. Infectious work up was unremarkable. Completed course of antibiotics. Continue on HD. Today feels fine except fatigue. Patient is going back home today and f/u with  in 2 weeks.       Past Medical History:   Diagnosis Date    Chronic infection of prosthetic knee, subsequent encounter     Encounter for blood transfusion     Essential (primary) hypertension     Multiple myeloma        Family History   Problem Relation Age of Onset    Heart attack Father        Social History     Socioeconomic History    Marital status:      Spouse name: Darlyn    Number of children: 1   Tobacco Use    Smoking status: Former    Smokeless tobacco: Never    Tobacco comments:     quit smoking in the 90s   Substance and Sexual Activity    Alcohol use: Not Currently         MEDICATIONS:     Current Outpatient Medications on File Prior to Visit   Medication Sig Dispense Refill    acyclovir (ZOVIRAX) 200 MG capsule Take 2 capsules (400 mg total) by mouth 2 (two) times daily. 120 capsule 11    apixaban (ELIQUIS) 5 mg Tab Take 1 tablet (5 mg total) by mouth 2 (two) times daily. Hold until instructed to resume by BMT provider.      calcium carbonate (TUMS) 200 mg calcium (500 mg) chewable tablet Chew and swallow 2 tablets (1,000 mg total) by mouth 3 (three) times daily. 180 tablet 11     cholecalciferol, vitamin D3, 75 mcg (3,000 unit) Tab Take 1 tablet by mouth once daily.      doxycycline (VIBRAMYCIN) 100 MG Cap Take 100 mg by mouth every 12 (twelve) hours. Preventative for past knee infection      ergocalciferol (ERGOCALCIFEROL) 50,000 unit Cap Take 50,000 Units by mouth every 14 (fourteen) days. Given at dialysis center      famotidine (PEPCID) 20 MG tablet Take 20 mg by mouth once daily.      LIDOcaine HCl 2% (XYLOCAINE) 2 % Soln 15 mLs by Mucous Membrane route every 6 (six) hours as needed (mouth sores). 100 mL 0    metoprolol succinate (TOPROL-XL) 25 MG 24 hr tablet Take 0.5 tablets (12.5 mg total) by mouth once daily. 15 tablet 11    OLANZapine (ZYPREXA) 5 MG tablet Take 1 tablet (5 mg total) by mouth every evening. 30 tablet 11    ondansetron (ZOFRAN) 4 MG tablet TAKE ONE TABLET BY MOUTH EVERY EIGHT HOURS IF NEEDED FOR NAUSEA.      oxyCODONE-acetaminophen (PERCOCET)  mg per tablet Take 1 tablet by mouth every 12 (twelve) hours as needed for Pain.      prochlorperazine (COMPAZINE) 10 MG tablet Take 1 tablet (10 mg total) by mouth 3 (three) times daily as needed (for nausea/vomiting). 30 tablet 0    rosuvastatin (CRESTOR) 20 MG tablet Take 20 mg by mouth once daily.      scopolamine (TRANSDERM-SCOP) 1.3-1.5 mg (1 mg over 3 days) Place 1 patch onto the skin Every 3 (three) days. Place 1 patch onto the skin every 3 days as needed for nausea      sertraline (ZOLOFT) 50 MG tablet Take 50 mg by mouth once daily.      sevelamer carbonate (RENVELA) 800 mg Tab Take 1 tablet (800 mg total) by mouth 3 (three) times daily with meals. 90 tablet 11    traMADoL (ULTRAM) 50 mg tablet Take 50 mg by mouth every 6 to 8 hours as needed for Pain.       No current facility-administered medications on file prior to visit.       ALLERGIES: Review of patient's allergies indicates:  No Known Allergies     ROS:       Review of Systems   Constitutional:  Positive for fatigue. Negative for chills and fever.    HENT:   Negative for nosebleeds and sore throat.    Respiratory:  Negative for cough and shortness of breath.    Cardiovascular:  Negative for chest pain and palpitations.   Gastrointestinal:  Negative for abdominal pain, diarrhea, nausea and vomiting.   Genitourinary:  Negative for dysuria and hematuria.    Musculoskeletal:  Negative for back pain and neck pain.   Skin:  Negative for rash and wound.   Neurological:  Negative for headaches and light-headedness.   Hematological:  Negative for adenopathy. Does not bruise/bleed easily.   Psychiatric/Behavioral:  Negative for depression. The patient is not nervous/anxious.      PHYSICAL EXAM:  Vitals:    01/30/23 0912   BP: 110/64   Pulse: 95   Resp: 16   Temp: 98.1 °F (36.7 °C)     Physical Exam  Constitutional:       Appearance: He is well-developed.   HENT:      Head: Normocephalic and atraumatic.      Mouth/Throat:      Mouth: Mucous membranes are moist. No oral lesions.      Pharynx: Oropharynx is clear.   Eyes:      Conjunctiva/sclera: Conjunctivae normal.   Cardiovascular:      Rate and Rhythm: Normal rate and regular rhythm.      Heart sounds: Normal heart sounds.   Pulmonary:      Effort: No respiratory distress.      Breath sounds: Normal breath sounds.   Abdominal:      General: Bowel sounds are normal.      Palpations: Abdomen is soft.   Musculoskeletal:         General: Normal range of motion.      Cervical back: Normal range of motion.   Skin:     General: Skin is warm and dry.      Comments: Right HD catheter intact   Neurological:      Mental Status: He is alert and oriented to person, place, and time.   Psychiatric:         Behavior: Behavior normal.         Thought Content: Thought content normal.         Judgment: Judgment normal.         PROBLEMS ASSESSED THIS VISIT:    1. History of autologous stem cell transplant    2. Multiple myeloma, remission status unspecified    3. Drug-induced cytopenia    4. CINV (chemotherapy-induced nausea and vomiting)     5. Mixed hyperlipidemia    6. Current mild episode of major depressive disorder without prior episode    7. ESRD on hemodialysis    8. Essential hypertension    9. Dependence on hemodialysis          ASSESSMENT AND PLAN    History of autologous stem cell transplant  - Patient of Dr. King  - Diagnosed with MM in April 2022. Underwent 2 cycles CyBorD then transitioned to DVRd (now s/p C6)  - Received CD34 dose of 3.13 x10^6/kg on 12/30/23. Today is Day +31  Patient will f/u with local oncologist  in 2 weeks and C in 4 weeks.     Multiple myeloma not having achieved remission  - Patient of Dr. King. Per most recent clinic note:   - 4/20/22: renal biopsy: light chain cast nephropathy, kappa light chain  - 4/26/22: right subclavian vein thrombus   - 4/27/22: M spike 0.2 with free monoclonal kappa band. B2mg 19.57, IgG 496, IgA 10, IgM <5. Kappa 08055, Lambda 7.9, ratio >1000  - 5/12/22: BMBx: plasma cell myeloma, marrow cellularity 100%, plasma cell percentage 100%. Plasma cell phenotype +, kappa +, CD20- -, CD30-, EMA-, SADAF-, [place] red negative. Peripheral blood with pancytopenia and no circulating blasts. Decreased storage iron. FISH canceled due to quantity not sufficient  - 5/18/22: CyBorD C1D1  - 5/23/22: rasburicase  - 5/24/22: Xgeva  - 6/6/22: Kappa 19482, lambda 4.0, ratio >1000, M spike 0.1 with free monoclonal kappa band present  - 6/6/22: CyBorD C2D1  - 6/9/22: BMBx Plasma cell neoplasm compatible with plasma cell myeloma. Extent of involvement 80-90% of bone marrow elements. Cytology: Plasmablastic. FISH cytogenetics positive for 1q21/CKS1B gain. Karyotype failed. Myelofibrosis diffuse (MF-3)  - 6/20/22: CyBorD C2D8 delayed due to covid  - 6/23/22: CyBOrD C2D11  - 7/5/22: C1D1 DVRd  - 7/7/22 - 7/18/22: hospital admission for septic shock resulting in renal failure  - 7/25/22: started again on DVRd  - 8/8/2022: kappa 1402.8, lambda 7.5, ratio 167.04. M spike 0.1, two faint  restricted bands in gamma globulin regions.   - 10/10/22: C5D1 DVRd. Revlimid on hold for upcoming stem   - Received Torie 140 auto SCT on 12/30/22 as above     cytopenia due to antineoplastic chemotherapy  - plt- wnl. Improving hgb, wbc     Moderate malnutrition  Following Dietitian  Encouraged small frequent meals      Fall during current hospitalization/syncope  - may related to syncope. CT head negative for bleed    Chemotherapy induced diarrhea  - C-diff neg 12/31  - Continue PRN imodium and limotil.   Resolved  CINV (chemotherapy-induced nausea and vomiting)  - Nausea occasionally  - home Scopalamine patch in place     Hyperlipidemia  - On statin     Depression  - Continue home Zoloft     Deep vein thrombosis (DVT) of upper extremity  - Platelet wnl. Resumed apixiban     Hypertension  - continue metoprolol     End stage renal disease  - developed during ICU stay. Believed to be 2/2 hypoprofusion due to hypotension  - on dialysis MWF at home.   - right chest wall dialysis catheter in place  - He will receive dialysis at [facility] through Day +30.     Dependence on hemodialysis  - see ESRD     Chronic infection of prosthetic knee  - Seen in ID clinic with Dr. Chatterjee prior to transplant   - ID rec'd continuing doxycycline throughout transplant. No sign of infection recurrence at this time.    Pancreatic Lesion  - Recent CT CAP on 01/21/23 with 1.3 cm hypoattenuating lesion within the pancreatic head   - order for MRI abdomen in 4 weeks      BMT Chart Routing      Follow up with physician .  in 4 weeks   Follow up with [facility]    Provider visit type    Infusion scheduling note    Injection scheduling note    Labs   Lab interval:  Cbc, cmp, Myeloma labs in 4 weeks. Abdominal MRI in 4 weeks. please coordinat visit together-patient from long distance   Imaging    Pharmacy appointment    Other referrals      Advance Care Planning     Date: 01/31/2023      We previously had discussions regarding his  underlying diagnosis, natural history, prognosis, and treatment options.  He was interested in pursuing any and all treatment options available to him. He remains interested in pursuing all treatment needed.  Will continue posttransplant monitoring.  Total time of this visit was 30 minutes, including time spent face to face with patient and/or via video/audio, and also in preparing for today's visit for MDM and documentation. (Medical Decision Making, including consideration of possible diagnoses, management options, complex medical record review, review of diagnostic tests and information, consideration and discussion of significant complications based on comorbidities, and discussion with providers involved with the care of the patient). Greater than 50% was spent face to face with the patient counseling and coordinating care.    Anna Tovar NP  Hematology/Oncology/BMT

## 2023-02-01 ENCOUNTER — TELEPHONE (OUTPATIENT)
Dept: INFUSION THERAPY | Facility: HOSPITAL | Age: 58
End: 2023-02-01
Payer: MEDICARE

## 2023-03-07 ENCOUNTER — LAB VISIT (OUTPATIENT)
Dept: LAB | Facility: HOSPITAL | Age: 58
End: 2023-03-07
Attending: NURSE PRACTITIONER
Payer: MEDICARE

## 2023-03-07 ENCOUNTER — OFFICE VISIT (OUTPATIENT)
Dept: HEMATOLOGY/ONCOLOGY | Facility: CLINIC | Age: 58
End: 2023-03-07
Payer: MEDICARE

## 2023-03-07 VITALS
SYSTOLIC BLOOD PRESSURE: 180 MMHG | WEIGHT: 218.06 LBS | DIASTOLIC BLOOD PRESSURE: 84 MMHG | HEIGHT: 71 IN | OXYGEN SATURATION: 100 % | BODY MASS INDEX: 30.53 KG/M2 | TEMPERATURE: 98 F | HEART RATE: 68 BPM | RESPIRATION RATE: 16 BRPM

## 2023-03-07 DIAGNOSIS — N18.6 ESRD ON HEMODIALYSIS: ICD-10-CM

## 2023-03-07 DIAGNOSIS — C90.00 MULTIPLE MYELOMA, REMISSION STATUS UNSPECIFIED: ICD-10-CM

## 2023-03-07 DIAGNOSIS — Z94.84 HISTORY OF AUTOLOGOUS STEM CELL TRANSPLANT: Primary | ICD-10-CM

## 2023-03-07 DIAGNOSIS — Z94.84 HISTORY OF AUTOLOGOUS STEM CELL TRANSPLANT: ICD-10-CM

## 2023-03-07 DIAGNOSIS — Z99.2 ESRD ON HEMODIALYSIS: ICD-10-CM

## 2023-03-07 DIAGNOSIS — D84.9 IMMUNOCOMPROMISED STATE: ICD-10-CM

## 2023-03-07 DIAGNOSIS — T50.905A DRUG-INDUCED CYTOPENIA: ICD-10-CM

## 2023-03-07 DIAGNOSIS — D75.9 DRUG-INDUCED CYTOPENIA: ICD-10-CM

## 2023-03-07 DIAGNOSIS — F32.89 OTHER DEPRESSION: ICD-10-CM

## 2023-03-07 DIAGNOSIS — E44.0 MODERATE MALNUTRITION: ICD-10-CM

## 2023-03-07 DIAGNOSIS — E78.2 MIXED HYPERLIPIDEMIA: ICD-10-CM

## 2023-03-07 DIAGNOSIS — I10 ESSENTIAL HYPERTENSION: ICD-10-CM

## 2023-03-07 DIAGNOSIS — K86.89 PANCREATIC MASS: ICD-10-CM

## 2023-03-07 LAB
ALBUMIN SERPL BCP-MCNC: 3.8 G/DL (ref 3.5–5.2)
ALP SERPL-CCNC: 46 U/L (ref 55–135)
ALT SERPL W/O P-5'-P-CCNC: 10 U/L (ref 10–44)
ANION GAP SERPL CALC-SCNC: 8 MMOL/L (ref 8–16)
AST SERPL-CCNC: 15 U/L (ref 10–40)
BASOPHILS # BLD AUTO: 0.02 K/UL (ref 0–0.2)
BASOPHILS NFR BLD: 0.7 % (ref 0–1.9)
BILIRUB SERPL-MCNC: 0.8 MG/DL (ref 0.1–1)
BUN SERPL-MCNC: 15 MG/DL (ref 6–20)
CALCIUM SERPL-MCNC: 9.4 MG/DL (ref 8.7–10.5)
CHLORIDE SERPL-SCNC: 108 MMOL/L (ref 95–110)
CO2 SERPL-SCNC: 25 MMOL/L (ref 23–29)
CREAT SERPL-MCNC: 3.8 MG/DL (ref 0.5–1.4)
DIFFERENTIAL METHOD: ABNORMAL
EOSINOPHIL # BLD AUTO: 0.1 K/UL (ref 0–0.5)
EOSINOPHIL NFR BLD: 2 % (ref 0–8)
ERYTHROCYTE [DISTWIDTH] IN BLOOD BY AUTOMATED COUNT: 18 % (ref 11.5–14.5)
EST. GFR  (NO RACE VARIABLE): 17.5 ML/MIN/1.73 M^2
GLUCOSE SERPL-MCNC: 112 MG/DL (ref 70–110)
HCT VFR BLD AUTO: 21.5 % (ref 40–54)
HGB BLD-MCNC: 6.9 G/DL (ref 14–18)
IGA SERPL-MCNC: <5 MG/DL (ref 40–350)
IGG SERPL-MCNC: 786 MG/DL (ref 650–1600)
IGM SERPL-MCNC: <5 MG/DL (ref 50–300)
IMM GRANULOCYTES # BLD AUTO: 0.01 K/UL (ref 0–0.04)
IMM GRANULOCYTES NFR BLD AUTO: 0.3 % (ref 0–0.5)
LYMPHOCYTES # BLD AUTO: 0.4 K/UL (ref 1–4.8)
LYMPHOCYTES NFR BLD: 12.5 % (ref 18–48)
MCH RBC QN AUTO: 33.5 PG (ref 27–31)
MCHC RBC AUTO-ENTMCNC: 32.1 G/DL (ref 32–36)
MCV RBC AUTO: 104 FL (ref 82–98)
MONOCYTES # BLD AUTO: 0.5 K/UL (ref 0.3–1)
MONOCYTES NFR BLD: 15.2 % (ref 4–15)
NEUTROPHILS # BLD AUTO: 2.1 K/UL (ref 1.8–7.7)
NEUTROPHILS NFR BLD: 69.3 % (ref 38–73)
NRBC BLD-RTO: 0 /100 WBC
PLATELET # BLD AUTO: 82 K/UL (ref 150–450)
PMV BLD AUTO: 11.2 FL (ref 9.2–12.9)
POTASSIUM SERPL-SCNC: 3.3 MMOL/L (ref 3.5–5.1)
PROT SERPL-MCNC: 6.2 G/DL (ref 6–8.4)
RBC # BLD AUTO: 2.06 M/UL (ref 4.6–6.2)
SODIUM SERPL-SCNC: 141 MMOL/L (ref 136–145)
WBC # BLD AUTO: 2.96 K/UL (ref 3.9–12.7)

## 2023-03-07 PROCEDURE — 85025 COMPLETE CBC W/AUTO DIFF WBC: CPT | Performed by: NURSE PRACTITIONER

## 2023-03-07 PROCEDURE — 3077F PR MOST RECENT SYSTOLIC BLOOD PRESSURE >= 140 MM HG: ICD-10-PCS | Mod: CPTII,S$GLB,, | Performed by: NURSE PRACTITIONER

## 2023-03-07 PROCEDURE — 3079F DIAST BP 80-89 MM HG: CPT | Mod: CPTII,S$GLB,, | Performed by: NURSE PRACTITIONER

## 2023-03-07 PROCEDURE — 1160F RVW MEDS BY RX/DR IN RCRD: CPT | Mod: CPTII,S$GLB,, | Performed by: NURSE PRACTITIONER

## 2023-03-07 PROCEDURE — 3008F PR BODY MASS INDEX (BMI) DOCUMENTED: ICD-10-PCS | Mod: CPTII,S$GLB,, | Performed by: NURSE PRACTITIONER

## 2023-03-07 PROCEDURE — 3008F BODY MASS INDEX DOCD: CPT | Mod: CPTII,S$GLB,, | Performed by: NURSE PRACTITIONER

## 2023-03-07 PROCEDURE — 99215 PR OFFICE/OUTPT VISIT, EST, LEVL V, 40-54 MIN: ICD-10-PCS | Mod: S$GLB,,, | Performed by: NURSE PRACTITIONER

## 2023-03-07 PROCEDURE — 83521 IG LIGHT CHAINS FREE EACH: CPT | Mod: 59 | Performed by: NURSE PRACTITIONER

## 2023-03-07 PROCEDURE — 3066F NEPHROPATHY DOC TX: CPT | Mod: CPTII,S$GLB,, | Performed by: NURSE PRACTITIONER

## 2023-03-07 PROCEDURE — 99999 PR PBB SHADOW E&M-EST. PATIENT-LVL V: CPT | Mod: PBBFAC,,, | Performed by: NURSE PRACTITIONER

## 2023-03-07 PROCEDURE — 3077F SYST BP >= 140 MM HG: CPT | Mod: CPTII,S$GLB,, | Performed by: NURSE PRACTITIONER

## 2023-03-07 PROCEDURE — 1159F MED LIST DOCD IN RCRD: CPT | Mod: CPTII,S$GLB,, | Performed by: NURSE PRACTITIONER

## 2023-03-07 PROCEDURE — 82784 ASSAY IGA/IGD/IGG/IGM EACH: CPT | Performed by: NURSE PRACTITIONER

## 2023-03-07 PROCEDURE — 36415 COLL VENOUS BLD VENIPUNCTURE: CPT | Performed by: NURSE PRACTITIONER

## 2023-03-07 PROCEDURE — 99999 PR PBB SHADOW E&M-EST. PATIENT-LVL V: ICD-10-PCS | Mod: PBBFAC,,, | Performed by: NURSE PRACTITIONER

## 2023-03-07 PROCEDURE — 99215 OFFICE O/P EST HI 40 MIN: CPT | Mod: S$GLB,,, | Performed by: NURSE PRACTITIONER

## 2023-03-07 PROCEDURE — 86334 IMMUNOFIX E-PHORESIS SERUM: CPT | Mod: 26,,, | Performed by: PATHOLOGY

## 2023-03-07 PROCEDURE — 84165 PROTEIN E-PHORESIS SERUM: CPT | Performed by: NURSE PRACTITIONER

## 2023-03-07 PROCEDURE — 84165 PROTEIN E-PHORESIS SERUM: CPT | Mod: 26,,, | Performed by: PATHOLOGY

## 2023-03-07 PROCEDURE — 1159F PR MEDICATION LIST DOCUMENTED IN MEDICAL RECORD: ICD-10-PCS | Mod: CPTII,S$GLB,, | Performed by: NURSE PRACTITIONER

## 2023-03-07 PROCEDURE — 3079F PR MOST RECENT DIASTOLIC BLOOD PRESSURE 80-89 MM HG: ICD-10-PCS | Mod: CPTII,S$GLB,, | Performed by: NURSE PRACTITIONER

## 2023-03-07 PROCEDURE — 84165 PATHOLOGIST INTERPRETATION SPE: ICD-10-PCS | Mod: 26,,, | Performed by: PATHOLOGY

## 2023-03-07 PROCEDURE — 80053 COMPREHEN METABOLIC PANEL: CPT | Performed by: NURSE PRACTITIONER

## 2023-03-07 PROCEDURE — 3066F PR DOCUMENTATION OF TREATMENT FOR NEPHROPATHY: ICD-10-PCS | Mod: CPTII,S$GLB,, | Performed by: NURSE PRACTITIONER

## 2023-03-07 PROCEDURE — 86334 IMMUNOFIX E-PHORESIS SERUM: CPT | Performed by: NURSE PRACTITIONER

## 2023-03-07 PROCEDURE — 1160F PR REVIEW ALL MEDS BY PRESCRIBER/CLIN PHARMACIST DOCUMENTED: ICD-10-PCS | Mod: CPTII,S$GLB,, | Performed by: NURSE PRACTITIONER

## 2023-03-07 PROCEDURE — 86334 PATHOLOGIST INTERPRETATION IFE: ICD-10-PCS | Mod: 26,,, | Performed by: PATHOLOGY

## 2023-03-07 NOTE — PROGRESS NOTES
HEMATOLOGY ONCOLOGY FELLOW CLINIC    Hematology History  Initial consult  Mr. De Lakhani is a 57 year old male with hypertension, history of prosthetic joint infection who was referred by Dr. Jonh Bunch for transplant evaluation for high-risk kappa light chain multiple myeloma with 1q gain.     Mr. Lakhani was diagnosed with multiple myeloma in April 2022 after presenting with ASHLEE. 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 He had bone marrow biopsy which showed 100% involvement by plasma cells. He was started on CyBorD on 5/18 and received two cycles of treatment. His bone marrow biopsy after the first cycle showed a decrease in his plasma cell involvement to 80-90%. Dr. Bunch then started DVRd on 7/5/22. However, he was admitted to the hospital on 7/7/22 with septic shock requiring ICU care. He improved with antibiotics but had an ASHLEE thought to be due to hypotension which resulted in renal failure. He is now dialysis dependent. He saw Dr. Bunch after he was discharged from the hospital and started back on DVRd with dose reduced lenalidomide on 7/25/22.     Oncology history:  4/20/22: renal biopsy: light chain cast nephropathy, kappa light chain  4/26/22: right subclavian vein thrombus   4/27/22: M spike 0.2 with free monoclonal kappa band. B2mg 19.57, IgG 496, IgA 10, IgM <5. Kappa 71336, Lambda 7.9, ratio >1000  5/12/22: BMBx: plasma cell myeloma, marrow cellularity 100%, plasma cell percentage 100%. Plasma cell phenotype +, kappa +, CD20- -, CD30-, EMA-, SADAF-, Congo red negative. Peripheral blood with pancytopenia and no circulating blasts. Decreased storage iron. FISH canceled due to quantity not sufficient  5/18/22: CyBorD C1D1  5/23/22: rasburicase  5/24/22: Xgeva  6/6/22: Kappa 84220, lambda 4.0, ratio >1000, M spike 0.1 with free monoclonal kappa band present  6/6/22: CyBorD C2D1  6/9/22: BMBx Plasma cell neoplasm compatible with plasma cell myeloma. Extent of  involvement 80-90% of bone marrow elements. Cytology: Plasmablastic. FISH cytogenetics positive for 1q21/CKS1B gain. Karyotype failed. Myelofibrosis diffuse (MF-3)  6/20/22: CyBorD C2D8 delayed due to covid  6/23/22: CyBOrD C2D11  7/5/22: C1D1 DVRd  7/7/22 - 7/18/22: hospital admission for septic shock resulting in renal failure  7/25/22: started again on DVRd  8/8/2022: kappa 1402.8, lambda 7.5, ratio 167.04. M spike 0.1, two faint restricted bands in gamma globulin regions.   10/10/22: C5D1 DVRd. Revlimid on hold for upcoming stem cell collection  10/31/22: C6D1 DVRd. Revlimid on hold  12/30/22: MelASCT  Patient had a left knee prosthetic joint infection in the summer of 2021. In March, still had bacteria in joint space, now on prophylactic doxycycline.   He had a colonoscopy 8 years ago which he thinks was clear.     Interval History:  Mr. Lakhani presents today follow up. Received kortney asct for his MM. Today is Day + 67. He is back to home and following  locally. He is doing well, no complaints. His hgb remains low side, 6.9 today but he refused PRBC. Continue on HD. He will back to [facility] for Day +100  restaging.   Presents with his wife.     Past Medical History:   Diagnosis Date    Chronic infection of prosthetic knee, subsequent encounter     Encounter for blood transfusion     Essential (primary) hypertension     Multiple myeloma        Family History   Problem Relation Age of Onset    Heart attack Father        Social History     Socioeconomic History    Marital status:      Spouse name: Darlyn    Number of children: 1   Tobacco Use    Smoking status: Former    Smokeless tobacco: Never    Tobacco comments:     quit smoking in the 90s   Substance and Sexual Activity    Alcohol use: Not Currently         MEDICATIONS:     Current Outpatient Medications on File Prior to Visit   Medication Sig Dispense Refill    acyclovir (ZOVIRAX) 200 MG capsule Take 2 capsules (400 mg total) by mouth 2 (two) times  daily. 120 capsule 11    apixaban (ELIQUIS) 5 mg Tab Take 1 tablet (5 mg total) by mouth 2 (two) times daily. Hold until instructed to resume by BMT provider.      calcium carbonate (TUMS) 200 mg calcium (500 mg) chewable tablet Chew and swallow 2 tablets (1,000 mg total) by mouth 3 (three) times daily. 180 tablet 11    cholecalciferol, vitamin D3, 75 mcg (3,000 unit) Tab Take 1 tablet by mouth once daily.      doxycycline (VIBRAMYCIN) 100 MG Cap Take 100 mg by mouth every 12 (twelve) hours. Preventative for past knee infection      ergocalciferol (ERGOCALCIFEROL) 50,000 unit Cap Take 50,000 Units by mouth every 14 (fourteen) days. Given at dialysis center      famotidine (PEPCID) 20 MG tablet Take 20 mg by mouth once daily.      LIDOcaine HCl 2% (XYLOCAINE) 2 % Soln 15 mLs by Mucous Membrane route every 6 (six) hours as needed (mouth sores). 100 mL 0    metoprolol succinate (TOPROL-XL) 25 MG 24 hr tablet Take 0.5 tablets (12.5 mg total) by mouth once daily. 15 tablet 11    OLANZapine (ZYPREXA) 5 MG tablet Take 1 tablet (5 mg total) by mouth every evening. 30 tablet 11    ondansetron (ZOFRAN) 4 MG tablet TAKE ONE TABLET BY MOUTH EVERY EIGHT HOURS IF NEEDED FOR NAUSEA.      oxyCODONE-acetaminophen (PERCOCET)  mg per tablet Take 1 tablet by mouth every 12 (twelve) hours as needed for Pain.      prochlorperazine (COMPAZINE) 10 MG tablet Take 1 tablet (10 mg total) by mouth 3 (three) times daily as needed (for nausea/vomiting). 30 tablet 0    rosuvastatin (CRESTOR) 20 MG tablet Take 20 mg by mouth once daily.      scopolamine (TRANSDERM-SCOP) 1.3-1.5 mg (1 mg over 3 days) Place 1 patch onto the skin Every 3 (three) days. Place 1 patch onto the skin every 3 days as needed for nausea      sertraline (ZOLOFT) 50 MG tablet Take 50 mg by mouth once daily.      sevelamer carbonate (RENVELA) 800 mg Tab Take 1 tablet (800 mg total) by mouth 3 (three) times daily with meals. 90 tablet 11    traMADoL (ULTRAM) 50 mg tablet  Take 50 mg by mouth every 6 to 8 hours as needed for Pain.       No current facility-administered medications on file prior to visit.       ALLERGIES: Review of patient's allergies indicates:  No Known Allergies     ROS:       Review of Systems   Constitutional:  Positive for fatigue. Negative for chills and fever.   HENT:   Negative for nosebleeds and sore throat.    Respiratory:  Negative for cough and shortness of breath.    Cardiovascular:  Negative for chest pain and palpitations.   Gastrointestinal:  Negative for abdominal pain, diarrhea, nausea and vomiting.   Genitourinary:  Negative for dysuria and hematuria.    Musculoskeletal:  Negative for back pain and neck pain.   Skin:  Negative for rash and wound.   Neurological:  Negative for headaches and light-headedness.   Hematological:  Negative for adenopathy. Does not bruise/bleed easily.   Psychiatric/Behavioral:  Negative for depression. The patient is not nervous/anxious.      PHYSICAL EXAM:  Vitals:    03/07/23 1231   BP: (!) 180/84   Pulse: 68   Resp: 16   Temp: 98.3 °F (36.8 °C)     Physical Exam  Constitutional:       Appearance: He is well-developed.   HENT:      Head: Normocephalic and atraumatic.      Mouth/Throat:      Mouth: Mucous membranes are moist. No oral lesions.      Pharynx: Oropharynx is clear.   Eyes:      Conjunctiva/sclera: Conjunctivae normal.   Cardiovascular:      Rate and Rhythm: Normal rate and regular rhythm.      Heart sounds: Normal heart sounds.   Pulmonary:      Effort: No respiratory distress.      Breath sounds: Normal breath sounds.   Abdominal:      General: Bowel sounds are normal.      Palpations: Abdomen is soft.   Musculoskeletal:         General: Normal range of motion.      Cervical back: Normal range of motion.   Skin:     General: Skin is warm and dry.      Comments: Right HD catheter intact   Neurological:      Mental Status: He is alert and oriented to person, place, and time.   Psychiatric:         Behavior:  Behavior normal.         Thought Content: Thought content normal.         Judgment: Judgment normal.         PROBLEMS ASSESSED THIS VISIT:    1. History of autologous stem cell transplant    2. Multiple myeloma, remission status unspecified    3. Drug-induced cytopenia    4. Moderate malnutrition    5. Mixed hyperlipidemia    6. Other depression    7. Essential hypertension    8. ESRD on hemodialysis    9. Pancreatic mass    10. Immunocompromised state            ASSESSMENT AND PLAN    History of autologous stem cell transplant  - Patient of Dr. King  - Diagnosed with MM in April 2022. Underwent 2 cycles CyBorD then transitioned to DVRd (now s/p C6)  - Received CD34 dose of 3.13 x10^6/kg on 12/30/23. Today is Day +67  - Day +60 MM labs done today result pending  Patient will f/u with local oncologist  and [facility] in 4 weeks for Day +100 restaging.    Multiple myeloma not having achieved remission  - Patient of Dr. King. Per most recent clinic note:   - 4/20/22: renal biopsy: light chain cast nephropathy, kappa light chain  - 4/26/22: right subclavian vein thrombus   - 4/27/22: M spike 0.2 with free monoclonal kappa band. B2mg 19.57, IgG 496, IgA 10, IgM <5. Kappa 03630, Lambda 7.9, ratio >1000  - 5/12/22: BMBx: plasma cell myeloma, marrow cellularity 100%, plasma cell percentage 100%. Plasma cell phenotype +, kappa +, CD20- -, CD30-, EMA-, SADAF-, Congo red negative. Peripheral blood with pancytopenia and no circulating blasts. Decreased storage iron. FISH canceled due to quantity not sufficient  - 5/18/22: CyBorD C1D1  - 5/23/22: rasburicase  - 5/24/22: Xgeva  - 6/6/22: Kappa 45631, lambda 4.0, ratio >1000, M spike 0.1 with free monoclonal kappa band present  - 6/6/22: CyBorD C2D1  - 6/9/22: BMBx Plasma cell neoplasm compatible with plasma cell myeloma. Extent of involvement 80-90% of bone marrow elements. Cytology: Plasmablastic. FISH cytogenetics positive for 1q21/CKS1B gain. Karyotype  failed. Myelofibrosis diffuse (MF-3)  - 6/20/22: CyBorD C2D8 delayed due to covid  - 6/23/22: CyBOrD C2D11  - 7/5/22: C1D1 DVRd  - 7/7/22 - 7/18/22: hospital admission for septic shock resulting in renal failure  - 7/25/22: started again on DVRd  - 8/8/2022: kappa 1402.8, lambda 7.5, ratio 167.04. M spike 0.1, two faint restricted bands in gamma globulin regions.   - 10/10/22: C5D1 DVRd. Revlimid on hold for upcoming stem   - Received Torie 140 auto SCT on 12/30/22 as above     cytopenia due to antineoplastic chemotherapy  - Refused transfusion for hgb 6.9  - He will f/u with nephrologist regarding EPO agent  - Repeat cbc next week locally      Moderate malnutrition  Following Dietitian  Encouraged small frequent meals    Improved  Fall during current hospitalization/syncope  - may related to syncope. CT head negative for bleed    CINV (chemotherapy-induced nausea and vomiting)  - Nausea occasionally  - home Scopalamine patch in place   Resolved  Hyperlipidemia  - On statin     Depression  - Continue home Zoloft     Deep vein thrombosis (DVT) of upper extremity  - on apixiban     Hypertension  - continue metoprolol     End stage renal disease  - developed during ICU stay. Believed to be 2/2 hypoprofusion due to hypotension  - on dialysis MWF at home.   - right chest wall dialysis catheter in place. Seeing surgeon on next week regarding AV Fistula. Plan for AV Fistula following Day +100     Dependence on hemodialysis  - see ESRD     Chronic infection of prosthetic knee  - Seen in ID clinic with Dr. Chatterjee prior to transplant   - ID rec'd continuing doxycycline throughout transplant. No sign of infection recurrence at this time.    Pancreatic Lesion  - Recent CT CAP on 01/21/23 with 1.3 cm hypoattenuating lesion within the pancreatic head   - order for MRI abdomen in 4 weeks. Plan to do locally.       BMT Chart Routing      Follow up with physician . Keep scheduled virtual visit(04/26) following restaging   Follow up  with [facility]    Provider visit type    Infusion scheduling note    Injection scheduling note    Labs    Imaging   Restaging labs, PET CT, biopsy on 04/11   Pharmacy appointment    Other referrals      Advance Care Planning     Date: 01/31/2023      We previously had discussions regarding his underlying diagnosis, natural history, prognosis, and treatment options.  He was interested in pursuing any and all treatment options available to him. He remains interested in pursuing all treatment needed.  Will continue posttransplant monitoring.  Total time of this visit was 30 minutes, including time spent face to face with patient and/or via video/audio, and also in preparing for today's visit for MDM and documentation. (Medical Decision Making, including consideration of possible diagnoses, management options, complex medical record review, review of diagnostic tests and information, consideration and discussion of significant complications based on comorbidities, and discussion with providers involved with the care of the patient). Greater than 50% was spent face to face with the patient counseling and coordinating care.    Anna Tovar NP  Hematology/Oncology/BMT

## 2023-03-08 LAB
ALBUMIN SERPL ELPH-MCNC: 3.91 G/DL (ref 3.35–5.55)
ALPHA1 GLOB SERPL ELPH-MCNC: 0.32 G/DL (ref 0.17–0.41)
ALPHA2 GLOB SERPL ELPH-MCNC: 0.61 G/DL (ref 0.43–0.99)
B-GLOBULIN SERPL ELPH-MCNC: 0.5 G/DL (ref 0.5–1.1)
GAMMA GLOB SERPL ELPH-MCNC: 0.65 G/DL (ref 0.67–1.58)
INTERPRETATION SERPL IFE-IMP: NORMAL
KAPPA LC SER QL IA: 1 MG/DL (ref 0.33–1.94)
KAPPA LC/LAMBDA SER IA: 1.49 (ref 0.26–1.65)
LAMBDA LC SER QL IA: 0.67 MG/DL (ref 0.57–2.63)
PROT SERPL-MCNC: 6 G/DL (ref 6–8.4)

## 2023-03-09 LAB
PATHOLOGIST INTERPRETATION IFE: NORMAL
PATHOLOGIST INTERPRETATION SPE: NORMAL

## 2023-04-10 ENCOUNTER — TELEPHONE (OUTPATIENT)
Dept: HEMATOLOGY/ONCOLOGY | Facility: CLINIC | Age: 58
End: 2023-04-10
Payer: MEDICARE

## 2023-04-10 DIAGNOSIS — C90.00 MULTIPLE MYELOMA, REMISSION STATUS UNSPECIFIED: Primary | ICD-10-CM

## 2023-04-10 NOTE — TELEPHONE ENCOUNTER
Spoke to pts wife and informed where to go to drop off 24 hour urine sample tomorrow before pts appt.

## 2023-04-10 NOTE — TELEPHONE ENCOUNTER
"----- Message from Leelee Solo sent at 4/10/2023 10:17 AM CDT -----  Consult/Advisory:       Name Of Caller: JAZMYNE JUAN (Spouse)          Contact Preference?[phone] (Mobile)         Provider Name: Christine       Does patient feel the need to be seen today? No       What is the nature of the call?: Calling to speak w/ nurse in regards to returning urine sample           Additional Notes:  "Thank you for all that you do for our patients"                                           "

## 2023-04-11 ENCOUNTER — PROCEDURE VISIT (OUTPATIENT)
Dept: HEMATOLOGY/ONCOLOGY | Facility: CLINIC | Age: 58
End: 2023-04-11
Payer: MEDICARE

## 2023-04-11 ENCOUNTER — HOSPITAL ENCOUNTER (OUTPATIENT)
Dept: RADIOLOGY | Facility: HOSPITAL | Age: 58
Discharge: HOME | End: 2023-04-11
Attending: NURSE PRACTITIONER
Payer: MEDICARE

## 2023-04-11 VITALS
DIASTOLIC BLOOD PRESSURE: 97 MMHG | RESPIRATION RATE: 20 BRPM | SYSTOLIC BLOOD PRESSURE: 168 MMHG | HEART RATE: 56 BPM | OXYGEN SATURATION: 100 %

## 2023-04-11 DIAGNOSIS — D72.0 GENETIC ANOMALIES OF LEUKOCYTES: ICD-10-CM

## 2023-04-11 DIAGNOSIS — Z94.84 HISTORY OF AUTOLOGOUS STEM CELL TRANSPLANT: ICD-10-CM

## 2023-04-11 DIAGNOSIS — C90.00 MULTIPLE MYELOMA, REMISSION STATUS UNSPECIFIED: ICD-10-CM

## 2023-04-11 DIAGNOSIS — C90.00 MULTIPLE MYELOMA, REMISSION STATUS UNSPECIFIED: Primary | ICD-10-CM

## 2023-04-11 LAB — POCT GLUCOSE: 115 MG/DL (ref 70–110)

## 2023-04-11 PROCEDURE — 88189 PR  FLOWCYTOMETRY/READ, 16 & > MARKERS: ICD-10-PCS | Mod: ,,, | Performed by: PATHOLOGY

## 2023-04-11 PROCEDURE — 88365 INSITU HYBRIDIZATION (FISH): CPT | Mod: 26,,, | Performed by: PATHOLOGY

## 2023-04-11 PROCEDURE — 88305 TISSUE EXAM BY PATHOLOGIST: ICD-10-PCS | Mod: 26,,, | Performed by: PATHOLOGY

## 2023-04-11 PROCEDURE — 88311 DECALCIFY TISSUE: CPT | Mod: 26,,, | Performed by: PATHOLOGY

## 2023-04-11 PROCEDURE — 88184 FLOWCYTOMETRY/ TC 1 MARKER: CPT | Mod: 59 | Performed by: PATHOLOGY

## 2023-04-11 PROCEDURE — 88364 INSITU HYBRIDIZATION (FISH): CPT | Performed by: PATHOLOGY

## 2023-04-11 PROCEDURE — 88311 DECALCIFY TISSUE: CPT | Performed by: PATHOLOGY

## 2023-04-11 PROCEDURE — 88311 PR  DECALCIFY TISSUE: ICD-10-PCS | Mod: 26,,, | Performed by: PATHOLOGY

## 2023-04-11 PROCEDURE — 88341 IMHCHEM/IMCYTCHM EA ADD ANTB: CPT | Performed by: PATHOLOGY

## 2023-04-11 PROCEDURE — 88365 PR  TISSUE HYBRIDIZATION: ICD-10-PCS | Mod: 26,,, | Performed by: PATHOLOGY

## 2023-04-11 PROCEDURE — 88341 PR IHC OR ICC EACH ADD'L SINGLE ANTIBODY  STAINPR: ICD-10-PCS | Mod: 26,59,, | Performed by: PATHOLOGY

## 2023-04-11 PROCEDURE — 88342 IMHCHEM/IMCYTCHM 1ST ANTB: CPT | Mod: 26,59,, | Performed by: PATHOLOGY

## 2023-04-11 PROCEDURE — 78816 NM PET CT WHOLE BODY: ICD-10-PCS | Mod: 26,PS,, | Performed by: RADIOLOGY

## 2023-04-11 PROCEDURE — 25500020 PHARM REV CODE 255: Performed by: NURSE PRACTITIONER

## 2023-04-11 PROCEDURE — 88365 INSITU HYBRIDIZATION (FISH): CPT | Performed by: PATHOLOGY

## 2023-04-11 PROCEDURE — 88313 PR  SPECIAL STAINS,GROUP II: ICD-10-PCS | Mod: 26,,, | Performed by: PATHOLOGY

## 2023-04-11 PROCEDURE — 88185 FLOWCYTOMETRY/TC ADD-ON: CPT | Mod: 59 | Performed by: NURSE PRACTITIONER

## 2023-04-11 PROCEDURE — A9698 NON-RAD CONTRAST MATERIALNOC: HCPCS | Performed by: NURSE PRACTITIONER

## 2023-04-11 PROCEDURE — 88305 TISSUE EXAM BY PATHOLOGIST: CPT | Performed by: PATHOLOGY

## 2023-04-11 PROCEDURE — 88271 CYTOGENETICS DNA PROBE: CPT | Performed by: NURSE PRACTITIONER

## 2023-04-11 PROCEDURE — 38222 DX BONE MARROW BX & ASPIR: CPT | Mod: LT,,, | Performed by: NURSE PRACTITIONER

## 2023-04-11 PROCEDURE — 85097 BONE MARROW INTERPRETATION: CPT | Mod: ,,, | Performed by: PATHOLOGY

## 2023-04-11 PROCEDURE — 88237 TISSUE CULTURE BONE MARROW: CPT | Performed by: NURSE PRACTITIONER

## 2023-04-11 PROCEDURE — 88184 FLOWCYTOMETRY/ TC 1 MARKER: CPT | Mod: 91 | Performed by: NURSE PRACTITIONER

## 2023-04-11 PROCEDURE — 88313 SPECIAL STAINS GROUP 2: CPT | Mod: 26,,, | Performed by: PATHOLOGY

## 2023-04-11 PROCEDURE — 88274 CYTOGENETICS 25-99: CPT | Performed by: NURSE PRACTITIONER

## 2023-04-11 PROCEDURE — 78816 PET IMAGE W/CT FULL BODY: CPT | Mod: TC

## 2023-04-11 PROCEDURE — 88313 SPECIAL STAINS GROUP 2: CPT | Performed by: PATHOLOGY

## 2023-04-11 PROCEDURE — 78816 PET IMAGE W/CT FULL BODY: CPT | Mod: 26,PS,, | Performed by: RADIOLOGY

## 2023-04-11 PROCEDURE — 88342 CHG IMMUNOCYTOCHEMISTRY: ICD-10-PCS | Mod: 26,59,, | Performed by: PATHOLOGY

## 2023-04-11 PROCEDURE — 88342 IMHCHEM/IMCYTCHM 1ST ANTB: CPT | Mod: 59 | Performed by: PATHOLOGY

## 2023-04-11 PROCEDURE — A9552 F18 FDG: HCPCS

## 2023-04-11 PROCEDURE — 88305 TISSUE EXAM BY PATHOLOGIST: CPT | Mod: 26,,, | Performed by: PATHOLOGY

## 2023-04-11 PROCEDURE — 88341 IMHCHEM/IMCYTCHM EA ADD ANTB: CPT | Mod: 26,59,, | Performed by: PATHOLOGY

## 2023-04-11 PROCEDURE — 88189 FLOWCYTOMETRY/READ 16 & >: CPT | Mod: ,,, | Performed by: PATHOLOGY

## 2023-04-11 PROCEDURE — 85097 PR  BONE MARROW,SMEAR INTERPRETATION: ICD-10-PCS | Mod: ,,, | Performed by: PATHOLOGY

## 2023-04-11 PROCEDURE — 88364 INSITU HYBRIDIZATION (FISH): CPT | Mod: 26,,, | Performed by: PATHOLOGY

## 2023-04-11 PROCEDURE — 88364 CHG INSITU HYBRIDIZATION (FISH: ICD-10-PCS | Mod: 26,,, | Performed by: PATHOLOGY

## 2023-04-11 PROCEDURE — 38222 BONE MARROW: ICD-10-PCS | Mod: LT,,, | Performed by: NURSE PRACTITIONER

## 2023-04-11 PROCEDURE — 88185 FLOWCYTOMETRY/TC ADD-ON: CPT | Mod: 59 | Performed by: PATHOLOGY

## 2023-04-11 RX ORDER — LIDOCAINE HYDROCHLORIDE 20 MG/ML
10 INJECTION, SOLUTION INFILTRATION; PERINEURAL
Status: COMPLETED | OUTPATIENT
Start: 2023-04-11 | End: 2023-04-11

## 2023-04-11 RX ADMIN — LIDOCAINE HYDROCHLORIDE 10 ML: 20 INJECTION, SOLUTION INFILTRATION; PERINEURAL at 12:04

## 2023-04-11 RX ADMIN — BARIUM SULFATE 450 ML: 20 SUSPENSION ORAL at 08:04

## 2023-04-11 NOTE — PROCEDURES
Bone marrow    Date/Time: 4/11/2023 10:00 AM  Performed by: Marianne Ramirez NP  Authorized by: Marianne Ramirez NP     Aspiration?: Yes   Biopsy?: Yes      PROCEDURE NOTE:  Bone Marrow aspiration and biopsy  Indication: day 100 post autologous stem cell transplant restaging Multiple Myeloma  Consent: Informed consent was obtained from patient.  Timeout: Done and documented.  Position: Prone  Site: Left posterior illiac crest.  Prep: Betadine.  Needle used: 11 gauge Jamshidi needle.  Anesthetic: 2% lidocaine 8 cc.  Biopsy: The biopsy needle was introduced into the marrow cavity and an 25 cc's of aspirate was obtained without complications on 2nd attempt and sent for flow, cytogenetics, PCPD FISH and MRD. Core biopsy obtained without complications and sent for routine histologic examination.   Complications: None.  EBL: minimal  Disposition: The patient was discharged home after laying supine for 20 minutes.    Marianne Ramirez NP  Hematology/BMT

## 2023-04-12 LAB
BODY SITE - BONE MARROW: NORMAL
CHROM BANDING METHOD: NORMAL
CHROMOSOME ANALYSIS BM ADDITIONAL INFORMATION: NORMAL
CHROMOSOME ANALYSIS BM RELEASED BY: NORMAL
CHROMOSOME ANALYSIS BM RESULT SUMMARY: NORMAL
CLINICAL CYTOGENETICIST REVIEW: NORMAL
CLINICAL DIAGNOSIS - BONE MARROW: NORMAL
FLOW CYTOMETRY ANTIBODIES ANALYZED - BONE MARROW: NORMAL
FLOW CYTOMETRY COMMENT - BONE MARROW: NORMAL
FLOW CYTOMETRY INTERPRETATION - BONE MARROW: NORMAL
KARYOTYP MAR: NORMAL
REASON FOR REFERRAL (NARRATIVE): NORMAL
REF LAB TEST METHOD: NORMAL
SPECIMEN SOURCE: NORMAL
SPECIMEN: NORMAL

## 2023-04-13 LAB
PCPDS FINAL DIAGNOSIS: NORMAL
PCPDS PRE-ANALYSIS PRE-SORT: NORMAL

## 2023-04-14 LAB
% B-CELL PRECURSORS: 1.32 %
% MAST CELLS: 0.01 %
ABNORMAL PLASMA CELL EVENTS: 0
GENETICIST REVIEW: NORMAL
LOWER LIMIT OF QUANTITATION (LLOQ): 1 X10(-5)
MINIMAL RESIDUAL DISEASE DETECTION (MRD), BONE MARROW: 0 %
PATIENT / SAMPLE THEORETICAL LOQ: 2 X10(-6)
PERCENT NORMAL PLASMA CELLS (OF TOTAL PC): 100 %
PLASMA CELL PROLIF RELEASED BY: NORMAL
PLASMA CELL PROLIF RESULT SUMMARY: NORMAL
PLASMA CELL PROLIF RESULT TABLE: NORMAL
PLASMA CELLS ASSESS MAR: NORMAL
POLY PLASMA CELL EVENTS: 180
REASON FOR REFERRAL, PLASMA CELL PROLIF (PCPD), FISH: NORMAL
REF LAB TEST METHOD: NORMAL
RESULTS, PLASMA CELL PROLIF (PCPD), FISH: NORMAL
SERVICE CMNT-IMP: NORMAL
SERVICE CMNT-IMP: NORMAL
SPECIMEN SOURCE: NORMAL
SPECIMEN, PLASMA CELL PROLIF (PCPD), FISH: NORMAL
TOTAL CELLS COUNTED MAR: NORMAL
TOTAL PLASMA CELL EVENTS: 180
VALIDATED ASSAY SENSITIVITY: 5.26 X10(-6)

## 2023-04-26 ENCOUNTER — OFFICE VISIT (OUTPATIENT)
Dept: HEMATOLOGY/ONCOLOGY | Facility: CLINIC | Age: 58
End: 2023-04-26
Payer: MEDICARE

## 2023-04-26 DIAGNOSIS — C90.01 MULTIPLE MYELOMA IN REMISSION: Primary | ICD-10-CM

## 2023-04-26 DIAGNOSIS — D61.818 PANCYTOPENIA: ICD-10-CM

## 2023-04-26 DIAGNOSIS — N18.6 ESRD ON HEMODIALYSIS: ICD-10-CM

## 2023-04-26 DIAGNOSIS — Z99.2 ESRD ON HEMODIALYSIS: ICD-10-CM

## 2023-04-26 DIAGNOSIS — Z94.84 HISTORY OF AUTO STEM CELL TRANSPLANT: ICD-10-CM

## 2023-04-26 PROCEDURE — 99215 OFFICE O/P EST HI 40 MIN: CPT | Mod: 95,,, | Performed by: INTERNAL MEDICINE

## 2023-04-26 PROCEDURE — 99215 PR OFFICE/OUTPT VISIT, EST, LEVL V, 40-54 MIN: ICD-10-PCS | Mod: 95,,, | Performed by: INTERNAL MEDICINE

## 2023-04-26 PROCEDURE — 3066F NEPHROPATHY DOC TX: CPT | Mod: CPTII,95,, | Performed by: INTERNAL MEDICINE

## 2023-04-26 PROCEDURE — 3066F PR DOCUMENTATION OF TREATMENT FOR NEPHROPATHY: ICD-10-PCS | Mod: CPTII,95,, | Performed by: INTERNAL MEDICINE

## 2023-05-03 NOTE — PROGRESS NOTES
HEMATOLOGY ONCOLOGY FELLOW CLINIC    Hematology History  Initial consult  Mr. De Lakhani is a 57 year old male with hypertension, history of prosthetic joint infection who was referred by Dr. Jonh Bunch for transplant evaluation for high-risk kappa light chain multiple myeloma with 1q gain.     Mr. Lakhani was diagnosed with multiple myeloma in April 2022 after presenting with ASHLEE. 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 He had bone marrow biopsy which showed 100% involvement by plasma cells. He was started on CyBorD on 5/18 and received two cycles of treatment. His bone marrow biopsy after the first cycle showed a decrease in his plasma cell involvement to 80-90%. Dr. Bunch then started DVRd on 7/5/22. However, he was admitted to the hospital on 7/7/22 with septic shock requiring ICU care. He improved with antibiotics but had an ASHLEE thought to be due to hypotension which resulted in renal failure. He is now dialysis dependent. He saw Dr. Bunch after he was discharged from the hospital and started back on DVRd with dose reduced lenalidomide on 7/25/22.     Oncology history:  4/20/22: renal biopsy: light chain cast nephropathy, kappa light chain  4/26/22: right subclavian vein thrombus   4/27/22: M spike 0.2 with free monoclonal kappa band. B2mg 19.57, IgG 496, IgA 10, IgM <5. Kappa 98217, Lambda 7.9, ratio >1000  5/12/22: BMBx: plasma cell myeloma, marrow cellularity 100%, plasma cell percentage 100%. Plasma cell phenotype +, kappa +, CD20- -, CD30-, EMA-, SADAF-, Congo red negative. Peripheral blood with pancytopenia and no circulating blasts. Decreased storage iron. FISH canceled due to quantity not sufficient  5/18/22: CyBorD C1D1  5/23/22: rasburicase  5/24/22: Xgeva  6/6/22: Kappa 01692, lambda 4.0, ratio >1000, M spike 0.1 with free monoclonal kappa band present  6/6/22: CyBorD C2D1  6/9/22: BMBx Plasma cell neoplasm compatible with plasma cell myeloma. Extent of  involvement 80-90% of bone marrow elements. Cytology: Plasmablastic. FISH cytogenetics positive for 1q21/CKS1B gain. Karyotype failed. Myelofibrosis diffuse (MF-3)  6/20/22: CyBorD C2D8 delayed due to covid  6/23/22: CyBOrD C2D11  7/5/22: C1D1 DVRd  7/7/22 - 7/18/22: hospital admission for septic shock resulting in renal failure  7/25/22: started again on DVRd  8/8/2022: kappa 1402.8, lambda 7.5, ratio 167.04. M spike 0.1, two faint restricted bands in gamma globulin regions.   10/10/22: C5D1 DVRd. Revlimid on hold for upcoming stem cell collection  10/31/22: C6D1 DVRd. Revlimid on hold  12/30/22: MelASCT  Patient had a left knee prosthetic joint infection in the summer of 2021. In March, still had bacteria in joint space, now on prophylactic doxycycline.   He had a colonoscopy 8 years ago which he thinks was clear.     Interval History:  Mr. Lakhani presents today follow up. Received kortney asct for his MM. Today is Day + +117; SCT day 12/30/22; he has recovered from transplant; back at home on HD; wife present durign interview.  Presents to review his day +100 post SCT restaging and discuss maintenance.     Past Medical History:   Diagnosis Date    Chronic infection of prosthetic knee, subsequent encounter     Encounter for blood transfusion     Essential (primary) hypertension     Multiple myeloma        Family History   Problem Relation Age of Onset    Heart attack Father        Social History     Socioeconomic History    Marital status:      Spouse name: Darlyn    Number of children: 1   Tobacco Use    Smoking status: Former    Smokeless tobacco: Never    Tobacco comments:     quit smoking in the 90s   Substance and Sexual Activity    Alcohol use: Not Currently         MEDICATIONS:     Current Outpatient Medications on File Prior to Visit   Medication Sig Dispense Refill    acyclovir (ZOVIRAX) 200 MG capsule Take 2 capsules (400 mg total) by mouth 2 (two) times daily. 120 capsule 11    apixaban (ELIQUIS) 5 mg  Tab Take 1 tablet (5 mg total) by mouth 2 (two) times daily. Hold until instructed to resume by BMT provider.      calcium carbonate (TUMS) 200 mg calcium (500 mg) chewable tablet Chew and swallow 2 tablets (1,000 mg total) by mouth 3 (three) times daily. 180 tablet 11    cholecalciferol, vitamin D3, 75 mcg (3,000 unit) Tab Take 1 tablet by mouth once daily.      doxycycline (VIBRAMYCIN) 100 MG Cap Take 100 mg by mouth every 12 (twelve) hours. Preventative for past knee infection      ergocalciferol (ERGOCALCIFEROL) 50,000 unit Cap Take 50,000 Units by mouth every 14 (fourteen) days. Given at dialysis center      famotidine (PEPCID) 20 MG tablet Take 20 mg by mouth once daily.      LIDOcaine HCl 2% (XYLOCAINE) 2 % Soln 15 mLs by Mucous Membrane route every 6 (six) hours as needed (mouth sores). 100 mL 0    metoprolol succinate (TOPROL-XL) 25 MG 24 hr tablet Take 0.5 tablets (12.5 mg total) by mouth once daily. 15 tablet 11    OLANZapine (ZYPREXA) 5 MG tablet Take 1 tablet (5 mg total) by mouth every evening. 30 tablet 11    ondansetron (ZOFRAN) 4 MG tablet TAKE ONE TABLET BY MOUTH EVERY EIGHT HOURS IF NEEDED FOR NAUSEA.      oxyCODONE-acetaminophen (PERCOCET)  mg per tablet Take 1 tablet by mouth every 12 (twelve) hours as needed for Pain.      prochlorperazine (COMPAZINE) 10 MG tablet Take 1 tablet (10 mg total) by mouth 3 (three) times daily as needed (for nausea/vomiting). 30 tablet 0    rosuvastatin (CRESTOR) 20 MG tablet Take 20 mg by mouth once daily.      scopolamine (TRANSDERM-SCOP) 1.3-1.5 mg (1 mg over 3 days) Place 1 patch onto the skin Every 3 (three) days. Place 1 patch onto the skin every 3 days as needed for nausea      sertraline (ZOLOFT) 50 MG tablet Take 50 mg by mouth once daily.      sevelamer carbonate (RENVELA) 800 mg Tab Take 1 tablet (800 mg total) by mouth 3 (three) times daily with meals. (Patient not taking: Reported on 4/11/2023) 90 tablet 11    traMADoL (ULTRAM) 50 mg tablet Take  50 mg by mouth every 6 to 8 hours as needed for Pain.       No current facility-administered medications on file prior to visit.       ALLERGIES: Review of patient's allergies indicates:  No Known Allergies     ROS:       Answers submitted by the patient for this visit:  Review of Systems Questionnaire (Submitted on 4/26/2023)  appetite change : No  unexpected weight change: No  mouth sores: No  visual disturbance: No  cough: No  shortness of breath: No  chest pain: No  abdominal pain: No  diarrhea: No  frequency: Yes  back pain: No  rash: No  headaches: No  adenopathy: No  nervous/ anxious: No      PHYSICAL EXAM:  There were no vitals filed for this visit.    physical exam deferred due to telemed  Appears well  on camera    Lab Results   Component Value Date    WBC 2.96 (L) 04/11/2023    HGB 7.4 (L) 04/11/2023    HCT 22.9 (L) 04/11/2023     (H) 04/11/2023     (L) 04/11/2023       CMP  Sodium   Date Value Ref Range Status   04/11/2023 141 136 - 145 mmol/L Final     Potassium   Date Value Ref Range Status   04/11/2023 4.1 3.5 - 5.1 mmol/L Final     Chloride   Date Value Ref Range Status   04/11/2023 108 95 - 110 mmol/L Final     CO2   Date Value Ref Range Status   04/11/2023 23 23 - 29 mmol/L Final     Glucose   Date Value Ref Range Status   04/11/2023 91 70 - 110 mg/dL Final     BUN   Date Value Ref Range Status   04/11/2023 32 (H) 6 - 20 mg/dL Final     Creatinine   Date Value Ref Range Status   04/11/2023 5.2 (H) 0.5 - 1.4 mg/dL Final     Calcium   Date Value Ref Range Status   04/11/2023 8.9 8.7 - 10.5 mg/dL Final     Total Protein   Date Value Ref Range Status   04/11/2023 6.2 6.0 - 8.4 g/dL Final     Albumin   Date Value Ref Range Status   04/11/2023 4.2 3.5 - 5.2 g/dL Final     Total Bilirubin   Date Value Ref Range Status   04/11/2023 0.6 0.1 - 1.0 mg/dL Final     Comment:     For infants and newborns, interpretation of results should be based  on gestational age, weight and in agreement with  clinical  observations.    Premature Infant recommended reference ranges:  Up to 24 hours.............<8.0 mg/dL  Up to 48 hours............<12.0 mg/dL  3-5 days..................<15.0 mg/dL  6-29 days.................<15.0 mg/dL       Alkaline Phosphatase   Date Value Ref Range Status   04/11/2023 47 (L) 55 - 135 U/L Final     AST   Date Value Ref Range Status   04/11/2023 16 10 - 40 U/L Final     ALT   Date Value Ref Range Status   04/11/2023 10 10 - 44 U/L Final     Anion Gap   Date Value Ref Range Status   04/11/2023 10 8 - 16 mmol/L Final     eGFR   Date Value Ref Range Status   04/11/2023 12.0 (A) >60 mL/min/1.73 m^2 Final     Kappa Free Light Chains   Date Value Ref Range Status   04/11/2023 1.69 0.33 - 1.94 mg/dL Final   03/07/2023 1.00 0.33 - 1.94 mg/dL Final   11/22/2022 127.36 (H) 0.33 - 1.94 mg/dL Final     Lambda Free Light Chains   Date Value Ref Range Status   04/11/2023 0.64 0.57 - 2.63 mg/dL Final   03/07/2023 0.67 0.57 - 2.63 mg/dL Final   11/22/2022 0.70 0.57 - 2.63 mg/dL Final     Kappa/Lambda FLC Ratio   Date Value Ref Range Status   04/11/2023 2.64 (H) 0.26 - 1.65 Final     Comment:     Undetected antigen excess is a rare event but cannot   be excluded. If these free light chain results do not   agree with other clinical or laboratory findings or   if the sample is from a patient that has previously   demonstrated antigen excess, discuss with the testing   laboratory.   Results should always be interpreted in conjunction   with other laboratory tests and clinical evidence.     03/07/2023 1.49 0.26 - 1.65 Final     Comment:     Undetected antigen excess is a rare event but cannot   be excluded. If these free light chain results do not   agree with other clinical or laboratory findings or   if the sample is from a patient that has previously   demonstrated antigen excess, discuss with the testing   laboratory.   Results should always be interpreted in conjunction   with other laboratory tests and  clinical evidence.     11/22/2022 181.90 (H) 0.26 - 1.65 Final     Comment:     Undetected antigen excess is a rare event but cannot   be excluded. If these free light chain results do not   agree with other clinical or laboratory findings or   if the sample is from a patient that has previously   demonstrated antigen excess, discuss with the testing   laboratory.   Results should always be interpreted in conjunction   with other laboratory tests and clinical evidence.       IgG   Date Value Ref Range Status   04/11/2023 528 (L) 650 - 1600 mg/dL Final     Comment:     IgG Cord Blood Reference Range: 650-1600 mg/dL.     IgA   Date Value Ref Range Status   04/11/2023 6 (L) 40 - 350 mg/dL Final     Comment:     IgA Cord Blood Reference Range: <5 mg/dL.     IgM   Date Value Ref Range Status   04/11/2023 8 (L) 50 - 300 mg/dL Final     Comment:     IgM Cord Blood Reference Range: <25 mg/dL.     Results for orders placed or performed during the hospital encounter of 04/11/23 (from the past 2160 hour(s))   NM PET CT Whole Body    Impression    1.  No hypermetabolic lytic lesions typical for active multiple myeloma.    2.  New hypermetabolic high density material within the left tibial medullary cavity.  Differential considerations include inflammatory/infectious etiology such as granuloma as well as malignancy.    This report was flagged in Epic as abnormal.    I, Bucky Moctezuma MD, attest that I reviewed and interpreted the images.    Electronically signed by resident: Elvia Rees  Date:    04/11/2023  Time:    11:32    Electronically signed by: Bucky Moctezuma  Date:    04/11/2023  Time:    17:54     Marrow biopsy - 4/11/23  LEFT ILIAC CREST BONE MARROW ASPIRATE, BONE MARROW CLOT, AND BONE MARROW CORE BIOPSY WITH:     CELLULARITY=20-30%, TRILINEAGE HEMATOPOIETIC ACTIVITY (M/E=1.7:1).   NO DEFINITIVE MORPHOLOGIC OR IMMUNOPHENOTYPIC EVIDENCE OF RESIDUAL PLASMA CELL NEOPLASM.  SEE COMMENT.   INCREASED STORAGE IRON.   ADEQUATE  NUMBER OF MEGAKARYOCYTES  Final Diagnosis  SEE BELOW    Comment: Bone marrow, flow cytometric immunophenotyping:     No monotypic plasma cells identified (MRD-negative).        Pathologist Interpretation SPE  Pathologist Interpretation SPE  Collected: 04/11/23 0800   Result status: Final   Resulting lab: [facility] [place]   Value: REVIEWED   Comment:   Electronically reviewed and signed by:   Lindsay Solo MD   Signed on 04/12/23 at 15:10   Decreased total protein.   Decreased gamma globulins.     No paraprotein bands identified.    *Additional information available - comment   Pathologist Interpretation GRACE  Pathologist Interpretation GRACE  Collected: 04/11/23 0800   Result status: Final   Resulting lab: [facility] [place]   Value: REVIEWED   Comment:   Electronically reviewed and signed by:   Lindsay Solo MD   Signed on 04/12/23 at 14:36   No monoclonal peaks identified.    *Additional information available - comment     PROBLEMS ASSESSED THIS VISIT:    1. Multiple myeloma in remission    2. History of auto stem cell transplant    3. ESRD on hemodialysis    4. Pancytopenia            ASSESSMENT AND PLAN    Multiple myeloma , high risk /History of autologous stem cell transplant     - Diagnosed with MM in April 2022. Underwent 2 cycles CyBorD then transitioned to DVRd (now s/p C6); please see hpi for oncologic details/dates  - subsequent underwent kortney 140sct on 12/30/22; Today is Day +117   -he has recovered from SCT  -his day +100 restaging cw with MRD negative (10e-5) sCR ; excellent new shared with pt       -given high risk disease, discussed recommendations for double maintenance with q 2 week velcade /renally dose revlimid for HD for minimum of 2 years, likely longer pending data       -discussed with Dr. Bunch who will be administering maintenance locally  And addition of subq denisse monthly to Vr or substituting denisse for velcade deu to neuropathy very  reasonable  -recommend continue acyc ppx,  continue eliquis if on ImId  -recommend monthly labs/biochemical studies while on maintenance    -will pt back in approximately 1 months to check in and get post SCT vaccines arranged following that appt             cytopenia due to antineoplastic chemotherapy  - suspect multifactorial form ESRD, chemotherapy  -can resume JULIO C per nephrologist     Moderate malnutrition  Following Dietitian  Encouraged small frequent meals    Improved          Hyperlipidemia  - On statin     Depression  - Continue home Zoloft     Deep vein thrombosis (DVT) of upper extremity  - on apixiban ; recommend remain on this do vTE risk with myeloma/therapy    Hypertension  - continue metoprolol     End stage renal disease  - developed during ICU stay. Believed to be 2/2 hypoprofusion due to hypotension  - on dialysis MWF at home.   - right chest wall dialysis catheter in place. Seeing surgeon on next week regarding AV Fistula. Plan for AV Fistula following Day +100     Dependence on hemodialysis  - see ESRD     Chronic infection of prosthetic knee  - Seen in ID clinic with Dr. Chatterjee prior to transplant   - ID rec'd continuing doxycycline throughout transplant.      Pancreatic Lesion  - Recent CT CAP on 01/21/23 with 1.3 cm hypoattenuating lesion within the pancreatic head   - will ask Dr. Bunch to fu on pancreatic MRI protocol     FU:  Virtual MD appt in  1 month

## 2023-07-18 ENCOUNTER — OFFICE VISIT (OUTPATIENT)
Dept: HEMATOLOGY/ONCOLOGY | Facility: CLINIC | Age: 58
End: 2023-07-18
Payer: MEDICARE

## 2023-07-18 DIAGNOSIS — Z71.85 VACCINE COUNSELING: ICD-10-CM

## 2023-07-18 DIAGNOSIS — Z79.899 IMMUNODEFICIENCY DUE TO DRUGS: ICD-10-CM

## 2023-07-18 DIAGNOSIS — N18.6 ANEMIA IN ESRD (END-STAGE RENAL DISEASE): ICD-10-CM

## 2023-07-18 DIAGNOSIS — Z94.84 HISTORY OF AUTO STEM CELL TRANSPLANT: ICD-10-CM

## 2023-07-18 DIAGNOSIS — D63.1 ANEMIA IN ESRD (END-STAGE RENAL DISEASE): ICD-10-CM

## 2023-07-18 DIAGNOSIS — C90.01 MULTIPLE MYELOMA IN REMISSION: Primary | ICD-10-CM

## 2023-07-18 DIAGNOSIS — D84.821 IMMUNODEFICIENCY DUE TO DRUGS: ICD-10-CM

## 2023-07-18 DIAGNOSIS — N18.6 ESRD ON HEMODIALYSIS: ICD-10-CM

## 2023-07-18 DIAGNOSIS — Z99.2 ESRD ON HEMODIALYSIS: ICD-10-CM

## 2023-07-18 PROCEDURE — 99215 OFFICE O/P EST HI 40 MIN: CPT | Mod: 95,,, | Performed by: INTERNAL MEDICINE

## 2023-07-18 PROCEDURE — 99215 PR OFFICE/OUTPT VISIT, EST, LEVL V, 40-54 MIN: ICD-10-PCS | Mod: 95,,, | Performed by: INTERNAL MEDICINE

## 2023-07-18 PROCEDURE — 3066F PR DOCUMENTATION OF TREATMENT FOR NEPHROPATHY: ICD-10-PCS | Mod: CPTII,95,, | Performed by: INTERNAL MEDICINE

## 2023-07-18 PROCEDURE — 3066F NEPHROPATHY DOC TX: CPT | Mod: CPTII,95,, | Performed by: INTERNAL MEDICINE

## 2023-07-18 NOTE — PROGRESS NOTES
The patient location is: home  The chief complaint leading to consultation is: MM/SCT fu    Visit type: audiovisual    Face to Face time with patient: 10  30  minutes of total time spent on the encounter, which includes face to face time and non-face to face time preparing to see the patient (eg, review of tests), Obtaining and/or reviewing separately obtained history, Documenting clinical information in the electronic or other health record, Independently interpreting results (not separately reported) and communicating results to the patient/family/caregiver, or Care coordination (not separately reported).         Each patient to whom he or she provides medical services by telemedicine is:  (1) informed of the relationship between the physician and patient and the respective role of any other health care provider with respect to management of the patient; and (2) notified that he or she may decline to receive medical services by telemedicine and may withdraw from such care at any time.    Notes:      HEMATOLOGY/BMT   CLINIC return pt note     Hematology History  Initial consult  Mr. De Lakhani is a 57 year old male with hypertension, history of prosthetic joint infection who was referred by Dr. Jonh Bunch for transplant evaluation for high-risk kappa light chain multiple myeloma with 1q gain.     Mr. Lakhani was diagnosed with multiple myeloma in April 2022 after presenting with ASHLEE. 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 He had bone marrow biopsy which showed 100% involvement by plasma cells. He was started on CyBorD on 5/18 and received two cycles of treatment. His bone marrow biopsy after the first cycle showed a decrease in his plasma cell involvement to 80-90%. Dr. Bunch then started DVRd on 7/5/22. However, he was admitted to the hospital on 7/7/22 with septic shock requiring ICU care. He improved with antibiotics but had an ASHLEE thought to be due to hypotension which resulted in  renal failure. He is now dialysis dependent. He saw Dr. Bunch after he was discharged from the hospital and started back on DVRd with dose reduced lenalidomide on 7/25/22.     Oncology history:  4/20/22: renal biopsy: light chain cast nephropathy, kappa light chain  4/26/22: right subclavian vein thrombus   4/27/22: M spike 0.2 with free monoclonal kappa band. B2mg 19.57, IgG 496, IgA 10, IgM <5. Kappa 91773, Lambda 7.9, ratio >1000  5/12/22: BMBx: plasma cell myeloma, marrow cellularity 100%, plasma cell percentage 100%. Plasma cell phenotype +, kappa +, CD20- -, CD30-, EMA-, SADAF-, Congo red negative. Peripheral blood with pancytopenia and no circulating blasts. Decreased storage iron. FISH canceled due to quantity not sufficient  5/18/22: CyBorD C1D1  5/23/22: rasburicase  5/24/22: Xgeva  6/6/22: Kappa 82026, lambda 4.0, ratio >1000, M spike 0.1 with free monoclonal kappa band present  6/6/22: CyBorD C2D1  6/9/22: BMBx Plasma cell neoplasm compatible with plasma cell myeloma. Extent of involvement 80-90% of bone marrow elements. Cytology: Plasmablastic. FISH cytogenetics positive for 1q21/CKS1B gain. Karyotype failed. Myelofibrosis diffuse (MF-3)  6/20/22: CyBorD C2D8 delayed due to covid  6/23/22: CyBOrD C2D11  7/5/22: C1D1 DVRd  7/7/22 - 7/18/22: hospital admission for septic shock resulting in renal failure  7/25/22: started again on DVRd  8/8/2022: kappa 1402.8, lambda 7.5, ratio 167.04. M spike 0.1, two faint restricted bands in gamma globulin regions.   10/10/22: C5D1 DVRd. Revlimid on hold for upcoming stem cell collection  10/31/22: C6D1 DVRd. Revlimid on hold  12/30/22: MelASCT  Patient had a left knee prosthetic joint infection in the summer of 2021. In March, still had bacteria in joint space, now on prophylactic doxycycline.   He had a colonoscopy 8 years ago which he thinks was clear.     Interval History:  Mr. Lakhani presents today follow up. Received kortney asct for his MM. Today is Day +  200; SCT day 12/30/22; he has recovered from transplant; back at home on HD; wife present durign interview.  his day +100 post SCT restaging was cw with MRD negative complete remission.  He has subsequently started quad maintenance with Dr. Bunch with Jeanne-Morgan tolerating well.    I dont have recent biochemical studies but anecdotally remains in CR.   Feels well .  Has not yet seen ID for vaccines counseling.     Past Medical History:   Diagnosis Date    Chronic infection of prosthetic knee, subsequent encounter     Encounter for blood transfusion     Essential (primary) hypertension     Multiple myeloma        Family History   Problem Relation Age of Onset    Heart attack Father        Social History     Socioeconomic History    Marital status:      Spouse name: Darlyn    Number of children: 1   Tobacco Use    Smoking status: Former    Smokeless tobacco: Never    Tobacco comments:     quit smoking in the 90s   Substance and Sexual Activity    Alcohol use: Not Currently         MEDICATIONS:     Current Outpatient Medications on File Prior to Visit   Medication Sig Dispense Refill    acyclovir (ZOVIRAX) 200 MG capsule Take 2 capsules (400 mg total) by mouth 2 (two) times daily. 120 capsule 11    apixaban (ELIQUIS) 5 mg Tab Take 1 tablet (5 mg total) by mouth 2 (two) times daily. Hold until instructed to resume by BMT provider.      calcium carbonate (TUMS) 200 mg calcium (500 mg) chewable tablet Chew and swallow 2 tablets (1,000 mg total) by mouth 3 (three) times daily. 180 tablet 11    cholecalciferol, vitamin D3, 75 mcg (3,000 unit) Tab Take 1 tablet by mouth once daily.      doxycycline (VIBRAMYCIN) 100 MG Cap Take 100 mg by mouth every 12 (twelve) hours. Preventative for past knee infection      ergocalciferol (ERGOCALCIFEROL) 50,000 unit Cap Take 50,000 Units by mouth every 14 (fourteen) days. Given at dialysis center      famotidine (PEPCID) 20 MG tablet Take 20 mg by mouth once daily.      LIDOcaine HCl  2% (XYLOCAINE) 2 % Soln 15 mLs by Mucous Membrane route every 6 (six) hours as needed (mouth sores). 100 mL 0    metoprolol succinate (TOPROL-XL) 25 MG 24 hr tablet Take 0.5 tablets (12.5 mg total) by mouth once daily. 15 tablet 11    OLANZapine (ZYPREXA) 5 MG tablet Take 1 tablet (5 mg total) by mouth every evening. 30 tablet 11    ondansetron (ZOFRAN) 4 MG tablet TAKE ONE TABLET BY MOUTH EVERY EIGHT HOURS IF NEEDED FOR NAUSEA.      oxyCODONE-acetaminophen (PERCOCET)  mg per tablet Take 1 tablet by mouth every 12 (twelve) hours as needed for Pain.      prochlorperazine (COMPAZINE) 10 MG tablet Take 1 tablet (10 mg total) by mouth 3 (three) times daily as needed (for nausea/vomiting). 30 tablet 0    rosuvastatin (CRESTOR) 20 MG tablet Take 20 mg by mouth once daily.      scopolamine (TRANSDERM-SCOP) 1.3-1.5 mg (1 mg over 3 days) Place 1 patch onto the skin Every 3 (three) days. Place 1 patch onto the skin every 3 days as needed for nausea      sertraline (ZOLOFT) 50 MG tablet Take 50 mg by mouth once daily.      sevelamer carbonate (RENVELA) 800 mg Tab Take 1 tablet (800 mg total) by mouth 3 (three) times daily with meals. (Patient not taking: Reported on 4/11/2023) 90 tablet 11    traMADoL (ULTRAM) 50 mg tablet Take 50 mg by mouth every 6 to 8 hours as needed for Pain.       No current facility-administered medications on file prior to visit.       ALLERGIES: Review of patient's allergies indicates:  No Known Allergies     ROS:     See HPI     PHYSICAL EXAM:  There were no vitals filed for this visit.    physical exam deferred due to telemed  Appears well  on camera    Lab Results   Component Value Date    WBC 2.96 (L) 04/11/2023    HGB 7.4 (L) 04/11/2023    HCT 22.9 (L) 04/11/2023     (H) 04/11/2023     (L) 04/11/2023       CMP  Sodium   Date Value Ref Range Status   04/11/2023 141 136 - 145 mmol/L Final     Potassium   Date Value Ref Range Status   04/11/2023 4.1 3.5 - 5.1 mmol/L Final      Chloride   Date Value Ref Range Status   04/11/2023 108 95 - 110 mmol/L Final     CO2   Date Value Ref Range Status   04/11/2023 23 23 - 29 mmol/L Final     Glucose   Date Value Ref Range Status   04/11/2023 91 70 - 110 mg/dL Final     BUN   Date Value Ref Range Status   04/11/2023 32 (H) 6 - 20 mg/dL Final     Creatinine   Date Value Ref Range Status   04/11/2023 5.2 (H) 0.5 - 1.4 mg/dL Final     Calcium   Date Value Ref Range Status   04/11/2023 8.9 8.7 - 10.5 mg/dL Final     Total Protein   Date Value Ref Range Status   04/11/2023 6.2 6.0 - 8.4 g/dL Final     Albumin   Date Value Ref Range Status   04/11/2023 4.2 3.5 - 5.2 g/dL Final     Total Bilirubin   Date Value Ref Range Status   04/11/2023 0.6 0.1 - 1.0 mg/dL Final     Comment:     For infants and newborns, interpretation of results should be based  on gestational age, weight and in agreement with clinical  observations.    Premature Infant recommended reference ranges:  Up to 24 hours.............<8.0 mg/dL  Up to 48 hours............<12.0 mg/dL  3-5 days..................<15.0 mg/dL  6-29 days.................<15.0 mg/dL       Alkaline Phosphatase   Date Value Ref Range Status   04/11/2023 47 (L) 55 - 135 U/L Final     AST   Date Value Ref Range Status   04/11/2023 16 10 - 40 U/L Final     ALT   Date Value Ref Range Status   04/11/2023 10 10 - 44 U/L Final     Anion Gap   Date Value Ref Range Status   04/11/2023 10 8 - 16 mmol/L Final     eGFR   Date Value Ref Range Status   04/11/2023 12.0 (A) >60 mL/min/1.73 m^2 Final     Kappa Free Light Chains   Date Value Ref Range Status   04/11/2023 1.69 0.33 - 1.94 mg/dL Final   03/07/2023 1.00 0.33 - 1.94 mg/dL Final   11/22/2022 127.36 (H) 0.33 - 1.94 mg/dL Final     Lambda Free Light Chains   Date Value Ref Range Status   04/11/2023 0.64 0.57 - 2.63 mg/dL Final   03/07/2023 0.67 0.57 - 2.63 mg/dL Final   11/22/2022 0.70 0.57 - 2.63 mg/dL Final     Kappa/Lambda FLC Ratio   Date Value Ref Range Status    04/11/2023 2.64 (H) 0.26 - 1.65 Final     Comment:     Undetected antigen excess is a rare event but cannot   be excluded. If these free light chain results do not   agree with other clinical or laboratory findings or   if the sample is from a patient that has previously   demonstrated antigen excess, discuss with the testing   laboratory.   Results should always be interpreted in conjunction   with other laboratory tests and clinical evidence.     03/07/2023 1.49 0.26 - 1.65 Final     Comment:     Undetected antigen excess is a rare event but cannot   be excluded. If these free light chain results do not   agree with other clinical or laboratory findings or   if the sample is from a patient that has previously   demonstrated antigen excess, discuss with the testing   laboratory.   Results should always be interpreted in conjunction   with other laboratory tests and clinical evidence.     11/22/2022 181.90 (H) 0.26 - 1.65 Final     Comment:     Undetected antigen excess is a rare event but cannot   be excluded. If these free light chain results do not   agree with other clinical or laboratory findings or   if the sample is from a patient that has previously   demonstrated antigen excess, discuss with the testing   laboratory.   Results should always be interpreted in conjunction   with other laboratory tests and clinical evidence.       IgG   Date Value Ref Range Status   04/11/2023 528 (L) 650 - 1600 mg/dL Final     Comment:     IgG Cord Blood Reference Range: 650-1600 mg/dL.     IgA   Date Value Ref Range Status   04/11/2023 6 (L) 40 - 350 mg/dL Final     Comment:     IgA Cord Blood Reference Range: <5 mg/dL.     IgM   Date Value Ref Range Status   04/11/2023 8 (L) 50 - 300 mg/dL Final     Comment:     IgM Cord Blood Reference Range: <25 mg/dL.     No results found for this or any previous visit (from the past 2160 hour(s)).    Marrow biopsy - 4/11/23  LEFT ILIAC CREST BONE MARROW ASPIRATE, BONE MARROW CLOT, AND  BONE MARROW CORE BIOPSY WITH:     CELLULARITY=20-30%, TRILINEAGE HEMATOPOIETIC ACTIVITY (M/E=1.7:1).   NO DEFINITIVE MORPHOLOGIC OR IMMUNOPHENOTYPIC EVIDENCE OF RESIDUAL PLASMA CELL NEOPLASM.  SEE COMMENT.   INCREASED STORAGE IRON.   ADEQUATE NUMBER OF MEGAKARYOCYTES  Final Diagnosis  SEE BELOW    Comment: Bone marrow, flow cytometric immunophenotyping:     No monotypic plasma cells identified (MRD-negative).        Pathologist Interpretation SPE  Pathologist Interpretation SPE  Collected: 04/11/23 0800   Result status: Final   Resulting lab: [facility]   Value: REVIEWED   Comment:   Electronically reviewed and signed by:   Lindsay Solo MD   Signed on 04/12/23 at 15:10   Decreased total protein.   Decreased gamma globulins.     No paraprotein bands identified.    *Additional information available - comment   Pathologist Interpretation GRACE  Pathologist Interpretation GRACE  Collected: 04/11/23 0800   Result status: Final   Resulting lab: [facility] [place]   Value: REVIEWED   Comment:   Electronically reviewed and signed by:   Lindsay Solo MD   Signed on 04/12/23 at 14:36   No monoclonal peaks identified.    *Additional information available - comment     PROBLEMS ASSESSED THIS VISIT:    1. Multiple myeloma in remission    2. History of auto stem cell transplant    3. Immunodeficiency due to drugs    4. ESRD on hemodialysis    5. Anemia in ESRD (end-stage renal disease)        ASSESSMENT AND PLAN    Multiple myeloma ,  ultra high risk (TP53 deletion and a 1q duplication)/sp autoSCT     - Diagnosed with MM in April 2022. Underwent 2 cycles CyBorD then transitioned to DVRd (now s/p C6); please see hpi for oncologic details/dates  - subsequent underwent kortney 140sct on 12/30/22; Today is Day +200   -he has recovered from SCT  -his day +100 restaging cw with MRD negative (10e-5) sCR        -given high risk disease, he has been placed on Jeanne-VRd maintenance with   Julio C; tolerating with no major AE's and responding well;       -recommend continue acyc ppx,  continue eliquis if on ImId  -recommend monthly labs/biochemical studies while on maintenance    -will see pt virtually in approximately 3  months to check in and get  1 year restaging with MRD studies  scheduled (dec 2023)      cytopenia due to antineoplastic chemotherapy  - suspect multifactorial form ESRD, chemotherapy  -can resume JULIO C per nephrologist     Moderate malnutrition  Following Dietitian  Encouraged small frequent meals    Improved          Hyperlipidemia  - On statin     Depression  - Continue home Zoloft     Deep vein thrombosis (DVT) of upper extremity  - on apixiban ; recommend remain on this do vTE risk with myeloma/therapy    Hypertension  - continue metoprolol     End stage renal disease   -on HD locally under care of Dr. Drew  Chronic infection of prosthetic knee  - Seen in ID clinic with Dr. Chatterjee prior to transplant   - ID rec'd continuing doxycycline throughout transplant.   -will refer to transplant ID for post transplant vaccine counseling; prefers virtual appt and to have vaccines arranged locally     Pancreatic Lesion  - Recent CT CAP on 01/21/23 with 1.3 cm hypoattenuating lesion within the pancreatic head   - will ask Dr. Bunch to fu on pancreatic MRI protocol     FU:   -refer to ID for transplant vaccine counseling ; requesting virtual appt for this   -jesus GALVAN appt with me in 3 months

## 2023-07-18 NOTE — Clinical Note
-refer to ID for transplant vaccine counseling ; requesting virtual appt for this  -virtual MD appt with me in 3 months

## 2023-09-19 ENCOUNTER — OFFICE VISIT (OUTPATIENT)
Dept: INFECTIOUS DISEASES | Facility: CLINIC | Age: 58
End: 2023-09-19
Payer: MEDICARE

## 2023-09-19 DIAGNOSIS — C90.01 MULTIPLE MYELOMA IN REMISSION: ICD-10-CM

## 2023-09-19 DIAGNOSIS — Z71.85 VACCINE COUNSELING: ICD-10-CM

## 2023-09-19 DIAGNOSIS — Z94.84 HISTORY OF AUTO STEM CELL TRANSPLANT: Primary | ICD-10-CM

## 2023-09-19 PROCEDURE — 3066F PR DOCUMENTATION OF TREATMENT FOR NEPHROPATHY: ICD-10-PCS | Mod: CPTII,95,, | Performed by: INTERNAL MEDICINE

## 2023-09-19 PROCEDURE — 99214 OFFICE O/P EST MOD 30 MIN: CPT | Mod: 95,,, | Performed by: INTERNAL MEDICINE

## 2023-09-19 PROCEDURE — 99214 PR OFFICE/OUTPT VISIT, EST, LEVL IV, 30-39 MIN: ICD-10-PCS | Mod: 95,,, | Performed by: INTERNAL MEDICINE

## 2023-09-19 PROCEDURE — 3066F NEPHROPATHY DOC TX: CPT | Mod: CPTII,95,, | Performed by: INTERNAL MEDICINE

## 2023-09-19 NOTE — PATIENT INSTRUCTIONS
Table I: COVID-19 Vaccine  COVID-19 Booster Time after Transplant   Sproutel or Moderna 10/1/23 12/1/23     Table II: Inactivated High-Dose Influenza Vaccine  High-Dose Influenza Vaccine Time after Transplant   September through March 9/19/23 Annually     Table III: Adult Inactivated Vaccine Schedule  Inactive Vaccine Time after Transplant    9/19/23 11/19/23 1/19/24 3/19/24   Haemophilus influenzae type B  HiB HiB HiB    Meningococcal ACWY (Menveo or Menactra) MCV4 MCV4     Pneumococcal-conjugate (Prevnar 20)*  Pneumococcal-polysaccharide (Pneumovax 23) PCV20 PCV20 PCV20 PCV20   Inactivated Polio IPV IPV IPV    Diphtheria-Tetanus-acellular Pertussis DTaP DTaP DTaP    Twinrix (Hepatitis A/B) HAV/HBV HAV/HBV  HAV/HBV     Adult Live Vaccine Schedule After 24 Months - All Stem Cell Transplants     2-1-5 Rule - Not until:  2 years post-HSCT  >1 year off all immunosuppressive therapy  >5 months since last dose of IVIg, VZIg, plasma transfusion    Live Vaccine Earliest administration  12/30/2024 ?24m ?25m   Measles/Mumps/Rubella (MMR)  MMR MMR   Varicella Zoster (Varivax) If VZV IgG titers  If NEGATIVE, vaccinate Varivax Varivax     Adult Non-Live Adjuvant Vaccine Schedule - Shingrix    Check VZV IgG titers  If POSITIVE, vaccinate Autologous   Shingrix #1 >Day 100   Shingrix #2  >Day 160

## 2023-09-19 NOTE — PROGRESS NOTES
The patient location is: [place]  The chief complaint leading to consultation is: post-SCT vaccines    Visit type: audiovisual    Face to Face time with patient: 20  30 minutes of total time spent on the encounter, which includes face to face time and non-face to face time preparing to see the patient (eg, review of tests), Obtaining and/or reviewing separately obtained history, Documenting clinical information in the electronic or other health record, Independently interpreting results (not separately reported) and communicating results to the patient/family/caregiver, or Care coordination (not separately reported).     Each patient to whom he or she provides medical services by telemedicine is:  (1) informed of the relationship between the physician and patient and the respective role of any other health care provider with respect to management of the patient; and (2) notified that he or she may decline to receive medical services by telemedicine and may withdraw from such care at any time.    Notes:       POST-HSCT VACCINATION ENCOUNTER    Subjective:     Patient ID: De Lakhani is a 58 y.o. male    CC: BMT vaccine update    [facility] oncologist: Christine  [facility] oncologist: Jonh Bunch    Transplant: Autologous stem cell transplant  Date of Transplant: 12/30/2022  cGVHD: no  Current therapy: Jeanne-VRd    Prophylaxis:  acyclovir, chronic suppressive doxycycline for knee PJI    HPI: De Lakhani is a 58 y.o. male presenting for post-stem cell transplant vaccination update. Patient denies any acute complaints today.       Review of Systems   All other systems reviewed and are negative.       Past Medical History:   Diagnosis Date    Chronic infection of prosthetic knee, subsequent encounter     Encounter for blood transfusion     Essential (primary) hypertension     Multiple myeloma        Past Surgical History:   Procedure Laterality Date    KNEE SURGERY      neck infusion         Family History   Problem Relation Age  of Onset    Heart attack Father        Social History     Tobacco Use    Smoking status: Former    Smokeless tobacco: Never    Tobacco comments:     quit smoking in the 90s   Substance Use Topics    Alcohol use: Not Currently         There is no immunization history on file for this patient.     Objective:     Physical Exam  Constitutional:       General: He is not in acute distress.     Appearance: Normal appearance. He is well-developed. He is not ill-appearing or diaphoretic.   HENT:      Head: Normocephalic and atraumatic.      Right Ear: External ear normal.      Left Ear: External ear normal.      Nose: Nose normal.   Eyes:      General: No scleral icterus.        Right eye: No discharge.         Left eye: No discharge.      Extraocular Movements: Extraocular movements intact.      Conjunctiva/sclera: Conjunctivae normal.   Pulmonary:      Effort: Pulmonary effort is normal. No respiratory distress.      Breath sounds: No stridor.   Skin:     General: Skin is dry.      Coloration: Skin is not jaundiced or pale.      Findings: No erythema.   Neurological:      General: No focal deficit present.      Mental Status: He is alert and oriented to person, place, and time. Mental status is at baseline.   Psychiatric:         Mood and Affect: Mood normal.         Behavior: Behavior normal.         Thought Content: Thought content normal.         Judgment: Judgment normal.           Data:    All data, including recent labs, radiology, and pathology, has been independently reviewed.      Labs:    No results found in the last 24 hours      Radiology:    No results found in the last 24 hours.       Assessment:    1. History of auto stem cell transplant      Varicella Zoster Antibody, IgG w/ next labs  Vaccine schedule as below - prescription and schedule to be mailed to patient to receive vaccines locally    Continue chronic suppressive doxycycline indefinitely for hx of knee PJI (see note 11/14/22 for details)           Table I: COVID-19 Vaccine  COVID-19 Booster Time after Transplant   PrecisionDemand or Moderna 10/1/23 12/1/23     Table II: Inactivated High-Dose Influenza Vaccine  High-Dose Influenza Vaccine Time after Transplant   September through March 9/19/23 Annually     Table III: Adult Inactivated Vaccine Schedule  Inactive Vaccine Time after Transplant    9/19/23 11/19/23 1/19/24 3/19/24   Haemophilus influenzae type B  HiB HiB HiB    Meningococcal ACWY (Menveo or Menactra) MCV4 MCV4     Pneumococcal-conjugate (Prevnar 20)*  Pneumococcal-polysaccharide (Pneumovax 23) PCV20 PCV20 PCV20 PCV20   Inactivated Polio IPV IPV IPV    Diphtheria-Tetanus-acellular Pertussis DTaP DTaP DTaP    Twinrix (Hepatitis A/B) HAV/HBV HAV/HBV  HAV/HBV     Adult Live Vaccine Schedule After 24 Months - All Stem Cell Transplants     2-1-5 Rule - Not until:  2 years post-HSCT  >1 year off all immunosuppressive therapy  >5 months since last dose of IVIg, VZIg, plasma transfusion    Live Vaccine 12/20/2024 ?24m ?25m   Measles/Mumps/Rubella (MMR)  MMR MMR   Varicella Zoster (Varivax) VZV IgG titers  If NEGATIVE, vaccinate Varivax Varivax     Adult Non-Live Adjuvant Vaccine Schedule - Shingrix    Check VZV IgG titers  If POSITIVE, vaccinate Autologous   Shingrix #1 >Day 100   Shingrix #2  >Day 160       Follow up in 1 year    The total time for evaluation and management services performed on 9/19/23 was greater than 30 minutes.     Vee Cocco DO  Transplant Infectious Disease

## 2023-10-17 ENCOUNTER — OFFICE VISIT (OUTPATIENT)
Dept: HEMATOLOGY/ONCOLOGY | Facility: CLINIC | Age: 58
End: 2023-10-17
Payer: MEDICARE

## 2023-10-17 DIAGNOSIS — Z94.84 HISTORY OF AUTO STEM CELL TRANSPLANT: ICD-10-CM

## 2023-10-17 DIAGNOSIS — C90.02 MULTIPLE MYELOMA IN RELAPSE: Primary | ICD-10-CM

## 2023-10-17 DIAGNOSIS — Z79.899 IMMUNODEFICIENCY DUE TO DRUGS: ICD-10-CM

## 2023-10-17 DIAGNOSIS — D84.821 IMMUNODEFICIENCY DUE TO DRUGS: ICD-10-CM

## 2023-10-17 DIAGNOSIS — Z99.2 ESRD ON HEMODIALYSIS: ICD-10-CM

## 2023-10-17 DIAGNOSIS — N18.6 ESRD ON HEMODIALYSIS: ICD-10-CM

## 2023-10-17 PROCEDURE — 99214 PR OFFICE/OUTPT VISIT, EST, LEVL IV, 30-39 MIN: ICD-10-PCS | Mod: 95,,, | Performed by: INTERNAL MEDICINE

## 2023-10-17 PROCEDURE — 3066F PR DOCUMENTATION OF TREATMENT FOR NEPHROPATHY: ICD-10-PCS | Mod: CPTII,95,, | Performed by: INTERNAL MEDICINE

## 2023-10-17 PROCEDURE — 3066F NEPHROPATHY DOC TX: CPT | Mod: CPTII,95,, | Performed by: INTERNAL MEDICINE

## 2023-10-17 PROCEDURE — 99214 OFFICE O/P EST MOD 30 MIN: CPT | Mod: 95,,, | Performed by: INTERNAL MEDICINE

## 2023-10-17 NOTE — PROGRESS NOTES
The patient location is: hospital  The chief complaint leading to consultation is: MM/SCT fu    Visit type: audiovisual    Face to Face time with patient: 10  30  minutes of total time spent on the encounter, which includes face to face time and non-face to face time preparing to see the patient (eg, review of tests), Obtaining and/or reviewing separately obtained history, Documenting clinical information in the electronic or other health record, Independently interpreting results (not separately reported) and communicating results to the patient/family/caregiver, or Care coordination (not separately reported).         Each patient to whom he or she provides medical services by telemedicine is:  (1) informed of the relationship between the physician and patient and the respective role of any other health care provider with respect to management of the patient; and (2) notified that he or she may decline to receive medical services by telemedicine and may withdraw from such care at any time.    Notes:      HEMATOLOGY/BMT   CLINIC return pt note     Hematology History  Initial consult  Mr. De Lakhani is a 57 year old male with hypertension, history of prosthetic joint infection who was referred by Dr. Jonh Bunch for transplant evaluation for high-risk kappa light chain multiple myeloma with 1q gain.     Mr. Lakhani was diagnosed with multiple myeloma in April 2022 after presenting with ASHLEE. 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 He had bone marrow biopsy which showed 100% involvement by plasma cells. He was started on CyBorD on 5/18 and received two cycles of treatment. His bone marrow biopsy after the first cycle showed a decrease in his plasma cell involvement to 80-90%. Dr. Bunch then started DVRd on 7/5/22. However, he was admitted to the hospital on 7/7/22 with septic shock requiring ICU care. He improved with antibiotics but had an ASHLEE thought to be due to hypotension which resulted in  renal failure. He is now dialysis dependent. He saw Dr. Bunch after he was discharged from the hospital and started back on DVRd with dose reduced lenalidomide on 7/25/22.     Oncology history:  4/20/22: renal biopsy: light chain cast nephropathy, kappa light chain  4/26/22: right subclavian vein thrombus   4/27/22: M spike 0.2 with free monoclonal kappa band. B2mg 19.57, IgG 496, IgA 10, IgM <5. Kappa 35829, Lambda 7.9, ratio >1000  5/12/22: BMBx: plasma cell myeloma, marrow cellularity 100%, plasma cell percentage 100%. Plasma cell phenotype +, kappa +, CD20- -, CD30-, EMA-, SADAF-, Congo red negative. Peripheral blood with pancytopenia and no circulating blasts. Decreased storage iron. FISH canceled due to quantity not sufficient  5/18/22: CyBorD C1D1  5/23/22: rasburicase  5/24/22: Xgeva  6/6/22: Kappa 15981, lambda 4.0, ratio >1000, M spike 0.1 with free monoclonal kappa band present  6/6/22: CyBorD C2D1  6/9/22: BMBx Plasma cell neoplasm compatible with plasma cell myeloma. Extent of involvement 80-90% of bone marrow elements. Cytology: Plasmablastic. FISH cytogenetics positive for 1q21/CKS1B gain. Karyotype failed. Myelofibrosis diffuse (MF-3)  6/20/22: CyBorD C2D8 delayed due to covid  6/23/22: CyBOrD C2D11  7/5/22: C1D1 DVRd  7/7/22 - 7/18/22: hospital admission for septic shock resulting in renal failure  7/25/22: started again on DVRd  8/8/2022: kappa 1402.8, lambda 7.5, ratio 167.04. M spike 0.1, two faint restricted bands in gamma globulin regions.   10/10/22: C5D1 DVRd. Revlimid on hold for upcoming stem cell collection  10/31/22: C6D1 DVRd. Revlimid on hold  12/30/22: MelASCT  Patient had a left knee prosthetic joint infection in the summer of 2021. In March, still had bacteria in joint space, now on prophylactic doxycycline.   He had a colonoscopy 8 years ago which he thinks was clear.     Interval History:  Mr. Lakhani presents today follow up. Received kortney asct for his MM. Today is Day +  291; SCT day 12/30/22; he has recovered from transplant;   his day +100 post SCT restaging was cw with MRD negative complete remission. Was on quad maintenance with Dr. Julio C robbins.   Unfortunately found to be in relapse.   Started DKd therapy a month ago. Currently admitted with fever with no source. Resolved and discharging today vs tomorrow on empiric abx.   Overall feels well. Continues HD.        Past Medical History:   Diagnosis Date    Chronic infection of prosthetic knee, subsequent encounter     Encounter for blood transfusion     Essential (primary) hypertension     Multiple myeloma        Family History   Problem Relation Age of Onset    Heart attack Father        Social History     Socioeconomic History    Marital status:      Spouse name: Darlyn    Number of children: 1   Tobacco Use    Smoking status: Former    Smokeless tobacco: Never    Tobacco comments:     quit smoking in the 90s   Substance and Sexual Activity    Alcohol use: Not Currently         MEDICATIONS:     Current Outpatient Medications on File Prior to Visit   Medication Sig Dispense Refill    acyclovir (ZOVIRAX) 200 MG capsule Take 2 capsules (400 mg total) by mouth 2 (two) times daily. 120 capsule 11    apixaban (ELIQUIS) 5 mg Tab Take 1 tablet (5 mg total) by mouth 2 (two) times daily. Hold until instructed to resume by BMT provider.      calcium carbonate (TUMS) 200 mg calcium (500 mg) chewable tablet Chew and swallow 2 tablets (1,000 mg total) by mouth 3 (three) times daily. 180 tablet 11    cholecalciferol, vitamin D3, 75 mcg (3,000 unit) Tab Take 1 tablet by mouth once daily.      doxycycline (VIBRAMYCIN) 100 MG Cap Take 100 mg by mouth every 12 (twelve) hours. Preventative for past knee infection      ergocalciferol (ERGOCALCIFEROL) 50,000 unit Cap Take 50,000 Units by mouth every 14 (fourteen) days. Given at dialysis center      famotidine (PEPCID) 20 MG tablet Take 20 mg by mouth once daily.      LIDOcaine HCl 2%  (XYLOCAINE) 2 % Soln 15 mLs by Mucous Membrane route every 6 (six) hours as needed (mouth sores). 100 mL 0    metoprolol succinate (TOPROL-XL) 25 MG 24 hr tablet Take 0.5 tablets (12.5 mg total) by mouth once daily. 15 tablet 11    OLANZapine (ZYPREXA) 5 MG tablet Take 1 tablet (5 mg total) by mouth every evening. 30 tablet 11    ondansetron (ZOFRAN) 4 MG tablet TAKE ONE TABLET BY MOUTH EVERY EIGHT HOURS IF NEEDED FOR NAUSEA.      oxyCODONE-acetaminophen (PERCOCET)  mg per tablet Take 1 tablet by mouth every 12 (twelve) hours as needed for Pain.      prochlorperazine (COMPAZINE) 10 MG tablet Take 1 tablet (10 mg total) by mouth 3 (three) times daily as needed (for nausea/vomiting). 30 tablet 0    rosuvastatin (CRESTOR) 20 MG tablet Take 20 mg by mouth once daily.      scopolamine (TRANSDERM-SCOP) 1.3-1.5 mg (1 mg over 3 days) Place 1 patch onto the skin Every 3 (three) days. Place 1 patch onto the skin every 3 days as needed for nausea      sertraline (ZOLOFT) 50 MG tablet Take 50 mg by mouth once daily.      sevelamer carbonate (RENVELA) 800 mg Tab Take 1 tablet (800 mg total) by mouth 3 (three) times daily with meals. (Patient not taking: Reported on 4/11/2023) 90 tablet 11    traMADoL (ULTRAM) 50 mg tablet Take 50 mg by mouth every 6 to 8 hours as needed for Pain.       No current facility-administered medications on file prior to visit.       ALLERGIES: Review of patient's allergies indicates:  No Known Allergies     ROS:     See HPI     PHYSICAL EXAM:       physical exam deferred due to telemed  Appears well  on camera    Lab Results   Component Value Date    WBC 2.96 (L) 04/11/2023    HGB 7.4 (L) 04/11/2023    HCT 22.9 (L) 04/11/2023     (H) 04/11/2023     (L) 04/11/2023       CMP  Sodium   Date Value Ref Range Status   04/11/2023 141 136 - 145 mmol/L Final     Potassium   Date Value Ref Range Status   04/11/2023 4.1 3.5 - 5.1 mmol/L Final     Chloride   Date Value Ref Range Status    04/11/2023 108 95 - 110 mmol/L Final     CO2   Date Value Ref Range Status   04/11/2023 23 23 - 29 mmol/L Final     Glucose   Date Value Ref Range Status   04/11/2023 91 70 - 110 mg/dL Final     BUN   Date Value Ref Range Status   04/11/2023 32 (H) 6 - 20 mg/dL Final     Creatinine   Date Value Ref Range Status   04/11/2023 5.2 (H) 0.5 - 1.4 mg/dL Final     Calcium   Date Value Ref Range Status   04/11/2023 8.9 8.7 - 10.5 mg/dL Final     Total Protein   Date Value Ref Range Status   04/11/2023 6.2 6.0 - 8.4 g/dL Final     Albumin   Date Value Ref Range Status   04/11/2023 4.2 3.5 - 5.2 g/dL Final     Total Bilirubin   Date Value Ref Range Status   04/11/2023 0.6 0.1 - 1.0 mg/dL Final     Comment:     For infants and newborns, interpretation of results should be based  on gestational age, weight and in agreement with clinical  observations.    Premature Infant recommended reference ranges:  Up to 24 hours.............<8.0 mg/dL  Up to 48 hours............<12.0 mg/dL  3-5 days..................<15.0 mg/dL  6-29 days.................<15.0 mg/dL       Alkaline Phosphatase   Date Value Ref Range Status   04/11/2023 47 (L) 55 - 135 U/L Final     AST   Date Value Ref Range Status   04/11/2023 16 10 - 40 U/L Final     ALT   Date Value Ref Range Status   04/11/2023 10 10 - 44 U/L Final     Anion Gap   Date Value Ref Range Status   04/11/2023 10 8 - 16 mmol/L Final     eGFR   Date Value Ref Range Status   04/11/2023 12.0 (A) >60 mL/min/1.73 m^2 Final     Kappa Free Light Chains   Date Value Ref Range Status   04/11/2023 1.69 0.33 - 1.94 mg/dL Final   03/07/2023 1.00 0.33 - 1.94 mg/dL Final   11/22/2022 127.36 (H) 0.33 - 1.94 mg/dL Final     Lambda Free Light Chains   Date Value Ref Range Status   04/11/2023 0.64 0.57 - 2.63 mg/dL Final   03/07/2023 0.67 0.57 - 2.63 mg/dL Final   11/22/2022 0.70 0.57 - 2.63 mg/dL Final     Kappa/Lambda FLC Ratio   Date Value Ref Range Status   04/11/2023 2.64 (H) 0.26 - 1.65 Final      Comment:     Undetected antigen excess is a rare event but cannot   be excluded. If these free light chain results do not   agree with other clinical or laboratory findings or   if the sample is from a patient that has previously   demonstrated antigen excess, discuss with the testing   laboratory.   Results should always be interpreted in conjunction   with other laboratory tests and clinical evidence.     03/07/2023 1.49 0.26 - 1.65 Final     Comment:     Undetected antigen excess is a rare event but cannot   be excluded. If these free light chain results do not   agree with other clinical or laboratory findings or   if the sample is from a patient that has previously   demonstrated antigen excess, discuss with the testing   laboratory.   Results should always be interpreted in conjunction   with other laboratory tests and clinical evidence.     11/22/2022 181.90 (H) 0.26 - 1.65 Final     Comment:     Undetected antigen excess is a rare event but cannot   be excluded. If these free light chain results do not   agree with other clinical or laboratory findings or   if the sample is from a patient that has previously   demonstrated antigen excess, discuss with the testing   laboratory.   Results should always be interpreted in conjunction   with other laboratory tests and clinical evidence.       IgG   Date Value Ref Range Status   04/11/2023 528 (L) 650 - 1600 mg/dL Final     Comment:     IgG Cord Blood Reference Range: 650-1600 mg/dL.     IgA   Date Value Ref Range Status   04/11/2023 6 (L) 40 - 350 mg/dL Final     Comment:     IgA Cord Blood Reference Range: <5 mg/dL.     IgM   Date Value Ref Range Status   04/11/2023 8 (L) 50 - 300 mg/dL Final     Comment:     IgM Cord Blood Reference Range: <25 mg/dL.     No results found for this or any previous visit (from the past 2160 hour(s)).    Marrow biopsy - 4/11/23  LEFT ILIAC CREST BONE MARROW ASPIRATE, BONE MARROW CLOT, AND BONE MARROW CORE BIOPSY WITH:      CELLULARITY=20-30%, TRILINEAGE HEMATOPOIETIC ACTIVITY (M/E=1.7:1).   NO DEFINITIVE MORPHOLOGIC OR IMMUNOPHENOTYPIC EVIDENCE OF RESIDUAL PLASMA CELL NEOPLASM.  SEE COMMENT.   INCREASED STORAGE IRON.   ADEQUATE NUMBER OF MEGAKARYOCYTES  Final Diagnosis  SEE BELOW    Comment: Bone marrow, flow cytometric immunophenotyping:     No monotypic plasma cells identified (MRD-negative).        Pathologist Interpretation SPE  Pathologist Interpretation SPE  Collected: 04/11/23 0800   Result status: Final   Resulting lab: [facility] [place]   Value: REVIEWED   Comment:   Electronically reviewed and signed by:   Lindsay Solo MD   Signed on 04/12/23 at 15:10   Decreased total protein.   Decreased gamma globulins.     No paraprotein bands identified.    *Additional information available - comment   Pathologist Interpretation GRACE  Pathologist Interpretation GRACE  Collected: 04/11/23 0800   Result status: Final   Resulting lab: [facility] [place]   Value: REVIEWED   Comment:   Electronically reviewed and signed by:   Lindsay Solo MD   Signed on 04/12/23 at 14:36   No monoclonal peaks identified.    *Additional information available - comment     PROBLEMS ASSESSED THIS VISIT:    1. Multiple myeloma in relapse    2. History of auto stem cell transplant    3. 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 Underwent 2 cycles CyBorD then transitioned to DVRd (now s/p C6); please see hpi for oncologic details/dates  - subsequent underwent kortney 140sct on 12/30/22; Today is Day +200   -he has recovered from SCT  -his day +100 restaging cw with MRD negative (10e-5) sCR        -given high risk disease, was placed on Jeanne-VRd maintenance with Dr. Bunch   -found to be in relapse sept 2023 and placed on KPd maintenance currently  cycle 1  -recommend continue therapy until intolerance or progression  -hopeful for earlier line CART approval first half 2024      -recommend continue acyc ppx,  continue eliquis if on ImId  -recommend monthly labs/biochemical studies while on maintenance    -will see pt virtually in approximately 3  months to check in and get  1 year restaging with MRD studies  scheduled (dec 2023)      cytopenia due to antineoplastic chemotherapy  - suspect multifactorial form ESRD, chemotherapy  -can resume JULIO C per nephrologist     Moderate malnutrition  Following Dietitian  Encouraged small frequent meals    Improved          Hyperlipidemia  - On statin     Depression  - Continue home Zoloft     Deep vein thrombosis (DVT) of upper extremity  - on apixiban ; recommend remain on this do vTE risk with myeloma/therapy    Hypertension  - continue metoprolol     End stage renal disease   -on HD locally under care of Dr. Drew  Chronic infection of prosthetic knee  - Seen in ID clinic with Dr. Chatterjee prior to transplant   - ID rec'd continuing doxycycline throughout transplant.   - referred to transplant ID for post transplant vaccine counseling; prefers virtual appt and to have vaccines arranged locally     Pancreatic Lesion  - Recent CT CAP on 01/21/23 with 1.3 cm hypoattenuating lesion within the pancreatic head   - will ask Dr. Bunch to fu on pancreatic MRI protocol     Fever     -resolved; currently in pt with no source  -abx per primary    FU:  With Dr. Bunch as above    Virtual MD appt jan 2024

## 2023-12-23 NOTE — PLAN OF CARE
Plan of care reviewed with the patient at the beginning of shift. The patient is alert and oriented. GCS 15. Denying complaints at this time. Day +3 AutoSCT. NAEON. Independent and ambulatory. Remained free from falls and injuries throughout shift. VSS. Bed in low locked position. Call bell and personal items within reach. Will continue to monitor.    
